# Patient Record
Sex: FEMALE | Race: WHITE | NOT HISPANIC OR LATINO | Employment: OTHER | URBAN - METROPOLITAN AREA
[De-identification: names, ages, dates, MRNs, and addresses within clinical notes are randomized per-mention and may not be internally consistent; named-entity substitution may affect disease eponyms.]

---

## 2017-01-02 DIAGNOSIS — I10 ESSENTIAL HYPERTENSION, BENIGN: Primary | ICD-10-CM

## 2017-01-02 NOTE — TELEPHONE ENCOUNTER
metoprolol (LOPRESSOR) 50 MG tablet      Last Written Prescription Date: 7/5/2016  Last Fill Quantity: 270, # refills: 1    Last Office Visit with [facility], [facility] or [facility] prescribing provider:  11/21/2016   Future Office Visit:    Next 5 appointments (look out 90 days)     Jan 23, 2017  2:30 PM   [facility]    [address] [place] [id number] [phone]            Jan 23, 2017  3:30 PM   Office Visit with Ventura Alvarez MD   [facility] ([facility])    [address] [place] [id number] [phone]                    BP Readings from Last 3 Encounters:   11/21/16 130/83   08/12/16 132/74   07/22/16 132/73

## 2017-01-03 RX ORDER — METOPROLOL TARTRATE 50 MG
TABLET ORAL
Qty: 270 TABLET | Refills: 1 | Status: SHIPPED | OUTPATIENT
Start: 2017-01-03 | End: 2017-06-27

## 2017-01-03 NOTE — TELEPHONE ENCOUNTER
Prescription approved per [facility] [place].    Missy Kent RN

## 2017-01-23 ENCOUNTER — OFFICE VISIT (OUTPATIENT)
Dept: PHARMACY | Facility: CLINIC | Age: 77
End: 2017-01-23
Payer: COMMERCIAL

## 2017-01-23 ENCOUNTER — OFFICE VISIT (OUTPATIENT)
Dept: FAMILY MEDICINE | Facility: CLINIC | Age: 77
End: 2017-01-23
Payer: COMMERCIAL

## 2017-01-23 VITALS
HEART RATE: 94 BPM | SYSTOLIC BLOOD PRESSURE: 124 MMHG | WEIGHT: 214 LBS | HEIGHT: 62 IN | BODY MASS INDEX: 39.38 KG/M2 | TEMPERATURE: 98.6 F | DIASTOLIC BLOOD PRESSURE: 62 MMHG

## 2017-01-23 VITALS
WEIGHT: 216 LBS | HEART RATE: 81 BPM | DIASTOLIC BLOOD PRESSURE: 66 MMHG | BODY MASS INDEX: 40.83 KG/M2 | SYSTOLIC BLOOD PRESSURE: 139 MMHG

## 2017-01-23 DIAGNOSIS — M79.10 MYALGIA: ICD-10-CM

## 2017-01-23 DIAGNOSIS — M79.602 PAIN IN BOTH UPPER EXTREMITIES: Primary | ICD-10-CM

## 2017-01-23 DIAGNOSIS — R79.89 ELEVATED SERUM CREATININE: Primary | ICD-10-CM

## 2017-01-23 DIAGNOSIS — I10 ESSENTIAL HYPERTENSION, BENIGN: ICD-10-CM

## 2017-01-23 DIAGNOSIS — I25.10 CORONARY ARTERY DISEASE INVOLVING NATIVE CORONARY ARTERY OF NATIVE HEART WITHOUT ANGINA PECTORIS: ICD-10-CM

## 2017-01-23 DIAGNOSIS — E78.5 HYPERLIPIDEMIA LDL GOAL <100: ICD-10-CM

## 2017-01-23 DIAGNOSIS — E11.59 TYPE 2 DIABETES MELLITUS WITH VASCULAR DISEASE (H): ICD-10-CM

## 2017-01-23 DIAGNOSIS — R82.81 PYURIA: ICD-10-CM

## 2017-01-23 DIAGNOSIS — E63.9 NUTRITIONAL DISORDER: ICD-10-CM

## 2017-01-23 DIAGNOSIS — M79.601 PAIN IN BOTH UPPER EXTREMITIES: Primary | ICD-10-CM

## 2017-01-23 DIAGNOSIS — K21.9 GASTROESOPHAGEAL REFLUX DISEASE, ESOPHAGITIS PRESENCE NOT SPECIFIED: ICD-10-CM

## 2017-01-23 LAB
ALBUMIN SERPL-MCNC: 3.6 G/DL (ref 3.4–5)
ALBUMIN UR-MCNC: 30 MG/DL
ALP SERPL-CCNC: 123 U/L (ref 40–150)
ALT SERPL W P-5'-P-CCNC: 20 U/L (ref 0–50)
ANION GAP SERPL CALCULATED.3IONS-SCNC: 10 MMOL/L (ref 3–14)
APPEARANCE UR: CLEAR
AST SERPL W P-5'-P-CCNC: 11 U/L (ref 0–45)
BILIRUB SERPL-MCNC: 0.4 MG/DL (ref 0.2–1.3)
BILIRUB UR QL STRIP: ABNORMAL
BUN SERPL-MCNC: 20 MG/DL (ref 7–30)
CALCIUM SERPL-MCNC: 9.1 MG/DL (ref 8.5–10.1)
CHLORIDE SERPL-SCNC: 109 MMOL/L (ref 94–109)
CK SERPL-CCNC: 68 U/L (ref 30–225)
CO2 SERPL-SCNC: 22 MMOL/L (ref 20–32)
COLOR UR AUTO: YELLOW
CREAT SERPL-MCNC: 1.13 MG/DL (ref 0.52–1.04)
ERYTHROCYTE [SEDIMENTATION RATE] IN BLOOD BY WESTERGREN METHOD: 39 MM/H (ref 0–30)
GFR SERPL CREATININE-BSD FRML MDRD: 46 ML/MIN/1.7M2
GLUCOSE SERPL-MCNC: 243 MG/DL (ref 70–99)
GLUCOSE UR STRIP-MCNC: 500 MG/DL
HBA1C MFR BLD: 8.7 % (ref 4.3–6)
HGB UR QL STRIP: NEGATIVE
HYALINE CASTS #/AREA URNS LPF: ABNORMAL /LPF (ref 0–2)
KETONES UR STRIP-MCNC: ABNORMAL MG/DL
LEUKOCYTE ESTERASE UR QL STRIP: ABNORMAL
NITRATE UR QL: NEGATIVE
PH UR STRIP: 5 PH (ref 5–7)
POTASSIUM SERPL-SCNC: 4.4 MMOL/L (ref 3.4–5.3)
PROT SERPL-MCNC: 7.2 G/DL (ref 6.8–8.8)
RBC #/AREA URNS AUTO: ABNORMAL /HPF (ref 0–2)
SODIUM SERPL-SCNC: 141 MMOL/L (ref 133–144)
SP GR UR STRIP: >1.03 (ref 1–1.03)
URN SPEC COLLECT METH UR: ABNORMAL
UROBILINOGEN UR STRIP-ACNC: 0.2 EU/DL (ref 0.2–1)
WBC #/AREA URNS AUTO: ABNORMAL /HPF (ref 0–2)

## 2017-01-23 PROCEDURE — 36415 COLL VENOUS BLD VENIPUNCTURE: CPT | Performed by: PREVENTIVE MEDICINE

## 2017-01-23 PROCEDURE — 82043 UR ALBUMIN QUANTITATIVE: CPT | Performed by: PREVENTIVE MEDICINE

## 2017-01-23 PROCEDURE — 80053 COMPREHEN METABOLIC PANEL: CPT | Performed by: PREVENTIVE MEDICINE

## 2017-01-23 PROCEDURE — 99214 OFFICE O/P EST MOD 30 MIN: CPT | Performed by: PREVENTIVE MEDICINE

## 2017-01-23 PROCEDURE — 86140 C-REACTIVE PROTEIN: CPT | Performed by: PREVENTIVE MEDICINE

## 2017-01-23 PROCEDURE — 85652 RBC SED RATE AUTOMATED: CPT | Performed by: PREVENTIVE MEDICINE

## 2017-01-23 PROCEDURE — 81001 URINALYSIS AUTO W/SCOPE: CPT | Performed by: PREVENTIVE MEDICINE

## 2017-01-23 PROCEDURE — 83036 HEMOGLOBIN GLYCOSYLATED A1C: CPT | Performed by: PREVENTIVE MEDICINE

## 2017-01-23 PROCEDURE — 99607 MTMS BY PHARM ADDL 15 MIN: CPT | Performed by: PHARMACIST

## 2017-01-23 PROCEDURE — 82550 ASSAY OF CK (CPK): CPT | Performed by: PREVENTIVE MEDICINE

## 2017-01-23 PROCEDURE — 99605 MTMS BY PHARM NP 15 MIN: CPT | Performed by: PHARMACIST

## 2017-01-23 NOTE — NURSING NOTE
"Chief Complaint   Patient presents with     Consult     lab results       Initial There were no vitals taken for this visit. Estimated body mass index is 40.83 kg/(m^2) as calculated from the following:    Height as of 11/21/16: 5' 1\" (1.549 m).    Weight as of an earlier encounter on 1/23/17: 216 lb (97.977 kg).  BP completed using cuff size: sherman KELLY CMA      "

## 2017-01-23 NOTE — MR AVS SNAPSHOT
After Visit Summary   1/23/2017    Cara Camarillo    MRN: [id number]           Patient Information     Date Of Birth          1940        Visit Information        Provider Department      1/23/2017 2:30 PM Lissy Diana, [facility] MTM        Care Instructions    Recommendations from today's MTM visit:                                                    MTM (medication therapy management) is a service provided by a clinical pharmacist designed to help you get the most of out of your medicines.   Today we reviewed what your medicines are for, how to know if they are working, that your medicines are safe and how to make your medicine regimen as easy as possible.     1.  Please follow-up with Dr. Alvarez regarding the pain today.  We can try cutting your atorvastatin in half for a while to see if your pain gets any better.    2.  You don't need to drink orange juice unless your blood sugar is less than 70mg/dL.    3.  I would recommend that you start taking a long-acting insulin again, but you didn't feel you could do this due to cost.    Next MTM visit:  Please let me know how your pain is doing in the next 2 weeks.    To schedule another MTM appointment, please call the clinic directly or you may call the MTM scheduling line at [phone] or toll-free at [phone].     My Clinical Pharmacist's contact information:                                                      It was a pleasure seeing you today!  Please feel free to contact me with any questions or concerns you have.      Lissy Diana, PharmD, [facility]  Medication Therapy Management Provider  Pager: [phone]     You may receive a survey about the MTM services you received.  I would appreciate your feedback to help me serve you better in the future. Please fill it out and return it when you can. Your comments will be anonymous.                 Follow-ups after your visit        Your next 10 appointments already  scheduled     Jan 23, 2017  3:30 PM   [facility]    [address] [facility] [address] [phone]           Bring a current list of meds and any records pertaining to this visit.  For Physicals, please bring immunization records and any forms needing to be filled out.  Please arrive 10 minutes early to complete paperwork.              Who to contact     If you have questions or need follow up information about today's clinic visit or your schedule please contact [facility] MTM directly at [phone].  Normal or non-critical lab and imaging results will be communicated to you by MyChart, letter or phone within 4 business days after the clinic has received the results. If you do not hear from us within 7 days, please contact the clinic through MyChart or phone. If you have a critical or abnormal lab result, we will notify you by phone as soon as possible.  Submit refill requests through [employer] or call your pharmacy and they will forward the refill request to us. Please allow 3 business days for your refill to be completed.          Additional Information About Your Visit        Care EveryWhere ID     This is your Care EveryWhere ID. This could be used by other organizations to access your [place] medical records  [id number]        Your Vitals Were     Pulse                   81            Blood Pressure from Last 3 Encounters:   01/23/17 139/66   11/21/16 130/83   08/12/16 132/74    Weight from Last 3 Encounters:   01/23/17 216 lb (97.977 kg)   11/21/16 221 lb 14.4 oz (100.653 kg)   08/12/16 217 lb (98.431 kg)              Today, you had the following     No orders found for display       Primary Care Provider Office Phone # Fax #    Ventura Alvarez [phone]       [facility] [address] [address]        Thank you!     Thank  you for choosing [facility]  for your care. Our goal is always to provide you with excellent care. Hearing back from our patients is one way we can continue to improve our services. Please take a few minutes to complete the written survey that you may receive in the mail after your visit with us. Thank you!             Your Updated Medication List - Protect others around you: Learn how to safely use, store and throw away your medicines at www.disposemymeds.org.          This list is accurate as of: 1/23/17  3:00 PM.  Always use your most recent med list.                   Brand Name Dispense Instructions for use    acetaminophen 500 MG tablet    TYLENOL     Take 500-1,000 mg by mouth 3 times daily as needed for mild pain       amLODIPine 10 MG tablet    NORVASC    90 tablet    Take 1 tablet (10 mg) by mouth daily       aspirin 81 MG tablet     100 tablet    Take 1 tablet (81 mg) by mouth daily       atorvastatin 40 MG tablet    LIPITOR    90 tablet    TAKE 1 TABLET BY MOUTH DAILY       blood glucose monitoring test strip    ONE TOUCH ULTRA    300 each    1 strip by In Vitro route 3 times daily       calcium carbonate 500 MG tablet    OS-MANJEET 500 mg [place]. Ca     Take 500 mg by mouth 2 times daily       clopidogrel 75 MG tablet    PLAVIX    34 tablet    Take 4 tablets the first day, then take 1 tab a day after that.       glipiZIDE 10 MG tablet    GLUCOTROL    360 tablet    Take 2 tablets by mouth in the morning before breakfast and 2 tablets in the evening before dinner       losartan 50 MG tablet    COZAAR    90 tablet    Take 1 tablet (50 mg) by mouth daily       metFORMIN 500 MG 24 hr tablet    GLUCOPHAGE-XR    180 tablet    TAKE 2 TABLETS (1,000 MG) BY MOUTH DAILY       metoprolol 50 MG tablet    LOPRESSOR    270 tablet    TAKE ONE AND ONE-HALF TABLETS BY MOUTH TWICE DAILY       nitroglycerin 0.4 MG sublingual tablet    NITROSTAT    25 tablet    Place 1 tablet (0.4 mg) under the tongue every 5  minutes as needed for chest pain if you are still having symptoms after 3 doses (15 minutes) call 911.       order for DME     1 Device    Equipment being ordered: L wrist brace       pantoprazole 40 MG EC tablet    PROTONIX    90 tablet    TAKE 1 TABLET BY MOUTH ONCE DAILY 30 TO 60 MINUTES BEFORE A MEAL       vitamin D 1000 UNITS capsule     30    1 CAPSULE DAILY

## 2017-01-23 NOTE — PROGRESS NOTES
SUBJECTIVE/OBJECTIVE:                                                    Cara Camarillo is a 76 year old female coming in for a follow-up visit for Medication Therapy Management.  She was referred to me from Dr. Alvarez.     Chief Complaint: Follow up from our visit on 10/19/16.  This visit serves as an initial visit for 2017.  She has an appt with Dr. Alvarez after our visit today.      Tobacco: No tobacco use   Alcohol: not currently using    Medication Adherence: no issues reported    Pain: She's having quite a bit of pain - she saw Dr. Alvarez for this last month and was asked to come back in 5 days - unfortunately that appt was re-scheduled and today was the soonest she could get in.  Pain is primarily in her wrists, forearms, upper arms, shoulders and hands (is primarily in the joints).  She was treated for gout in her wrist.  She is using a heating pad, which seems to be helpful.  She hasn't tried ice.  She's not taking anything else for the pain but does have APAP available for use.  She mentions that it took her 1/2 hour to get her boots on today and another 1/2 hour to get her seatbelt on before our visit due to the pain.  She has declined care coordination services in the past, and continues to do so again today.    Diabetes:  Pt currently taking metformin ER 1000mg daily and glipizide IR 20mg BID. Pt is not experiencing side effects.  SMBG: one time daily (alternating between AM fasting and before dinner).   Ranges (from patient's glucose log):   AM: 172, 183, 188, 171, 171, 198, 198, 162, 173, 140, 190, 145, 192  Before dinner: 131, 136, 150, 171, 125, 141, 137, 155, 122, 145, 144, 114, 141  Symptoms of low blood sugar? none. Frequency of hypoglycemia? Never - but she does drink some OJ with BG readings around 110mg/dL  Recent symptoms of high blood sugar? none  Eye exam: due - she declines at this time due to cost  Foot exam: up to date  Microalbumin is < 30 mg/g. Pt is taking an ACEi/ARB.  Aspirin:  Taking 81mg daily and denies side effects  Diet/Exercise: No change     Hypertension/CAD: Current medications include ASA 81mg daily, Plavix 75mg daily, amlodipine 10mg daily, losartan 50mg daily, metoprolol tartrate 75mg BID, and SL NTG PRN (not recent use).  Patient does not self-monitor BP.  Patient reports no current medication side effects.     Hyperlipidemia: Current therapy includes atorvastatin 40mg once daily.  Pt reports no significant myalgias or other side effects.   The 10-year ASCVD risk score (Gabymango ADRIAN Jr., et al., 2013) is: 48.1%    Values used to calculate the score:      Age: 76 years      Sex: Female      Is an : No      Diabetic: Yes      Tobacco smoker: No      Systolic Blood Pressure: 130 mmHg      Prescribed Antihypertensives: Yes      HDL Cholesterol: 49 mg/dL      Total Cholesterol: 177 mg/dL       GERD: Current medications include: pantoprazole 40mg daily. Pt c/o no current symptoms.  Patient feels that current regimen is effective.     Supplements: She continues taking calcium BID and Vitamin D 1000 IU daily.  She denies side effects of therapy.    Current labs include:  BP Readings from Last 3 Encounters:   11/21/16 130/83   08/12/16 132/74   07/22/16 132/73       Today's Vitals: There were no vitals taken for this visit.     A1C      8.9   11/21/2016    CHOL      177   7/22/2016  TRIG      148   7/22/2016  HDL       49   7/22/2016  LDL       98   7/22/2016    Liver Function Studies -   Recent Labs   Lab Test  11/21/16   1641   PROTTOTAL  7.6   ALBUMIN  3.7   BILITOTAL  0.3   ALKPHOS  108   AST  13   ALT  23       Lab Results   Component Value Date    UCRR 218 07/22/2016    MICROL 16 07/22/2016    UMALCR 7.57 07/22/2016       Last Basic Metabolic Panel:  NA      140   11/21/2016   POTASSIUM      4.5   11/21/2016  CHLORIDE      108   11/21/2016  BUN       31   11/21/2016  CR     1.39   11/21/2016    GFR ESTIMATE   Date Value Ref Range Status   11/21/2016 37* >60 mL/min/1.7m2  Final     Comment:     Non  GFR Calc   09/12/2016 45* >60 mL/min/1.7m2 Final     Comment:     Non  GFR Calc   07/22/2016 42* >60 mL/min/1.7m2 Final     Comment:     Non  GFR Calc       TSH   Date Value Ref Range Status   11/21/2016 3.32 0.40 - 4.00 mU/L Final   ]    Most Recent Immunizations   Administered Date(s) Administered     Influenza (High Dose) 3 valent vaccine 11/21/2016     Influenza (IIV3) 10/12/2012     Pneumococcal (PCV 13) 05/21/2015     Pneumococcal 23 valent 05/16/2014     TD (ADULT, 7+) 04/15/2011     TDAP (ADACEL AGES 11-64) 01/29/2015     ASSESSMENT:                                                    Current medications were reviewed today.      Medication Adherence: no issues identified    Pain: Needs further evaluation, appt scheduled with PCP immediately after ours.  Could be related to statin therapy, but would be abnormal since pain is primarily in joints.  She could try reducing atorvastatin dose for 2 weeks to see if sx improve.  I think she'd benefit from care coordination services, but she declines.    Diabetes: Needs Improvement. Patient is not meeting A1c goal of < 8%.  At this point our options are limited since she didn't tolerate Actos, and she's unwilling/unable to start any brand name medications due to cost.  I think she may qualify for assistance for costs of medications, but she says she's not and she declines checking.  Ok to continue current metformin dose, as long as eGFR doesn't fall <30ml/min.  Doesn't need to treat low blood sugars with readings in the 100s.    Hypertension/CAD: Stable. Patient is meeting BP goal of < 140/90mmHg.      Hyperlipidemia: Stable. Pt is on high intensity statin which is indicated based on 2013 ACC/AHA guidelines for lipid management.       GERD: Stable.  Current treatment is effective.     Supplements:  Calcium citrate would be preferred calcium supplement given concurrent PPI, but she declines  changing (again due to cost).     PLAN:                                                      1.  Please follow-up with Dr. Alvarez regarding the pain today.  We can try cutting your atorvastatin in half for a while to see if your pain gets any better.  2.  You don't need to drink orange juice unless your blood sugar is less than 70mg/dL.  3.  Recommended adding another medication for diabetes, she declined.  4.  Recommended care coordination referral, she declined.    I spent 40 minutes with this patient today.  All changes were made via collaborative practice agreement with Ventura Alvarez. A copy of the visit note was provided to the patient's primary care provider.     Will follow up in 2 weeks, I've asked her to call me with an update on pain control.    The patient was given a summary of these recommendations as an after visit summary.    Lissy Diana, PharmD, [facility]  Medication Therapy Management Provider  Pager: [phone]

## 2017-01-23 NOTE — PATIENT INSTRUCTIONS
Recommendations from today's MTM visit:                                                    MTM (medication therapy management) is a service provided by a clinical pharmacist designed to help you get the most of out of your medicines.   Today we reviewed what your medicines are for, how to know if they are working, that your medicines are safe and how to make your medicine regimen as easy as possible.     1.  Please follow-up with Dr. Alvarez regarding the pain today.  We can try cutting your atorvastatin in half for a while to see if your pain gets any better.    2.  You don't need to drink orange juice unless your blood sugar is less than 70mg/dL.    3.  I would recommend that you start taking a long-acting insulin again, but you didn't feel you could do this due to cost.    Next MTM visit:  Please let me know how your pain is doing in the next 2 weeks.    To schedule another MTM appointment, please call the clinic directly or you may call the MTM scheduling line at [phone] or toll-free at [phone].     My Clinical Pharmacist's contact information:                                                      It was a pleasure seeing you today!  Please feel free to contact me with any questions or concerns you have.      Lissy Diana, [place], [facility]  Medication Therapy Management Provider  Pager: [phone]     You may receive a survey about the MTM services you received.  I would appreciate your feedback to help me serve you better in the future. Please fill it out and return it when you can. Your comments will be anonymous.

## 2017-01-23 NOTE — MR AVS SNAPSHOT
"              After Visit Summary   1/23/2017    Cara Camarillo    MRN: [id number]           Patient Information     Date Of Birth          1940        Visit Information        Provider Department      1/23/2017 3:30 PM Ventura Alvarez MD [facility]         Follow-ups after your visit        Who to contact     If you have questions or need follow up information about today's clinic visit or your schedule please contact [facility] directly at [phone].  Normal or non-critical lab and imaging results will be communicated to you by MyChart, letter or phone within 4 business days after the clinic has received the results. If you do not hear from us within 7 days, please contact the clinic through MyChart or phone. If you have a critical or abnormal lab result, we will notify you by phone as soon as possible.  Submit refill requests through [employer] or call your pharmacy and they will forward the refill request to us. Please allow 3 business days for your refill to be completed.          Additional Information About Your Visit        Care EveryWhere ID     This is your Care EveryWhere ID. This could be used by other organizations to access your [place] medical records  [id number]        Your Vitals Were     Pulse Temperature Height BMI (Body Mass Index) Breastfeeding?       94 98.6  F (37  C) 5' 2\" (1.575 m) 39.13 kg/m2 No        Blood Pressure from Last 3 Encounters:   01/23/17 124/62   01/23/17 139/66   11/21/16 130/83    Weight from Last 3 Encounters:   01/23/17 214 lb (97.07 kg)   01/23/17 216 lb (97.977 kg)   11/21/16 221 lb 14.4 oz (100.653 kg)              Today, you had the following     No orders found for display       Primary Care Provider Office Phone # Fax #    Ventura Alvarez [phone]       [facility] [address]  [place] [address]        Thank you!     Thank you for choosing [place] [facility]  for your care. Our goal is always to provide you with excellent care. Hearing back from our patients is one way we can continue to improve our services. Please take a few minutes to complete the written survey that you may receive in the mail after your visit with us. Thank you!             Your Updated Medication List - Protect others around you: Learn how to safely use, store and throw away your medicines at www.disposemymeds.org.          This list is accurate as of: 1/23/17  3:39 PM.  Always use your most recent med list.                   Brand Name Dispense Instructions for use    acetaminophen 500 MG tablet    TYLENOL     Take 500-1,000 mg by mouth 3 times daily as needed for mild pain       amLODIPine 10 MG tablet    NORVASC    90 tablet    Take 1 tablet (10 mg) by mouth daily       aspirin 81 MG tablet     100 tablet    Take 1 tablet (81 mg) by mouth daily       atorvastatin 40 MG tablet    LIPITOR    90 tablet    TAKE 1 TABLET BY MOUTH DAILY       blood glucose monitoring test strip    ONE TOUCH ULTRA    300 each    1 strip by In Vitro route 3 times daily       calcium carbonate 500 MG tablet    OS-MANJEET 500 mg [place]. Ca     Take 500 mg by mouth 2 times daily       clopidogrel 75 MG tablet    PLAVIX    34 tablet    Take 4 tablets the first day, then take 1 tab a day after that.       glipiZIDE 10 MG tablet    GLUCOTROL    360 tablet    Take 2 tablets by mouth in the morning before breakfast and 2 tablets in the evening before dinner       losartan 50 MG tablet    COZAAR    90 tablet    Take 1 tablet (50 mg) by mouth daily       metFORMIN 500 MG 24 hr tablet    GLUCOPHAGE-XR    180 tablet    TAKE 2 TABLETS (1,000 MG) BY MOUTH DAILY       metoprolol 50 MG tablet    LOPRESSOR    270 tablet    TAKE ONE AND ONE-HALF TABLETS BY MOUTH TWICE DAILY       nitroglycerin 0.4 MG sublingual tablet    NITROSTAT    25 tablet    Place 1 tablet (0.4 mg) under the tongue every 5 minutes as needed for chest pain  if you are still having symptoms after 3 doses (15 minutes) call 911.       order for DME     1 Device    Equipment being ordered: L wrist brace       pantoprazole 40 MG EC tablet    PROTONIX    90 tablet    TAKE 1 TABLET BY MOUTH ONCE DAILY 30 TO 60 MINUTES BEFORE A MEAL       vitamin D 1000 UNITS capsule     30    1 CAPSULE DAILY

## 2017-01-23 NOTE — LETTER
[facility]  [address] [place] [address] [facility]  [address]  [place]  [address]  Tel: [phone]    February 13, 2017    [place]  [address] [facility] [address]        Dear Ms. Camarillo,    Many of your labs look fine.    Your urinalysis has a few white blood cells in it.  I advise repeating this test in 2-3 weeks.    Your hemoglobin a1c (diabetes test) is above goal.  I advise increasing your metformin to 1000 mg in the morning and 500 mg in the evening.    Please call me with your blood glucoses in 2 weeks.    We should recheck your creatinine in 3-4 weeks and your a1c lab in 3 months.    It was a pleasure seeing you in clinic recently.  Please call with any questions you may have.      Sincerely,    Syvlester Alvarez MD/jin    Results for orders placed or performed in visit on 01/23/17   Comprehensive metabolic panel   Result Value Ref Range    Sodium 141 133 - 144 mmol/L    Potassium 4.4 3.4 - 5.3 mmol/L    Chloride 109 94 - 109 mmol/L    Carbon Dioxide 22 20 - 32 mmol/L    Anion Gap 10 3 - 14 mmol/L    Glucose 243 (H) 70 - 99 mg/dL    Urea Nitrogen 20 7 - 30 mg/dL    Creatinine 1.13 (H) 0.52 - 1.04 mg/dL    GFR Estimate 46 (L) >60 mL/min/1.7m2    GFR Estimate If Black 56 (L) >60 mL/min/1.7m2    Calcium 9.1 8.5 - 10.1 mg/dL    Bilirubin Total 0.4 0.2 - 1.3 mg/dL    Albumin 3.6 3.4 - 5.0 g/dL    Protein Total 7.2 6.8 - 8.8 g/dL    Alkaline Phosphatase 123 40 - 150 U/L    ALT 20 0 - 50 U/L    AST 11 0 - 45 U/L   UA reflex to Microscopic and Culture   Result Value Ref Range    Color Urine Yellow     Appearance Urine Clear     Glucose Urine 500 (A) NEG mg/dL    Bilirubin Urine (A) NEG     Small  This is an unconfirmed screening test result. A positive result may be false.      Ketones Urine Trace (A) NEG mg/dL    Specific Gravity Urine >1.030 1.003 - 1.035    Blood Urine Negative NEG    pH Urine 5.0 5.0 - 7.0 pH    Protein Albumin Urine 30 (A) NEG mg/dL    Urobilinogen Urine 0.2 0.2 - 1.0 EU/dL     Nitrite Urine Negative NEG    Leukocyte Esterase Urine Small (A) NEG    Source Midstream Urine    Albumin Random Urine Quantitative   Result Value Ref Range    Creatinine Urine 292 mg/dL    Albumin Urine mg/L 60 mg/L    Albumin Urine mg/g Cr 20.58 0 - 25 mg/g Cr   Hemoglobin A1c   Result Value Ref Range    Hemoglobin A1C 8.7 (H) 4.3 - 6.0 %   ESR: Erythrocyte sedimentation rate   Result Value Ref Range    Sed Rate 39 (H) 0 - 30 mm/h   CK total   Result Value Ref Range    CK Total 68 30 - 225 U/L   CRP, inflammation   Result Value Ref Range    CRP Inflammation 8.1 (H) 0.0 - 8.0 mg/L   Urine Microscopic   Result Value Ref Range    WBC Urine 2-5 (A) 0 - 2 /HPF    RBC Urine O - 2 0 - 2 /HPF    Hyaline Casts O - 2 0 - 2 /LPF           Enclosure: Lab Results

## 2017-01-24 LAB
CREAT UR-MCNC: 292 MG/DL
CRP SERPL-MCNC: 8.1 MG/L (ref 0–8)
MICROALBUMIN UR-MCNC: 60 MG/L
MICROALBUMIN/CREAT UR: 20.58 MG/G CR (ref 0–25)

## 2017-01-27 NOTE — PROGRESS NOTES
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    ROS:  C: NEGATIVE for fever, chills  E: NEGATIVE for vision changes   R: NEGATIVE for significant cough or SOB  CV: NEGATIVE for chest pain, palpitations   GI: NEGATIVE for nausea, abdominal pain, heartburn, or change in bowel habits  : NEGATIVE for frequency, dysuria, or hematuria  M: NEGATIVE for significant arthralgias or myalgia  N: NEGATIVE for weakness, dizziness or paresthesias or headache    OBJECTIVE:  /62 mmHg  Pulse 94  Temp(Src) 98.6  F (37  C)  Ht 5' 2\" (1.575 m)  Wt 214 lb (97.07 kg)  BMI 39.13 kg/m2  Breastfeeding? No  EXAM:  GENERAL APPEARANCE: healthy, alert and no distress  EYES: EOMI,  PERRL  HENT: ear canals and TM's normal and nose and mouth without ulcers or lesions  RESP: lungs clear to auscultation - no rales, rhonchi or wheezes  CV: regular rates and rhythm, normal S1 S2, no S3 or S4 and no murmur, click or rub -  ABDOMEN:  soft, nontender, no HSM or masses and bowel sounds normal    ASSESSMENT/PLAN:  (R79.89) Elevated serum creatinine  (primary encounter diagnosis)  Plan: Comprehensive metabolic panel, UA reflex to         Microscopic and Culture, Albumin Random Urine         Quantitative, Urine Microscopic    (M79.1) Myalgia  Plan: ESR: Erythrocyte sedimentation rate, CK total,         CRP, inflammation  Will check labs and stop atorvastatin for 3 weeks  Pt to call me with update on symptoms at that point.    (E78.5) Hyperlipidemia LDL goal <100    (E11.59) Type 2 diabetes mellitus with vascular disease " (H)  Plan: Hemoglobin A1c    (I25.10) Coronary artery disease involving native coronary artery of native heart without angina pectoris    Above issues are stable, med changes pending labs    25 minutes spent with patient, over 50% time counseling, coordinating care and explaining about nature of the patient's conditions.  All risks, benefits of treatment and further evaluation was reviewed with patient.  Pt expressed understanding.  Pt was in agreement with this plan.  Ventura Alvarez MD

## 2017-02-08 ENCOUNTER — TELEPHONE (OUTPATIENT)
Dept: PHARMACY | Facility: CLINIC | Age: 77
End: 2017-02-08

## 2017-02-08 NOTE — Clinical Note
[facility]  [address] [address] [phone]          February 10, 2017    Cara Camarillo                                                                                                                     [address] [facility] [address]            Dear Cara,    Our records show that you are due for a Medication Therapy Management (MTM) appointment. This is an appointment to meet with a pharmacist in the clinic to make sure you get the most out of your medications.  Your medications play an important role in your health.  We would like to meet with you to review how your medications are working for you and to answer any questions you may have.       I know you left me a message earlier this week and I've tried reaching you several times without success.  Can you call the phone # below to set up a phone visit with me so we can have a set time to touch base?     We are available in the clinic on Mondays, Wednesdays and Fridays from 8am to 4pm.  To make an appointment, please call our scheduling line at [phone].    We hope to see you soon!       Sincerely,        Lissy Diana, PharmD, [facility]  Medication Therapy Management Pharmacist  Pager: [phone]     Jacey Hampton PharmD  Medication Therapy Management Pharmacy Resident  Pager: [phone]

## 2017-02-08 NOTE — TELEPHONE ENCOUNTER
Received VM from [place] indicating that Dr. Alvarez stopped her atorvastatin and her pain is doing so much better.  I tried calling her back to f/up on this - there was no answer and no VM available.  Will try again at a later time.    Lissy Diana, PharmD, [facility]  Medication Therapy Management Provider  Pager: [phone]

## 2017-02-10 NOTE — TELEPHONE ENCOUNTER
Tried calling patient again - there again was no answer and no VM available.  Sent letter to warner Diana PharmD, [facility]  Medication Therapy Management Provider  Pager: [phone]

## 2017-02-10 NOTE — TELEPHONE ENCOUNTER
Tried calling patient again - let phone ring many times, there was no answer, no VM available.  Will try again at a later time.    Lissy Diana, PharmD, [facility]  Medication Therapy Management Provider  Pager: [phone]

## 2017-02-10 NOTE — TELEPHONE ENCOUNTER
Tried calling patient again - phone is busy.  Will try again later.    Lissy Diana, PharmD, [facility]  Medication Therapy Management Provider  Pager: [phone]

## 2017-02-13 ENCOUNTER — TELEPHONE (OUTPATIENT)
Dept: FAMILY MEDICINE | Facility: CLINIC | Age: 77
End: 2017-02-13

## 2017-02-13 DIAGNOSIS — E78.5 HYPERLIPIDEMIA LDL GOAL <100: Primary | ICD-10-CM

## 2017-02-13 NOTE — TELEPHONE ENCOUNTER
Please call patient to let them know  Many of your labs look fine.    Your urinalysis has a few white blood cells in it.  I advise repeating this test in 2-3 weeks.    Your hemoglobin a1c (diabetes test) is above goal.  I advise increasing your metformin to 1000 mg in the morning and 500 mg in the evening.    Please call me with your blood glucoses in 2 weeks.    We should recheck your creatinine in 3-4 weeks and your a1c lab in 3 months.    It was a pleasure seeing you in clinic recently.  Please call with any questions you may have.  Ventura Alvarez MD

## 2017-02-14 ENCOUNTER — TELEPHONE (OUTPATIENT)
Dept: FAMILY MEDICINE | Facility: CLINIC | Age: 77
End: 2017-02-14

## 2017-02-14 RX ORDER — ROSUVASTATIN CALCIUM 5 MG/1
5 TABLET, COATED ORAL WEEKLY
Qty: 12 TABLET | Refills: 1 | Status: SHIPPED | OUTPATIENT
Start: 2017-02-14 | End: 2017-06-05

## 2017-02-14 NOTE — TELEPHONE ENCOUNTER
Reason for Call:  Other prescription    Detailed comments: pharmacy is asking for rx clarification  Apparently pt is on 2 different statins   Please call the pharmacist and verify  [phone]    Best Time:     Can we leave a detailed message on this number? YES    Call taken on 2/14/2017 at 3:48 PM by Anamaria Boogie

## 2017-02-14 NOTE — TELEPHONE ENCOUNTER
Advised per below, and pt will  new medication, and come for urinalysis as recommended in 2 weeks.    Jossy Wharton RN

## 2017-02-28 ENCOUNTER — TELEPHONE (OUTPATIENT)
Dept: FAMILY MEDICINE | Facility: CLINIC | Age: 77
End: 2017-02-28

## 2017-02-28 DIAGNOSIS — E11.59 TYPE 2 DIABETES MELLITUS WITH VASCULAR DISEASE (H): ICD-10-CM

## 2017-02-28 NOTE — TELEPHONE ENCOUNTER
Reason for Call:  Other FYI    Detailed comments: BS readings for the past 2 weeks.  2/14   2/15   2/16   2/17   2/18   2/19   2/20   2/21   2/22   2/23   2/24   225   2/26 PM 96  2/27     Phone Number Patient can be reached at: Home number on file [phone] (home)    Best Time: anytime    Can we leave a detailed message on this number? YES    Call taken on 2/28/2017 at 9:27 AM by Jacqueline Hargrove

## 2017-03-01 RX ORDER — METFORMIN HCL 500 MG
TABLET, EXTENDED RELEASE 24 HR ORAL
Qty: 180 TABLET | Refills: 0
Start: 2017-03-01 | End: 2017-05-24

## 2017-03-01 NOTE — TELEPHONE ENCOUNTER
Patient notified of below provider information.  She is confirming she take 1000 mg in Morning and 500 mg in PM of metformin.  Deandra Denise RN

## 2017-03-01 NOTE — TELEPHONE ENCOUNTER
Attempted to reach Patient, no answer on her home phone number, no way to leave a message.   Called her second number listed which is her daughter Kalina (consent to communicate is on file), left message for her mother to call the clinic back, advised that we would try her home phone again later today also.    Need to confirm dosing that the Patient is taking for her Metformin as requested by PCP in message below.   Also need to ask Patient which labs she was told to come and get redrawn as she has an appointment scheduled for labs on 3/6 but no future labs are noted in the chart.    Teodora Pulido RN

## 2017-03-01 NOTE — TELEPHONE ENCOUNTER
Please advise pt continue current meds for diabetes - confirm she is taking metformin 1000 mg in am and 500 mg in pm.  Advise we should recheck her creatinine lab in the next few days in the clinic. Please have her continue to check her glucoses and contact us with an update. Thanks.

## 2017-03-06 ENCOUNTER — DOCUMENTATION ONLY (OUTPATIENT)
Dept: LAB | Facility: CLINIC | Age: 77
End: 2017-03-06

## 2017-03-06 DIAGNOSIS — R82.81 PYURIA: ICD-10-CM

## 2017-03-06 DIAGNOSIS — E11.59 TYPE 2 DIABETES MELLITUS WITH VASCULAR DISEASE (H): ICD-10-CM

## 2017-03-06 DIAGNOSIS — R82.81 PYURIA: Primary | ICD-10-CM

## 2017-03-06 LAB
ALBUMIN UR-MCNC: 30 MG/DL
APPEARANCE UR: CLEAR
BACTERIA #/AREA URNS HPF: ABNORMAL /HPF
BILIRUB UR QL STRIP: ABNORMAL
COLOR UR AUTO: YELLOW
CREAT SERPL-MCNC: 1.21 MG/DL (ref 0.52–1.04)
GFR SERPL CREATININE-BSD FRML MDRD: 43 ML/MIN/1.7M2
GLUCOSE UR STRIP-MCNC: 100 MG/DL
HBA1C MFR BLD: 8.3 % (ref 4.3–6)
HGB UR QL STRIP: NEGATIVE
HYALINE CASTS #/AREA URNS LPF: ABNORMAL /LPF (ref 0–2)
KETONES UR STRIP-MCNC: ABNORMAL MG/DL
LEUKOCYTE ESTERASE UR QL STRIP: ABNORMAL
MUCOUS THREADS #/AREA URNS LPF: PRESENT /LPF
NITRATE UR QL: NEGATIVE
NON-SQ EPI CELLS #/AREA URNS LPF: ABNORMAL /LPF
PH UR STRIP: 5.5 PH (ref 5–7)
RBC #/AREA URNS AUTO: ABNORMAL /HPF (ref 0–2)
SP GR UR STRIP: 1.02 (ref 1–1.03)
URN SPEC COLLECT METH UR: ABNORMAL
UROBILINOGEN UR STRIP-ACNC: 0.2 EU/DL (ref 0.2–1)
WBC #/AREA URNS AUTO: ABNORMAL /HPF (ref 0–2)

## 2017-03-06 PROCEDURE — 81001 URINALYSIS AUTO W/SCOPE: CPT | Performed by: PREVENTIVE MEDICINE

## 2017-03-06 PROCEDURE — 83036 HEMOGLOBIN GLYCOSYLATED A1C: CPT | Performed by: PREVENTIVE MEDICINE

## 2017-03-06 PROCEDURE — 82565 ASSAY OF CREATININE: CPT | Performed by: PREVENTIVE MEDICINE

## 2017-03-06 PROCEDURE — 36415 COLL VENOUS BLD VENIPUNCTURE: CPT | Performed by: PREVENTIVE MEDICINE

## 2017-03-06 NOTE — PROGRESS NOTES
Patient came in TODAY to get her UA recheck. She mentioned she needed to recheck her leukocytes. Please place future order.    Thanks!

## 2017-03-06 NOTE — LETTER
[facility]  [address] [facility]  [address]  [place]  [address]  Tel: [phone]    March 21, 2017    Cara M Marquise  [address] [facility] [address]        Dear MsCelena Marquise,    Your labs generally look fine.  Your hemoglobin a1c (diabetes test) remains slightly elevated.  Please make an appointment with me in clinic to further discuss this.    It was a pleasure seeing you in clinic recently.  Please call with any questions you may have.    Sincerely,    Sylvester Alvarez MD/jin    Results for orders placed or performed in visit on 03/06/17   Creatinine   Result Value Ref Range    Creatinine 1.21 (H) 0.52 - 1.04 mg/dL    GFR Estimate 43 (L) >60 mL/min/1.7m2    GFR Estimate If Black 52 (L) >60 mL/min/1.7m2   Hemoglobin A1c   Result Value Ref Range    Hemoglobin A1C 8.3 (H) 4.3 - 6.0 %   UA reflex to Microscopic and Culture   Result Value Ref Range    Color Urine Yellow     Appearance Urine Clear     Glucose Urine 100 (A) NEG mg/dL    Bilirubin Urine (A) NEG     Small  This is an unconfirmed screening test result. A positive result may be false.      Ketones Urine Trace (A) NEG mg/dL    Specific Gravity Urine 1.025 1.003 - 1.035    Blood Urine Negative NEG    pH Urine 5.5 5.0 - 7.0 pH    Protein Albumin Urine 30 (A) NEG mg/dL    Urobilinogen Urine 0.2 0.2 - 1.0 EU/dL    Nitrite Urine Negative NEG    Leukocyte Esterase Urine Small (A) NEG    Source Midstream Urine    Urine Microscopic   Result Value Ref Range    WBC Urine 2-5 (A) 0 - 2 /HPF    RBC Urine O - 2 0 - 2 /HPF    Hyaline Casts 2-5 (A) 0 - 2 /LPF    Squamous Epithelial /LPF Urine Few FEW /LPF    Bacteria Urine Many (A) NEG /HPF    Mucous Urine Present (A) NEG /LPF           Enclosure: Lab Results

## 2017-03-08 ENCOUNTER — ALLIED HEALTH/NURSE VISIT (OUTPATIENT)
Dept: PHARMACY | Facility: CLINIC | Age: 77
End: 2017-03-08
Payer: COMMERCIAL

## 2017-03-08 DIAGNOSIS — I25.10 CORONARY ARTERY DISEASE INVOLVING NATIVE CORONARY ARTERY OF NATIVE HEART WITHOUT ANGINA PECTORIS: ICD-10-CM

## 2017-03-08 DIAGNOSIS — E78.5 HYPERLIPIDEMIA LDL GOAL <100: Primary | ICD-10-CM

## 2017-03-08 DIAGNOSIS — E11.59 TYPE 2 DIABETES MELLITUS WITH VASCULAR DISEASE (H): ICD-10-CM

## 2017-03-08 DIAGNOSIS — I10 ESSENTIAL HYPERTENSION, BENIGN: ICD-10-CM

## 2017-03-08 PROCEDURE — 99605 MTMS BY PHARM NP 15 MIN: CPT | Performed by: PHARMACIST

## 2017-03-08 PROCEDURE — 99607 MTMS BY PHARM ADDL 15 MIN: CPT | Performed by: PHARMACIST

## 2017-03-08 NOTE — MR AVS SNAPSHOT
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  Normal or non-critical lab and imaging results will be communicated to you by MyChart, letter or phone within 4 business days after the clinic has received the results. If you do not hear from us within 7 days, please contact the clinic through [facility] or phone. If you have a critical or abnormal lab result, we will notify you by phone as soon as possible.  Submit refill requests through [facility] or call your pharmacy and they will forward the refill request to us. Please allow 3 business days for your refill to be completed.          Additional Information About Your Visit        MyChart Information     [facility] lets you send messages to your doctor, view your test results, renew your prescriptions, schedule appointments and more. To sign up, go to [email or web address] . Click on \"Log in\" on the left side of the screen, which will take you to the Welcome page. Then click on \"Sign up Now\" on the right side of the page.     You will be asked to enter the access code listed below, as well as some personal information. Please follow the directions to create your username and password.     Your access code is: [email or web address]  Expires: 6/6/2017 10:37 AM     Your " access code will  in 90 days. If you need help or a new code, please call your [place] clinic or [phone].        Care EveryWhere ID     This is your Care EveryWhere ID. This could be used by other organizations to access your [place] medical records  [id number]         Blood Pressure from Last 3 Encounters:   17 124/62   17 139/66   16 130/83    Weight from Last 3 Encounters:   17 214 lb (97.1 kg)   17 216 lb (98 kg)   16 221 lb 14.4 oz (100.7 kg)              Today, you had the following     No orders found for display       Primary Care Provider Office Phone # Fax #    Ventura Jeter Anders Alvarez [phone]       [facility] [address]  [place] [address]        Thank you!     Thank you for choosing [facility]  for your care. Our goal is always to provide you with excellent care. Hearing back from our patients is one way we can continue to improve our services. Please take a few minutes to complete the written survey that you may receive in the mail after your visit with us. Thank you!             Your Updated Medication List - Protect others around you: Learn how to safely use, store and throw away your medicines at www.disposemymeds.org.          This list is accurate as of: 3/8/17 10:37 AM.  Always use your most recent med list.                   Brand Name Dispense Instructions for use    acetaminophen 500 MG tablet    TYLENOL     Take 500-1,000 mg by mouth 3 times daily as needed for mild pain       amLODIPine 10 MG tablet    NORVASC    90 tablet    Take 1 tablet (10 mg) by mouth daily       aspirin 81 MG tablet     100 tablet    Take 1 tablet (81 mg) by mouth daily       blood glucose monitoring test strip    ONE TOUCH ULTRA    300 each    1 strip by In Vitro route 3 times daily       calcium carbonate 500 MG tablet    OS-MANJEET 500 mg Nikolai. Ca     Take 500 mg by mouth 2 times daily        clopidogrel 75 MG tablet    PLAVIX    34 tablet    Take 4 tablets the first day, then take 1 tab a day after that.       glipiZIDE 10 MG tablet    GLUCOTROL    360 tablet    TAKE 2 TABLETS BY MOUTH IN THE MORNING BEFORE BREAKFAST AND 2 TABLETS IN THE EVENING BEFORE DINNER       losartan 50 MG tablet    COZAAR    90 tablet    Take 1 tablet (50 mg) by mouth daily       metFORMIN 500 MG 24 hr tablet    GLUCOPHAGE-XR    180 tablet    1000 mg in am and 500 mg in pm       metoprolol 50 MG tablet    LOPRESSOR    270 tablet    TAKE ONE AND ONE-HALF TABLETS BY MOUTH TWICE DAILY       nitroglycerin 0.4 MG sublingual tablet    NITROSTAT    25 tablet    Place 1 tablet (0.4 mg) under the tongue every 5 minutes as needed for chest pain if you are still having symptoms after 3 doses (15 minutes) call 911.       order for DME     1 Device    Equipment being ordered: L wrist brace       pantoprazole 40 MG EC tablet    PROTONIX    90 tablet    TAKE 1 TABLET BY MOUTH ONCE DAILY 30 TO 60 MINUTES BEFORE A MEAL       rosuvastatin 5 MG tablet    CRESTOR    12 tablet    Take 1 tablet (5 mg) by mouth once a week       vitamin D 1000 UNITS capsule     30    1 CAPSULE DAILY

## 2017-03-08 NOTE — PROGRESS NOTES
SUBJECTIVE/OBJECTIVE:                                                    Cara Camarillo is a 76 year old female called for a follow-up visit for Medication Therapy Management.  She was referred to me from Dr. Alvarez.     Chief Complaint: Follow up from our visit on 1/23/17.  This visit serves as an initial visit for 2017.  Called to f/up on diabetes management.    Tobacco: No tobacco use   Alcohol: not currently using    Medication Adherence: no issues reported    Hyperlipidemia: Current therapy includes rosuvastatin 5mg weekly.  Pt reports pain/weakness in her arms, but improved compared to atorvastatin.  She says there are times/days when she doesn't have pains.  The 10-year ASCVD risk score (Gaby ADRIAN Jr., et al., 2013) is: 44.9%    Values used to calculate the score:      Age: 76 years      Sex: Female      Is Non- : No      Diabetic: Yes      Tobacco smoker: No      Systolic Blood Pressure: 124 mmHg      Is Prescribed Antihypertensives: Yes      HDL Cholesterol: 49 mg/dL      Total Cholesterol: 177 mg/dL     Diabetes:  Pt currently taking metformin XR 1500mg/day and glipizide IR 20mg BID. Pt is not experiencing side effects.  SMBG: one time daily (alternating AM and PM).   Ranges (patient reported): See telephone encounter from 2/28  Symptoms of low blood sugar? none. Frequency of hypoglycemia? never.  Recent symptoms of high blood sugar? none  Eye exam: due  Foot exam: up to date   Microalbumin is < 30 mg/g. Pt is taking an ACEi/ARB.  Aspirin: Taking 81mg daily and denies side effects    Hypertension/CAD: Current medications include ASA 81mg daily, Plavix 75mg daily, amlodipine 10mg daily, losartan 50mg daily, and metoprolol tartrate 75mg BID.  Patient does not self-monitor BP.  Patient reports no current medication side effects.  She reports being told at one point that she could d/c Plavix after one year - she says it's been about a year now and wonders if she can d/c.    She denied  other questions or concerns today so didn't want to discuss her other medications/conditions.    Current labs include:  BP Readings from Last 3 Encounters:   01/23/17 124/62   01/23/17 139/66   11/21/16 130/83     Today's Vitals: There were no vitals taken for this visit. - telephone visit    Lab Results   Component Value Date    A1C 8.3 03/06/2017   .  Lab Results   Component Value Date    CHOL 177 07/22/2016     Lab Results   Component Value Date    TRIG 148 07/22/2016     Lab Results   Component Value Date    HDL 49 07/22/2016     Lab Results   Component Value Date    LDL 98 07/22/2016    LDL 89 12/18/2015       Liver Function Studies -   Recent Labs   Lab Test  01/23/17   1557   PROTTOTAL  7.2   ALBUMIN  3.6   BILITOTAL  0.4   ALKPHOS  123   AST  11   ALT  20       Lab Results   Component Value Date    UCRR 292 01/23/2017    MICROL 60 01/23/2017    UMALCR 20.58 01/23/2017       Last Basic Metabolic Panel:  Lab Results   Component Value Date     01/23/2017      Lab Results   Component Value Date    POTASSIUM 4.4 01/23/2017     Lab Results   Component Value Date    CHLORIDE 109 01/23/2017     Lab Results   Component Value Date    BUN 20 01/23/2017     Lab Results   Component Value Date    CR 1.21 03/06/2017     GFR Estimate   Date Value Ref Range Status   03/06/2017 43 (L) >60 mL/min/1.7m2 Final     Comment:     Non  GFR Calc   01/23/2017 46 (L) >60 mL/min/1.7m2 Final     Comment:     Non  GFR Calc   11/21/2016 37 (L) >60 mL/min/1.7m2 Final     Comment:     Non  GFR Calc       TSH   Date Value Ref Range Status   11/21/2016 3.32 0.40 - 4.00 mU/L Final   ]    Most Recent Immunizations   Administered Date(s) Administered     Influenza (High Dose) 3 valent vaccine 11/21/2016     Influenza (IIV3) 10/12/2012     Pneumococcal (PCV 13) 05/21/2015     Pneumococcal 23 valent 05/16/2014     TD (ADULT, 7+) 04/15/2011     TDAP (ADACEL AGES 11-64) 01/29/2015     ASSESSMENT:                                                     Current medications were reviewed today as discussed above.      Medication Adherence: no issues identified    Hyperlipidemia: Stable. Pt is not on high intensity statin which is indicated based on 2013 ACC/AHA guidelines for lipid management, but is on maximally tolerated statin at this time.    Diabetes: Improved. Patient is not meeting A1c goal of < 8%, but A1c was checked after 6 weeks rather than 3 months.  A1c did improve, so next A1c may be at goal without further medication adjustments.    Hypertension/CAD: Stable. Patient is meeting BP goal of < 140/90mmHg.  Would benefit from following up with cardiology to discuss Plavix duration.      PLAN:                                                      1.  Continue current medication regimen.  Will be due for another A1c in 3 months.  2.  Encouraged Cara to schedule 1 year f/up appt with cardiology.    I spent 20 minutes with this patient today.  All changes were made via collaborative practice agreement with Ventura Alvarez. A copy of the visit note was provided to the patient's primary care provider.     Will follow up in 3 months, sooner if needed.    The patient declined a summary of these recommendations as an after visit summary.    Lissy Diana, PharmD, [facility]  Medication Therapy Management Provider  Pager: [phone]

## 2017-03-17 ENCOUNTER — TELEPHONE (OUTPATIENT)
Dept: CARDIOLOGY | Facility: CLINIC | Age: 77
End: 2017-03-17

## 2017-03-17 DIAGNOSIS — I25.10 CAD IN NATIVE ARTERY: ICD-10-CM

## 2017-03-17 RX ORDER — CLOPIDOGREL BISULFATE 75 MG/1
75 TABLET ORAL DAILY
Qty: 90 TABLET | Refills: 1 | Status: SHIPPED | OUTPATIENT
Start: 2017-03-17 | End: 2017-05-02

## 2017-03-17 NOTE — TELEPHONE ENCOUNTER
Patient called wondering if she should see a doctor for her annual OV to review medication. Parth currently has an OV in April. Advised patient to keep scheduled OV.

## 2017-03-23 DIAGNOSIS — E11.59 TYPE 2 DIABETES MELLITUS WITH VASCULAR DISEASE (H): ICD-10-CM

## 2017-03-23 RX ORDER — METFORMIN HCL 500 MG
TABLET, EXTENDED RELEASE 24 HR ORAL
Qty: 180 TABLET | Refills: 0 | Status: SHIPPED | OUTPATIENT
Start: 2017-03-23 | End: 2017-04-12

## 2017-03-23 NOTE — TELEPHONE ENCOUNTER
metFORMIN (GLUCOPHAGE-XR) 500 MG 24 hr tablet 180 tablet 0 3/1/2017              Last Written Prescription Date: 3/1/17  Last Fill Quantity: 180, # refills: 0  Last Office Visit with G, P or [facility] prescribing provider:  1/23/17   Next 5 appointments (look out 90 days)     Apr 12, 2017  4:30 PM CDT   [facility] Visit with Ventura Alvarez MD   [facility]    [address] [facility] [id number] [phone]                   BP Readings from Last 3 Encounters:   01/23/17 124/62   01/23/17 139/66   11/21/16 130/83     Lab Results   Component Value Date    MICROL 60 01/23/2017     No results found for: MICROALBUMIN  Creatinine   Date Value Ref Range Status   03/06/2017 1.21 (H) 0.52 - 1.04 mg/dL Final   ]  GFR Estimate   Date Value Ref Range Status   03/06/2017 43 (L) >60 mL/min/1.7m2 Final     Comment:     Non  GFR Calc   01/23/2017 46 (L) >60 mL/min/1.7m2 Final     Comment:     Non  GFR Calc   11/21/2016 37 (L) >60 mL/min/1.7m2 Final     Comment:     Non  GFR Calc     GFR Estimate If Black   Date Value Ref Range Status   03/06/2017 52 (L) >60 mL/min/1.7m2 Final     Comment:      GFR Calc   01/23/2017 56 (L) >60 mL/min/1.7m2 Final     Comment:      GFR Calc   11/21/2016 44 (L) >60 mL/min/1.7m2 Final     Comment:      GFR Calc     Lab Results   Component Value Date    CHOL 177 07/22/2016     Lab Results   Component Value Date    HDL 49 07/22/2016     Lab Results   Component Value Date    LDL 98 07/22/2016     Lab Results   Component Value Date    TRIG 148 07/22/2016     Lab Results   Component Value Date    CHOLHDLRATIO 4.4 10/24/2014     Lab Results   Component Value Date    AST 11 01/23/2017     Lab Results   Component Value Date    ALT 20 01/23/2017     Lab Results   Component Value Date    A1C 8.3 03/06/2017    A1C 8.7 01/23/2017    A1C 8.9 11/21/2016    A1C  8.3 07/22/2016    A1C 7.7 04/18/2016     Potassium   Date Value Ref Range Status   01/23/2017 4.4 3.4 - 5.3 mmol/L Final

## 2017-04-12 ENCOUNTER — OFFICE VISIT (OUTPATIENT)
Dept: FAMILY MEDICINE | Facility: CLINIC | Age: 77
End: 2017-04-12
Payer: COMMERCIAL

## 2017-04-12 ENCOUNTER — RADIANT APPOINTMENT (OUTPATIENT)
Dept: GENERAL RADIOLOGY | Facility: CLINIC | Age: 77
End: 2017-04-12
Attending: PREVENTIVE MEDICINE
Payer: COMMERCIAL

## 2017-04-12 VITALS
WEIGHT: 214 LBS | OXYGEN SATURATION: 98 % | HEIGHT: 62 IN | BODY MASS INDEX: 39.38 KG/M2 | TEMPERATURE: 97.4 F | HEART RATE: 78 BPM | DIASTOLIC BLOOD PRESSURE: 76 MMHG | SYSTOLIC BLOOD PRESSURE: 147 MMHG

## 2017-04-12 DIAGNOSIS — M25.552 HIP PAIN, LEFT: Primary | ICD-10-CM

## 2017-04-12 DIAGNOSIS — E11.59 TYPE 2 DIABETES MELLITUS WITH VASCULAR DISEASE (H): ICD-10-CM

## 2017-04-12 DIAGNOSIS — M25.552 HIP PAIN, LEFT: ICD-10-CM

## 2017-04-12 DIAGNOSIS — R30.0 DYSURIA: ICD-10-CM

## 2017-04-12 LAB
ALBUMIN UR-MCNC: ABNORMAL MG/DL
APPEARANCE UR: CLEAR
BACTERIA #/AREA URNS HPF: ABNORMAL /HPF
BASOPHILS # BLD AUTO: 0 10E9/L (ref 0–0.2)
BASOPHILS NFR BLD AUTO: 0.3 %
BILIRUB UR QL STRIP: NEGATIVE
COLOR UR AUTO: YELLOW
DIFFERENTIAL METHOD BLD: NORMAL
EOSINOPHIL # BLD AUTO: 0.2 10E9/L (ref 0–0.7)
EOSINOPHIL NFR BLD AUTO: 3.5 %
ERYTHROCYTE [DISTWIDTH] IN BLOOD BY AUTOMATED COUNT: 14.5 % (ref 10–15)
GLUCOSE UR STRIP-MCNC: NEGATIVE MG/DL
HCT VFR BLD AUTO: 42.2 % (ref 35–47)
HGB BLD-MCNC: 13.9 G/DL (ref 11.7–15.7)
HGB UR QL STRIP: ABNORMAL
KETONES UR STRIP-MCNC: NEGATIVE MG/DL
LEUKOCYTE ESTERASE UR QL STRIP: ABNORMAL
LYMPHOCYTES # BLD AUTO: 1.3 10E9/L (ref 0.8–5.3)
LYMPHOCYTES NFR BLD AUTO: 22 %
MCH RBC QN AUTO: 29 PG (ref 26.5–33)
MCHC RBC AUTO-ENTMCNC: 32.9 G/DL (ref 31.5–36.5)
MCV RBC AUTO: 88 FL (ref 78–100)
MONOCYTES # BLD AUTO: 0.5 10E9/L (ref 0–1.3)
MONOCYTES NFR BLD AUTO: 9.2 %
MUCOUS THREADS #/AREA URNS LPF: PRESENT /LPF
NEUTROPHILS # BLD AUTO: 3.7 10E9/L (ref 1.6–8.3)
NEUTROPHILS NFR BLD AUTO: 65 %
NITRATE UR QL: NEGATIVE
NON-SQ EPI CELLS #/AREA URNS LPF: ABNORMAL /LPF
PH UR STRIP: 5.5 PH (ref 5–7)
PLATELET # BLD AUTO: 248 10E9/L (ref 150–450)
RBC # BLD AUTO: 4.79 10E12/L (ref 3.8–5.2)
RBC #/AREA URNS AUTO: ABNORMAL /HPF (ref 0–2)
SP GR UR STRIP: >1.03 (ref 1–1.03)
URN SPEC COLLECT METH UR: ABNORMAL
UROBILINOGEN UR STRIP-ACNC: 0.2 EU/DL (ref 0.2–1)
WBC # BLD AUTO: 5.7 10E9/L (ref 4–11)
WBC #/AREA URNS AUTO: ABNORMAL /HPF (ref 0–2)

## 2017-04-12 PROCEDURE — 36415 COLL VENOUS BLD VENIPUNCTURE: CPT | Performed by: PREVENTIVE MEDICINE

## 2017-04-12 PROCEDURE — 83690 ASSAY OF LIPASE: CPT | Performed by: PREVENTIVE MEDICINE

## 2017-04-12 PROCEDURE — 85025 COMPLETE CBC W/AUTO DIFF WBC: CPT | Performed by: PREVENTIVE MEDICINE

## 2017-04-12 PROCEDURE — 86140 C-REACTIVE PROTEIN: CPT | Performed by: PREVENTIVE MEDICINE

## 2017-04-12 PROCEDURE — 81001 URINALYSIS AUTO W/SCOPE: CPT | Performed by: PREVENTIVE MEDICINE

## 2017-04-12 PROCEDURE — 99214 OFFICE O/P EST MOD 30 MIN: CPT | Performed by: PREVENTIVE MEDICINE

## 2017-04-12 PROCEDURE — 87086 URINE CULTURE/COLONY COUNT: CPT | Performed by: PREVENTIVE MEDICINE

## 2017-04-12 PROCEDURE — 80053 COMPREHEN METABOLIC PANEL: CPT | Performed by: PREVENTIVE MEDICINE

## 2017-04-12 PROCEDURE — 73523 X-RAY EXAM HIPS BI 5/> VIEWS: CPT

## 2017-04-12 NOTE — MR AVS SNAPSHOT
After Visit Summary   4/12/2017    Cara Camarillo    MRN: [id number]           Patient Information     Date Of Birth          1940        Visit Information        Provider Department      4/12/2017 4:30 PM Ventura Alvarez MD [facility]         Follow-ups after your visit        Your next 10 appointments already scheduled     Apr 12, 2017  4:30 PM CDT   Office Visit with Ventura Alvarez MD   [facility] ([facility])    [address] [facility] [id number] [phone]           Bring a current list of meds and any records pertaining to this visit.  For Physicals, please bring immunization records and any forms needing to be filled out.  Please arrive 10 minutes early to complete paperwork.            Apr 13, 2017  1:30 PM CDT   RETURN 45 with Kyle Montana MD   [facility] ([facility])    [id number]  [facility] [id number] [phone]              Who to contact     If you have questions or need follow up information about today's clinic visit or your schedule please contact [facility] directly at [phone].  Normal or non-critical lab and imaging results will be communicated to you by MyChart, letter or phone within 4 business days after the clinic has received the results. If you do not hear from us within 7 days, please contact the clinic through GnamGnamhart or phone. If you have a critical or abnormal lab result, we will notify you by phone as soon as possible.  Submit refill requests through IForem or call your pharmacy and they will forward the refill request to us. Please allow 3 business days for your refill to be completed.          Additional Information About Your Visit        [id number] Room Information     IForem lets you send messages to your doctor, view your test results, renew your prescriptions, schedule appointments and  "more. To sign up, go to [email or web address] . Click on \"Log in\" on the left side of the screen, which will take you to the Welcome page. Then click on \"Sign up Now\" on the right side of the page.     You will be asked to enter the access code listed below, as well as some personal information. Please follow the directions to create your username and password.     Your access code is: [email or web address]  Expires: 2017 11:37 AM     Your access code will  in 90 days. If you need help or a new code, please call your [place] clinic or [phone].        Care EveryWhere ID     This is your Care EveryWhere ID. This could be used by other organizations to access your [place] medical records  [id number]        Your Vitals Were     Pulse Temperature Height Pulse Oximetry BMI (Body Mass Index)       78 97.4  F (36.3  C) (Tympanic) 5' 2\" (1.575 m) 98% 39.14 kg/m2        Blood Pressure from Last 3 Encounters:   17 147/76   17 124/62   17 139/66    Weight from Last 3 Encounters:   17 214 lb (97.1 kg)   17 214 lb (97.1 kg)   17 216 lb (98 kg)              Today, you had the following     No orders found for display         Today's Medication Changes          These changes are accurate as of: 17  4:27 PM.  If you have any questions, ask your nurse or doctor.               These medicines have changed or have updated prescriptions.        Dose/Directions    metFORMIN 500 MG 24 hr tablet   Commonly known as:  GLUCOPHAGE-XR   This may have changed:  Another medication with the same name was removed. Continue taking this medication, and follow the directions you see here.   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     Thank you for choosing [facility]  for your care. Our goal is always to provide you with excellent care. Hearing back from our patients is one way we can continue to improve our services. Please take a few minutes to complete the written survey that you may receive in the mail after your visit with us. Thank you!             Your Updated Medication List - Protect others around you: Learn how to safely use, store and throw away your medicines at www.disposemymeds.org.          This list is accurate as of: 4/12/17  4:27 PM.  Always use your most recent med list.                   Brand Name Dispense Instructions for use    acetaminophen 500 MG tablet    TYLENOL     Take 500-1,000 mg by mouth 3 times daily as needed for mild pain       amLODIPine 10 MG tablet    NORVASC    90 tablet    Take 1 tablet (10 mg) by mouth daily       aspirin 81 MG tablet     100 tablet    Take 1 tablet (81 mg) by mouth daily       blood glucose monitoring test strip    ONE TOUCH ULTRA    300 each    1 strip by In Vitro route 3 times daily       calcium carbonate 500 MG tablet    OS-MANJEET 500 mg [place]. Ca     Take 500 mg by mouth 2 times daily       clopidogrel 75 MG tablet    PLAVIX    90 tablet    Take 1 tablet (75 mg) by mouth daily take 1 tab a day       glipiZIDE 10 MG tablet    GLUCOTROL    360 tablet    TAKE 2 TABLETS BY MOUTH IN THE MORNING BEFORE BREAKFAST AND 2 TABLETS IN THE EVENING BEFORE DINNER       losartan 50 MG tablet    COZAAR    90 tablet    Take 1 tablet (50 mg) by mouth daily       metFORMIN 500 MG 24 hr tablet    GLUCOPHAGE-XR    180 tablet    1000 mg in am and 500 mg in pm       metoprolol 50 MG tablet    LOPRESSOR    270 tablet    TAKE ONE AND ONE-HALF TABLETS BY MOUTH TWICE DAILY       nitroglycerin 0.4 MG sublingual tablet    NITROSTAT    25 tablet    Place 1 tablet (0.4 mg) under the tongue every 5 minutes as  needed for chest pain if you are still having symptoms after 3 doses (15 minutes) call 911.       order for DME     1 Device    Equipment being ordered: L wrist brace       pantoprazole 40 MG EC tablet    PROTONIX    90 tablet    TAKE 1 TABLET BY MOUTH ONCE DAILY 30 TO 60 MINUTES BEFORE A MEAL       rosuvastatin 5 MG tablet    CRESTOR    12 tablet    Take 1 tablet (5 mg) by mouth once a week       vitamin D 1000 UNITS capsule     30    1 CAPSULE DAILY

## 2017-04-12 NOTE — NURSING NOTE
"Chief Complaint   Patient presents with     RECHECK     discuss labs     Diarrhea     pt states she has diarrhea a lot and not sure if its related to meds     Musculoskeletal Problem     right arm pain       Initial /76 (BP Location: Left arm, Cuff Size: Adult Large)  Pulse 78  Temp 97.4  F (36.3  C) (Tympanic)  Ht 5' 2\" (1.575 m)  Wt 214 lb (97.1 kg)  SpO2 98%  BMI 39.14 kg/m2 Estimated body mass index is 39.14 kg/(m^2) as calculated from the following:    Height as of this encounter: 5' 2\" (1.575 m).    Weight as of this encounter: 214 lb (97.1 kg).  Medication Reconciliation: complete     Mahsa Meza MA    "

## 2017-04-12 NOTE — LETTER
[facility]  [address] [place], [place] [address]  [address]  [place]  [address]  Tel: [phone]    April 25, 2017    Cara Camarillo  [address] [facility] [id number]        Dear Ms. Camarillo,    Your urinalysis shows some white blood cells in your urine but no significant bacteria.  I advise rechecking this in 2-3 days.  It was a pleasure seeing you in clinic recently.  Please call with any questions you may have.    Sincerely,    Sylvester Alvarez MD/jin    Results for orders placed or performed in visit on 04/12/17   UA reflex to Microscopic and Culture   Result Value Ref Range    Color Urine Yellow     Appearance Urine Clear     Glucose Urine Negative NEG mg/dL    Bilirubin Urine Negative NEG    Ketones Urine Negative NEG mg/dL    Specific Gravity Urine >1.030 1.003 - 1.035    Blood Urine Trace (A) NEG    pH Urine 5.5 5.0 - 7.0 pH    Protein Albumin Urine Trace (A) NEG mg/dL    Urobilinogen Urine 0.2 0.2 - 1.0 EU/dL    Nitrite Urine Negative NEG    Leukocyte Esterase Urine Large (A) NEG    Source Midstream Urine    CBC with platelets and differential   Result Value Ref Range    WBC 5.7 4.0 - 11.0 10e9/L    RBC Count 4.79 3.8 - 5.2 10e12/L    Hemoglobin 13.9 11.7 - 15.7 g/dL    Hematocrit 42.2 35.0 - 47.0 %    MCV 88 78 - 100 fl    MCH 29.0 26.5 - 33.0 pg    MCHC 32.9 31.5 - 36.5 g/dL    RDW 14.5 10.0 - 15.0 %    Platelet Count 248 150 - 450 10e9/L    Diff Method Automated Method     % Neutrophils 65.0 %    % Lymphocytes 22.0 %    % Monocytes 9.2 %    % Eosinophils 3.5 %    % Basophils 0.3 %    Absolute Neutrophil 3.7 1.6 - 8.3 10e9/L    Absolute Lymphocytes 1.3 0.8 - 5.3 10e9/L    Absolute Monocytes 0.5 0.0 - 1.3 10e9/L    Absolute Eosinophils 0.2 0.0 - 0.7 10e9/L    Absolute Basophils 0.0 0.0 - 0.2 10e9/L   Comprehensive metabolic panel   Result Value Ref Range    Sodium 142 133 - 144 mmol/L    Potassium 4.6 3.4 - 5.3 mmol/L    Chloride 109 94 - 109 mmol/L    Carbon Dioxide 24 20 - 32 mmol/L    Anion  Gap 9 3 - 14 mmol/L    Glucose 144 (H) 70 - 99 mg/dL    Urea Nitrogen 26 7 - 30 mg/dL    Creatinine 1.27 (H) 0.52 - 1.04 mg/dL    GFR Estimate 41 (L) >60 mL/min/1.7m2    GFR Estimate If Black 49 (L) >60 mL/min/1.7m2    Calcium 9.2 8.5 - 10.1 mg/dL    Bilirubin Total 0.3 0.2 - 1.3 mg/dL    Albumin 3.7 3.4 - 5.0 g/dL    Protein Total 7.1 6.8 - 8.8 g/dL    Alkaline Phosphatase 105 40 - 150 U/L    ALT 33 0 - 50 U/L    AST 22 0 - 45 U/L   Lipase   Result Value Ref Range    Lipase 128 73 - 393 U/L   CRP, inflammation   Result Value Ref Range    CRP Inflammation 6.6 0.0 - 8.0 mg/L   Urine Microscopic   Result Value Ref Range    WBC Urine  (A) 0 - 2 /HPF    RBC Urine O - 2 0 - 2 /HPF    Squamous Epithelial /LPF Urine Few FEW /LPF    Bacteria Urine Moderate (A) NEG /HPF    Mucous Urine Present (A) NEG /LPF   Urine Culture Aerobic Bacterial   Result Value Ref Range    Specimen Description Midstream Urine     Culture Micro       <10,000 colonies/mL mixed urogenital zac Susceptibility testing not routinely   done      Micro Report Status FINAL 04/14/2017            Enclosure: Lab Results

## 2017-04-13 ENCOUNTER — TELEPHONE (OUTPATIENT)
Dept: FAMILY MEDICINE | Facility: CLINIC | Age: 77
End: 2017-04-13

## 2017-04-13 ENCOUNTER — OFFICE VISIT (OUTPATIENT)
Dept: CARDIOLOGY | Facility: CLINIC | Age: 77
End: 2017-04-13
Attending: INTERNAL MEDICINE
Payer: COMMERCIAL

## 2017-04-13 VITALS
HEART RATE: 84 BPM | BODY MASS INDEX: 40.67 KG/M2 | SYSTOLIC BLOOD PRESSURE: 160 MMHG | DIASTOLIC BLOOD PRESSURE: 80 MMHG | WEIGHT: 221 LBS | HEIGHT: 62 IN

## 2017-04-13 DIAGNOSIS — E78.5 HYPERLIPIDEMIA LDL GOAL <100: ICD-10-CM

## 2017-04-13 DIAGNOSIS — R30.0 DYSURIA: ICD-10-CM

## 2017-04-13 DIAGNOSIS — R60.0 BILATERAL EDEMA OF LOWER EXTREMITY: ICD-10-CM

## 2017-04-13 DIAGNOSIS — I25.10 CORONARY ARTERY DISEASE INVOLVING NATIVE CORONARY ARTERY OF NATIVE HEART WITHOUT ANGINA PECTORIS: Primary | ICD-10-CM

## 2017-04-13 DIAGNOSIS — I51.9 LEFT VENTRICULAR DIASTOLIC DYSFUNCTION: ICD-10-CM

## 2017-04-13 DIAGNOSIS — I10 ESSENTIAL HYPERTENSION, BENIGN: ICD-10-CM

## 2017-04-13 DIAGNOSIS — E11.9 TYPE 2 DIABETES MELLITUS WITHOUT COMPLICATION, WITHOUT LONG-TERM CURRENT USE OF INSULIN (H): Primary | ICD-10-CM

## 2017-04-13 DIAGNOSIS — I35.0 AORTIC STENOSIS, MILD: ICD-10-CM

## 2017-04-13 LAB
ALBUMIN SERPL-MCNC: 3.7 G/DL (ref 3.4–5)
ALP SERPL-CCNC: 105 U/L (ref 40–150)
ALT SERPL W P-5'-P-CCNC: 33 U/L (ref 0–50)
ANION GAP SERPL CALCULATED.3IONS-SCNC: 9 MMOL/L (ref 3–14)
AST SERPL W P-5'-P-CCNC: 22 U/L (ref 0–45)
BILIRUB SERPL-MCNC: 0.3 MG/DL (ref 0.2–1.3)
BUN SERPL-MCNC: 26 MG/DL (ref 7–30)
CALCIUM SERPL-MCNC: 9.2 MG/DL (ref 8.5–10.1)
CHLORIDE SERPL-SCNC: 109 MMOL/L (ref 94–109)
CO2 SERPL-SCNC: 24 MMOL/L (ref 20–32)
CREAT SERPL-MCNC: 1.27 MG/DL (ref 0.52–1.04)
CRP SERPL-MCNC: 6.6 MG/L (ref 0–8)
GFR SERPL CREATININE-BSD FRML MDRD: 41 ML/MIN/1.7M2
GLUCOSE SERPL-MCNC: 144 MG/DL (ref 70–99)
LIPASE SERPL-CCNC: 128 U/L (ref 73–393)
POTASSIUM SERPL-SCNC: 4.6 MMOL/L (ref 3.4–5.3)
PROT SERPL-MCNC: 7.1 G/DL (ref 6.8–8.8)
SODIUM SERPL-SCNC: 142 MMOL/L (ref 133–144)

## 2017-04-13 PROCEDURE — 99214 OFFICE O/P EST MOD 30 MIN: CPT | Performed by: INTERNAL MEDICINE

## 2017-04-13 ASSESSMENT — PATIENT HEALTH QUESTIONNAIRE - PHQ9: SUM OF ALL RESPONSES TO PHQ QUESTIONS 1-9: 0

## 2017-04-13 NOTE — PROGRESS NOTES
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    ROS:  C: NEGATIVE for fever, chills  E: NEGATIVE for vision changes   R: NEGATIVE for significant cough or SOB  CV: NEGATIVE for chest pain, palpitations   GI: NEGATIVE for nausea, abdominal pain, heartburn, or change in bowel habits  : NEGATIVE for frequency, dysuria, or hematuria  M: NEGATIVE for significant arthralgias or myalgia  N: NEGATIVE for weakness, dizziness or paresthesias or headache    OBJECTIVE:  /76 (BP Location: Left arm, Cuff Size: Adult Large)  Pulse 78  Temp 97.4  F (36.3  C) (Tympanic)  Ht 5' 2\" (1.575 m)  Wt 214 lb (97.1 kg)  SpO2 98%  BMI 39.14 kg/m2  EXAM:  GENERAL APPEARANCE: healthy, alert and no distress  EYES: EOMI,  PERRL  HENT: ear canals and TM's normal and nose and mouth without ulcers or lesions  RESP: lungs clear to auscultation - no rales, rhonchi or wheezes  CV: regular rates and rhythm, normal S1 S2, no S3 or S4 and no murmur, click or rub -  ABDOMEN:  soft, nontender, no HSM or masses and bowel sounds normal  LOW BACK - mild ttp paraspinal region bilaterally, neg slr grace, pain w int/ext rot L hip, nl strength, senation, reflexes grace LE, nl rom back, nl gain, no si jt ttp, no pelvic tilt    ASSESSMENT/PLAN:  (M25.552) Hip pain, left  (primary encounter diagnosis)  Plan: XR Pelvis and Hip Bilateral 2 Views, Urine         Microscopic  Suspect OA  Will image    (R30.0) Dysuria  Plan: UA reflex to Microscopic and Culture, CBC with         platelets and differential, Comprehensive         metabolic panel, Lipase, CRP, inflammation,         Urine Culture Aerobic Bacterial    Will check ua    (E11.59) Type 2 diabetes mellitus with vascular disease " (H)  Plan: insulin NPH (HUMULIN N VIAL) 100 UNIT/ML         injection  Not optimized, will stop glipizide and start nph, advise pt to check glucoses over next 2 weeks and contact me with update    25 minutes spent with patient, over 50% time counseling, coordinating care and explaining about nature of the patient's conditions.    All risks, benefits of treatment and further evaluation was reviewed with patient.  Pt expressed understanding.  Pt was in agreement with this plan.  Ventura Alvarez MD

## 2017-04-13 NOTE — TELEPHONE ENCOUNTER
Yes I would take 1/2 the dose in the morning - please advise pt contact us in 2 weeks with an update on her blood glucoses.

## 2017-04-13 NOTE — PROGRESS NOTES
HPI and Plan:   See dictation:[id number]    Orders Placed This Encounter   Procedures     NM Lexiscan stress test (nuc card)     Follow-Up with Cardiac Advanced Practice Provider     Follow-Up with Cardiologist       No orders of the defined types were placed in this encounter.      There are no discontinued medications.      Encounter Diagnoses   Name Primary?     Coronary artery disease involving native coronary artery of native heart without angina pectoris Yes     Left ventricular diastolic dysfunction      Aortic stenosis, mild      Hyperlipidemia LDL goal <100      Essential hypertension, benign      Bilateral edema of lower extremity        CURRENT MEDICATIONS:  Current Outpatient Prescriptions   Medication Sig Dispense Refill     insulin NPH (HUMULIN N VIAL) 100 UNIT/ML injection Inject 5 Units Subcutaneous 2 times daily (before meals) 3 vial 3     clopidogrel (PLAVIX) 75 MG tablet Take 1 tablet (75 mg) by mouth daily take 1 tab a day 90 tablet 1     metFORMIN (GLUCOPHAGE-XR) 500 MG 24 hr tablet 1000 mg in am and 500 mg in pm 180 tablet 0     rosuvastatin (CRESTOR) 5 MG tablet Take 1 tablet (5 mg) by mouth once a week 12 tablet 1     metoprolol (LOPRESSOR) 50 MG tablet TAKE ONE AND ONE-HALF TABLETS BY MOUTH TWICE DAILY 270 tablet 1     order for DME Equipment being ordered: L wrist brace 1 Device 0     pantoprazole (PROTONIX) 40 MG enteric coated tablet TAKE 1 TABLET BY MOUTH ONCE DAILY 30 TO 60 MINUTES BEFORE A MEAL 90 tablet 2     amLODIPine (NORVASC) 10 MG tablet Take 1 tablet (10 mg) by mouth daily 90 tablet 3     losartan (COZAAR) 50 MG tablet Take 1 tablet (50 mg) by mouth daily 90 tablet 1     nitroglycerin (NITROSTAT) 0.4 MG SL tablet Place 1 tablet (0.4 mg) under the tongue every 5 minutes as needed for chest pain if you are still having symptoms after 3 doses (15 minutes) call 912. 25 tablet 1     calcium carbonate (OS-MANJEET 500 MG [place]. CA) 500 MG tablet Take 500 mg by mouth 2 times daily        acetaminophen (TYLENOL) 500 MG tablet Take 500-1,000 mg by mouth 3 times daily as needed for mild pain       aspirin 81 MG tablet Take 1 tablet (81 mg) by mouth daily 100 tablet 3     VITAMIN D 1000 UNIT OR CAPS 1 CAPSULE DAILY 30 0     blood glucose monitoring (ONE TOUCH ULTRA) test strip 1 strip by In Vitro route 3 times daily 300 each prn       ALLERGIES     Allergies   Allergen Reactions     Actos [Pioglitazone]      Lower extremity edema       Enalapril      Renal failure     Hydrochlorothiazide      Dry mouth     Lisinopril      Hyperkalemia         PAST MEDICAL HISTORY:  Past Medical History:   Diagnosis Date     Adenoma     tubal     Anxiety      Anxiety      Aortic stenosis      Arthritis     ankles     Chronic kidney disease (CKD), stage III (moderate)      Coronary artery disease     cardiac cath 2016: ISIDRO to LAD, cath 2016: ISIDRO to PDA     Decubitus ulcer of coccyx      Dysthymic disorder      Essential hypertension, benign 2205     Herpes zoster without mention of complication Aug 2006    left leg (thigh)     Hydronephrosis      Hyperlipidaemia LDL goal < 100      Left ventricular diastolic dysfunction      Malignant neoplasm of corpus uteri, except isthmus (H) 2005    endometrial CA, s/p external beam radiation & radiation implant & FIDEL/BSO       Obesity      Pulmonary hyperinflation      Sciatica      Type II or unspecified type diabetes mellitus without mention of complication, not stated as uncontrolled 2005     Unspecified congenital anomaly of urinary system 2005       PAST SURGICAL HISTORY:  Past Surgical History:   Procedure Laterality Date     ARTHROPLASTY KNEE  2013    Procedure: ARTHROPLASTY KNEE;  RIGHT TOTAL KNEE ARTHROPLASTY;  Surgeon: Romaine Constantino MD;  Location:  OR     ARTHROPLASTY KNEE  2/3/2014    Procedure: ARTHROPLASTY KNEE;  LEFT TOTAL KNEE ARTHROPLASTY (BIOMET)^;  Surgeon: Romaine Constantino MD;  Location:  OR     C ANESTH, SECTION        C NONSPECIFIC PROCEDURE  3/20/06    CT scan of chest/abd/pelvis- negative for recurrence of CA     EXCISE MASS LOWER EXTREMITY Left 4/8/2015    Procedure: EXCISE MASS LOWER EXTREMITY;  Surgeon: Sloan Richardson MD;  Location:  SD     HC UGI ENDOSCOPY W EUS  1/8/2013    Procedure: COMBINED ENDOSCOPIC ULTRASOUND, ESOPHAGOSCOPY, GASTROSCOPY, DUODENOSCOPY (EGD);  Surgeon: Lyric Simons MD;  Location:  GI     HYSTERECTOMY      Vaginal     HYSTERECTOMY, VAGINAL  7/25/05    Uterine CA     LAPAROSCOPIC CHOLECYSTECTOMY  1/10/2013    Procedure: LAPAROSCOPIC CHOLECYSTECTOMY;  LAPAROSCOPIC CHOLECYSTECTOMY ;  Surgeon: Eduardo Taylor MD;  Location:  OR     NEPHRECTOMY BILATERAL       NEPHRECTOMY RT/LT  OCT 2005     OTHER SURGICAL HISTORY      angiogram Jan 2016: ISIDRO to PDA     OTHER SURGICAL HISTORY      angiogram Feb 2016: ISIDRO to LAD       FAMILY HISTORY:  Family History   Problem Relation Age of Onset     DIABETES Father      Adult onset     Other - See Comments Mother 95     Old age       SOCIAL HISTORY:  Social History     Social History     Marital status:      Spouse name: N/A     Number of children: N/A     Years of education: N/A     Social History Main Topics     Smoking status: Never Smoker     Smokeless tobacco: Never Used     Alcohol use No     Drug use: No     Sexual activity: No     Other Topics Concern     Parent/Sibling W/ Cabg, Mi Or Angioplasty Before 65f 55m? No     Caffeine Concern No     1 cup daily     Sleep Concern Yes     worrying a lot right now about health     Stress Concern Yes     was told procedure not covered under medicare, losing sleep, wouldn't have gone to hospital if she knew that. WORRIES about it continually     Weight Concern No     Special Diet No     Exercise No     Social History Narrative       Review of Systems:  Skin:  Negative       Eyes:  Positive for glasses;cataracts    ENT:  Negative      Respiratory:  Negative       Cardiovascular:     "Positive for;dizziness    Gastroenterology: Negative      Genitourinary:  not assessed      Musculoskeletal:  Positive for joint pain;arthritis;nocturnal cramping    Neurologic:  Positive for headaches    Psychiatric:  Negative      Heme/Lymph/Imm:  Positive for allergies    Endocrine:  Positive for diabetes type 2    Physical Exam:  Vitals: /80 (BP Location: Right arm, Cuff Size: Adult Large)  Pulse 84  Ht 1.575 m (5' 2\")  Wt 100.2 kg (221 lb)  BMI 40.42 kg/m2    Constitutional:  alert and oriented;well developed;in no acute distress obese      Skin:  warm and dry to the touch, no apparent skin lesions or masses noted        Head:  normocephalic, no masses or lesions        Eyes:  pupils equal and round, conjunctivae and lids unremarkable, sclera white, no xanthalasma, EOMS intact, no nystagmus        ENT:  no pallor or cyanosis, dentition good        Neck:  carotid pulses are full and equal bilaterally, JVP normal, no carotid bruit, no thyromegaly        Chest:  normal breath sounds, clear to auscultation, normal A-P diameter, normal symmetry, normal respiratory excursion, no use of accessory muscles          Cardiac: regular rhythm;normal S1 and S2   S4   systolic ejection murmur;grade 2;LLSB;radiation to the RUSB          Abdomen:  abdomen soft, non-tender, BS normoactive, no mass, no HSM, no bruits        Vascular: pulses full and equal, no bruits auscultated                                   left radial site clean mild ecchymosis; no hum or bruit    Extremities and Back:  no edema;no deformities, clubbing, cyanosis, erythema observed              Neurological:  no gross motor deficits;affect appropriate, oriented to time, person and place              CC  Miquel Rodriguez MD   [facility]  [address]  [place] [address]              "

## 2017-04-13 NOTE — MR AVS SNAPSHOT
After Visit Summary   4/13/2017    Cara Camarillo    MRN: [id number]           Patient Information     Date Of Birth          1940        Visit Information        Provider Department      4/13/2017 1:30 PM Kyle Montana MD [facility] HEART [facility]        Today's Diagnoses     Coronary artery disease involving native coronary artery of native heart without angina pectoris    -  1    Left ventricular diastolic dysfunction        Aortic stenosis, mild        Hyperlipidemia LDL goal <100        Essential hypertension, benign        Bilateral edema of lower extremity           Follow-ups after your visit        Additional Services     Follow-Up with Cardiac Advanced Practice Provider           Follow-Up with Cardiologist                 Future tests that were ordered for you today     Open Future Orders        Priority Expected Expires Ordered    Follow-Up with Cardiac Advanced Practice Provider Routine 10/10/2017 4/13/2018 4/13/2017    Follow-Up with Cardiologist Routine 4/10/2018 4/13/2018 4/13/2017    NM Lexiscan stress test (nuc card) Routine 4/20/2017 4/13/2018 4/13/2017            Who to contact     If you have questions or need follow up information about today's clinic visit or your schedule please contact [facility] directly at [phone].  Normal or non-critical lab and imaging results will be communicated to you by MyChart, letter or phone within 4 business days after the clinic has received the results. If you do not hear from us within 7 days, please contact the clinic through MyChart or phone. If you have a critical or abnormal lab result, we will notify you by phone as soon as possible.  Submit refill requests through [employer] or call your pharmacy and they will forward the refill request to us. Please allow 3 business days for your refill to be completed.          Additional Information About Your Visit       "  MyChart Information     [employer] lets you send messages to your doctor, view your test results, renew your prescriptions, schedule appointments and more. To sign up, go to [email or web address] . Click on \"Log in\" on the left side of the screen, which will take you to the Welcome page. Then click on \"Sign up Now\" on the right side of the page.     You will be asked to enter the access code listed below, as well as some personal information. Please follow the directions to create your username and password.     Your access code is: [email or web address]  Expires: 2017 11:37 AM     Your access code will  in 90 days. If you need help or a new code, please call your [place] clinic or [phone].        Care EveryWhere ID     This is your Care EveryWhere ID. This could be used by other organizations to access your [place] medical records  [id number]        Your Vitals Were     Pulse Height BMI (Body Mass Index)             84 1.575 m (5' 2\") 40.42 kg/m2          Blood Pressure from Last 3 Encounters:   17 160/80   17 147/76   17 124/62    Weight from Last 3 Encounters:   17 100.2 kg (221 lb)   17 97.1 kg (214 lb)   17 97.1 kg (214 lb)              We Performed the Following     Follow-Up with Cardiologist        Primary Care Provider Office Phone # Fax #    [place] Corona Alvarez [phone]       [facility] [address] [place] [address] [place] [address] [address]        Thank you!     Thank you for choosing [facility] [place]  for your care. Our goal is always to provide you with excellent care. Hearing back from our patients is one way we can continue to improve our services. Please take a few minutes to complete the written survey that you may receive in the mail after your visit with us. Thank you!             Your Updated Medication List - Protect others around you: Learn how to safely use, store and throw away " your medicines at www.disposemymeds.org.          This list is accurate as of: 4/13/17  2:22 PM.  Always use your most recent med list.                   Brand Name Dispense Instructions for use    acetaminophen 500 MG tablet    TYLENOL     Take 500-1,000 mg by mouth 3 times daily as needed for mild pain       amLODIPine 10 MG tablet    NORVASC    90 tablet    Take 1 tablet (10 mg) by mouth daily       aspirin 81 MG tablet     100 tablet    Take 1 tablet (81 mg) by mouth daily       blood glucose monitoring test strip    ONE TOUCH ULTRA    300 each    1 strip by In Vitro route 3 times daily       calcium carbonate 500 MG tablet    OS-MANJEET 500 mg [place]. [place]     Take 500 mg by mouth 2 times daily       clopidogrel 75 MG tablet    PLAVIX    90 tablet    Take 1 tablet (75 mg) by mouth daily take 1 tab a day       insulin  UNIT/ML injection    HumuLIN N VIAL    3 vial    Inject 5 Units Subcutaneous 2 times daily (before meals)       losartan 50 MG tablet    COZAAR    90 tablet    Take 1 tablet (50 mg) by mouth daily       metFORMIN 500 MG 24 hr tablet    GLUCOPHAGE-XR    180 tablet    1000 mg in am and 500 mg in pm       metoprolol 50 MG tablet    LOPRESSOR    270 tablet    TAKE ONE AND ONE-HALF TABLETS BY MOUTH TWICE DAILY       nitroglycerin 0.4 MG sublingual tablet    NITROSTAT    25 tablet    Place 1 tablet (0.4 mg) under the tongue every 5 minutes as needed for chest pain if you are still having symptoms after 3 doses (15 minutes) call 911.       order for DME     1 Device    Equipment being ordered: L wrist brace       pantoprazole 40 MG EC tablet    PROTONIX    90 tablet    TAKE 1 TABLET BY MOUTH ONCE DAILY 30 TO 60 MINUTES BEFORE A MEAL       rosuvastatin 5 MG tablet    CRESTOR    12 tablet    Take 1 tablet (5 mg) by mouth once a week       vitamin D 1000 UNITS capsule     30    1 CAPSULE DAILY

## 2017-04-13 NOTE — TELEPHONE ENCOUNTER
If still wanting NPH, it looks like the insurance will likely cover Novolin N, I have this pending. Not sure if Novolin and Humulin are 1:1 so I left sig blank.    Hilario Peralta, CMA

## 2017-04-13 NOTE — TELEPHONE ENCOUNTER
Reason for Call:  Insulin    Detailed comments: Cara NOVOA Cheriseselvin called and said that the insulin she was prescribed is not covered by insurance and is 200 dollars. She is wondering if she should stay on her old insulin since this one is so expensive or what she should do. Please give Cara Luongselvin a call back as soon as possible.           Phone Number Patient can be reached at: Home number on file [phone] (home)    Best Time: ASAP     Can we leave a detailed message on this number? YES    Call taken on 4/13/2017 at 3:39 PM by Demetra Hemphill

## 2017-04-13 NOTE — LETTER
4/13/2017    Ventura Alvarez MD  [facility]   [address]  [facility] [address]    RE: Cara Camarillo       Dear Colleague,    I had the pleasure of seeing your patient, Cara Camarillo, at [facility] for evaluation of coronary artery disease.  This patient was previously followed by my associate ROWAN Rodriguez, but because this doctor is in the [place] office less frequently, the patient is now changing to my clinic.  She is a delightful 76, almost 77-year-old female with a history of coronary artery disease.  She is status post intracoronary stenting of the posterior descending artery in 01/2016.  In a staged procedure the following month, her mid LAD was stented.  The patient has a history of anxiety and depression.  She denies recurrent chest discomfort.  Her biggest complaint at this time is mostly bilateral right greater than left arm discomfort.  This radiates into her hand and is quite weak and painful to lift almost anything.  The patient was a previous  and did many of those duties with her arms.  She denies syncope, presyncope, palpitations or significant shortness of breath.  She does go to [employer] and walks in the store with a grocery cart.      PHYSICAL EXAMINATION:   VITAL SIGNS:  Current blood pressure is 160/80, pulse is 84 and regular.   CHEST:  Clear to auscultation.   CARDIAC:  Regular rate and rhythm, normal S1 and S2 with an S4 gallop but no S3.  She has a 2/6 systolic ejection murmur left lower sternal border to the right upper sternal border.  No other murmurs heard.  No JVD or HJR.  Pulses were all intact without bruits.   ABDOMEN:  Benign without organomegaly.   EXTREMITIES:  Without cyanosis, clubbing or edema.     Outpatient Encounter Prescriptions as of 4/13/2017   Medication Sig Dispense Refill     [DISCONTINUED] insulin NPH (HUMULIN N VIAL) 100 UNIT/ML injection Inject 5 Units Subcutaneous 2 times daily (before meals)  3 vial 3     [DISCONTINUED] clopidogrel (PLAVIX) 75 MG tablet Take 1 tablet (75 mg) by mouth daily take 1 tab a day 90 tablet 1     [DISCONTINUED] metFORMIN (GLUCOPHAGE-XR) 500 MG 24 hr tablet 1000 mg in am and 500 mg in pm 180 tablet 0     [DISCONTINUED] rosuvastatin (CRESTOR) 5 MG tablet Take 1 tablet (5 mg) by mouth once a week (Patient not taking: Reported on 5/1/2017) 12 tablet 1     [DISCONTINUED] metoprolol (LOPRESSOR) 50 MG tablet TAKE ONE AND ONE-HALF TABLETS BY MOUTH TWICE DAILY 270 tablet 1     order for DME Equipment being ordered: L wrist brace 1 Device 0     pantoprazole (PROTONIX) 40 MG enteric coated tablet TAKE 1 TABLET BY MOUTH ONCE DAILY 30 TO 60 MINUTES BEFORE A MEAL 90 tablet 2     amLODIPine (NORVASC) 10 MG tablet Take 1 tablet (10 mg) by mouth daily 90 tablet 3     [DISCONTINUED] losartan (COZAAR) 50 MG tablet Take 1 tablet (50 mg) by mouth daily 90 tablet 1     nitroglycerin (NITROSTAT) 0.4 MG SL tablet Place 1 tablet (0.4 mg) under the tongue every 5 minutes as needed for chest pain if you are still having symptoms after 3 doses (15 minutes) call 911. 25 tablet 1     calcium carbonate (OS-MANJEET 500 MG [place]. [place]) 500 MG tablet Take 500 mg by mouth 2 times daily       acetaminophen (TYLENOL) 500 MG tablet Take 500-1,000 mg by mouth 3 times daily as needed for mild pain       aspirin 81 MG tablet Take 1 tablet (81 mg) by mouth daily 100 tablet 3     VITAMIN D 1000 UNIT OR CAPS 1 CAPSULE DAILY 30 0     [DISCONTINUED] blood glucose monitoring (ONE TOUCH ULTRA) test strip 1 strip by In Vitro route 3 times daily 300 each prn     No facility-administered encounter medications on file as of 4/13/2017.       ASSESSMENT:   1.  Cara Camarillo is a pleasant 76-year-old female with known coronary artery disease.  She is status post intracoronary stenting of the PDA and LAD.  We are going to perform a Lexiscan nuclear stress test to evaluate whether her clopidogrel can be discontinued.  If there is no  ischemia, we will continue to manage risk factors.   2.  Hypertension.  The patient notes her blood pressure is generally better than today.  We will check her blood pressure at the time of her stress test.   3.  Dyslipidemia.  The patient is taking rosuvastatin 5 mg 1 day per week on Wednesday.  This is not likely causing myalgias or myopathy.  I have asked her to discontinue this medicine for 3 weeks and to call my office with an update.  If her symptoms have not improved, I do not believe this is secondary to the statin and we will resume the statin on an everyday basis.  At that point, we will consider rechecking her lipids.   4.  The patient does have a history of mild aortic stenosis.  We will continue to monitor this.                It is my pleasure to assist in the care of Cara Gordon.  She will be seen at our clinic in 6 months or earlier on a p.r.n. basis.  All her questions were answered to her satisfaction.     Sincerely,    Kyle Montana MD     [facility]

## 2017-04-13 NOTE — TELEPHONE ENCOUNTER
PCP: Please see message below  Patient has future stress test 4/18/17  Pt wondering if she should take 1/2 dose insulin in the morning?    Kari Hassan RN

## 2017-04-13 NOTE — TELEPHONE ENCOUNTER
Reason for Call:  Other prescription    Detailed comments: Patient having a stress test 04/18/17 should she take half a dose of her insulin     Phone Number Patient can be reached at: Home number on file [phone] (home)    Best Time: anytime     Can we leave a detailed message on this number? YES    Call taken on 4/13/2017 at 2:58 PM by Endy Parnell

## 2017-04-13 NOTE — TELEPHONE ENCOUNTER
I called patient and relayed provider message below.  Take 1/2 dose in the morning before stress test.  She will call in 2 weeks with update on blood glucoses.     Kari Hassan RN

## 2017-04-14 LAB
BACTERIA SPEC CULT: NORMAL
MICRO REPORT STATUS: NORMAL
SPECIMEN SOURCE: NORMAL

## 2017-04-14 RX ORDER — SULFAMETHOXAZOLE/TRIMETHOPRIM 800-160 MG
1 TABLET ORAL 2 TIMES DAILY
Qty: 6 TABLET | Refills: 0 | Status: SHIPPED | OUTPATIENT
Start: 2017-04-14 | End: 2017-04-17

## 2017-04-14 NOTE — TELEPHONE ENCOUNTER
Pt called [facility] and they told her she is responsible to pay  $140 for Novolin plus she would need to pay for syringes. Pt states  She cannot afford this. Pt would like someone to call her to discuss   Ph. [phone]

## 2017-04-14 NOTE — TELEPHONE ENCOUNTER
we can refer her to care coordinator to discuss options  If she agrees then let me know so we can put referral.  Dr.Nasima Alejandro MD

## 2017-04-14 NOTE — PROGRESS NOTES
HISTORY OF PRESENT ILLNESS:  I had the pleasure of seeing your patient, Cara Camarillo, at [facility] for evaluation of coronary artery disease.  This patient was previously followed by my associate ROWAN Rodriguez, but because this doctor is in the [place] office less frequently, the patient is now changing to my clinic.  She is a delightful 76, almost 77-year-old female with a history of coronary artery disease.  She is status post intracoronary stenting of the posterior descending artery in 01/2016.  In a staged procedure the following month, her mid LAD was stented.  The patient has a history of anxiety and depression.  She denies recurrent chest discomfort.  Her biggest complaint at this time is mostly bilateral right greater than left arm discomfort.  This radiates into her hand and is quite weak and painful to lift almost anything.  The patient was a previous  and did many of those duties with her arms.  She denies syncope, presyncope, palpitations or significant shortness of breath.  She does go to [employer] and walks in the store with a grocery cart.      PHYSICAL EXAMINATION:   VITAL SIGNS:  Current blood pressure is 160/80, pulse is 84 and regular.   CHEST:  Clear to auscultation.   CARDIAC:  Regular rate and rhythm, normal S1 and S2 with an S4 gallop but no S3.  She has a 2/6 systolic ejection murmur left lower sternal border to the right upper sternal border.  No other murmurs heard.  No JVD or HJR.  Pulses were all intact without bruits.   ABDOMEN:  Benign without organomegaly.   EXTREMITIES:  Without cyanosis, clubbing or edema.      ASSESSMENT:   1.  Cara Camarillo is a pleasant 76-year-old female with known coronary artery disease.  She is status post intracoronary stenting of the PDA and LAD.  We are going to perform a Lexiscan nuclear stress test to evaluate whether her clopidogrel can be discontinued.  If there is no ischemia, we will continue to manage risk factors.    2.  Hypertension.  The patient notes her blood pressure is generally better than today.  We will check her blood pressure at the time of her stress test.   3.  Dyslipidemia.  The patient is taking rosuvastatin 5 mg 1 day per week on Wednesday.  This is not likely causing myalgias or myopathy.  I have asked her to discontinue this medicine for 3 weeks and to call my office with an update.  If her symptoms have not improved, I do not believe this is secondary to the statin and we will resume the statin on an everyday basis.  At that point, we will consider rechecking her lipids.   4.  The patient does have a history of mild aortic stenosis.  We will continue to monitor this.      It is my pleasure to assist in the care of Cara Gordon.  She will be seen at our clinic in 6 months or earlier on a p.r.n. basis.  All her questions were answered to her satisfaction.      cc:   [place] [address]         JOSEY MAO MD, [facility]             D: 2017 14:30   T: 2017 12:12   MT: iain      Name:     CARA LAGOS   MRN:      [id number]        Account:      [id number]   :      1940           Service Date: 2017      Document: [id number]

## 2017-04-14 NOTE — TELEPHONE ENCOUNTER
Patient is unable to afford the novolin, so is thinking she should just stick with the glipizide.  Please call her and advise.   Mahsa Meza MA

## 2017-04-17 ENCOUNTER — TELEPHONE (OUTPATIENT)
Dept: PHARMACY | Facility: CLINIC | Age: 77
End: 2017-04-17

## 2017-04-17 NOTE — TELEPHONE ENCOUNTER
Thanks for the help Lissy! I'll have to remember this Relion brand if another cost issue comes up with NPH.

## 2017-04-17 NOTE — TELEPHONE ENCOUNTER
Received phone call from Cara.  She did  Novolin N (her daughter assisted her with the cost) and she wonders when she should be testing her BG.  I suggested she test BID - immediatly before meals when taking her insulin.    I advised the cost of NPH insulin would be lower for her next refill if she received at this at [facility] or [facility] ($25/vial) and requests the Relion brand.  She will do this for her next refill.    I'll call her in 2 weeks to f/up on her BG.    Lissy Diana, PharmD, [facility]  Medication Therapy Management Provider  Pager: [phone]

## 2017-04-17 NOTE — TELEPHONE ENCOUNTER
Lissy or Hilario - I am surprised the nph is too expensive.  Can you help with this?    I really think she needs insulin.    Thanks Sylvester

## 2017-04-17 NOTE — TELEPHONE ENCOUNTER
See separate telephone from today.    Lissy Burres, PharmD, [facility]  Medication Therapy Management Provider  Pager: [phone]

## 2017-04-18 ENCOUNTER — HOSPITAL ENCOUNTER (OUTPATIENT)
Dept: CARDIOLOGY | Facility: CLINIC | Age: 77
Discharge: HOME | End: 2017-04-18
Attending: INTERNAL MEDICINE | Admitting: INTERNAL MEDICINE
Payer: MEDICARE

## 2017-04-18 VITALS — HEART RATE: 75 BPM | SYSTOLIC BLOOD PRESSURE: 140 MMHG | DIASTOLIC BLOOD PRESSURE: 86 MMHG

## 2017-04-18 DIAGNOSIS — I25.10 CORONARY ARTERY DISEASE INVOLVING NATIVE CORONARY ARTERY OF NATIVE HEART WITHOUT ANGINA PECTORIS: ICD-10-CM

## 2017-04-18 PROCEDURE — 78452 HT MUSCLE IMAGE SPECT MULT: CPT | Mod: 26 | Performed by: INTERNAL MEDICINE

## 2017-04-18 PROCEDURE — 93018 CV STRESS TEST I&R ONLY: CPT | Performed by: INTERNAL MEDICINE

## 2017-04-18 PROCEDURE — 34300033 ZZH RX 343: Performed by: INTERNAL MEDICINE

## 2017-04-18 PROCEDURE — 25000128 H RX IP 250 OP 636: Performed by: INTERNAL MEDICINE

## 2017-04-18 PROCEDURE — A9502 TC99M TETROFOSMIN: HCPCS | Performed by: INTERNAL MEDICINE

## 2017-04-18 PROCEDURE — 93016 CV STRESS TEST SUPVJ ONLY: CPT | Performed by: INTERNAL MEDICINE

## 2017-04-18 PROCEDURE — 93017 CV STRESS TEST TRACING ONLY: CPT

## 2017-04-18 RX ORDER — ALBUTEROL SULFATE 90 UG/1
2 AEROSOL, METERED RESPIRATORY (INHALATION) EVERY 5 MIN PRN
Status: DISCONTINUED | OUTPATIENT
Start: 2017-04-18 | End: 2017-04-19 | Stop reason: HOSPADM

## 2017-04-18 RX ORDER — ACYCLOVIR 200 MG/1
0-1 CAPSULE ORAL
Status: DISCONTINUED | OUTPATIENT
Start: 2017-04-18 | End: 2017-04-19 | Stop reason: HOSPADM

## 2017-04-18 RX ORDER — AMINOPHYLLINE 25 MG/ML
50-100 INJECTION, SOLUTION INTRAVENOUS
Status: COMPLETED | OUTPATIENT
Start: 2017-04-18 | End: 2017-04-18

## 2017-04-18 RX ORDER — REGADENOSON 0.08 MG/ML
0.4 INJECTION, SOLUTION INTRAVENOUS ONCE
Status: COMPLETED | OUTPATIENT
Start: 2017-04-18 | End: 2017-04-18

## 2017-04-18 RX ADMIN — TETROFOSMIN 9.85 MCI.: 0.23 INJECTION, POWDER, LYOPHILIZED, FOR SOLUTION INTRAVENOUS at 12:01

## 2017-04-18 RX ADMIN — AMINOPHYLLINE 100 MG: 25 INJECTION, SOLUTION INTRAVENOUS at 13:47

## 2017-04-18 RX ADMIN — TETROFOSMIN 29.7 MCI.: 0.23 INJECTION, POWDER, LYOPHILIZED, FOR SOLUTION INTRAVENOUS at 13:44

## 2017-04-18 RX ADMIN — REGADENOSON 0.4 MG: 0.08 INJECTION, SOLUTION INTRAVENOUS at 13:39

## 2017-04-20 DIAGNOSIS — I10 ESSENTIAL HYPERTENSION WITH GOAL BLOOD PRESSURE LESS THAN 140/90: ICD-10-CM

## 2017-04-20 RX ORDER — LOSARTAN POTASSIUM 50 MG/1
100 TABLET ORAL DAILY
Qty: 180 TABLET | Refills: 1 | Status: SHIPPED | OUTPATIENT
Start: 2017-04-20 | End: 2017-10-19

## 2017-04-20 NOTE — TELEPHONE ENCOUNTER
Pending Prescriptions:                       Disp   Refills    losartan (COZAAR) 50 MG tablet            90 tab*1            Sig: Take 1 tablet (50 mg) by mouth daily        SIG NEEDS TO BE ADJUSTED TO TAKE two 50mg Tablets Daily    See results note in epic under nuclear med study.  Last Written Prescription Date: 07/22/2016  Last Fill Quantity: 90, # refills: 1  Last Office Visit with [facility], [facility] or [facility] prescribing provider: 04/12/2017  Next 5 appointments (look out 90 days)     May 03, 2017  1:15 PM CDT   Nurse Only with CS NURSE   [facility]    [address]  [place] [address]   [phone]                   Potassium   Date Value Ref Range Status   04/12/2017 4.6 3.4 - 5.3 mmol/L Final     Creatinine   Date Value Ref Range Status   04/12/2017 1.27 (H) 0.52 - 1.04 mg/dL Final     BP Readings from Last 3 Encounters:   04/18/17 140/86   04/13/17 160/80   04/12/17 147/76

## 2017-04-20 NOTE — TELEPHONE ENCOUNTER
Routing refill request to provider for review/approval because:  Labs out of range:  Creatinine.  Please review.  Thank you.  Deandra Denise RN

## 2017-04-26 ENCOUNTER — DOCUMENTATION ONLY (OUTPATIENT)
Dept: LAB | Facility: CLINIC | Age: 77
End: 2017-04-26

## 2017-04-26 DIAGNOSIS — R82.71 ASYMPTOMATIC BACTERIURIA: Primary | ICD-10-CM

## 2017-04-26 NOTE — PROGRESS NOTES
Patient is coming to lab on 5/2/17 and there is no note indicating why she will be coming to lab. Please review and future orders if needed.    Thanks, Lab

## 2017-05-01 ENCOUNTER — ALLIED HEALTH/NURSE VISIT (OUTPATIENT)
Dept: PHARMACY | Facility: CLINIC | Age: 77
End: 2017-05-01
Payer: COMMERCIAL

## 2017-05-01 ENCOUNTER — TELEPHONE (OUTPATIENT)
Dept: CARDIOLOGY | Facility: CLINIC | Age: 77
End: 2017-05-01

## 2017-05-01 DIAGNOSIS — E78.5 HYPERLIPIDEMIA LDL GOAL <100: Primary | ICD-10-CM

## 2017-05-01 DIAGNOSIS — I25.10 CORONARY ARTERY DISEASE INVOLVING NATIVE CORONARY ARTERY OF NATIVE HEART WITHOUT ANGINA PECTORIS: ICD-10-CM

## 2017-05-01 DIAGNOSIS — I10 ESSENTIAL HYPERTENSION, BENIGN: ICD-10-CM

## 2017-05-01 DIAGNOSIS — E11.59 TYPE 2 DIABETES MELLITUS WITH VASCULAR DISEASE (H): ICD-10-CM

## 2017-05-01 DIAGNOSIS — E78.5 HYPERLIPIDEMIA LDL GOAL <100: ICD-10-CM

## 2017-05-01 PROCEDURE — 99606 MTMS BY PHARM EST 15 MIN: CPT | Performed by: PHARMACIST

## 2017-05-01 PROCEDURE — 99607 MTMS BY PHARM ADDL 15 MIN: CPT | Performed by: PHARMACIST

## 2017-05-01 NOTE — TELEPHONE ENCOUNTER
OK to discontinue her clopidogrel.  I would suggest she stay off the rosuvastatin for 1 month.  If she continue to not have any LE myalgias, I would suggest re-challenging with low dose rosuvastatin 5 mg once per week to see if her symptoms return.  If she cannot tolerate a statin, we could consider use of PCSK9 Inhibitors.  Kyle Montana MD, FACC  May 1, 2017 5:50 PM

## 2017-05-01 NOTE — PROGRESS NOTES
SUBJECTIVE/OBJECTIVE:                                                    Cara Camarillo is a 77 year old female called for a follow-up visit for Medication Therapy Management.  She was referred to me from Dr. Alvarez.     Chief Complaint: Follow up from our visit on 3/8/17 and phone calls between now and then.  Called to f/up on diabetes management.    Tobacco: No tobacco use   Alcohol: not currently using    Medication Adherence: no issues reported    Hyperlipidemia: Current therapy includes no current medications - she went off rosuvastatin after seeing cardiology on 4/13 and feels pain has been much better without this.  She was instructed to call Dr. Montana after 3 weeks off of medication which she has not yet done.  The 10-year ASCVD risk score (Gabymango ADRIAN Jr, et al., 2013) is: 57.4%    Values used to calculate the score:      Age: 77 years      Sex: Female      Is Non- : No      Diabetic: Yes      Tobacco smoker: No      Systolic Blood Pressure: 140 mmHg      Is BP treated: Yes      HDL Cholesterol: 49 mg/dL      Total Cholesterol: 177 mg/dL     Diabetes:  Pt currently taking metformin ER 1500mg daily and NPH insulin 5 units BID. Pt is not experiencing side effects.  SMBG: two times daily.   Ranges (from patient's glucose log):   AM (fasting): 175, 166, 193, 164, 169, 206, 152, 205, 167, 175, 215, 190, 181, 173, 204  Before dinner: 144, 153, 157, 142, 117, 130, 131, 114, 145, 164, 95, 164, 159, 154  Symptoms of low blood sugar? none. Frequency of hypoglycemia? never.  Recent symptoms of high blood sugar? none  Eye exam: due  Foot exam: due  Microalbumin is < 30 mg/g. Pt is taking an ACEi/ARB.  Aspirin: Taking 81mg daily and denies side effects    Hypertension/CAD: Current medications include ASA 81mg daily, Plavix 75mg daily, amlodipine 10mg daily, losartan 100mg daily, metoprolol tartrate 75mg BID, and SL NTG PRN (no recent use).  Patient does not self-monitor BP.  Patient reports no  current medication side effects.  She questions if she needs to stay on Plavix or not - she was instructed to have a nuclear stress test to evaluate the need (which she did), but hasn't heard anything back from cardiology.    She declines discussing other medications/conditions today - feels everything else is going well.    Current labs include:  BP Readings from Last 3 Encounters:   04/18/17 140/86   04/13/17 160/80   04/12/17 147/76     Today's Vitals: There were no vitals taken for this visit. - telephone visit    Lab Results   Component Value Date    A1C 8.3 03/06/2017   .  Lab Results   Component Value Date    CHOL 177 07/22/2016     Lab Results   Component Value Date    TRIG 148 07/22/2016     Lab Results   Component Value Date    HDL 49 07/22/2016     Lab Results   Component Value Date    LDL 98 07/22/2016       Liver Function Studies -   Recent Labs   Lab Test  04/12/17   1716   PROTTOTAL  7.1   ALBUMIN  3.7   BILITOTAL  0.3   ALKPHOS  105   AST  22   ALT  33       Lab Results   Component Value Date    UCRR 292 01/23/2017    MICROL 60 01/23/2017    UMALCR 20.58 01/23/2017       Last Basic Metabolic Panel:  Lab Results   Component Value Date     04/12/2017      Lab Results   Component Value Date    POTASSIUM 4.6 04/12/2017     Lab Results   Component Value Date    CHLORIDE 109 04/12/2017     Lab Results   Component Value Date    BUN 26 04/12/2017     Lab Results   Component Value Date    CR 1.27 04/12/2017     GFR Estimate   Date Value Ref Range Status   04/12/2017 41 (L) >60 mL/min/1.7m2 Final     Comment:     Non  GFR Calc   03/06/2017 43 (L) >60 mL/min/1.7m2 Final     Comment:     Non  GFR Calc   01/23/2017 46 (L) >60 mL/min/1.7m2 Final     Comment:     Non  GFR Calc     TSH   Date Value Ref Range Status   11/21/2016 3.32 0.40 - 4.00 mU/L Final   ]    Most Recent Immunizations   Administered Date(s) Administered     Influenza (High Dose) 3 valent  vaccine 11/21/2016     Influenza (IIV3) 10/12/2012     Pneumococcal (PCV 13) 05/21/2015     Pneumococcal 23 valent 05/16/2014     TD (ADULT, 7+) 04/15/2011     TDAP Vaccine (Adacel) 01/29/2015     ASSESSMENT:                                                    Current medications were reviewed today as discussed above.      Medication Adherence: no issues identified    Hyperlipidemia: Needs Improvement. Pt is not on high intensity statin which is indicated based on 2013 ACC/AHA guidelines for lipid management.  It appears cardiology had a plan in place (although unclear), she'd benefit from calling them.    Diabetes: Needs Improvement. Patient is not meeting A1c goal of < 8%. Self monitoring of blood glucose is not at goal of fasting  mg/dL and post prandial < 180 mg/dL.  Will benefit from continuing to increase insulin doses     Hypertension/CAD: Needs Improvement.  Would benefit from calling cardiology to f/up on Plavix duration.      PLAN:                                                      1.  Encouraged Cara to call cardiology to discuss plans for statin use and Plavix use.  2.  Increased NPH insulin to 8 units BID.  She'll get next refill from [facility] for cost effectiveness and will call when she needs an Rx sent.    I spent 20 minutes with this patient today.  All changes were made via collaborative practice agreement with Ventura Alvarez. A copy of the visit note was provided to the patient's primary care provider.     Will follow up in 2 weeks, phone visit scheduled.    The patient declined a summary of these recommendations as an after visit summary.    Lissy Diana, PharmD, BCACP  Medication Therapy Management Provider  Pager: [phone]

## 2017-05-01 NOTE — TELEPHONE ENCOUNTER
Pt called stating that Dr. Montana wanted her to call in about 3 weeks with an update. Pt stated that that the pain has gone away since stopping the Rosuvastatin so she believes it was that medication causing her symptoms. Pt also was wondering if she could stop the clopidogrel as Dr. Montana wanted to see the stress test prior to stopping it.     4/13/17 OV with Dr. Montana   ASSESSMENT:   1. Cara Camarillo is a pleasant 76-year-old female with known coronary artery disease. She is status post intracoronary stenting of the PDA and LAD. We are going to perform a Lexiscan nuclear stress test to evaluate whether her clopidogrel can be discontinued. If there is no ischemia, we will continue to manage risk factors.   2. Hypertension. The patient notes her blood pressure is generally better than today. We will check her blood pressure at the time of her stress test.   3. Dyslipidemia. The patient is taking rosuvastatin 5 mg 1 day per week on Wednesday. This is not likely causing myalgias or myopathy. I have asked her to discontinue this medicine for 3 weeks and to call my office with an update. If her symptoms have not improved, I do not believe this is secondary to the statin and we will resume the statin on an everyday basis. At that point, we will consider rechecking her lipids.   4. The patient does have a history of mild aortic stenosis. We will continue to monitor this.       4/18/17 [facility]  Notes Recorded by Kyle Montana MD on 4/18/2017 at 5:29 PM  Normal nuclear stress test without ischemia or infarction.  Normal LV function.  Hypertension at rest.   Suggest F/U with Dr. Alvarez for her hypertension.  Would probably increase the losartan to 100 mg daily.  Kyle Montana MD, [facility]  April 18, 2017 5:28 PM    Pt is currently on losartan 100 mg daily per Dr. Alvarez.     Will route to Dr. Montana to see what he recommends for the rosuvastatin and if patient can stop the clopidogrel.

## 2017-05-01 NOTE — MR AVS SNAPSHOT
After Visit Summary   5/1/2017    Cara Camarillo    MRN: [id number]           Patient Information     Date Of Birth          1940        Visit Information        Provider Department      5/1/2017 9:30 AM Lissy Diana, [facility]        Today's Diagnoses     Hyperlipidemia LDL goal <100    -  1    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)        Coronary artery disease involving native coronary artery of native heart without angina pectoris        Essential hypertension, benign           Follow-ups after your visit        Your next 10 appointments already scheduled     May 03, 2017  1:00 PM CDT   LAB with  LAB   [facility]    [address] [facility] [id number] [phone]           Patient must bring picture ID.  Patient should be prepared to give a urine specimen  Please do not eat 10-12 hours before your appointment if you are coming in fasting for labs on lipids, cholesterol, or glucose (sugar).  Pregnant women should follow their Care Team instructions. Water with medications is okay. Do not drink coffee or other fluids.   If you have concerns about taking  your medications, please ask at office or if scheduling via [employer], send a message by clicking on Secure Messaging, Message Your Care Team.            May 03, 2017  1:15 PM CDT   Nurse Only with  NURSE   [facility]    [address] [place] [id number] [phone]            May 15, 2017  8:30 AM CDT   TELEMEDICINE with Demetra Craig [facility] ([facility])    [address] [place] [id number] [phone]           Note: this is not an onsite visit; there is no need to come to the facility.              Who to contact     If you have questions or need follow up information about today's clinic visit or your schedule please contact  "[facility] directly at [phone].  Normal or non-critical lab and imaging results will be communicated to you by MyChart, letter or phone within 4 business days after the clinic has received the results. If you do not hear from us within 7 days, please contact the clinic through [employer] or phone. If you have a critical or abnormal lab result, we will notify you by phone as soon as possible.  Submit refill requests through [employer] or call your pharmacy and they will forward the refill request to us. Please allow 3 business days for your refill to be completed.          Additional Information About Your Visit        Dish[employer]pinion[employer] Information     [facility] lets you send messages to your doctor, view your test results, renew your prescriptions, schedule appointments and more. To sign up, go to [email or web address] . Click on \"Log in\" on the left side of the screen, which will take you to the Welcome page. Then click on \"Sign up Now\" on the right side of the page.     You will be asked to enter the access code listed below, as well as some personal information. Please follow the directions to create your username and password.     Your access code is: [email or web address]  Expires: 2017 11:37 AM     Your access code will  in 90 days. If you need help or a new code, please call your [place] clinic or [phone].        Care EveryWhere ID     This is your Care EveryWhere ID. This could be used by other organizations to access your [place] medical records  [id number]         Blood Pressure from Last 3 Encounters:   17 140/86   17 160/80   17 147/76    Weight from Last 3 Encounters:   17 221 lb (100.2 kg)   17 214 lb (97.1 kg)   17 214 lb (97.1 kg)              Today, you had the following     No orders found for display         Today's Medication Changes          These changes are accurate as of: 17 10:00 AM.  If you have any questions, ask your nurse or " doctor.               These medicines have changed or have updated prescriptions.        Dose/Directions    insulin  UNIT/ML injection   Commonly known as:  NovoLIN N VIAL   This may have changed:  additional instructions   Changed by:  Lissy Diana RPH        8 units before breakfast, 8 units before dinner   Quantity:  10 mL   Refills:  1            Where to get your medicines      Some of these will need a paper prescription and others can be bought over the counter.  Ask your nurse if you have questions.     You don't need a prescription for these medications     insulin  UNIT/ML injection                Primary Care Provider Office Phone # Fax #    Ventura Jeter Anders Alvarez [phone]       [facility] [address]  [facility] [address]        Thank you!     Thank you for choosing [facility]  for your care. Our goal is always to provide you with excellent care. Hearing back from our patients is one way we can continue to improve our services. Please take a few minutes to complete the written survey that you may receive in the mail after your visit with us. Thank you!             Your Updated Medication List - Protect others around you: Learn how to safely use, store and throw away your medicines at www.disposemymeds.org.          This list is accurate as of: 5/1/17 10:00 AM.  Always use your most recent med list.                   Brand Name Dispense Instructions for use    acetaminophen 500 MG tablet    TYLENOL     Take 500-1,000 mg by mouth 3 times daily as needed for mild pain       amLODIPine 10 MG tablet    NORVASC    90 tablet    Take 1 tablet (10 mg) by mouth daily       aspirin 81 MG tablet     100 tablet    Take 1 tablet (81 mg) by mouth daily       blood glucose monitoring test strip    ONE TOUCH ULTRA    300 each    1 strip by In Vitro route 3 times daily       calcium carbonate 500 MG tablet    OS-MANJEET 500 mg [place]. Ca      Take 500 mg by mouth 2 times daily       clopidogrel 75 MG tablet    PLAVIX    90 tablet    Take 1 tablet (75 mg) by mouth daily take 1 tab a day       insulin  UNIT/ML injection    NovoLIN N VIAL    10 mL    8 units before breakfast, 8 units before dinner       losartan 50 MG tablet    COZAAR    180 tablet    Take 2 tablets (100 mg) by mouth daily       metFORMIN 500 MG 24 hr tablet    GLUCOPHAGE-XR    180 tablet    1000 mg in am and 500 mg in pm       metoprolol 50 MG tablet    LOPRESSOR    270 tablet    TAKE ONE AND ONE-HALF TABLETS BY MOUTH TWICE DAILY       nitroglycerin 0.4 MG sublingual tablet    NITROSTAT    25 tablet    Place 1 tablet (0.4 mg) under the tongue every 5 minutes as needed for chest pain if you are still having symptoms after 3 doses (15 minutes) call 911.       order for DME     1 Device    Equipment being ordered: L wrist brace       pantoprazole 40 MG EC tablet    PROTONIX    90 tablet    TAKE 1 TABLET BY MOUTH ONCE DAILY 30 TO 60 MINUTES BEFORE A MEAL       rosuvastatin 5 MG tablet    CRESTOR    12 tablet    Take 1 tablet (5 mg) by mouth once a week       vitamin D 1000 UNITS capsule     30    1 CAPSULE DAILY

## 2017-05-02 NOTE — TELEPHONE ENCOUNTER
Writer called pt back with Dr. Montana above response. Writer informed pt that she can stop her clopidogrel. Writer updated med list. Pt verbalized understanding. Writer told pt she can stay off of her rosuvastatin for 1 month. Writer told pt that if she continues to not have any lower extremity myalgias then Dr. Montana would like to restart it at a low dose. Writer told pt that a reminder will be set to call pt in one month to see how her symptoms are doing. Pt verbalized understanding and has no further questions or concerns at this time.

## 2017-05-03 ENCOUNTER — ALLIED HEALTH/NURSE VISIT (OUTPATIENT)
Dept: NURSING | Facility: CLINIC | Age: 77
End: 2017-05-03
Payer: COMMERCIAL

## 2017-05-03 VITALS
SYSTOLIC BLOOD PRESSURE: 135 MMHG | HEART RATE: 70 BPM | BODY MASS INDEX: 40.48 KG/M2 | TEMPERATURE: 96.9 F | DIASTOLIC BLOOD PRESSURE: 78 MMHG | OXYGEN SATURATION: 96 % | HEIGHT: 62 IN | WEIGHT: 220 LBS

## 2017-05-03 DIAGNOSIS — R82.71 ASYMPTOMATIC BACTERIURIA: ICD-10-CM

## 2017-05-03 DIAGNOSIS — Z01.30 BP CHECK: Primary | ICD-10-CM

## 2017-05-03 LAB
ALBUMIN UR-MCNC: 30 MG/DL
APPEARANCE UR: CLEAR
BACTERIA #/AREA URNS HPF: ABNORMAL /HPF
BILIRUB UR QL STRIP: NEGATIVE
COLOR UR AUTO: YELLOW
GLUCOSE UR STRIP-MCNC: NEGATIVE MG/DL
HGB UR QL STRIP: NEGATIVE
KETONES UR STRIP-MCNC: ABNORMAL MG/DL
LEUKOCYTE ESTERASE UR QL STRIP: ABNORMAL
MUCOUS THREADS #/AREA URNS LPF: PRESENT /LPF
NITRATE UR QL: NEGATIVE
NON-SQ EPI CELLS #/AREA URNS LPF: ABNORMAL /LPF
PH UR STRIP: 5.5 PH (ref 5–7)
RBC #/AREA URNS AUTO: ABNORMAL /HPF (ref 0–2)
SP GR UR STRIP: >1.03 (ref 1–1.03)
URN SPEC COLLECT METH UR: ABNORMAL
UROBILINOGEN UR STRIP-ACNC: 0.2 EU/DL (ref 0.2–1)
WBC #/AREA URNS AUTO: ABNORMAL /HPF (ref 0–2)

## 2017-05-03 PROCEDURE — 99207 ZZC NO CHARGE NURSE ONLY: CPT

## 2017-05-03 PROCEDURE — 81001 URINALYSIS AUTO W/SCOPE: CPT | Performed by: PHYSICIAN ASSISTANT

## 2017-05-03 PROCEDURE — 87086 URINE CULTURE/COLONY COUNT: CPT | Performed by: PHYSICIAN ASSISTANT

## 2017-05-03 NOTE — PROGRESS NOTES
Cara Camarillo is a 77 year old female who comes in today for a Blood Pressure check because of ongoing blood pressure monitoring.    *Document pulse and BP  *Use new set of vitals button for multiple readings.  *Use extended vitals for orthostatic    Vitals as recorded, a large cuff was used.    Patient is taking medication as prescribed  Patient is tolerating medications well.  Patient is not monitoring Blood Pressure at home.  Average readings if yes are NA    Current complaints: none    Disposition: follow-up as indicated by MD/AP

## 2017-05-03 NOTE — MR AVS SNAPSHOT
"              After Visit Summary   5/3/2017    Cara Camarillo    MRN: [id number]           Patient Information     Date Of Birth          1940        Visit Information        Provider Department      5/3/2017 1:15 PM CS NURSE [facility]        Today's Diagnoses     BP check    -  1       Follow-ups after your visit        Your next 10 appointments already scheduled     May 15, 2017  8:30 AM CDT   TELEMEDICINE with Demetra Craig RPH   [facility] ([facility])    [address] [place] [id number] [phone]           Note: this is not an onsite visit; there is no need to come to the facility.              Who to contact     If you have questions or need follow up information about today's clinic visit or your schedule please contact [facility] directly at [phone].  Normal or non-critical lab and imaging results will be communicated to you by MyChart, letter or phone within 4 business days after the clinic has received the results. If you do not hear from us within 7 days, please contact the clinic through MyChart or phone. If you have a critical or abnormal lab result, we will notify you by phone as soon as possible.  Submit refill requests through [email or web address] or call your pharmacy and they will forward the refill request to us. Please allow 3 business days for your refill to be completed.          Additional Information About Your Visit        MyChart Information     [email or web address] lets you send messages to your doctor, view your test results, renew your prescriptions, schedule appointments and more. To sign up, go to [email or web address] . Click on \"Log in\" on the left side of the screen, which will take you to the Welcome page. Then click on \"Sign up Now\" on the right side of the page.     You will be asked to enter the access code listed below, as well as some personal information. Please follow the directions to create " "your username and password.     Your access code is: [email or web address]  Expires: 2017 11:37 AM     Your access code will  in 90 days. If you need help or a new code, please call your [facility] or [phone].        Care EveryWhere ID     This is your Care EveryWhere ID. This could be used by other organizations to access your [place] medical records  [id number]        Your Vitals Were     Pulse Temperature Height Pulse Oximetry BMI (Body Mass Index)       70 96.9  F (36.1  C) (Oral) 5' 2\" (1.575 m) 96% 40.24 kg/m2        Blood Pressure from Last 3 Encounters:   17 135/78   17 140/86   17 160/80    Weight from Last 3 Encounters:   17 220 lb (99.8 kg)   17 221 lb (100.2 kg)   17 214 lb (97.1 kg)              Today, you had the following     No orders found for display       Primary Care Provider Office Phone # Fax #    Ventura Jeter Anders Alvarez [phone]       [facility] [address] [facility] [address]  [facility] [address]        Thank you!     Thank you for choosing [facility]  for your care. Our goal is always to provide you with excellent care. Hearing back from our patients is one way we can continue to improve our services. Please take a few minutes to complete the written survey that you may receive in the mail after your visit with us. Thank you!             Your Updated Medication List - Protect others around you: Learn how to safely use, store and throw away your medicines at www.disposemymeds.org.          This list is accurate as of: 5/3/17  1:17 PM.  Always use your most recent med list.                   Brand Name Dispense Instructions for use    acetaminophen 500 MG tablet    TYLENOL     Take 500-1,000 mg by mouth 3 times daily as needed for mild pain       amLODIPine 10 MG tablet    NORVASC    90 tablet    Take 1 tablet (10 mg) by mouth daily       aspirin 81 MG tablet     100 tablet    Take 1 tablet (81 mg) by " mouth daily       blood glucose monitoring test strip    ONE TOUCH ULTRA    300 each    1 strip by In Vitro route 3 times daily       calcium carbonate 500 MG tablet    OS-MANJEET 500 mg [place]. Ca     Take 500 mg by mouth 2 times daily       insulin  UNIT/ML injection    NovoLIN N VIAL    10 mL    8 units before breakfast, 8 units before dinner       losartan 50 MG tablet    COZAAR    180 tablet    Take 2 tablets (100 mg) by mouth daily       metFORMIN 500 MG 24 hr tablet    GLUCOPHAGE-XR    180 tablet    1000 mg in am and 500 mg in pm       metoprolol 50 MG tablet    LOPRESSOR    270 tablet    TAKE ONE AND ONE-HALF TABLETS BY MOUTH TWICE DAILY       nitroglycerin 0.4 MG sublingual tablet    NITROSTAT    25 tablet    Place 1 tablet (0.4 mg) under the tongue every 5 minutes as needed for chest pain if you are still having symptoms after 3 doses (15 minutes) call 911.       order for DME     1 Device    Equipment being ordered: L wrist brace       pantoprazole 40 MG EC tablet    PROTONIX    90 tablet    TAKE 1 TABLET BY MOUTH ONCE DAILY 30 TO 60 MINUTES BEFORE A MEAL       rosuvastatin 5 MG tablet    CRESTOR    12 tablet    Take 1 tablet (5 mg) by mouth once a week       vitamin D 1000 UNITS capsule     30    1 CAPSULE DAILY

## 2017-05-04 ENCOUNTER — TELEPHONE (OUTPATIENT)
Dept: FAMILY MEDICINE | Facility: CLINIC | Age: 77
End: 2017-05-04

## 2017-05-04 LAB
BACTERIA SPEC CULT: NORMAL
MICRO REPORT STATUS: NORMAL
SPECIMEN SOURCE: NORMAL

## 2017-05-04 NOTE — TELEPHONE ENCOUNTER
I called patient and explained that we are waiting on Urine Culture results to come back  We will contact her with results when ready.    Kari Hassan RN

## 2017-05-04 NOTE — TELEPHONE ENCOUNTER
Patient calling for UA results.  Also thinks she was to have blood work as well.  Please advise,  Sho Mondragon RN  [employer]

## 2017-05-04 NOTE — TELEPHONE ENCOUNTER
Reason for Call:  Patient calling to check on labs     Detailed comments: Cara Camarillo called and had UA done yesterday. She thought that she was supposed to have blood work done as well but there are no orders in her chart for any blood work.       Phone Number Patient can be reached at: Home number on file [phone] (home)    Best Time: ASAP     Can we leave a detailed message on this number? YES    Call taken on 5/4/2017 at 12:13 PM by Demetra Hemphill

## 2017-05-05 ENCOUNTER — TELEPHONE (OUTPATIENT)
Dept: FAMILY MEDICINE | Facility: CLINIC | Age: 77
End: 2017-05-05

## 2017-05-05 DIAGNOSIS — R82.81 STERILE PYURIA: Primary | ICD-10-CM

## 2017-05-05 NOTE — TELEPHONE ENCOUNTER
Attempted to reach patient again.   Home Phone continues to ring and ring with no VM set up  Please recall.      Kari Hassan RN

## 2017-05-05 NOTE — TELEPHONE ENCOUNTER
Call to patient.  Reviewed provider plan with her.  She is very concerned about what might be wrong with her in regards to going to Urology, she worries about needing any type of surgery again.  Reassured patient that is best to have specialist evaluate her for the best treatment options, surgery may not be needed.  She did state she wanted to see Dr. Riley again, reviewed referral was placed for his office.  She was happy with this.  She verbalizes understanding of pl,an, has no further questions.  Marie Vyas RN

## 2017-05-05 NOTE — TELEPHONE ENCOUNTER
Advise any of the four mds taking pt's is fine    Increase nph insulin to 9 units at night.  Continue 8 units in morning.  Please update med list.  Follow up with Lissy Diana for glucoses and insulin dosing.  I strongly recommend continuing insulin therapy    I would advise pt see urology for the white cells in her urine.  I have referred her.

## 2017-05-05 NOTE — TELEPHONE ENCOUNTER
PCP:     Called Patient regarding the UC results.  1.She continues to feel some burning on urination, but not all the time, and today has not felt it at all.  Continues to feel frequency with urination  Denies any hematuria, fever.  Urine is clear, no odor.    Continues to have intermittent pain in the left groin, but that is also not present today.   Is trying to drink more water, but doesn't feel thirsty.   Occasional dizziness in the morning, better the rest of the day.     2.Recent blood sugars since Novolin was increased to 8 units twice a day.  5/2 am 201, 160 before dinner,  5/3 189, 171  5/4 219, 138  Would prefer to not be on insulin, would rather take pills if she can.       3.Is upset about PCP leaving, is there someone you would recommend that she see?    Please advise  Thank you  Teodora Pulido RN    __________________________________________________________________________________________________________  Lab result note:  Notes Recorded by Ventura Alvarez MD on 5/5/2017 at 8:12 AM  Please call patient to let them know and task me back with answer to urinary symptom question.  Thanks.    Your urinalysis shows some white blood cells in it but urine culture is negative.  Please let me know if you are having any bothersome urinary symptoms.    It was a pleasure seeing you in clinic recently.  Please call with any questions you may have.  Ventura Alvarez MD

## 2017-05-15 ENCOUNTER — ALLIED HEALTH/NURSE VISIT (OUTPATIENT)
Dept: PHARMACY | Facility: CLINIC | Age: 77
End: 2017-05-15
Payer: COMMERCIAL

## 2017-05-15 DIAGNOSIS — E11.59 TYPE 2 DIABETES MELLITUS WITH VASCULAR DISEASE (H): Primary | ICD-10-CM

## 2017-05-15 PROCEDURE — 99607 MTMS BY PHARM ADDL 15 MIN: CPT | Performed by: PHARMACIST

## 2017-05-15 PROCEDURE — 99606 MTMS BY PHARM EST 15 MIN: CPT | Performed by: PHARMACIST

## 2017-05-15 NOTE — PATIENT INSTRUCTIONS
Recommendations from today's MTM visit:                                                        1. Increase Novolin N to 10 units before breakfast, 10 units before dinner    Next MTM visit: I will call in 2 weeks on Wednesday, May 31st at 10 AM to find out how your blood sugars are.      To schedule another MTM appointment, please call the clinic directly or you may call the MTM scheduling line at [phone] or toll-free at [phone].     My Clinical Pharmacist's contact information:                                                      It was a pleasure seeing you today!  Please feel free to contact me with any questions or concerns you have.      Demetra Craig, Pharm.D.  Medication Therapy Management Pharmacist  Page/VM:  [phone]    You may receive a survey about the MTM services you received.  I would appreciate your feedback to help me serve you better in the future. Please fill it out and return it when you can. Your comments will be anonymous.

## 2017-05-15 NOTE — MR AVS SNAPSHOT
After Visit Summary   5/15/2017    Cara Camarillo    MRN: [id number]           Patient Information     Date Of Birth          1940        Visit Information        Provider Department      5/15/2017 8:30 AM Demetra Craig RPH [facility]        Today's Diagnoses     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)    -  1      Care Instructions    Recommendations from today's MTM visit:                                                        1. Increase Novolin N to 10 units before breakfast, 10 units before dinner    Next MTM visit: I will call in 2 weeks on Wednesday, May 31st at 10 AM to find out how your blood sugars are.      To schedule another MTM appointment, please call the clinic directly or you may call the MTM scheduling line at [phone] or toll-free at [phone].     My Clinical Pharmacist's contact information:                                                      It was a pleasure seeing you today!  Please feel free to contact me with any questions or concerns you have.      Demetra Craig, Pharm.D.  Medication Therapy Management Pharmacist  Page/VM:  [phone]    You may receive a survey about the [facility] services you received.  I would appreciate your feedback to help me serve you better in the future. Please fill it out and return it when you can. Your comments will be anonymous.              Follow-ups after your visit        Your next 10 appointments already scheduled     May 31, 2017 10:00 AM CDT   TELEMEDICINE with Demetra Craig RPH   [facility] ([facility])    [address] [place] [address] [id number] [phone]           Note: this is not an onsite visit; there is no need to come to the facility.              Who to contact     If you have questions or need follow up information about today's clinic visit or your schedule please contact [facility] directly at  "[phone].  Normal or non-critical lab and imaging results will be communicated to you by MyChart, letter or phone within 4 business days after the clinic has received the results. If you do not hear from us within 7 days, please contact the clinic through [employer] or phone. If you have a critical or abnormal lab result, we will notify you by phone as soon as possible.  Submit refill requests through [email or web address] or call your pharmacy and they will forward the refill request to us. Please allow 3 business days for your refill to be completed.          Additional Information About Your Visit        [employer] Information     [email or web address] lets you send messages to your doctor, view your test results, renew your prescriptions, schedule appointments and more. To sign up, go to [email or web address] . Click on \"Log in\" on the left side of the screen, which will take you to the Welcome page. Then click on \"Sign up Now\" on the right side of the page.     You will be asked to enter the access code listed below, as well as some personal information. Please follow the directions to create your username and password.     Your access code is: [email or web address]  Expires: 2017 11:37 AM     Your access code will  in 90 days. If you need help or a new code, please call your [place] clinic or [phone].        Care EveryWhere ID     This is your Care EveryWhere ID. This could be used by other organizations to access your [place] medical records  [id number]         Blood Pressure from Last 3 Encounters:   17 135/78   17 140/86   17 160/80    Weight from Last 3 Encounters:   17 220 lb (99.8 kg)   17 221 lb (100.2 kg)   17 214 lb (97.1 kg)              Today, you had the following     No orders found for display         Today's Medication Changes          These changes are accurate as of: 5/15/17  1:05 PM.  If you have any questions, ask your nurse or doctor.               These medicines have " changed or have updated prescriptions.        Dose/Directions    NovoLIN N VIAL 100 UNIT/ML injection   This may have changed:  additional instructions   Used for:  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)   Generic drug:  insulin NPH        10 units before breakfast, 10 units before dinner   Quantity:  10 mL   Refills:  1                Primary Care Provider Office Phone # Fax #    Ventura Jeter Anders Alvarez [phone]       [facility] [address] [facility] [address]  [facility] [address]        Thank you!     Thank you for choosing [facility]  for your care. Our goal is always to provide you with excellent care. Hearing back from our patients is one way we can continue to improve our services. Please take a few minutes to complete the written survey that you may receive in the mail after your visit with us. Thank you!             Your Updated Medication List - Protect others around you: Learn how to safely use, store and throw away your medicines at www.disposemymeds.org.          This list is accurate as of: 5/15/17  1:05 PM.  Always use your most recent med list.                   Brand Name Dispense Instructions for use    acetaminophen 500 MG tablet    TYLENOL     Take 500-1,000 mg by mouth 3 times daily as needed for mild pain       amLODIPine 10 MG tablet    NORVASC    90 tablet    Take 1 tablet (10 mg) by mouth daily       aspirin 81 MG tablet     100 tablet    Take 1 tablet (81 mg) by mouth daily       blood glucose monitoring test strip    ONE TOUCH ULTRA    300 each    1 strip by In Vitro route 3 times daily       calcium carbonate 500 MG tablet    OS-MANJEET 500 mg [place]     Take 500 mg by mouth 2 times daily       losartan 50 MG tablet    COZAAR    180 tablet    Take 2 tablets (100 mg) by mouth daily       metFORMIN 500 MG 24 hr tablet    GLUCOPHAGE-XR    180 tablet    1000 mg in am and 500 mg in pm       metoprolol 50 MG tablet    LOPRESSOR    270  tablet    TAKE ONE AND ONE-HALF TABLETS BY MOUTH TWICE DAILY       nitroglycerin 0.4 MG sublingual tablet    NITROSTAT    25 tablet    Place 1 tablet (0.4 mg) under the tongue every 5 minutes as needed for chest pain if you are still having symptoms after 3 doses (15 minutes) call 911.       NovoBinary Thumb N VIAL 100 UNIT/ML injection   Generic drug:  insulin NPH     10 mL    10 units before breakfast, 10 units before dinner       order for DME     1 Device    Equipment being ordered: L wrist brace       pantoprazole 40 MG EC tablet    PROTONIX    90 tablet    TAKE 1 TABLET BY MOUTH ONCE DAILY 30 TO 60 MINUTES BEFORE A MEAL       rosuvastatin 5 MG tablet    CRESTOR    12 tablet    Take 1 tablet (5 mg) by mouth once a week       vitamin D 1000 UNITS capsule     30    1 CAPSULE DAILY

## 2017-05-15 NOTE — PROGRESS NOTES
SUBJECTIVE/OBJECTIVE:                                                    Cara Camarillo is a 77 year old female called for a follow-up visit for Medication Therapy Management.  She was referred to me from Dr. Alvarez.     Chief Complaint: Follow up from our visit on May 1st, 2017.  Following-up on blood sugars.   Tobacco: No tobacco use   Alcohol: not currently using    Medication Adherence: no issues reported    Diabetes:  Pt currently taking Novolin N 9 units twice daily. Pt is not experiencing side effects.  SMBG: two times daily.   Ranges (patient reported): See below         AM   PM  5/2  201, 160  5/3  189, 171  5/4  219, 138  5/5  190, 145  5/6  166, 127 (increased to 9 units twice daily)  5/7  210, 107  5/8  156, 128  5/9  183, 137  5/10 157, 127  5/11 160, 132  5/12 185, 128  5/13 152, 131  5/14 202, 168    Patient is not experiencing hypoglycemia  Recent symptoms of high blood sugar? none  Eye exam: due  Foot exam: due  Microalbumin is < 30 mg/g. Pt is taking an ACEi/ARB.  Aspirin: Taking 81mg daily and denies side effects  Diet/Exercise: She tries to eat things she knows are okay for diabetes but struggles when she is visiting with her daughter.  She states that she will try to do better.  It sounds like she relies on her daughter for company during meals frequently and is often served things inconsistent with a diabetes diet.     Patient feels that her numbers were a lot better when she was taking glipizide.  The Novolin N is expensive.  Patient really doesn't like using insulin because of the visible nature of using insulin.      Current labs include:  BP Readings from Last 3 Encounters:   05/03/17 135/78   04/18/17 140/86   04/13/17 160/80     Today's Vitals: There were no vitals taken for this visit. - telemed    Lab Results   Component Value Date    A1C 8.3 03/06/2017    A1C 8.7 01/23/2017    A1C 8.9 11/21/2016    A1C 8.3 07/22/2016    A1C 7.7 04/18/2016     Recent Labs   Lab Test  07/22/16   1500   12/18/15   1116   10/24/14   0847   04/24/13   0905   CHOL  177   --    --   215*   --   157   HDL  49*  53   < >  49*   < >  56   LDL  98  89   < >  137*   < >  83   TRIG  148   --    --   143   --   93   CHOLHDLRATIO   --    --    --   4.4   --   2.8    < > = values in this interval not displayed.     Liver Function Studies -   Recent Labs   Lab Test  04/12/17   1716   PROTTOTAL  7.1   ALBUMIN  3.7   BILITOTAL  0.3   ALKPHOS  105   AST  22   ALT  33     GFR Estimate   Date Value Ref Range Status   04/12/2017 41 (L) >60 mL/min/1.7m2 Final     Comment:     Non  GFR Calc   03/06/2017 43 (L) >60 mL/min/1.7m2 Final     Comment:     Non  GFR Calc   01/23/2017 46 (L) >60 mL/min/1.7m2 Final     Comment:     Non  GFR Calc     Lab Results   Component Value Date    UCRR 292 01/23/2017    MICROL 60 01/23/2017    UMALCR 20.58 01/23/2017       Last Basic Metabolic Panel:  Lab Results   Component Value Date     04/12/2017      Lab Results   Component Value Date    POTASSIUM 4.6 04/12/2017     Lab Results   Component Value Date    CHLORIDE 109 04/12/2017     Lab Results   Component Value Date    BUN 26 04/12/2017     Lab Results   Component Value Date    CR 1.27 04/12/2017     TSH   Date Value Ref Range Status   11/21/2016 3.32 0.40 - 4.00 mU/L Final     Most Recent Immunizations   Administered Date(s) Administered     Influenza (High Dose) 3 valent vaccine 11/21/2016     Influenza (IIV3) 10/12/2012     Pneumococcal (PCV 13) 05/21/2015     Pneumococcal 23 valent 05/16/2014     TD (ADULT, 7+) 04/15/2011     TDAP Vaccine (Adacel) 01/29/2015     ASSESSMENT:                                                    Current medications were reviewed today as discussed above.      Medication Adherence: no issues identified    Diabetes: Needs Improvement. Patient is not meeting A1c goal of < 8%. Self monitoring of blood glucose is not at goal of fasting  mg/dL and PPG <180 mg/dL.  We  discuss an achievable goal today of having all blood sugars measure beneath 200 mg/dL. To achieve this goal, patient would benefit from a dose increase in insulin.       PLAN:                                                      1) Increase Novolin N to 10 units twice daily.  Continue SMBG.      I spent 20 minutes with this patient today.  All changes were made via collaborative practice agreement with Ventura Alvarez. A copy of the visit note was provided to the patient's primary care provider.     Will follow up in 2 weeks to monitor blood sugars and determine if patient has gotten in touch with cardiology.    The patient was mailed a summary of these recommendations as an after visit summary.    Demetra Craig, Pharm.D.  Medication Therapy Management Pharmacist  Page/VM:  [phone]

## 2017-05-16 ENCOUNTER — TELEPHONE (OUTPATIENT)
Dept: FAMILY MEDICINE | Facility: CLINIC | Age: 77
End: 2017-05-16

## 2017-05-16 NOTE — TELEPHONE ENCOUNTER
Called and relayed referral information to the Pt.   Informed her about reason for referral, and advised Pt to call and schedule appointment.   Pt agrees with plan of care.     Your provider has referred you to: N: [facility]  [place] [phone]   [email or web address]    Missy Kent RN

## 2017-05-16 NOTE — TELEPHONE ENCOUNTER
Reason for Call:  Other call back    Detailed comments: Pt called and was wondering if she could speak with a nurse in regards to the [facility] referral from Dr. Alvarez. She is unclear whether she should be calling them to schedule, if she even NEEDS to schedule the appt., or if they are suppose to be calling her. Please give her a phone call back ASAP.    Phone Number Patient can be reached at: Home number on file [phone] (home)    Best Time:     Can we leave a detailed message on this number? YES    Call taken on 5/16/2017 at 8:41 AM by Ana Murillo

## 2017-05-22 DIAGNOSIS — E11.59 TYPE 2 DIABETES MELLITUS WITH VASCULAR DISEASE (H): ICD-10-CM

## 2017-05-22 RX ORDER — METFORMIN HCL 500 MG
TABLET, EXTENDED RELEASE 24 HR ORAL
Qty: 180 TABLET | Refills: 1 | Status: SHIPPED | OUTPATIENT
Start: 2017-05-22 | End: 2017-05-24 | Stop reason: DRUGHIGH

## 2017-05-22 NOTE — TELEPHONE ENCOUNTER
metFORMIN (GLUCOPHAGE-XR) 500 MG 24 hr tablet 180 tablet 0 3/1/2017              Last Written Prescription Date: 03/01/2017  Last Fill Quantity: 180, # refills: 0  Last Office Visit with G, [facility] or [facility] prescribing provider:  04/12/2017        BP Readings from Last 3 Encounters:   05/03/17 135/78   04/18/17 140/86   04/13/17 160/80     Lab Results   Component Value Date    MICROL 60 01/23/2017     Lab Results   Component Value Date    UMALCR 20.58 01/23/2017     Creatinine   Date Value Ref Range Status   04/12/2017 1.27 (H) 0.52 - 1.04 mg/dL Final   ]  GFR Estimate   Date Value Ref Range Status   04/12/2017 41 (L) >60 mL/min/1.7m2 Final     Comment:     Non  GFR Calc   03/06/2017 43 (L) >60 mL/min/1.7m2 Final     Comment:     Non  GFR Calc   01/23/2017 46 (L) >60 mL/min/1.7m2 Final     Comment:     Non  GFR Calc     GFR Estimate If Black   Date Value Ref Range Status   04/12/2017 49 (L) >60 mL/min/1.7m2 Final     Comment:      GFR Calc   03/06/2017 52 (L) >60 mL/min/1.7m2 Final     Comment:      GFR Calc   01/23/2017 56 (L) >60 mL/min/1.7m2 Final     Comment:      GFR Calc     Lab Results   Component Value Date    CHOL 177 07/22/2016     Lab Results   Component Value Date    HDL 49 07/22/2016     Lab Results   Component Value Date    LDL 98 07/22/2016     Lab Results   Component Value Date    TRIG 148 07/22/2016     Lab Results   Component Value Date    CHOLHDLRATIO 4.4 10/24/2014     Lab Results   Component Value Date    AST 22 04/12/2017     Lab Results   Component Value Date    ALT 33 04/12/2017     Lab Results   Component Value Date    A1C 8.3 03/06/2017    A1C 8.7 01/23/2017    A1C 8.9 11/21/2016    A1C 8.3 07/22/2016    A1C 7.7 04/18/2016     Potassium   Date Value Ref Range Status   04/12/2017 4.6 3.4 - 5.3 mmol/L Final

## 2017-05-22 NOTE — TELEPHONE ENCOUNTER
Prescription approved per [facility] according to the [facility] Refill Protocol.  Angie Ferguson RN- Triage [employer]

## 2017-05-24 DIAGNOSIS — E11.59 TYPE 2 DIABETES MELLITUS WITH VASCULAR DISEASE (H): ICD-10-CM

## 2017-05-24 RX ORDER — METFORMIN HCL 500 MG
TABLET, EXTENDED RELEASE 24 HR ORAL
Qty: 180 TABLET | Refills: 0 | Status: SHIPPED | OUTPATIENT
Start: 2017-05-24 | End: 2017-07-19

## 2017-05-24 NOTE — TELEPHONE ENCOUNTER
Reason for Call:  Medication or medication refill:    Do you use a [facility]?  Name of the pharmacy and phone number for the current request:  [id number] [place] [facility], [place] - [address]    Name of the medication requested: metFORMIN (GLUCOPHAGE-XR) 500 MG 24 hr table    Other request: pt is calling states that pharmacy will not re fill this prescription. Pharmacy is telling her that it's too early that it refill for 5/30... Pt is worried that if she doesn't take meds it might be a bad thing.. She would like call when rx is sent    Can we leave a detailed message on this number? YES    Phone number patient can be reached at: Home number on file [phone] (home)    Best Time: anytime    Call taken on 5/24/2017 at 1:50 PM by Shantel Marroquin

## 2017-05-24 NOTE — TELEPHONE ENCOUNTER
Pending Prescriptions:                       Disp   Refills    metFORMIN (GLUCOPHAGE-XR) 500 MG 24 hr ta*180 ta*0            Si mg in am and 500 mg in pm    Wrong script dosing sent into pharmacy. Med list updated. Please review. Thanks    Last Written Prescription Date: 2017  Last Fill Quantity: 180, # refills: 0  Last Office Visit with [facility] or [facility] prescribing provider:  2017        BP Readings from Last 3 Encounters:   17 135/78   17 140/86   17 160/80     Lab Results   Component Value Date    MICROL 60 2017     Lab Results   Component Value Date    UMALCR 20.58 2017     Creatinine   Date Value Ref Range Status   2017 1.27 (H) 0.52 - 1.04 mg/dL Final   ]  GFR Estimate   Date Value Ref Range Status   2017 41 (L) >60 mL/min/1.7m2 Final     Comment:     Non  GFR Calc   2017 43 (L) >60 mL/min/1.7m2 Final     Comment:     Non  GFR Calc   2017 46 (L) >60 mL/min/1.7m2 Final     Comment:     Non  GFR Calc     GFR Estimate If Black   Date Value Ref Range Status   2017 49 (L) >60 mL/min/1.7m2 Final     Comment:      GFR Calc   2017 52 (L) >60 mL/min/1.7m2 Final     Comment:      GFR Calc   2017 56 (L) >60 mL/min/1.7m2 Final     Comment:      GFR Calc     Lab Results   Component Value Date    CHOL 177 2016     Lab Results   Component Value Date    HDL 49 2016     Lab Results   Component Value Date    LDL 98 2016     Lab Results   Component Value Date    TRIG 148 2016     Lab Results   Component Value Date    CHOLHDLRATIO 4.4 10/24/2014     Lab Results   Component Value Date    AST 22 2017     Lab Results   Component Value Date    ALT 33 2017     Lab Results   Component Value Date    A1C 8.3 2017    A1C 8.7 2017    A1C 8.9 2016    A1C 8.3 2016    A1C 7.7 2016      Potassium   Date Value Ref Range Status   04/12/2017 4.6 3.4 - 5.3 mmol/L Final

## 2017-05-31 ENCOUNTER — ALLIED HEALTH/NURSE VISIT (OUTPATIENT)
Dept: PHARMACY | Facility: CLINIC | Age: 77
End: 2017-05-31
Payer: COMMERCIAL

## 2017-05-31 DIAGNOSIS — E11.59 TYPE 2 DIABETES MELLITUS WITH VASCULAR DISEASE (H): Primary | ICD-10-CM

## 2017-05-31 PROCEDURE — 99606 MTMS BY PHARM EST 15 MIN: CPT | Performed by: PHARMACIST

## 2017-05-31 PROCEDURE — 99607 MTMS BY PHARM ADDL 15 MIN: CPT | Performed by: PHARMACIST

## 2017-05-31 NOTE — MR AVS SNAPSHOT
"              After Visit Summary   5/31/2017    Cara Camarillo    MRN: [id number]           Patient Information     Date Of Birth          1940        Visit Information        Provider Department      5/31/2017 10:00 AM Demetra Craig RPH [facility]        Today's Diagnoses     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)    -  1       Follow-ups after your visit        Future tests that were ordered for you today     Open Future Orders        Priority Expected Expires Ordered    Hemoglobin A1c Routine  5/31/2018 5/31/2017    Lipid panel reflex to direct LDL Routine  5/31/2018 5/31/2017            Who to contact     If you have questions or need follow up information about today's clinic visit or your schedule please contact [facility] directly at [phone].  Normal or non-critical lab and imaging results will be communicated to you by MyChart, letter or phone within 4 business days after the clinic has received the results. If you do not hear from us within 7 days, please contact the clinic through MyChart or phone. If you have a critical or abnormal lab result, we will notify you by phone as soon as possible.  Submit refill requests through [employer] or call your pharmacy and they will forward the refill request to us. Please allow 3 business days for your refill to be completed.          Additional Information About Your Visit        MyChart Information     [employer] lets you send messages to your doctor, view your test results, renew your prescriptions, schedule appointments and more. To sign up, go to [email or web address] . Click on \"Log in\" on the left side of the screen, which will take you to the Welcome page. Then click on \"Sign up Now\" on the right side of the page.     You will be asked to enter the access code listed below, as well as some personal information. Please follow the directions to create your username and password.     Your access " "code is: [email or web address]  Expires: 2017 11:37 AM     Your access code will  in 90 days. If you need help or a new code, please call your [place] clinic or [phone].        Care EveryWhere ID     This is your Care EveryWhere ID. This could be used by other organizations to access your [place] medical records  [id number]         Blood Pressure from Last 3 Encounters:   17 135/78   17 140/86   17 160/80    Weight from Last 3 Encounters:   17 220 lb (99.8 kg)   17 221 lb (100.2 kg)   17 214 lb (97.1 kg)                 Today's Medication Changes          These changes are accurate as of: 17 10:52 AM.  If you have any questions, ask your nurse or doctor.               Start taking these medicines.        Dose/Directions    insulin syringe-needle U-100 30G X 1/2\" 0.5 ML   Commonly known as:  BD insulin syringe ULTRAFINE   Used for:  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)        Use one syringe twice daily or as directed.   Quantity:  100 each   Refills:  11            Where to get your medicines      These medications were sent to [facility] [place] - [address]  [address] [address]     Phone:  [phone]     insulin syringe-needle U-100 30G X 1/2\" 0.5 ML                Primary Care Provider Office Phone # Fax #    [place] Corona Alvarez [phone]       [facility] [address]  [facility] [address]        Thank you!     Thank you for choosing [facility]  for your care. Our goal is always to provide you with excellent care. Hearing back from our patients is one way we can continue to improve our services. Please take a few minutes to complete the written survey that you may receive in the mail after your visit with us. Thank you!             Your Updated Medication List - Protect others around you: Learn how to safely use, store and throw away your " "medicines at www.disposemymeds.org.          This list is accurate as of: 5/31/17 10:52 AM.  Always use your most recent med list.                   Brand Name Dispense Instructions for use    acetaminophen 500 MG tablet    TYLENOL     Take 500-1,000 mg by mouth 3 times daily as needed for mild pain       amLODIPine 10 MG tablet    NORVASC    90 tablet    Take 1 tablet (10 mg) by mouth daily       aspirin 81 MG tablet     100 tablet    Take 1 tablet (81 mg) by mouth daily       blood glucose monitoring test strip    ONE TOUCH ULTRA    300 each    1 strip by In Vitro route 3 times daily       calcium carbonate 500 MG tablet    OS-MANJEET 500 mg [place]. [place]     Take 500 mg by mouth 2 times daily       insulin syringe-needle U-100 30G X 1/2\" 0.5 ML    BD insulin syringe ULTRAFINE    100 each    Use one syringe twice daily or as directed.       losartan 50 MG tablet    COZAAR    180 tablet    Take 2 tablets (100 mg) by mouth daily       metFORMIN 500 MG 24 hr tablet    GLUCOPHAGE-XR    180 tablet    1000 mg in am and 500 mg in pm       metoprolol 50 MG tablet    LOPRESSOR    270 tablet    TAKE ONE AND ONE-HALF TABLETS BY MOUTH TWICE DAILY       nitroglycerin 0.4 MG sublingual tablet    NITROSTAT    25 tablet    Place 1 tablet (0.4 mg) under the tongue every 5 minutes as needed for chest pain if you are still having symptoms after 3 doses (15 minutes) call 911.       NovoLIN N VIAL 100 UNIT/ML injection   Generic drug:  insulin NPH     10 mL    10 units before breakfast, 10 units before dinner       order for DME     1 Device    Equipment being ordered: L wrist brace       pantoprazole 40 MG EC tablet    PROTONIX    90 tablet    TAKE 1 TABLET BY MOUTH ONCE DAILY 30 TO 60 MINUTES BEFORE A MEAL       rosuvastatin 5 MG tablet    CRESTOR    12 tablet    Take 1 tablet (5 mg) by mouth once a week       vitamin D 1000 UNITS capsule     30    1 CAPSULE DAILY         "

## 2017-05-31 NOTE — Clinical Note
FYI - ordered A1c and lipid panel per due labs. Sugars are looking much better! I will follow-up with her when labs return

## 2017-05-31 NOTE — PROGRESS NOTES
"SUBJECTIVE/OBJECTIVE:                                                    Cara Camarillo is a 77 year old female called for a follow-up visit for Medication Therapy Management.  She was referred to me from Dr. Alvarez. She describes concern today regarding a recent finding of white blood cells in her urine with accompanying back pain.  Her will go see a urologist or a nephrologist on June 16th to have the matter investigated.  She believes everything is \"going so wrong\" for her right now.  She mentioned this at our last visit as well.     Chief Complaint: Follow up from our visit on May 15th, 2017.  Following-up on blood sugars.   Tobacco: No tobacco use   Alcohol: not currently using    Medication Adherence: no issues reported - patient requests a syringe refill today (BD syringes UF 0.5x30, [facility])    Diabetes:  Pt currently taking metformin XR 1500 mg daily, Novolin N 10 units twice daily. She brings up the expense of her Novolin N again and mentions that 10 years ago or so when she started insulin it was much cheaper for her.  She used Lantus at the time. I was unable to explain the cost difference to her - I'm not sure what her prescription drug insurance plan is and how it covers insulin.  She wishes she could go back to oral medications again.  We discuss that this plan is best for her kidneys at this time.  She verbalizes understanding.  She describes some bruising where her injections are but quickly changes the subject.   She mentions that keeping track her of her blood sugars is burdensome and she does not enjoy doing it.  Cara would prefer to keep her insulin dose the same for now.   Pt is not experiencing side effects.  SMBG: two times daily.   Ranges (patient reported): See below              AM   PM  5/15  195, 128    5/16  151, 146  5/17  157, 111  5/18  181, 128  5/19  180, 127   5/20  152, 125  5/21  164, 123  5/22  181, 177  5/23  163, 117  5/24  171, 158  5/25  205, 135  5/26  182, 124  5/27 "  198, 103    160, 140    192, 110    147, 150    184   Av mg/dL  Patient is not experiencing hypoglycemia  Recent symptoms of high blood sugar? none  Eye exam: due  Foot exam: due  Microalbumin is < 30 mg/g. Pt is taking an ACEi/ARB.  Aspirin: Taking 81mg daily and denies side effects  Diet/Exercise: No changes from our last discussion.  Patient makes an effort to eat well but often relies on her daughter for food.  These food choices aren't always consistent with her diabetes diet.     Current labs include:  BP Readings from Last 3 Encounters:   17 135/78   17 140/86   17 160/80     Today's Vitals: There were no vitals taken for this visit. - telemed    Lab Results   Component Value Date    A1C 8.3 2017    A1C 8.7 2017    A1C 8.9 2016    A1C 8.3 2016    A1C 7.7 2016     Recent Labs   Lab Test  16   1500  12/18/15   1116   10/24/14   0847   13   0905   CHOL  177   --    --   215*   --   157   HDL  49*  53   < >  49*   < >  56   LDL  98  89   < >  137*   < >  83   TRIG  148   --    --   143   --   93   CHOLHDLRATIO   --    --    --   4.4   --   2.8    < > = values in this interval not displayed.     Liver Function Studies -   Recent Labs   Lab Test  17   1716   PROTTOTAL  7.1   ALBUMIN  3.7   BILITOTAL  0.3   ALKPHOS  105   AST  22   ALT  33     GFR Estimate   Date Value Ref Range Status   2017 41 (L) >60 mL/min/1.7m2 Final     Comment:     Non  GFR Calc   2017 43 (L) >60 mL/min/1.7m2 Final     Comment:     Non  GFR Calc   2017 46 (L) >60 mL/min/1.7m2 Final     Comment:     Non  GFR Calc     Lab Results   Component Value Date    UCRR 292 2017    MICROL 60 2017    UMALCR 20.58 2017       Last Basic Metabolic Panel:  Lab Results   Component Value Date     2017      Lab Results   Component Value Date    POTASSIUM 4.6 2017     Lab  Results   Component Value Date    CHLORIDE 109 04/12/2017     Lab Results   Component Value Date    BUN 26 04/12/2017     Lab Results   Component Value Date    CR 1.27 04/12/2017     TSH   Date Value Ref Range Status   11/21/2016 3.32 0.40 - 4.00 mU/L Final     Most Recent Immunizations   Administered Date(s) Administered     Influenza (High Dose) 3 valent vaccine 11/21/2016     Influenza (IIV3) 10/12/2012     Pneumococcal (PCV 13) 05/21/2015     Pneumococcal 23 valent 05/16/2014     TD (ADULT, 7+) 04/15/2011     TDAP Vaccine (Adacel) 01/29/2015     ASSESSMENT:                                                    Current medications were reviewed today as discussed above.      Medication Adherence: no issues identified    Diabetes: Improved. Patient is not meeting A1c goal of < 8%. Self monitoring of blood glucose is not at goal of fasting  mg/dL. However, patient is meeting PPG of <180 mg/dL and all but one measurement is less than 200 at this visit.  This achieves our goal we set at last visit.  Patient is due for a1c in June.  Defer dose adjustment until A1c lab returns.      PLAN:                                                      1) Order A1c today.   2) Continue current medications and SMBG.     I spent 20 minutes with this patient today.  All changes were made via collaborative practice agreement with [place] Alvarez. A copy of the visit note was provided to the patient's primary care provider.     Will follow up via phone call after A1c lab returns in June.     The patient declined a summary of these recommendations as an after visit summary.    Demetra Craig, Pharm.D.  Medication Therapy Management Pharmacist  Page/VM:  [phone]

## 2017-06-02 NOTE — TELEPHONE ENCOUNTER
Writer called pt . Pt states that she occasionally has an achy feeling in her right hands arm, and wrist, but it has decreased since stopping this rosuvastatin.  Pt states that she was having this pain when she saw Dr. Montana on 4/13/17. Pt also reports that her pain is occurring less often in the legs.     Writer will route to Dr. Montana as any FYI and see if he would like pt to resume low dose rosuvastatin.

## 2017-06-05 RX ORDER — ROSUVASTATIN CALCIUM 5 MG/1
5 TABLET, COATED ORAL WEEKLY
Qty: 12 TABLET | Refills: 3 | Status: SHIPPED | OUTPATIENT
Start: 2017-06-05 | End: 2017-07-27

## 2017-06-05 NOTE — TELEPHONE ENCOUNTER
I would resume the rosuvastatin at the previous dose.  Small muscle pain is unusual with statins.  Patient to report back after two weeks please.  Kyle Montana MD, FACC  June 4, 2017 10:08 PM

## 2017-06-05 NOTE — TELEPHONE ENCOUNTER
Called patient to verify she resumed her rosuvastatin.  She did not answer and there is no voicemail available.

## 2017-06-05 NOTE — TELEPHONE ENCOUNTER
Spoke to patient and she is going to resume rosuvastatin 5 mg every Wednesday.  Refill sent. Informed patient writer will call her back in two weeks to see if the muscle pain has come back. Patient agrees to plan. No further questions. Team 4 reminder sent.

## 2017-06-05 NOTE — TELEPHONE ENCOUNTER
Received a message from patient asking to be called back.  Called patient back three and line is busy.

## 2017-06-09 DIAGNOSIS — E11.9 TYPE 2 DIABETES, HBA1C GOAL < 7% (H): ICD-10-CM

## 2017-06-09 NOTE — TELEPHONE ENCOUNTER
Pending Prescriptions:                       Disp   Refills    ONE TOUCH ULTRA test strip [Pharmacy Med *[address]*1            Sig: USE TO TEST THREE TIMES DAILY             Last Written Prescription Date: 4/5/16  Last Fill Quantity: 300, # refills: prn  Last Office Visit with G, [facility] or [facility] prescribing provider:  4/12/17        BP Readings from Last 3 Encounters:   05/03/17 135/78   04/18/17 140/86   04/13/17 160/80     Lab Results   Component Value Date    MICROL 60 01/23/2017     Lab Results   Component Value Date    UMALCR 20.58 01/23/2017     Creatinine   Date Value Ref Range Status   04/12/2017 1.27 (H) 0.52 - 1.04 mg/dL Final   ]  GFR Estimate   Date Value Ref Range Status   04/12/2017 41 (L) >60 mL/min/1.7m2 Final     Comment:     Non  GFR Calc   03/06/2017 43 (L) >60 mL/min/1.7m2 Final     Comment:     Non  GFR Calc   01/23/2017 46 (L) >60 mL/min/1.7m2 Final     Comment:     Non  GFR Calc     GFR Estimate If Black   Date Value Ref Range Status   04/12/2017 49 (L) >60 mL/min/1.7m2 Final     Comment:      GFR Calc   03/06/2017 52 (L) >60 mL/min/1.7m2 Final     Comment:      GFR Calc   01/23/2017 56 (L) >60 mL/min/1.7m2 Final     Comment:      GFR Calc     Lab Results   Component Value Date    CHOL 177 07/22/2016     Lab Results   Component Value Date    HDL 49 07/22/2016     Lab Results   Component Value Date    LDL 98 07/22/2016     Lab Results   Component Value Date    TRIG 148 07/22/2016     Lab Results   Component Value Date    CHOLHDLRATIO 4.4 10/24/2014     Lab Results   Component Value Date    AST 22 04/12/2017     Lab Results   Component Value Date    ALT 33 04/12/2017     Lab Results   Component Value Date    A1C 8.3 03/06/2017    A1C 8.7 01/23/2017    A1C 8.9 11/21/2016    A1C 8.3 07/22/2016    A1C 7.7 04/18/2016     Potassium   Date Value Ref Range Status   04/12/2017 4.6 3.4 - 5.3 mmol/L Final

## 2017-06-16 ENCOUNTER — TRANSFERRED RECORDS (OUTPATIENT)
Dept: HEALTH INFORMATION MANAGEMENT | Facility: CLINIC | Age: 77
End: 2017-06-16

## 2017-06-16 ENCOUNTER — DOCUMENTATION ONLY (OUTPATIENT)
Dept: LAB | Facility: CLINIC | Age: 77
End: 2017-06-16

## 2017-06-16 DIAGNOSIS — E11.9 TYPE 2 DIABETES MELLITUS WITHOUT COMPLICATION, WITHOUT LONG-TERM CURRENT USE OF INSULIN (H): ICD-10-CM

## 2017-06-16 DIAGNOSIS — E11.9 TYPE 2 DIABETES MELLITUS WITHOUT COMPLICATION, WITHOUT LONG-TERM CURRENT USE OF INSULIN (H): Primary | ICD-10-CM

## 2017-06-16 LAB — HBA1C MFR BLD: 7.7 % (ref 4.3–6)

## 2017-06-16 PROCEDURE — 36415 COLL VENOUS BLD VENIPUNCTURE: CPT | Performed by: PREVENTIVE MEDICINE

## 2017-06-16 PROCEDURE — 83036 HEMOGLOBIN GLYCOSYLATED A1C: CPT | Performed by: PREVENTIVE MEDICINE

## 2017-06-16 NOTE — PROGRESS NOTES
Patient came to lab today (6/16/17) for an A1c check-up and she mentioned that you sent her a letter regarding about it.   Please review and future order if needed.    -Lab

## 2017-06-20 NOTE — TELEPHONE ENCOUNTER
Called patient to see how she was doing after resuming the rosuvastatin 5 mg every Wednesday.  Patient did not answer. VM not available.

## 2017-06-21 ENCOUNTER — ALLIED HEALTH/NURSE VISIT (OUTPATIENT)
Dept: PHARMACY | Facility: CLINIC | Age: 77
End: 2017-06-21
Payer: COMMERCIAL

## 2017-06-21 DIAGNOSIS — E78.5 HYPERLIPIDEMIA LDL GOAL <100: ICD-10-CM

## 2017-06-21 DIAGNOSIS — R30.0 DYSURIA: ICD-10-CM

## 2017-06-21 DIAGNOSIS — E11.59 TYPE 2 DIABETES MELLITUS WITH VASCULAR DISEASE (H): Primary | ICD-10-CM

## 2017-06-21 PROCEDURE — 99607 MTMS BY PHARM ADDL 15 MIN: CPT | Performed by: PHARMACIST

## 2017-06-21 PROCEDURE — 99606 MTMS BY PHARM EST 15 MIN: CPT | Performed by: PHARMACIST

## 2017-06-21 NOTE — Clinical Note
Patient is complaining of weakness since restarting her statin.  She states it is similar to what she experienced while taking atorvastatin. I told her I would pass this on to you as you requested that she check in 2 weeks after new rosuva start.

## 2017-06-21 NOTE — MR AVS SNAPSHOT
After Visit Summary   6/21/2017    Cara Camarillo    MRN: [id number]           Patient Information     Date Of Birth          1940        Visit Information        Provider Department      6/21/2017 10:00 AM Demetra Craig RPH [facility]        Today's Diagnoses     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)    -  1    Hyperlipidemia LDL goal <100        Dysuria           Follow-ups after your visit        Your next 10 appointments already scheduled     Jul 19, 2017 10:00 AM CDT   TELEMEDICINE with Demetra Craig RPH   [facility]    [address] [place] [address] [phone]           Note: this is not an onsite visit; there is no need to come to the facility.            Jul 27, 2017  3:30 PM CDT   [facility]    [address] [place] [id number] [phone]           Bring a current list of meds and any records pertaining to this visit.  For Physicals, please bring immunization records and any forms needing to be filled out.  Please arrive 10 minutes early to complete paperwork.              Who to contact     If you have questions or need follow up information about today's clinic visit or your schedule please contact [facility] directly at [phone].  Normal or non-critical lab and imaging results will be communicated to you by MyChart, letter or phone within 4 business days after the clinic has received the results. If you do not hear from us within 7 days, please contact the clinic through MyChart or phone. If you have a critical or abnormal lab result, we will notify you by phone as soon as possible.  Submit refill requests through [employer] or call your pharmacy and they will forward the refill request to us. Please allow 3 business days for your refill to be completed.     "      Additional Information About Your Visit        MyChart Information     [employer] lets you send messages to your doctor, view your test results, renew your prescriptions, schedule appointments and more. To sign up, go to [email or web address] . Click on \"Log in\" on the left side of the screen, which will take you to the Welcome page. Then click on \"Sign up Now\" on the right side of the page.     You will be asked to enter the access code listed below, as well as some personal information. Please follow the directions to create your username and password.     Your access code is: [email or web address]  Expires: 2017 11:19 AM     Your access code will  in 90 days. If you need help or a new code, please call your [place] clinic or [phone].        Care EveryWhere ID     This is your Care EveryWhere ID. This could be used by other organizations to access your [place] medical records  [id number]         Blood Pressure from Last 3 Encounters:   17 135/78   17 140/86   17 160/80    Weight from Last 3 Encounters:   17 220 lb (99.8 kg)   17 221 lb (100.2 kg)   17 214 lb (97.1 kg)              Today, you had the following     No orders found for display         Where to get your medicines      These medications were sent to [facility] [address] [place] [address]  [address] [facility] [address]     Phone:  [phone]     insulin  UNIT/ML injection    insulin syringe-needle U-100 30G X 1/2\" 0.5 ML          Primary Care Provider Office Phone # Fax #    [place] Corona Alvarez [phone]       [facility] [address] [facility] [address]  [facility] [address]        Equal Access to Services     TERRANCE TRUONG AH: Haddevante zelaya Sovicki, waaxda luqadaha, qaybta kaallevi harrison. So [facility] [phone].    ATENCIÓN: Si neelam koo a serrano disposición " "servicios gratuitos de asistencia lingüística. Meeta allen [phone].    We comply with applicable federal civil rights laws and [place] laws. We do not discriminate on the basis of race, color, national origin, age, disability sex, sexual orientation or gender identity.            Thank you!     Thank you for choosing [facility]  for your care. Our goal is always to provide you with excellent care. Hearing back from our patients is one way we can continue to improve our services. Please take a few minutes to complete the written survey that you may receive in the mail after your visit with us. Thank you!             Your Updated Medication List - Protect others around you: Learn how to safely use, store and throw away your medicines at www.disposemymeds.org.          This list is accurate as of: 6/21/17 11:19 AM.  Always use your most recent med list.                   Brand Name Dispense Instructions for use Diagnosis    acetaminophen 500 MG tablet    TYLENOL     Take 500-1,000 mg by mouth 3 times daily as needed for mild pain        amLODIPine 10 MG tablet    NORVASC    90 tablet    Take 1 tablet (10 mg) by mouth daily    Essential hypertension, benign       aspirin 81 MG tablet     100 tablet    Take 1 tablet (81 mg) by mouth daily    Type II or unspecified type diabetes mellitus without mention of complication, not stated as uncontrolled       calcium carbonate 1250 MG tablet    OS-MANJEET 500 mg [place]. 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)       losartan 50 MG tablet    COZAAR    180 tablet    Take 2 tablets (100 mg) by " mouth daily    Essential hypertension with goal blood pressure less than 140/90       metFORMIN 500 MG 24 hr tablet    GLUCOPHAGE-XR    180 tablet    1000 mg in am and 500 mg in pm    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)       metoprolol 50 MG tablet    LOPRESSOR    270 tablet    TAKE ONE AND ONE-HALF TABLETS BY MOUTH TWICE DAILY    Essential hypertension, benign       nitroglycerin 0.4 MG sublingual tablet    NITROSTAT    25 tablet    Place 1 tablet (0.4 mg) under the tongue every 5 minutes as needed for chest pain if you are still having symptoms after 3 doses (15 minutes) call 911.    Postsurgical percutaneous transluminal coronary angioplasty status, Status post coronary angioplasty       ONE TOUCH ULTRA test strip   Generic drug:  blood glucose monitoring     300 strip    USE TO TEST THREE TIMES DAILY    Type 2 diabetes, HbA1c goal < 7% (H)       order for DME     1 Device    Equipment being ordered: L wrist brace    Left wrist pain       pantoprazole 40 MG EC tablet    PROTONIX    90 tablet    TAKE 1 TABLET BY MOUTH ONCE DAILY 30 TO 60 MINUTES BEFORE A MEAL    Gastroesophageal reflux disease, esophagitis presence not specified       rosuvastatin 5 MG tablet    CRESTOR    12 tablet    Take 1 tablet (5 mg) by mouth once a week    Hyperlipidemia LDL goal <100       vitamin D 1000 UNITS capsule     30    1 CAPSULE DAILY    Vitamin D deficiency

## 2017-06-21 NOTE — PROGRESS NOTES
SUBJECTIVE/OBJECTIVE:                Cara Camarillo is a 77 year old female called for a follow-up visit for Medication Therapy Management.  She was referred to me from Dr. Alvarez.  She will be establishing care with Dr. Gonsalez in July after Dr. Alvarez leaves his practice.      Chief Complaint: Follow up from our visit on 17.  We follow-up on her recent A1c measurement today.  Tobacco: No tobacco use   Alcohol: not currently using    Medication Adherence: no issues reported    Diabetes:  Pt currently taking Novolin N 10 units twice a day, metformin 1000 mg in the morning and 500 mg in the evening. Pt is not experiencing side effects.  Cara does request that we send new prescriptions for Novolin N and syringes to [facility].  She has learned she can save money getting those items there.   SMBG: two times daily.   Ranges (from patient's glucose log): As below    Date FBG Bedtime    183 158    201 139    174 156    215 156    181 118    186      FB/6 at goal ( mg/dL, 0%)  Bedtime:  5/ at goal (<200 mg/dL, 100%)    Patient is not experiencing hypoglycemia  Recent symptoms of high blood sugar? none  Eye exam: due  Foot exam: due  Microalbumin is < 30 mg/g. Pt is taking an ACEi/ARB.  Aspirin: Taking 81mg daily and denies side effects  Diet/Exercise: Limited to activities of daily living, tries to eat within the guidelines of a diabetes diet    Urogenital Problem:  Patient is experiencing a lot of pain this week due to unspecified urinary/groin issue.  She does not report treating this pain with medications but does express frustration that urology was unable to help her.  She was asked to follow-up with her primary care provider.  Her PCP is Dr. Alvarez - however, he is leaving his practice shortly and she will need to establish care with Dr. Gonsalez.  She is unable to get in with Dr. Gonsalez until late July.  This worries her more and she reports being uncomfortable. She states  "that the pain does come and go and she hopes it will resolve soon.      Hyperlipidemia: Current therapy includes rosuvastatin 5 mg weekly.  Pt reports myalgias in her arms that makes it difficult to hold things since on medication. She wears braces on her wrists (longstanding) due to underlying issues but cannot currently \"hold a cup of coffee\" due to weakness.She reports similar symptoms of weakness and pain that she experienced while on atorvastatin.  They resolved when she quit atorvastatin.  I was unaware of this issue in the past and am not aware of the history of this pain and weakness in her arms in its entirety.     Current labs include:  BP Readings from Last 3 Encounters:   05/03/17 135/78   04/18/17 140/86   04/13/17 160/80     Today's Vitals: There were no vitals taken for this visit. - telemed  Lab Results   Component Value Date    A1C 7.7 06/16/2017   .  Lab Results   Component Value Date    CHOL 177 07/22/2016     Lab Results   Component Value Date    TRIG 148 07/22/2016     Lab Results   Component Value Date    HDL 49 07/22/2016     Lab Results   Component Value Date    LDL 98 07/22/2016       Liver Function Studies -   Recent Labs   Lab Test  04/12/17   1716   PROTTOTAL  7.1   ALBUMIN  3.7   BILITOTAL  0.3   ALKPHOS  105   AST  22   ALT  33       Lab Results   Component Value Date    UCRR 292 01/23/2017    MICROL 60 01/23/2017    UMALCR 20.58 01/23/2017       Last Basic Metabolic Panel:  Lab Results   Component Value Date     04/12/2017      Lab Results   Component Value Date    POTASSIUM 4.6 04/12/2017     Lab Results   Component Value Date    CHLORIDE 109 04/12/2017     Lab Results   Component Value Date    BUN 26 04/12/2017     Lab Results   Component Value Date    CR 1.27 04/12/2017     GFR Estimate   Date Value Ref Range Status   04/12/2017 41 (L) >60 mL/min/1.7m2 Final     Comment:     Non  GFR Calc   03/06/2017 43 (L) >60 mL/min/1.7m2 Final     Comment:     Non  " American GFR Calc   01/23/2017 46 (L) >60 mL/min/1.7m2 Final     Comment:     Non  GFR Calc     TSH   Date Value Ref Range Status   11/21/2016 3.32 0.40 - 4.00 mU/L Final     Most Recent Immunizations   Administered Date(s) Administered     Influenza (High Dose) 3 valent vaccine 11/21/2016     Influenza (IIV3) 10/12/2012     Pneumococcal (PCV 13) 05/21/2015     Pneumococcal 23 valent 05/16/2014     TD (ADULT, 7+) 04/15/2011     TDAP Vaccine (Adacel) 01/29/2015       ASSESSMENT:              Current medications were reviewed today as discussed above.      Medication Adherence: no issues identified    Diabetes: Stable. Patient is meeting A1c goal of < 8%.      Hyperlipidemia: Stable. Pt is on moderate intensity statin which is indicated based on 2013 ACC/AHA guidelines for lipid management.  However, patient appears to be experiencing myalgias and weakness related to the medication.  Will inform cardiology for guidance.     Urogenital Issue: Plan in place to see PCP for further workup.     PLAN:                  1) Continue SMBG.    2) New Rx's for Novolin N and syringes sent to [facility] pharmacy in [place].  3) Reports of myalgias and weakness passed on to cardiology for further evaluation.     I spent 20 minutes with this patient today.  All changes were made via collaborative practice agreement with Ventura Alvarez. A copy of the visit note was provided to the patient's primary care provider.     Will follow up in 4 weeks.    The patient declined a summary of these recommendations as an after visit summary.    Demetra Craig, Pharm.D.  Medication Therapy Management Pharmacist  Page/VM:  [phone]

## 2017-06-22 NOTE — TELEPHONE ENCOUNTER
Received CC'd chart from patients PMD          Kyle Montana MD at 6/21/2017 10:00 AM        Status: Signed            I will have my RN call to get a better understanding of any myalgias.  Rosuvastatin 5 mg once a day is a pretty minimal dose.  Options are pravastatin or PCSK9 Inhibitor.  Kyle Montana MD, FACC  June 21, 2017 10:23 PM                  Spoke to patient and she stated that since resumed the rosuvastatin her arms have been very achy and painful. Yesterday was the worst it has ever been. Pt states that today is better so far. Pt only takes the Rosuvastatin 5 mg once a week on Wednesday. Writer reviewed the other option and she was not interested in injections, does not think she could do it herself. Pt would like to keep to taking a pill if possible.    Will route to Dr. Montana to update

## 2017-06-26 NOTE — TELEPHONE ENCOUNTER
Spoke to patient about Dr. Montana's recommendation below. Pt stated that lately she has not had any aches or pains. Pt would like to take this weeks dose of Rosuvastatin 5 mg on Wednesday 6/28/17 and see if her aches and pains return. If patient still have Myalgias, she will call back and try switching to the pravastatin. Writer agreed with plan.

## 2017-06-26 NOTE — TELEPHONE ENCOUNTER
Tried to call patient to review Dr. Montana's recommendations below. No answer and no voicemail on home phone picks up. Tried to call mobile phone listed. No answer. Left non urgent voicemail for patient or daughter to call back with team 4 direct number.

## 2017-06-26 NOTE — TELEPHONE ENCOUNTER
I would start pravastatin 40 mg at bedtime daily.  I would then recheck FLP/ALT after 2 months.  Thanks.  Kyle Montana MD, FACC  June 25, 2017 10:57 PM

## 2017-06-27 DIAGNOSIS — I10 ESSENTIAL HYPERTENSION, BENIGN: ICD-10-CM

## 2017-06-27 NOTE — TELEPHONE ENCOUNTER
Pending Prescriptions:                       Disp   Refills    metoprolol (LOPRESSOR) 50 MG tablet [Phar*270 ta*1            Sig: TAKE ONE AND ONE-HALF TABLETS BY MOUTH TWICE           DAILY          Last Written Prescription Date: 1/7/17  Last Fill Quantity: 270, # refills: 1    Last Office Visit with [facility], P or [facility] prescribing provider:  4/12/17   Future Office Visit:    Next 5 appointments (look out 90 days)     Jul 27, 2017  3:30 PM CDT   [facility]    [address] [facility] [address]   [phone]                    BP Readings from Last 3 Encounters:   05/03/17 135/78   04/18/17 140/86   04/13/17 160/80

## 2017-06-28 RX ORDER — METOPROLOL TARTRATE 50 MG
TABLET ORAL
Qty: 270 TABLET | Refills: 0 | Status: SHIPPED | OUTPATIENT
Start: 2017-06-28 | End: 2017-09-27

## 2017-06-28 NOTE — TELEPHONE ENCOUNTER
Prescription approved per [facility] Refill Protocol.    Teodora Pulido RN

## 2017-07-19 ENCOUNTER — ALLIED HEALTH/NURSE VISIT (OUTPATIENT)
Dept: PHARMACY | Facility: CLINIC | Age: 77
End: 2017-07-19
Payer: COMMERCIAL

## 2017-07-19 DIAGNOSIS — E11.59 TYPE 2 DIABETES MELLITUS WITH VASCULAR DISEASE (H): Primary | ICD-10-CM

## 2017-07-19 DIAGNOSIS — E78.5 HYPERLIPIDEMIA LDL GOAL <100: ICD-10-CM

## 2017-07-19 DIAGNOSIS — E11.59 TYPE 2 DIABETES MELLITUS WITH VASCULAR DISEASE (H): ICD-10-CM

## 2017-07-19 DIAGNOSIS — R30.0 DYSURIA: ICD-10-CM

## 2017-07-19 PROCEDURE — 99606 MTMS BY PHARM EST 15 MIN: CPT | Performed by: PHARMACIST

## 2017-07-19 PROCEDURE — 99607 MTMS BY PHARM ADDL 15 MIN: CPT | Performed by: PHARMACIST

## 2017-07-19 NOTE — PROGRESS NOTES
SUBJECTIVE/OBJECTIVE:                Cara Camarillo is a 77 year old female called for a follow-up visit for Medication Therapy Management.  She was referred to me from Dr. Alvarez.  She will be establishing care with Dr. Gonsalez next week.     Chief Complaint: Follow up from our visit on 17.  We follow-up on her blood sugar measurements and statin therapy today.    Tobacco: No tobacco use   Alcohol: not currently using    Medication Adherence: no issues reported    Diabetes:  Pt currently taking Novolin N 10 units twice a day, metformin 1000 mg in the morning and 500 mg in the evening. Pt is not experiencing side effects.  She mentions that she is giving her insulin in her leg right now because she doesn't like the bruises on her stomach.  We review appropriate technique today.   SMBG: two times daily.   Ranges (from patient's glucose log): As below    Date FBG Bedtime   7/10 192 137   7/11 181 129   7/12 158 142   7/13 156 133   7/14 181 164   /15 171 105   16 248 149    198 131   18 183 144    173      FB/10 at goal ( mg/dL, 0%)  Bedtime:   at goal (<200 mg/dL, 100%)    Patient is not experiencing hypoglycemia  Recent symptoms of high blood sugar? none  Eye exam: due  Foot exam: due  Microalbumin is < 30 mg/g. Pt is taking an ACEi/ARB.  Aspirin: Taking 81mg daily and complains of bruising easily today. This is not new or worsened, but she mentions it today.    Diet/Exercise: Limited to activities of daily living, tries to eat within the guidelines of a diabetes diet    Hyperlipidemia: Current therapy includes rosuvastatin 5 mg weekly on .  Pt reports myalgias in her arms and legs again.  She stopped rosuvastatin for awhile per cardiology and noticed the pains resolved. She restarted and noticed the pains returned.  She was instructed to call cardiology back if the muscle pains returned.  She tried to call the cardiology office to report symptoms as directed but could  not reach the nurse she spoke to.  She left a message but states she did not receive a return phone call.  She will continue taking medication as directed for now. She does mention that Dr. Alvarez stopped atorvastatin awhile back for this reason today.     Current labs include:  BP Readings from Last 3 Encounters:   05/03/17 135/78   04/18/17 140/86   04/13/17 160/80     Today's Vitals: There were no vitals taken for this visit. - telemed    Lab Results   Component Value Date    A1C 7.7 06/16/2017   .  Lab Results   Component Value Date    CHOL 177 07/22/2016     Lab Results   Component Value Date    TRIG 148 07/22/2016     Lab Results   Component Value Date    HDL 49 07/22/2016     Lab Results   Component Value Date    LDL 98 07/22/2016     Liver Function Studies -   Recent Labs   Lab Test  04/12/17   1716   PROTTOTAL  7.1   ALBUMIN  3.7   BILITOTAL  0.3   ALKPHOS  105   AST  22   ALT  33     Lab Results   Component Value Date    UCRR 292 01/23/2017    MICROL 60 01/23/2017    UMALCR 20.58 01/23/2017     Last Basic Metabolic Panel:  Lab Results   Component Value Date     04/12/2017      Lab Results   Component Value Date    POTASSIUM 4.6 04/12/2017     Lab Results   Component Value Date    CHLORIDE 109 04/12/2017     Lab Results   Component Value Date    BUN 26 04/12/2017     Lab Results   Component Value Date    CR 1.27 04/12/2017     GFR Estimate   Date Value Ref Range Status   04/12/2017 41 (L) >60 mL/min/1.7m2 Final     Comment:     Non  GFR Calc   03/06/2017 43 (L) >60 mL/min/1.7m2 Final     Comment:     Non  GFR Calc   01/23/2017 46 (L) >60 mL/min/1.7m2 Final     Comment:     Non  GFR Calc     TSH   Date Value Ref Range Status   11/21/2016 3.32 0.40 - 4.00 mU/L Final     Most Recent Immunizations   Administered Date(s) Administered     Influenza (High Dose) 3 valent vaccine 11/21/2016     Influenza (IIV3) 10/12/2012     Pneumococcal (PCV 13) 05/21/2015      Pneumococcal 23 valent 05/16/2014     TD (ADULT, 7+) 04/15/2011     TDAP Vaccine (Adacel) 01/29/2015       ASSESSMENT:              Current medications were reviewed today as discussed above.      Medication Adherence: no issues identified    Diabetes: Stable. Patient is meeting A1c goal of < 8%.      Hyperlipidemia: Stable. Pt is on moderate intensity statin which is indicated based on 2013 ACC/AHA guidelines for lipid management.  However, patient appears to be experiencing myalgias and weakness related to the medication once again.  Will inform cardiology for a patient call back.      Urogenital Issue: Plan in place to see PCP for further workup. The urologist did not have findings to pass on.     PLAN:                  1) Continue SMBG.    2) Will send info about muscle pains to cardiology again.     I spent 30 minutes with this patient today.  All changes were made via collaborative practice agreement with Dr. Gonsalez. A copy of the visit note was provided to the patient's primary care provider.     Will follow up in 8 weeks.    The patient declined a summary of these recommendations as an after visit summary.    Demetra Craig, Pharm.D.  Medication Therapy Management Pharmacist  Page/VM:  [phone]

## 2017-07-20 RX ORDER — METFORMIN HCL 500 MG
TABLET, EXTENDED RELEASE 24 HR ORAL
Qty: 180 TABLET | Refills: 0 | Status: SHIPPED | OUTPATIENT
Start: 2017-07-20 | End: 2017-09-16

## 2017-07-20 NOTE — TELEPHONE ENCOUNTER
Pending Prescriptions:                       Disp   Refills    metFORMIN (GLUCOPHAGE-XR) 500 MG 24 hr ta*180 ta*0            Si mg in am and 500 mg in pm             Last Written Prescription Date: 17  Last Fill Quantity: 180, # refills: 0  Last Office Visit with G, P or [facility] prescribing provider:  17 Antonio   Next 5 appointments (look out 90 days)     2017  3:30 PM CDT   [facility] Visit with Kole [facility] MD   [facility]    [address] [facility] [address]   [phone]                   BP Readings from Last 3 Encounters:   17 135/78   17 140/86   17 160/80     Lab Results   Component Value Date    MICROL 60 2017     Lab Results   Component Value Date    UMALCR 20.58 2017     Creatinine   Date Value Ref Range Status   2017 1.27 (H) 0.52 - 1.04 mg/dL Final   ]  GFR Estimate   Date Value Ref Range Status   2017 41 (L) >60 mL/min/1.7m2 Final     Comment:     Non  GFR Calc   2017 43 (L) >60 mL/min/1.7m2 Final     Comment:     Non  GFR Calc   2017 46 (L) >60 mL/min/1.7m2 Final     Comment:     Non  GFR Calc     GFR Estimate If Black   Date Value Ref Range Status   2017 49 (L) >60 mL/min/1.7m2 Final     Comment:      GFR Calc   2017 52 (L) >60 mL/min/1.7m2 Final     Comment:      GFR Calc   2017 56 (L) >60 mL/min/1.7m2 Final     Comment:      GFR Calc     Lab Results   Component Value Date    CHOL 177 2016     Lab Results   Component Value Date    HDL 49 2016     Lab Results   Component Value Date    LDL 98 2016     Lab Results   Component Value Date    TRIG 148 2016     Lab Results   Component Value Date    CHOLHDLRATIO 4.4 10/24/2014     Lab Results   Component Value Date    AST 22 2017     Lab Results   Component Value Date    ALT 33  04/12/2017     Lab Results   Component Value Date    A1C 7.7 06/16/2017    A1C 8.3 03/06/2017    A1C 8.7 01/23/2017    A1C 8.9 11/21/2016    A1C 8.3 07/22/2016     Potassium   Date Value Ref Range Status   04/12/2017 4.6 3.4 - 5.3 mmol/L Final     Ruba Mg, RT(R)

## 2017-07-20 NOTE — TELEPHONE ENCOUNTER
Dr. Gonsalez  Scheduled to see you 7/27  Routing refill request to provider for review/approval because:  Labs out of range:  Cr  Labs not current:  lipids  Due to establish care with new provider    Please approve if appropriate  Sameera Aj RN

## 2017-07-24 DIAGNOSIS — K21.9 GASTROESOPHAGEAL REFLUX DISEASE, ESOPHAGITIS PRESENCE NOT SPECIFIED: ICD-10-CM

## 2017-07-24 NOTE — TELEPHONE ENCOUNTER
Pending Prescriptions:                       Disp   Refills    pantoprazole (PROTONIX) 40 MG EC tablet   90 tab*2                Last Written Prescription Date: 10/27/16  Last Fill Quantity: 90,  # refills: 2   Last Office Visit with [facility], P or [facility] prescribing provider: 4/12/17                                         Next 5 appointments (look out 90 days)     Jul 27, 2017  3:30 PM CDT   Office Visit with Kole Gonsalez MD   [facility]    [address] [facility] [id number] [phone]

## 2017-07-26 RX ORDER — PANTOPRAZOLE SODIUM 40 MG/1
TABLET, DELAYED RELEASE ORAL
Qty: 90 TABLET | Refills: 2 | Status: SHIPPED | OUTPATIENT
Start: 2017-07-26 | End: 2018-07-09

## 2017-07-26 NOTE — TELEPHONE ENCOUNTER
Routing to Dr. Gonsalez to review. Pt is scheduled to establish with you on 7/27/17.     Med pended.     Missy Kent RN

## 2017-07-27 ENCOUNTER — OFFICE VISIT (OUTPATIENT)
Dept: FAMILY MEDICINE | Facility: CLINIC | Age: 77
End: 2017-07-27
Payer: COMMERCIAL

## 2017-07-27 VITALS
OXYGEN SATURATION: 97 % | TEMPERATURE: 97 F | HEART RATE: 75 BPM | HEIGHT: 62 IN | DIASTOLIC BLOOD PRESSURE: 76 MMHG | BODY MASS INDEX: 40.3 KG/M2 | WEIGHT: 219 LBS | RESPIRATION RATE: 18 BRPM | SYSTOLIC BLOOD PRESSURE: 138 MMHG

## 2017-07-27 DIAGNOSIS — E11.59 TYPE 2 DIABETES MELLITUS WITH VASCULAR DISEASE (H): Primary | ICD-10-CM

## 2017-07-27 DIAGNOSIS — I10 ESSENTIAL HYPERTENSION, BENIGN: ICD-10-CM

## 2017-07-27 DIAGNOSIS — I35.0 AORTIC STENOSIS, MILD: ICD-10-CM

## 2017-07-27 DIAGNOSIS — N18.30 CKD (CHRONIC KIDNEY DISEASE) STAGE 3, GFR 30-59 ML/MIN (H): ICD-10-CM

## 2017-07-27 DIAGNOSIS — M25.552 HIP PAIN, LEFT: ICD-10-CM

## 2017-07-27 DIAGNOSIS — Z13.89 SCREENING FOR DIABETIC PERIPHERAL NEUROPATHY: ICD-10-CM

## 2017-07-27 DIAGNOSIS — E78.5 HYPERLIPIDEMIA LDL GOAL <70: ICD-10-CM

## 2017-07-27 DIAGNOSIS — I25.10 CORONARY ARTERY DISEASE INVOLVING NATIVE CORONARY ARTERY OF NATIVE HEART WITHOUT ANGINA PECTORIS: ICD-10-CM

## 2017-07-27 PROCEDURE — 99207 C FOOT EXAM  NO CHARGE: CPT | Mod: 25 | Performed by: INTERNAL MEDICINE

## 2017-07-27 PROCEDURE — 99214 OFFICE O/P EST MOD 30 MIN: CPT | Mod: 25 | Performed by: INTERNAL MEDICINE

## 2017-07-27 RX ORDER — ATORVASTATIN CALCIUM 40 MG/1
40 TABLET, FILM COATED ORAL DAILY
Qty: 90 TABLET | Refills: 3 | COMMUNITY
Start: 2017-07-27 | End: 2018-01-15

## 2017-07-27 NOTE — PROGRESS NOTES
SUBJECTIVE:                                                    Cara Camarillo is a 77 year old female who presents to clinic today for the following health issues:      New Patient/Transfer of Care    Former patient of Dr. Alvarez  Complains of pain in left groin present for months  No injury or trauma  X-ray in 2017 showed mild/moderate DJD in left hip  Symptoms improved with APAP, but still severe    Sugars usually less than 200 in AM  No problems with low sugars    Problem list and histories reviewed & adjusted, as indicated.  Additional history: as documented    Patient Active Problem List   Diagnosis     Malignant neoplasm of corpus uteri, except isthmus (H)     Dysthymic disorder     Herpes zoster     Essential hypertension, benign     SOLITARY KIDNEY ANOMALY NEC     Hyperlipidemia LDL goal <70     CKD (chronic kidney disease) stage 3, GFR 30-59 ml/min     Advanced directives, counseling/discussion     Anxiety     Tubular adenoma     OA (osteoarthritis)     Aortic stenosis, mild     IVCD (intraventricular conduction defect)     B12 deficiency     Iron deficiency     Esophageal reflux     Overweight BMI 35-40     Type 2 diabetes mellitus without complication (H)     Osteopenia     RBBB     Left ventricular diastolic dysfunction     Coronary artery disease     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)     Chest wall pain     Bilateral edema of lower extremity     Coronary artery disease involving native coronary artery of native heart without angina pectoris     Past Surgical History:   Procedure Laterality Date     ARTHROPLASTY KNEE  2013    Procedure: ARTHROPLASTY KNEE;  RIGHT TOTAL KNEE ARTHROPLASTY;  Surgeon: Romaine Constantino MD;  Location:  OR     ARTHROPLASTY KNEE  2/3/2014    Procedure: ARTHROPLASTY KNEE;  LEFT TOTAL KNEE ARTHROPLASTY (BIOMET)^;  Surgeon: Romaine Constantino MD;  Location:  OR     C ANESTH, SECTION       C NONSPECIFIC PROCEDURE  3/20/06    CT scan of  "chest/abd/pelvis- negative for recurrence of CA     EXCISE MASS LOWER EXTREMITY Left 4/8/2015    Procedure: EXCISE MASS LOWER EXTREMITY;  Surgeon: Sloan Richardson MD;  Location:  SD     HC UGI ENDOSCOPY W EUS  1/8/2013    Procedure: COMBINED ENDOSCOPIC ULTRASOUND, ESOPHAGOSCOPY, GASTROSCOPY, DUODENOSCOPY (EGD);  Surgeon: Lyric Simons MD;  Location:  GI     HYSTERECTOMY      Vaginal     HYSTERECTOMY, VAGINAL  7/25/05    Uterine CA     LAPAROSCOPIC CHOLECYSTECTOMY  1/10/2013    Procedure: LAPAROSCOPIC CHOLECYSTECTOMY;  LAPAROSCOPIC CHOLECYSTECTOMY ;  Surgeon: Eduardo Taylor MD;  Location:  OR     NEPHRECTOMY BILATERAL       NEPHRECTOMY RT/LT  OCT 2005     OTHER SURGICAL HISTORY      angiogram Jan 2016: ISIDRO to PDA     OTHER SURGICAL HISTORY      angiogram Feb 2016: ISIDRO to LAD       Social History   Substance Use Topics     Smoking status: Never Smoker     Smokeless tobacco: Never Used     Alcohol use No     Family History   Problem Relation Age of Onset     DIABETES Father      Adult onset     Other - See Comments Mother 95     Old age         Current Outpatient Prescriptions   Medication Sig Dispense Refill     pantoprazole (PROTONIX) 40 MG EC tablet TAKE 1 TABLET BY MOUTH ONCE DAILY 30 TO 60 MINUTES BEFORE A MEAL 90 tablet 2     metFORMIN (GLUCOPHAGE-XR) 500 MG 24 hr tablet 1000 mg in am and 500 mg in pm 180 tablet 0     metoprolol (LOPRESSOR) 50 MG tablet TAKE ONE AND ONE-HALF TABLETS BY MOUTH TWICE DAILY 270 tablet 0     insulin syringe-needle U-100 (BD INSULIN SYRINGE ULTRAFINE) 30G X 1/2\" 0.5 ML Use one syringe twice daily or as directed. 100 each 11     insulin NPH (NOVOLIN N VIAL) 100 UNIT/ML injection 10 units before breakfast, 10 units before dinner 10 mL 1     ONE TOUCH ULTRA test strip USE TO TEST THREE TIMES DAILY 300 strip 1     rosuvastatin (CRESTOR) 5 MG tablet Take 1 tablet (5 mg) by mouth once a week 12 tablet 3     losartan (COZAAR) 50 MG tablet Take 2 tablets (100 " "mg) by mouth daily 180 tablet 1     order for DME Equipment being ordered: L wrist brace 1 Device 0     amLODIPine (NORVASC) 10 MG tablet Take 1 tablet (10 mg) by mouth daily 90 tablet 3     calcium carbonate (OS-MANJEET 500 MG [place]) 500 MG tablet Take 500 mg by mouth 2 times daily       acetaminophen (TYLENOL) 500 MG tablet Take 500-1,000 mg by mouth 3 times daily as needed for mild pain       aspirin 81 MG tablet Take 1 tablet (81 mg) by mouth daily 100 tablet 3     VITAMIN D 1000 UNIT OR CAPS 1 CAPSULE DAILY 30 0     nitroglycerin (NITROSTAT) 0.4 MG SL tablet Place 1 tablet (0.4 mg) under the tongue every 5 minutes as needed for chest pain if you are still having symptoms after 3 doses (15 minutes) call 911. (Patient not taking: Reported on 7/27/2017) 25 tablet 1     Allergies   Allergen Reactions     Actos [Pioglitazone]      Lower extremity edema       Enalapril      Renal failure     Hydrochlorothiazide      Dry mouth     Lisinopril      Hyperkalemia           ROS:  Constitutional, HEENT, cardiovascular, pulmonary, gi (reports chronic diarrhea present for years, no blood or weight loss) and gu (recent evaluation for pyuria by urology) systems are negative, except as otherwise noted.      OBJECTIVE:   /82 (BP Location: Right arm, Patient Position: Chair, Cuff Size: Adult Large)  Pulse 75  Temp 97  F (36.1  C) (Oral)  Resp 18  Ht 5' 2\" (1.575 m)  Wt 219 lb (99.3 kg)  SpO2 97%  BMI 40.06 kg/m2  Body mass index is 40.06 kg/(m^2).  GENERAL: healthy, alert and no distress  NECK: no adenopathy, no asymmetry, masses, or scars and thyroid normal to palpation  RESP: lungs clear to auscultation - no rales, rhonchi or wheezes  CV: Heart with regular rate and rhythm.   ABDOMEN: soft, nontender, no hepatosplenomegaly, no masses and bowel sounds normal  MS: Left hip joint with full ROM without pain, straight left leg raise does not elicit symptoms  PSYCH: mentation appears normal, affect normal/bright  Diabetic " foot exam: normal DP and PT pulses, no trophic changes or ulcerative lesions and normal sensory exam    Diagnostic Test Results:  Results for orders placed or performed in visit on 06/16/17   Hemoglobin A1c   Result Value Ref Range    Hemoglobin A1C 7.7 (H) 4.3 - 6.0 %       ASSESSMENT/PLAN:       1. 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 Coronary artery disease involving native coronary artery of native heart without angina pectoris  Symptoms controlled  On appropriate medical therapy     3. Essential hypertension, benign  Well controlled     4. Aortic stenosis, mild  No symptoms     5. CKD (chronic kidney disease) stage 3, GFR 30-59 ml/min  Stable     6. Hyperlipidemia LDL goal <70  On statin therapy, but concerned about cost of Crestor  Does not feel much better on Crestor then atorvastatin  Wants to switch back to atorvastatin but only wants to take it once weekly (which is a start)  Will recheck lipids in October when she returns        7. Screening for diabetic peripheral neuropathy    - FOOT EXAM  NO CHARGE [95805.114]    8.  Left hip pain  - Probably from OA, I recommended injection, she will consider (and call me if she decides to do this); if the injection does not help consider PT    FUTURE APPOINTMENTS:       - October or sooner as needed     Kole Gonsalez MD  [facility]

## 2017-07-27 NOTE — MR AVS SNAPSHOT
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      Your next 10 appointments already scheduled     Sep 20, 2017 10:00 AM CDT   TELEMEDICINE with Lissy Diana [facility] ([facility])    [address] [place] [address] [phone]           Note: this is not an onsite visit; there is no need to come to the facility.              Who to contact     If you have questions or need follow up information about today's clinic visit or your schedule please contact [facility] directly at [phone].  Normal or non-critical lab and imaging results will be communicated to you by MyChart, letter or phone within 4 business days after the clinic has received the results. If you do not hear from us within 7 days, please contact the clinic through MyChart or phone. If you have a critical or abnormal lab result, we will notify you by phone as soon as possible.  Submit refill requests through [employer] or call your pharmacy and they will forward the refill request to us. Please  "allow 3 business days for your refill to be completed.          Additional Information About Your Visit        MyChart Information     [email or web address] lets you send messages to your doctor, view your test results, renew your prescriptions, schedule appointments and more. To sign up, go to [email or web address] . Click on \"Log in\" on the left side of the screen, which will take you to the Welcome page. Then click on \"Sign up Now\" on the right side of the page.     You will be asked to enter the access code listed below, as well as some personal information. Please follow the directions to create your username and password.     Your access code is: [email or web address]  Expires: 2017 11:19 AM     Your access code will  in 90 days. If you need help or a new code, please call your [place] clinic or [phone].        Care EveryWhere ID     This is your [facility] EveryWhere ID. This could be used by other organizations to access your [place] medical records  [id number]        Your Vitals Were     Pulse Temperature Respirations Height Pulse Oximetry BMI (Body Mass Index)    75 97  F (36.1  C) (Oral) 18 5' 2\" (1.575 m) 97% 40.06 kg/m2       Blood Pressure from Last 3 Encounters:   17 138/76   17 135/78   17 140/86    Weight from Last 3 Encounters:   17 219 lb (99.3 kg)   17 220 lb (99.8 kg)   17 221 lb (100.2 kg)              We Performed the Following     FOOT EXAM  NO CHARGE [56563.114]          Today's Medication Changes          These changes are accurate as of: 17  4:11 PM.  If you have any questions, ask your nurse or doctor.               Stop taking these medicines if you haven't already. Please contact your care team if you have questions.     rosuvastatin 5 MG tablet   Commonly known as:  CRESTOR   Stopped by:  Kole Gonsalez MD                    Primary Care Provider Office Phone # Fax #    Kole Gonsalez [phone]       [place] [address] " [place] [address]        Equal Access to Services     TERRANCE TRUONG : Hadii aad ku hadivonlaura Jacobs, waisatusabine davalos, blancaanahi guyryliesabine holly, levi willissaramakayla santizo . So [facility] [phone].    ATENCIÓN: Si habla español, tiene a serrano disposición servicios gratuitos de asistencia lingüística. Llame al [phone].    We comply with applicable federal civil rights laws and [place] laws. We do not discriminate on the basis of race, color, national origin, age, disability sex, sexual orientation or gender identity.            Thank you!     Thank you for choosing [facility]  for your care. Our goal is always to provide you with excellent care. Hearing back from our patients is one way we can continue to improve our services. Please take a few minutes to complete the written survey that you may receive in the mail after your visit with us. Thank you!             Your Updated Medication List - Protect others around you: Learn how to safely use, store and throw away your medicines at www.disposemymeds.org.          This list is accurate as of: 7/27/17  4:11 PM.  Always use your most recent med list.                   Brand Name Dispense Instructions for use Diagnosis    acetaminophen 500 MG tablet    TYLENOL     Take 500-1,000 mg by mouth 3 times daily as needed for mild pain        amLODIPine 10 MG tablet    NORVASC    90 tablet    Take 1 tablet (10 mg) by mouth daily    Essential hypertension, benign       aspirin 81 MG tablet     100 tablet    Take 1 tablet (81 mg) by mouth daily    Type II or unspecified type diabetes mellitus without mention of complication, not stated as uncontrolled       calcium carbonate 1250 MG tablet    OS-MANJEET 500 mg [place]. [place]     Take 500 mg by mouth 2 times daily        insulin  UNIT/ML injection    NovoLIN N VIAL    10 mL    10 units before breakfast, 10 units before dinner    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)       insulin  "syringe-needle U-100 30G X 1/2\" 0.5 ML    BD insulin syringe ULTRAFINE    100 each    Use one syringe twice daily or as directed.    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)       LIPITOR 40 MG tablet   Generic drug:  atorvastatin     90 tablet    Take 1 tablet (40 mg) by mouth daily        losartan 50 MG tablet    COZAAR    180 tablet    Take 2 tablets (100 mg) by mouth daily    Essential hypertension with goal blood pressure less than 140/90       metFORMIN 500 MG 24 hr tablet    GLUCOPHAGE-XR    180 tablet    1000 mg in am and 500 mg in pm    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)       metoprolol 50 MG tablet    LOPRESSOR    270 tablet    TAKE ONE AND ONE-HALF TABLETS BY MOUTH TWICE DAILY    Essential hypertension, benign       nitroGLYcerin 0.4 MG sublingual tablet    NITROSTAT    25 tablet    Place 1 tablet (0.4 mg) under the tongue every 5 minutes as needed for chest pain if you are still having symptoms after 3 doses (15 minutes) call 911.    Postsurgical percutaneous transluminal coronary angioplasty status, Status post coronary angioplasty       ONE TOUCH ULTRA test strip   Generic drug:  blood glucose monitoring     300 strip    USE TO TEST THREE TIMES DAILY    Type 2 diabetes, HbA1c goal < 7% (H)       order for DME     1 Device    Equipment being ordered: L wrist brace    Left wrist pain       pantoprazole 40 MG EC tablet    PROTONIX    90 tablet    TAKE 1 TABLET BY MOUTH ONCE DAILY 30 TO 60 MINUTES BEFORE A MEAL    Gastroesophageal reflux disease, esophagitis presence not specified       vitamin D 1000 UNITS capsule     30    1 CAPSULE DAILY    Vitamin D deficiency         "

## 2017-09-08 DIAGNOSIS — I10 ESSENTIAL HYPERTENSION, BENIGN: ICD-10-CM

## 2017-09-08 RX ORDER — AMLODIPINE BESYLATE 10 MG/1
10 TABLET ORAL DAILY
Qty: 90 TABLET | Refills: 3 | Status: SHIPPED | OUTPATIENT
Start: 2017-09-08 | End: 2018-09-16

## 2017-09-08 NOTE — TELEPHONE ENCOUNTER
Routing refill request to provider for review/approval because:  Labs out of range:  Cr     Missy Kent RN

## 2017-09-08 NOTE — TELEPHONE ENCOUNTER
Pending Prescriptions:                       Disp   Refills    amLODIPine (NORVASC) 10 MG tablet         90 tab*3            Sig: Take 1 tablet (10 mg) by mouth daily          Last Written Prescription Date: 7-  Last Fill Quantity: 90, # refills: 3  Last Office Visit with BYRON, [facility] or [facility] prescribing provider: 7-27-17 Wallace  Next 5 appointments (look out 90 days)     Oct 23, 2017  1:00 PM CDT   Return Visit with AKANKSHA Cadena CNP   [facility] ([facility])    [address] [place] [address] [phone]                   Potassium   Date Value Ref Range Status   04/12/2017 4.6 3.4 - 5.3 mmol/L Final     Creatinine   Date Value Ref Range Status   04/12/2017 1.27 (H) 0.52 - 1.04 mg/dL Final     BP Readings from Last 3 Encounters:   07/27/17 138/76   05/03/17 135/78   04/18/17 140/86     RT Criselda (R)

## 2017-09-20 ENCOUNTER — ALLIED HEALTH/NURSE VISIT (OUTPATIENT)
Dept: PHARMACY | Facility: CLINIC | Age: 77
End: 2017-09-20
Payer: COMMERCIAL

## 2017-09-20 DIAGNOSIS — E78.5 HYPERLIPIDEMIA LDL GOAL <100: ICD-10-CM

## 2017-09-20 DIAGNOSIS — R19.7 DIARRHEA, UNSPECIFIED TYPE: ICD-10-CM

## 2017-09-20 DIAGNOSIS — E11.59 TYPE 2 DIABETES MELLITUS WITH VASCULAR DISEASE (H): Primary | ICD-10-CM

## 2017-09-20 PROCEDURE — 99607 MTMS BY PHARM ADDL 15 MIN: CPT | Performed by: PHARMACIST

## 2017-09-20 PROCEDURE — 99606 MTMS BY PHARM EST 15 MIN: CPT | Performed by: PHARMACIST

## 2017-09-20 NOTE — PROGRESS NOTES
SUBJECTIVE/OBJECTIVE:                Cara Camarillo is a 77 year old female called for a follow-up visit for Medication Therapy Management.  She was referred to me from Dr. Alvarez.  She will be establishing care with Dr. Gonsalez next month.    Chief Complaint: Follow up from her visit on 7/19/17 with Ryan Craig, PharmD, BCACP.  Called to f/up on diabetes management.    Tobacco: No tobacco use   Alcohol: not currently using    Medication Adherence: no issues reported    Diabetes:  Pt currently taking Novolin N 10 units twice a day, metformin ER 1500mg daily. Pt is not experiencing side effects.   SMBG: been testing every day, but would like to go back to every other day, alternating morning and bedtime.   Ranges (from patient's glucose log): As below    Date FBG Bedtime   9/10 143 127   9/11 168 116   9/12 169 118   9/13 187 135   9/14 168 166   9/15 198 119   9/16 148 151   9/17 146 108   9/18 159 139   9/19 181 131   9/20 164      Patient is experiencing hypoglycemia. Frequency of hypoglycemia? Once or twice, took orange juice, happened in the morning (she didn't test BG when feeling this way).   Recent symptoms of high blood sugar? none  Eye exam: due - she declines due to cost  Foot exam: due  Microalbumin is < 30 mg/g. Pt is taking an ACEi/ARB.  Aspirin: Taking 81mg daily and complains of bruising easily today. This is not new or worsened, but she mentions it today.      Hyperlipidemia: Current therapy includes rosuvastatin 5 mg weekly on Wednesdays.  She ran out of rosuvastatin today and has leftover atorvastatin at home so she plans to change back to atorvastatin, taking 40mg weekly.  She notes she sometimes has to take APAP for myalgias secondary to statin use, but feels the sx only last for a few hours after she takes the dose.    Diarrhea: Pt complains of diarrhea and is wondering if it is due to the medications. She doesn't have diarrhea every day. She notices diarrhea especially when something is  troubling her    Current labs include:  BP Readings from Last 3 Encounters:   07/27/17 138/76   05/03/17 135/78   04/18/17 140/86     Today's Vitals: There were no vitals taken for this visit. - telephone visit    Lab Results   Component Value Date    A1C 7.7 06/16/2017   .  Lab Results   Component Value Date    CHOL 177 07/22/2016     Lab Results   Component Value Date    TRIG 148 07/22/2016     Lab Results   Component Value Date    HDL 49 07/22/2016     Lab Results   Component Value Date    LDL 98 07/22/2016       Liver Function Studies -   Recent Labs   Lab Test  04/12/17   1716   PROTTOTAL  7.1   ALBUMIN  3.7   BILITOTAL  0.3   ALKPHOS  105   AST  22   ALT  33       Lab Results   Component Value Date    UCRR 292 01/23/2017    MICROL 60 01/23/2017    UMALCR 20.58 01/23/2017       Last Basic Metabolic Panel:  Lab Results   Component Value Date     04/12/2017      Lab Results   Component Value Date    POTASSIUM 4.6 04/12/2017     Lab Results   Component Value Date    CHLORIDE 109 04/12/2017     Lab Results   Component Value Date    BUN 26 04/12/2017     Lab Results   Component Value Date    CR 1.27 04/12/2017     GFR Estimate   Date Value Ref Range Status   04/12/2017 41 (L) >60 mL/min/1.7m2 Final     Comment:     Non  GFR Calc   03/06/2017 43 (L) >60 mL/min/1.7m2 Final     Comment:     Non  GFR Calc   01/23/2017 46 (L) >60 mL/min/1.7m2 Final     Comment:     Non  GFR Calc       TSH   Date Value Ref Range Status   11/21/2016 3.32 0.40 - 4.00 mU/L Final   ]    Most Recent Immunizations   Administered Date(s) Administered     Influenza (High Dose) 3 valent vaccine 11/21/2016     Influenza (IIV3) 10/12/2012     Pneumococcal (PCV 13) 05/21/2015     Pneumococcal 23 valent 05/16/2014     TD (ADULT, 7+) 04/15/2011     TDAP Vaccine (Adacel) 01/29/2015       ASSESSMENT:              Current medications were reviewed today as discussed above.      Medication Adherence: no  issues identified    Diabetes: Needs Improvement. Patient is meeting A1c goal of < 8%. Self monitoring of blood glucose is not at goal of fasting  mg/dL.  Post-prandial readings are at goal <180mg/dL.  May benefit from increasing PM insulin dose to aid with AM BG, but she declines doing so at this time.    Hyperlipidemia: Unimproved. Pt is not on high ntensity statin which is indicated based on 2013 ACC/AHA guidelines for lipid management, but is on maximally tolerated statin therapy.  No changes needed at this time.    Diarrhea: Does not seem to be medication related given intermittent nature of diarrhea sx.  I agree it's likely secondary to some anxiety symptoms, which she's declined treatment for.    PLAN:                1.  Recommended increasing PM dose of Novolin N to 12 units, but she declined doing so.    I spent 30 minutes with this patient today. A copy of the visit note was provided to the patient's primary care provider.     Will follow up pending lab results with PCP next month.    The patient declined a summary of these recommendations as an after visit summary.    Carito Perez, PharmD  Pharmaceutical Care Resident  Pager: [phone]     Lissy Diana PharmD, [facility]  Medication Therapy Management Provider  Pager: [phone]

## 2017-09-20 NOTE — MR AVS SNAPSHOT
After Visit Summary   9/20/2017    Cara Camarillo    MRN: [id number]           Patient Information     Date Of Birth          1940        Visit Information        Provider Department      9/20/2017 10:00 AM Lissy Diana [facility]        Today's Diagnoses     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)    -  1    Hyperlipidemia LDL goal <100        Diarrhea, unspecified type           Follow-ups after your visit        Your next 10 appointments already scheduled     Oct 23, 2017  1:00 PM CDT   Return Visit with AKANKSHA Cadena CNP   [facility] [place] ([facility])    [id number]  [facility] [address] [phone]            Oct 27, 2017 12:30 PM CDT   Office Visit with Kole Gonsalez MD   [facility]    [address] [facility] [address] [phone]           Bring a current list of meds and any records pertaining to this visit. For Physicals, please bring immunization records and any forms needing to be filled out. Please arrive 10 minutes early to complete paperwork.              Who to contact     If you have questions or need follow up information about today's clinic visit or your schedule please contact [facility] directly at [phone].  Normal or non-critical lab and imaging results will be communicated to you by MyChart, letter or phone within 4 business days after the clinic has received the results. If you do not hear from us within 7 days, please contact the clinic through MyChart or phone. If you have a critical or abnormal lab result, we will notify you by phone as soon as possible.  Submit refill requests through [employer] or call your pharmacy and they will forward the refill request to us. Please allow 3 business days for your refill to be completed.          Additional Information About Your Visit       "  MyChart Information     Finsphere lets you send messages to your doctor, view your test results, renew your prescriptions, schedule appointments and more. To sign up, go to [email or web address] . Click on \"Log in\" on the left side of the screen, which will take you to the Welcome page. Then click on \"Sign up Now\" on the right side of the page.     You will be asked to enter the access code listed below, as well as some personal information. Please follow the directions to create your username and password.     Your access code is: [email or web address]  Expires: 2017  4:20 PM     Your access code will  in 90 days. If you need help or a new code, please call your [place] clinic or [phone].        Care EveryWhere ID     This is your Care EveryWhere ID. This could be used by other organizations to access your [place] medical records  [id number]         Blood Pressure from Last 3 Encounters:   17 138/76   17 135/78   17 140/86    Weight from Last 3 Encounters:   17 219 lb (99.3 kg)   17 220 lb (99.8 kg)   17 221 lb (100.2 kg)              Today, you had the following     No orders found for display       Primary Care Provider Office Phone # Fax #    Kole Gonsalez [phone]       [facility] [address] [facility] [address]  [facility] [address]        Equal Access to Services     [facility] HospitalCARISA AH: Hadii aad ku hadasho Soomaali, waaxda luqadaha, qaybta kaalmada adeegyada, waxay abner santizo . So [facility] [phone].    ATENCIÓN: Si habla español, tiene a serrano disposición servicios gratuitos de asistencia lingüística. Llame al [phone].    We comply with applicable federal civil rights laws and [place] laws. We do not discriminate on the basis of race, color, national origin, age, disability sex, sexual orientation or gender identity.            Thank you!     Thank you for choosing [facility]  for your care. Our " "goal is always to provide you with excellent care. Hearing back from our patients is one way we can continue to improve our services. Please take a few minutes to complete the written survey that you may receive in the mail after your visit with us. Thank you!             Your Updated Medication List - Protect others around you: Learn how to safely use, store and throw away your medicines at www.disposemymeds.org.          This list is accurate as of: 9/20/17 11:59 PM.  Always use your most recent med list.                   Brand Name Dispense Instructions for use Diagnosis    acetaminophen 500 MG tablet    TYLENOL     Take 500-1,000 mg by mouth 3 times daily as needed for mild pain        amLODIPine 10 MG tablet    NORVASC    90 tablet    Take 1 tablet (10 mg) by mouth daily    Essential hypertension, benign       aspirin 81 MG tablet     100 tablet    Take 1 tablet (81 mg) by mouth daily    Type II or unspecified type diabetes mellitus without mention of complication, not stated as uncontrolled       calcium carbonate 1250 MG tablet    OS-MANJEET 500 mg [place]. 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)       LIPITOR 40 MG tablet   Generic drug:  atorvastatin     90 tablet    Take 1 tablet (40 mg) by mouth daily        losartan 50 MG tablet    COZAAR    180 tablet    Take 2 tablets (100 mg) by mouth daily    Essential hypertension with goal blood pressure less than 140/90       metFORMIN 500 MG 24 hr tablet    GLUCOPHAGE-XR    90 tablet    Take 2 tablets by mouth in the morning, and 1 tablet in the evening. Overdue for fasting lab. Please schedule: " [phone]    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)       metoprolol 50 MG tablet    LOPRESSOR    270 tablet    TAKE ONE AND ONE-HALF TABLETS BY MOUTH TWICE DAILY    Essential hypertension, benign       nitroGLYcerin 0.4 MG sublingual tablet    NITROSTAT    25 tablet    Place 1 tablet (0.4 mg) under the tongue every 5 minutes as needed for chest pain if you are still having symptoms after 3 doses (15 minutes) call 911.    Postsurgical percutaneous transluminal coronary angioplasty status, Status post coronary angioplasty       ONE TOUCH ULTRA test strip   Generic drug:  blood glucose monitoring     300 strip    USE TO TEST THREE TIMES DAILY    Type 2 diabetes, HbA1c goal < 7% (H)       order for DME     1 Device    Equipment being ordered: L wrist brace    Left wrist pain       pantoprazole 40 MG EC tablet    PROTONIX    90 tablet    TAKE 1 TABLET BY MOUTH ONCE DAILY 30 TO 60 MINUTES BEFORE A MEAL    Gastroesophageal reflux disease, esophagitis presence not specified       vitamin D 1000 UNITS capsule     30    1 CAPSULE DAILY    Vitamin D deficiency

## 2017-09-27 DIAGNOSIS — I10 ESSENTIAL HYPERTENSION, BENIGN: ICD-10-CM

## 2017-09-27 DIAGNOSIS — E11.59 TYPE 2 DIABETES MELLITUS WITH VASCULAR DISEASE (H): ICD-10-CM

## 2017-09-27 LAB
CHOLEST SERPL-MCNC: 139 MG/DL
HBA1C MFR BLD: 7.9 % (ref 4.3–6)
HDLC SERPL-MCNC: 68 MG/DL
LDLC SERPL CALC-MCNC: 48 MG/DL
NONHDLC SERPL-MCNC: 71 MG/DL
TRIGL SERPL-MCNC: 114 MG/DL

## 2017-09-27 PROCEDURE — 80061 LIPID PANEL: CPT | Performed by: PHARMACIST

## 2017-09-27 PROCEDURE — 83036 HEMOGLOBIN GLYCOSYLATED A1C: CPT | Performed by: PHARMACIST

## 2017-09-27 PROCEDURE — 36415 COLL VENOUS BLD VENIPUNCTURE: CPT | Performed by: PHARMACIST

## 2017-09-27 NOTE — TELEPHONE ENCOUNTER
Pending Prescriptions:                       Disp   Refills    metoprolol (LOPRESSOR) 50 MG tablet       270 ta*0                Last Written Prescription Date: 06/28/17  Last Fill Quantity: 270, # refills: 0  Last Office Visit with [facility], [facility] or [facility] prescribing provider: 08/29/17  Next 5 appointments (look out 90 days)     Oct 23, 2017  1:00 PM CDT   Return Visit with AKANKSHA Cadena CNP   [facility] [place] ([facility])    [facility] [id number] [phone]            Oct 27, 2017 12:30 PM CDT   Office Visit with Kole Gonsalez MD   [facility] ([facility])    [address] [facility] [id number] [phone]                   Potassium   Date Value Ref Range Status   04/12/2017 4.6 3.4 - 5.3 mmol/L Final     Creatinine   Date Value Ref Range Status   04/12/2017 1.27 (H) 0.52 - 1.04 mg/dL Final     BP Readings from Last 3 Encounters:   07/27/17 138/76   05/03/17 135/78   04/18/17 140/86

## 2017-09-28 RX ORDER — METOPROLOL TARTRATE 50 MG
TABLET ORAL
Qty: 270 TABLET | Refills: 0 | Status: SHIPPED | OUTPATIENT
Start: 2017-09-28 | End: 2017-12-27

## 2017-10-17 DIAGNOSIS — E11.59 TYPE 2 DIABETES MELLITUS WITH VASCULAR DISEASE (H): ICD-10-CM

## 2017-10-17 NOTE — TELEPHONE ENCOUNTER
Metformin      Last Written Prescription Date: 09/18/2017  Last Fill Quantity: 90, # refills: 0  Last Office Visit with [facility], [facility] or [facility] prescribing provider: 07/27/2017  Next 5 appointments (look out 90 days)     Oct 23, 2017  1:00 PM CDT   Return Visit with AKANKSHA Cadena CNP   [facility] [place] ([facility])    [address] [place] [address] [phone]            Oct 27, 2017 12:30 PM CDT   Office Visit with Kole Gonsalez MD   [facility] ([facility])    [address] [facility] [address] [phone]                   Lab Results   Component Value Date    CHOL 139 09/27/2017     Lab Results   Component Value Date    HDL 68 09/27/2017     Lab Results   Component Value Date    LDL 48 09/27/2017     Lab Results   Component Value Date    TRIG 114 09/27/2017     Lab Results   Component Value Date    CHOLHDLRATIO 4.4 10/24/2014

## 2017-10-18 RX ORDER — METFORMIN HCL 500 MG
TABLET, EXTENDED RELEASE 24 HR ORAL
Qty: 90 TABLET | Refills: 0 | Status: SHIPPED | OUTPATIENT
Start: 2017-10-18 | End: 2017-11-16

## 2017-10-19 DIAGNOSIS — I10 ESSENTIAL HYPERTENSION WITH GOAL BLOOD PRESSURE LESS THAN 140/90: ICD-10-CM

## 2017-10-19 RX ORDER — LOSARTAN POTASSIUM 50 MG/1
TABLET ORAL
Qty: 180 TABLET | Refills: 1 | Status: SHIPPED | OUTPATIENT
Start: 2017-10-19 | End: 2018-04-22

## 2017-10-19 NOTE — TELEPHONE ENCOUNTER
Routing refill request to provider for review/approval because:  Labs out of range:  Cr 1.27  Please approve if appropriate  Sameera Aj RN

## 2017-10-23 ENCOUNTER — OFFICE VISIT (OUTPATIENT)
Dept: CARDIOLOGY | Facility: CLINIC | Age: 77
End: 2017-10-23
Attending: INTERNAL MEDICINE
Payer: COMMERCIAL

## 2017-10-23 VITALS
BODY MASS INDEX: 40.85 KG/M2 | DIASTOLIC BLOOD PRESSURE: 80 MMHG | HEART RATE: 82 BPM | WEIGHT: 222 LBS | SYSTOLIC BLOOD PRESSURE: 140 MMHG | HEIGHT: 62 IN

## 2017-10-23 DIAGNOSIS — I10 ESSENTIAL HYPERTENSION, BENIGN: ICD-10-CM

## 2017-10-23 DIAGNOSIS — I25.10 CORONARY ARTERY DISEASE INVOLVING NATIVE CORONARY ARTERY OF NATIVE HEART WITHOUT ANGINA PECTORIS: ICD-10-CM

## 2017-10-23 PROCEDURE — 99214 OFFICE O/P EST MOD 30 MIN: CPT | Performed by: NURSE PRACTITIONER

## 2017-10-23 NOTE — LETTER
10/23/2017    Kole Gonsalez MD  [facility]   [address]  [facility] [address]      RE: Roebling M Cheriseselvin       Dear Colleague,    I had the pleasure of seeing Cara Camarillo in the [facility].    HISTORY OF PRESENT ILLNESS:  Cara is a sweet 77-year-old woman who is here today in followup for her coronary artery disease.  Her past medical history includes stenting of her posterior descending artery in 01/2016.  She had a staged procedure 1 month later where her mid-LAD was stented.  When she was seen in followup with Dr. Montana in April of this year she complained of bilateral arm pain with right being worse than her left.  She describes the pain as radiating into her hand and felt weakness in her upper extremities.  At that clinic visit, her blood pressure was elevated.  Dr. Montana ordered a Lexiscan to evaluate her symptoms and also to make a decision whether her Plavix could be discontinued. Cara had her stress test 04/18.  It showed no evidence of ischemia or infarction.  It showed a normal LV systolic function of 60%.  Her resting blood pressure was 140/86 and 130/82 during the test.  Today her blood pressure is 140/80.  I reviewed her antihypertensive medications closely with Cara today.  It appears she is taking them as she should.      In April when she was seen by Dr. Montana she was on rosuvastatin 5 mg weekly on Wednesdays and she reported some vague muscle discomfort.  Dr. Montana asked her to take a 3-week holiday and then atorvastatin was started at 40 mg a day.  She has had a followup cholesterol profile since then. Total cholesterol now was 139, HDL 68, LDL 48, triglycerides 114.      Today, Cara still talks about pains in her thumbs and her hands and in her wrist.  She denies any muscle myalgias or swelling in her larger muscles.  She denies shortness of breath at rest.  She does report shortness of breath with walking  activities.  She does admit she is very inactive.  She feels unsteady at times on her feet.  She does have a cane, a walker to help with mobility.      Cara lives alone.  She does her own grocery shopping and cooking.      Her weight today is 222 pounds.  This is 8 pounds up from a visit back in April.      PHYSICAL EXAMINATION:   GENERAL:  The patient is alert and oriented.   SKIN:  Warm and dry.  She is in no acute distress.  She needed assistance of one getting on and off the exam table.   CARDIAC:  Heart tones are distant, but regular.   LUNGS:  Clear without crackles or wheezes.   ABDOMEN:  Soft.   EXTREMITIES:  Lower extremities are free of edema.  Blood pressure as mentioned above.     Outpatient Encounter Prescriptions as of 10/23/2017   Medication Sig Dispense Refill     losartan (COZAAR) 50 MG tablet TAKE 2 TABLETS BY MOUTH DAILY 180 tablet 1     [DISCONTINUED] metFORMIN (GLUCOPHAGE-XR) 500 MG 24 hr tablet TAKE 2 TABLETS BY MOUTH IN MORNING, AND 1 TABLET IN EVENING. OVERDUE FOR FASTING LAB, MUST SCHEDULE. 90 tablet 0     metoprolol (LOPRESSOR) 50 MG tablet TAKE ONE AND ONE-HALF TABLETS BY MOUTH TWICE DAILY 270 tablet 0     amLODIPine (NORVASC) 10 MG tablet Take 1 tablet (10 mg) by mouth daily 90 tablet 3     atorvastatin (LIPITOR) 40 MG tablet Take 1 tablet (40 mg) by mouth daily 90 tablet 3     pantoprazole (PROTONIX) 40 MG EC tablet TAKE 1 TABLET BY MOUTH ONCE DAILY 30 TO 60 MINUTES BEFORE A MEAL 90 tablet 2     insulin NPH (NOVOLIN N VIAL) 100 UNIT/ML injection 10 units before breakfast, 10 units before dinner 10 mL 1     nitroglycerin (NITROSTAT) 0.4 MG SL tablet Place 1 tablet (0.4 mg) under the tongue every 5 minutes as needed for chest pain if you are still having symptoms after 3 doses (15 minutes) call 911. 25 tablet 1     calcium carbonate (OS-MANJEET 500 MG [place]. [place]) 500 MG tablet Take 500 mg by mouth 2 times daily       acetaminophen (TYLENOL) 500 MG tablet Take 500-1,000 mg by mouth 3 times  "daily as needed for mild pain       aspirin 81 MG tablet Take 1 tablet (81 mg) by mouth daily 100 tablet 3     VITAMIN D 1000 UNIT OR CAPS 1 CAPSULE DAILY 30 0     insulin syringe-needle U-100 (BD INSULIN SYRINGE ULTRAFINE) 30G X 1/2\" 0.5 ML Use one syringe twice daily or as directed. 100 each 11     ONE TOUCH ULTRA test strip USE TO TEST THREE TIMES DAILY 300 strip 1     order for DME Equipment being ordered: L wrist brace 1 Device 0     No facility-administered encounter medications on file as of 10/23/2017.       IMPRESSION AND PLAN:  This is a 77-year-old woman with a history of coronary artery disease with a recent Lexiscan nuclear stress test that was negative for any signs of ischemia.  Her Plavix was stopped shortly after the test.  Cara has important risk factors for coronary artery disease that includes hypertension, obesity and diabetes mellitus.    2. Hypertension -stable  3. Hyperlipidemia- LDL at goal.    More than 50% of this 30-minute clinic visit was spent on strategies of weight loss to include increased mobility, using her walker more regularly.  I also talked about the Mediterranean diet, reducing her starches and sweets, and adding more fruit and vegetables.  Finally, we talked about lower or smaller portions.  It has been my pleasure to be involved in Cara's care.  She will follow up with Dr. Montana in 04/2018.     Again, thank you for allowing me to participate in the care of your patient.      Sincerely,    AKANKSHA Shi CNP     [facility]      "

## 2017-10-23 NOTE — PROGRESS NOTES
"HPI and Plan:   See dictation    No orders of the defined types were placed in this encounter.    No orders of the defined types were placed in this encounter.    There are no discontinued medications.      Encounter Diagnoses   Name Primary?     Coronary artery disease involving native coronary artery of native heart without angina pectoris      Essential hypertension, benign        CURRENT MEDICATIONS:  Current Outpatient Prescriptions   Medication Sig Dispense Refill     losartan (COZAAR) 50 MG tablet TAKE 2 TABLETS BY MOUTH DAILY 180 tablet 1     metFORMIN (GLUCOPHAGE-XR) 500 MG 24 hr tablet TAKE 2 TABLETS BY MOUTH IN MORNING, AND 1 TABLET IN EVENING. OVERDUE FOR FASTING LAB, MUST SCHEDULE. 90 tablet 0     metoprolol (LOPRESSOR) 50 MG tablet TAKE ONE AND ONE-HALF TABLETS BY MOUTH TWICE DAILY 270 tablet 0     amLODIPine (NORVASC) 10 MG tablet Take 1 tablet (10 mg) by mouth daily 90 tablet 3     atorvastatin (LIPITOR) 40 MG tablet Take 1 tablet (40 mg) by mouth daily 90 tablet 3     pantoprazole (PROTONIX) 40 MG EC tablet TAKE 1 TABLET BY MOUTH ONCE DAILY 30 TO 60 MINUTES BEFORE A MEAL 90 tablet 2     insulin NPH (NOVOLIN N VIAL) 100 UNIT/ML injection 10 units before breakfast, 10 units before dinner 10 mL 1     nitroglycerin (NITROSTAT) 0.4 MG SL tablet Place 1 tablet (0.4 mg) under the tongue every 5 minutes as needed for chest pain if you are still having symptoms after 3 doses (15 minutes) call 911. 25 tablet 1     calcium carbonate (OS-MANJEET 500 MG [place]. [place]) 500 MG tablet Take 500 mg by mouth 2 times daily       acetaminophen (TYLENOL) 500 MG tablet Take 500-1,000 mg by mouth 3 times daily as needed for mild pain       aspirin 81 MG tablet Take 1 tablet (81 mg) by mouth daily 100 tablet 3     VITAMIN D 1000 UNIT OR CAPS 1 CAPSULE DAILY 30 0     insulin syringe-needle U-100 (BD INSULIN SYRINGE ULTRAFINE) 30G X 1/2\" 0.5 ML Use one syringe twice daily or as directed. 100 each 11     ONE TOUCH ULTRA test strip USE " TO TEST THREE TIMES DAILY 300 strip 1     order for DME Equipment being ordered: L wrist brace 1 Device 0       ALLERGIES     Allergies   Allergen Reactions     Actos [Pioglitazone]      Lower extremity edema       Enalapril      Renal failure     Hydrochlorothiazide      Dry mouth     Lisinopril      Hyperkalemia         PAST MEDICAL HISTORY:  Past Medical History:   Diagnosis Date     Adenoma     tubal     Anxiety      Anxiety      Aortic stenosis      Arthritis     ankles     Chronic kidney disease (CKD), stage III (moderate)      Coronary artery disease     cardiac cath 2016: ISIDRO to LAD, cath 2016: ISIDRO to PDA     Decubitus ulcer of coccyx      Dysthymic disorder      Essential hypertension, benign 2205     Herpes zoster without mention of complication Aug 2006    left leg (thigh)     Hydronephrosis      Hyperlipidaemia LDL goal < 100      Left ventricular diastolic dysfunction      Malignant neoplasm of corpus uteri, except isthmus (H) 2005    endometrial CA, s/p external beam radiation & radiation implant & FIDEL/BSO       Obesity      Pulmonary hyperinflation      Sciatica      Type II or unspecified type diabetes mellitus without mention of complication, not stated as uncontrolled 2005     Unspecified congenital anomaly of urinary system 2005       PAST SURGICAL HISTORY:  Past Surgical History:   Procedure Laterality Date     ARTHROPLASTY KNEE  2013    Procedure: ARTHROPLASTY KNEE;  RIGHT TOTAL KNEE ARTHROPLASTY;  Surgeon: Romaine Constantion MD;  Location:  OR     ARTHROPLASTY KNEE  2/3/2014    Procedure: ARTHROPLASTY KNEE;  LEFT TOTAL KNEE ARTHROPLASTY (BIOMET)^;  Surgeon: Romaine Constantino MD;  Location:  OR     C ANESTH, SECTION       C NONSPECIFIC PROCEDURE  3/20/06    CT scan of chest/abd/pelvis- negative for recurrence of CA     EXCISE MASS LOWER EXTREMITY Left 2015    Procedure: EXCISE MASS LOWER EXTREMITY;  Surgeon: Sloan Richardson MD;  Location:  [facility]     HC UGI ENDOSCOPY W EUS  1/8/2013    Procedure: COMBINED ENDOSCOPIC ULTRASOUND, ESOPHAGOSCOPY, GASTROSCOPY, DUODENOSCOPY (EGD);  Surgeon: Lyric Simons MD;  Location:  GI     HYSTERECTOMY      Vaginal     HYSTERECTOMY, VAGINAL  7/25/05    Uterine CA     LAPAROSCOPIC CHOLECYSTECTOMY  1/10/2013    Procedure: LAPAROSCOPIC CHOLECYSTECTOMY;  LAPAROSCOPIC CHOLECYSTECTOMY ;  Surgeon: Eduardo Taylor MD;  Location:  OR     NEPHRECTOMY BILATERAL       NEPHRECTOMY RT/LT  OCT 2005     OTHER SURGICAL HISTORY      angiogram Jan 2016: ISIDRO to PDA     OTHER SURGICAL HISTORY      angiogram Feb 2016: ISIDRO to LAD       FAMILY HISTORY:  Family History   Problem Relation Age of Onset     DIABETES Father      Adult onset     Other - See Comments Mother 95     Old age       SOCIAL HISTORY:  Social History     Social History     Marital status:      Spouse name: N/A     Number of children: N/A     Years of education: N/A     Social History Main Topics     Smoking status: Never Smoker     Smokeless tobacco: Never Used     Alcohol use No     Drug use: No     Sexual activity: No     Other Topics Concern     Parent/Sibling W/ Cabg, Mi Or Angioplasty Before 65f 55m? No     Caffeine Concern No     1 cup daily     Sleep Concern Yes     worrying a lot right now about health     Stress Concern Yes     was told procedure not covered under medicare, losing sleep, wouldn't have gone to hospital if she knew that. WORRIES about it continually     Weight Concern No     Special Diet No     Exercise No     Social History Narrative       Review of Systems:  Skin:  Negative     Eyes:  Positive for glasses;cataracts  ENT:  Negative    Respiratory:  Negative    Cardiovascular:    Positive for;dizziness;edema  Gastroenterology: Positive for nausea  Genitourinary:  not assessed    Musculoskeletal:  Positive for joint pain;arthritis;nocturnal cramping  Neurologic:  Positive for headaches (more often in the  "mornings)  Psychiatric:  Negative    Heme/Lymph/Imm:  Positive for allergies  Endocrine:  Positive for diabetes    Physical Exam:  Vitals: /80  Pulse 82  Ht 1.575 m (5' 2\")  Wt 100.7 kg (222 lb)  BMI 40.6 kg/m2    Constitutional:  alert and oriented;well developed;in no acute distress obese      Skin:  warm and dry to the touch, no apparent skin lesions or masses noted        Head:  normocephalic, no masses or lesions        Eyes:  pupils equal and round, conjunctivae and lids unremarkable, sclera white, no xanthalasma, EOMS intact, no nystagmus        ENT:  no pallor or cyanosis, dentition good        Neck:  carotid pulses are full and equal bilaterally, JVP normal, no carotid bruit, no thyromegaly        Chest:  normal breath sounds, clear to auscultation, normal A-P diameter, normal symmetry, normal respiratory excursion, no use of accessory muscles        Cardiac: regular rhythm;normal S1 and S2   S4   systolic ejection murmur;grade 2;LLSB;radiation to the RUSB          Abdomen:  abdomen soft, non-tender, BS normoactive, no mass, no HSM, no bruits        Vascular: pulses full and equal, no bruits auscultated                                 left radial site clean mild ecchymosis; no hum or bruit    Extremities and Back:  no edema;no deformities, clubbing, cyanosis, erythema observed        Neurological:  no gross motor deficits;affect appropriate, oriented to time, person and place          Recent Lab Results:  LIPID RESULTS:  Lab Results   Component Value Date    CHOL 139 09/27/2017    HDL 68 09/27/2017    LDL 48 09/27/2017    TRIG 114 09/27/2017    CHOLHDLRATIO 4.4 10/24/2014       LIVER ENZYME RESULTS:  Lab Results   Component Value Date    AST 22 04/12/2017    ALT 33 04/12/2017       CBC RESULTS:  Lab Results   Component Value Date    WBC 5.7 04/12/2017    RBC 4.79 04/12/2017    HGB 13.9 04/12/2017    HCT 42.2 04/12/2017    MCV 88 04/12/2017    MCH 29.0 04/12/2017    MCHC 32.9 04/12/2017    RDW 14.5 " 04/12/2017     04/12/2017       BMP RESULTS:  Lab Results   Component Value Date     04/12/2017    POTASSIUM 4.6 04/12/2017    CHLORIDE 109 04/12/2017    CO2 24 04/12/2017    ANIONGAP 9 04/12/2017     (H) 04/12/2017    BUN 26 04/12/2017    CR 1.27 (H) 04/12/2017    GFRESTIMATED 41 (L) 04/12/2017    GFRESTBLACK 49 (L) 04/12/2017    MANJEET 9.2 04/12/2017        A1C RESULTS:  Lab Results   Component Value Date    A1C 7.9 (H) 09/27/2017       INR RESULTS:  Lab Results   Component Value Date    INR 0.89 01/19/2016    INR 0.89 09/25/2014           CC  Kyle Montana MD  [address]  [place] [address]

## 2017-10-23 NOTE — PROGRESS NOTES
HISTORY OF PRESENT ILLNESS:  Cara is a sweet 77-year-old woman who is here today in followup for her coronary artery disease.  Her past medical history includes stenting of her posterior descending artery in 01/2016.  She had a staged procedure 1 month later where her mid-LAD was stented.  When she was seen in followup with Dr. Montana in April of this year she complained of bilateral arm pain with right being worse than her left.  She describes the pain as radiating into her hand and felt weakness in her upper extremities.  At that clinic visit, her blood pressure was elevated.  Dr. Montana ordered a Lexiscan to evaluate her symptoms and also to make a decision whether her Plavix could be discontinued. Cara had her stress test 04/18.  It showed no evidence of ischemia or infarction.  It showed a normal LV systolic function of 60%.  Her resting blood pressure was 140/86 and 130/82 during the test.  Today her blood pressure is 140/80.  I reviewed her antihypertensive medications closely with Cara today.  It appears she is taking them as she should.      In April when she was seen by Dr. Montana she was on rosuvastatin 5 mg weekly on Wednesdays and she reported some vague muscle discomfort.  Dr. Montana asked her to take a 3-week holiday and then atorvastatin was started at 40 mg a day.  She has had a followup cholesterol profile since then. Total cholesterol now was 139, HDL 68, LDL 48, triglycerides 114.      Today, Cara still talks about pains in her thumbs and her hands and in her wrist.  She denies any muscle myalgias or swelling in her larger muscles.  She denies shortness of breath at rest.  She does report shortness of breath with walking activities.  She does admit she is very inactive.  She feels unsteady at times on her feet.  She does have a cane, a walker to help with mobility.      Cara lives alone.  She does her own grocery shopping and cooking.      Her weight today is 222 pounds.   This is 8 pounds up from a visit back in April.      PHYSICAL EXAMINATION:   GENERAL:  The patient is alert and oriented.   SKIN:  Warm and dry.  She is in no acute distress.  She needed assistance of one getting on and off the exam table.   CARDIAC:  Heart tones are distant, but regular.   LUNGS:  Clear without crackles or wheezes.   ABDOMEN:  Soft.   EXTREMITIES:  Lower extremities are free of edema.  Blood pressure as mentioned above.      IMPRESSION AND PLAN:  This is a 77-year-old woman with a history of coronary artery disease with a recent Lexiscan nuclear stress test that was negative for any signs of ischemia.  Her Plavix was stopped shortly after the test.  Cara has important risk factors for coronary artery disease that includes hypertension, obesity and diabetes mellitus.    2. Hypertension -stable  3. Hyperlipidemia- LDL at goal.    More than 50% of this 30-minute clinic visit was spent on strategies of weight loss to include increased mobility, using her walker more regularly.  I also talked about the Mediterranean diet, reducing her starches and sweets, and adding more fruit and vegetables.  Finally, we talked about lower or smaller portions.  It has been my pleasure to be involved in Cara's care.  She will follow up with Dr. Montana in 2018.         AKANKSHA FLETCHER, CADEN             D: 10/23/2017 14:15   T: 10/23/2017 16:12   MT: [id number]      Name:     CARA LAGOS   MRN:      [id number]        Account:      [id number]   :      1940           Service Date: 10/23/2017      Document: [id number]

## 2017-10-23 NOTE — MR AVS SNAPSHOT
After Visit Summary   10/23/2017    Cara Camarillo    MRN: [id number]           Patient Information     Date Of Birth          1940        Visit Information        Provider Department      10/23/2017 1:00 PM Mary Lou Galvez, AKANKSHA NEGRETE [facility]        Today's Diagnoses     Coronary artery disease involving native coronary artery of native heart without angina pectoris        Essential hypertension, benign          Care Instructions    No changes today    Mediterranean diet hand-out          Follow-ups after your visit        Your next 10 appointments already scheduled     Oct 27, 2017 12:30 PM CDT   [facility]    [address] [facility] [address] [phone]           Bring a current list of meds and any records pertaining to this visit. For Physicals, please bring immunization records and any forms needing to be filled out. Please arrive 10 minutes early to complete paperwork.              Who to contact     If you have questions or need follow up information about today's clinic visit or your schedule please contact [facility] directly at [phone].  Normal or non-critical lab and imaging results will be communicated to you by [facility] Labshart, letter or phone within 4 business days after the clinic has received the results. If you do not hear from us within 7 days, please contact the clinic through Cyantot or phone. If you have a critical or abnormal lab result, we will notify you by phone as soon as possible.  Submit refill requests through GardenStory or call your pharmacy and they will forward the refill request to us. Please allow 3 business days for your refill to be completed.          Additional Information About Your Visit        Aeryon Labshart Information     GardenStory lets you send messages to your doctor, view your test results,  "renew your prescriptions, schedule appointments and more. To sign up, go to [email or web address] . Click on \"Log in\" on the left side of the screen, which will take you to the Welcome page. Then click on \"Sign up Now\" on the right side of the page.     You will be asked to enter the access code listed below, as well as some personal information. Please follow the directions to create your username and password.     Your access code is: [email or web address]  Expires: 2017  4:20 PM     Your access code will  in 90 days. If you need help or a new code, please call your [place] clinic or [phone].        Care EveryWhere ID     This is your Care EveryWhere ID. This could be used by other organizations to access your [place] medical records  [id number]        Your Vitals Were     Pulse Height BMI (Body Mass Index)             82 1.575 m (5' 2\") 40.6 kg/m2          Blood Pressure from Last 3 Encounters:   10/23/17 140/80   17 138/76   17 135/78    Weight from Last 3 Encounters:   10/23/17 100.7 kg (222 lb)   17 99.3 kg (219 lb)   17 99.8 kg (220 lb)              We Performed the Following     Follow-Up with Cardiac Advanced Practice Provider        Primary Care Provider Office Phone # Fax #    Kole Gonsalez [phone]       [facility] [address] [address]        Equal Access to Services     [facility]: Hadii aad ku hadasho Soomaali, waaxda luqadaha, qaybta kaalmada adeegyasabine, levi santizo . So [facility] [phone].    ATENCIÓN: Si habla español, tiene a serrano disposición servicios gratuitos de asistencia lingüística. Llame al [phone].    We comply with applicable federal civil rights laws and [place] laws. We do not discriminate on the basis of race, color, national origin, age, disability, sex, sexual orientation, or gender identity.            Thank you!     Thank you for choosing [facility]" "[facility] [place]  for your care. Our goal is always to provide you with excellent care. Hearing back from our patients is one way we can continue to improve our services. Please take a few minutes to complete the written survey that you may receive in the mail after your visit with us. Thank you!             Your Updated Medication List - Protect others around you: Learn how to safely use, store and throw away your medicines at www.disposemymeds.org.          This list is accurate as of: 10/23/17  1:37 PM.  Always use your most recent med list.                   Brand Name Dispense Instructions for use Diagnosis    acetaminophen 500 MG tablet    TYLENOL     Take 500-1,000 mg by mouth 3 times daily as needed for mild pain        amLODIPine 10 MG tablet    NORVASC    90 tablet    Take 1 tablet (10 mg) by mouth daily    Essential hypertension, benign       aspirin 81 MG tablet     100 tablet    Take 1 tablet (81 mg) by mouth daily    Type II or unspecified type diabetes mellitus without mention of complication, not stated as uncontrolled       calcium carbonate 1250 MG tablet    OS-MANJEET 500 mg [place]. 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 " OVERDUE FOR FASTING LAB, MUST SCHEDULE.    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    Postsurgical percutaneous transluminal coronary angioplasty status, Status post coronary angioplasty       ONE TOUCH ULTRA test strip   Generic drug:  blood glucose monitoring     300 strip    USE TO TEST THREE TIMES DAILY    Type 2 diabetes, HbA1c goal < 7% (H)       order for DME     1 Device    Equipment being ordered: L wrist brace    Left wrist pain       pantoprazole 40 MG EC tablet    PROTONIX    90 tablet    TAKE 1 TABLET BY MOUTH ONCE DAILY 30 TO 60 MINUTES BEFORE A MEAL    Gastroesophageal reflux disease, esophagitis presence not specified       vitamin D 1000 UNITS capsule     30    1 CAPSULE DAILY    Vitamin D deficiency

## 2017-10-27 ENCOUNTER — OFFICE VISIT (OUTPATIENT)
Dept: FAMILY MEDICINE | Facility: CLINIC | Age: 77
End: 2017-10-27
Payer: COMMERCIAL

## 2017-10-27 VITALS
BODY MASS INDEX: 41.04 KG/M2 | OXYGEN SATURATION: 97 % | HEART RATE: 67 BPM | DIASTOLIC BLOOD PRESSURE: 68 MMHG | TEMPERATURE: 97 F | WEIGHT: 223 LBS | SYSTOLIC BLOOD PRESSURE: 135 MMHG | HEIGHT: 62 IN

## 2017-10-27 DIAGNOSIS — E11.59 TYPE 2 DIABETES MELLITUS WITH VASCULAR DISEASE (H): Primary | ICD-10-CM

## 2017-10-27 DIAGNOSIS — Z23 NEED FOR PROPHYLACTIC VACCINATION AND INOCULATION AGAINST INFLUENZA: ICD-10-CM

## 2017-10-27 DIAGNOSIS — G89.29 CHRONIC RIGHT-SIDED LOW BACK PAIN WITH RIGHT-SIDED SCIATICA: ICD-10-CM

## 2017-10-27 DIAGNOSIS — K21.9 GASTROESOPHAGEAL REFLUX DISEASE, ESOPHAGITIS PRESENCE NOT SPECIFIED: ICD-10-CM

## 2017-10-27 DIAGNOSIS — I25.10 CORONARY ARTERY DISEASE INVOLVING NATIVE CORONARY ARTERY OF NATIVE HEART WITHOUT ANGINA PECTORIS: ICD-10-CM

## 2017-10-27 DIAGNOSIS — E78.5 HYPERLIPIDEMIA LDL GOAL <70: ICD-10-CM

## 2017-10-27 DIAGNOSIS — I10 ESSENTIAL HYPERTENSION, BENIGN: ICD-10-CM

## 2017-10-27 DIAGNOSIS — M54.41 CHRONIC RIGHT-SIDED LOW BACK PAIN WITH RIGHT-SIDED SCIATICA: ICD-10-CM

## 2017-10-27 PROCEDURE — 90662 IIV NO PRSV INCREASED AG IM: CPT | Performed by: INTERNAL MEDICINE

## 2017-10-27 PROCEDURE — G0008 ADMIN INFLUENZA VIRUS VAC: HCPCS | Performed by: INTERNAL MEDICINE

## 2017-10-27 PROCEDURE — 99214 OFFICE O/P EST MOD 30 MIN: CPT | Performed by: INTERNAL MEDICINE

## 2017-10-27 NOTE — NURSING NOTE
"Chief Complaint   Patient presents with     Follow Up For     Diabetes       Initial /70 (BP Location: Right arm, Patient Position: Chair, Cuff Size: Adult Large)  Pulse 67  Temp 97  F (36.1  C) (Oral)  Ht 5' 2\" (1.575 m)  Wt 223 lb (101.2 kg)  SpO2 97%  BMI 40.79 kg/m2 Estimated body mass index is 40.79 kg/(m^2) as calculated from the following:    Height as of this encounter: 5' 2\" (1.575 m).    Weight as of this encounter: 223 lb (101.2 kg).  Medication Reconciliation: complete   aMr Eric MA  "

## 2017-10-27 NOTE — PROGRESS NOTES
Injectable Influenza Immunization Documentation    1.  Is the person to be vaccinated sick today?   No    2. Does the person to be vaccinated have an allergy to a component   of the vaccine?   No  Egg Allergy Algorithm Link    3. Has the person to be vaccinated ever had a serious reaction   to influenza vaccine in the past?   No    4. Has the person to be vaccinated ever had Guillain-Barré syndrome?   No    Form completed by patient  Mar Eric MA  Prior to injection verified patient identity using patient's name and date of birth.

## 2017-10-27 NOTE — MR AVS SNAPSHOT
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      Future tests that were ordered for you today     Open Future Orders        Priority Expected Expires Ordered    **A1C FUTURE anytime Routine 12/28/2017 10/27/2018 10/27/2017            Who to contact     If you have questions or need follow up information about today's clinic visit or your schedule please contact [facility] directly at [phone].  Normal or non-critical lab and imaging results will be communicated to you by MyChart, letter or phone within 4 business days after the clinic has received the results. If you do not hear from us within 7 days, please contact the clinic through MyChart or phone. If you have a critical or abnormal lab result, we will notify you by phone as soon as possible.  Submit refill requests through zPerfectGift or call your pharmacy and they will forward the refill request to us. Please allow 3 business days for your refill to be completed.          Additional Information About Your Visit        Care EveryWhere ID     This is your Care EveryWhere ID. This could be used by other organizations to access your  "[place] medical records  [id number]        Your Vitals Were     Pulse Temperature Height Pulse Oximetry BMI (Body Mass Index)       67 97  F (36.1  C) (Oral) 5' 2\" (1.575 m) 97% 40.79 kg/m2        Blood Pressure from Last 3 Encounters:   10/27/17 135/68   10/23/17 140/80   07/27/17 138/76    Weight from Last 3 Encounters:   10/27/17 223 lb (101.2 kg)   10/23/17 222 lb (100.7 kg)   07/27/17 219 lb (99.3 kg)               Primary Care Provider Office Phone # Fax #    Kole Gonsalez [phone]       [facility] [address] [address]        Equal Access to Services     [facility] : Hadii saúl gongora hadasho Soomaali, waaxda luqadaha, qaybta kaalmada adeegyada, levi santizo . So [facility] [phone].    ATENCIÓN: Si habla español, tiene a serrano disposición servicios gratuitos de asistencia lingüística. Meeta al [phone].    We comply with applicable federal civil rights laws and [place] laws. We do not discriminate on the basis of race, color, national origin, age, disability, sex, sexual orientation, or gender identity.            Thank you!     Thank you for choosing [facility]  for your care. Our goal is always to provide you with excellent care. Hearing back from our patients is one way we can continue to improve our services. Please take a few minutes to complete the written survey that you may receive in the mail after your visit with us. Thank you!             Your Updated Medication List - Protect others around you: Learn how to safely use, store and throw away your medicines at www.disposemymeds.org.          This list is accurate as of: 10/27/17  1:12 PM.  Always use your most recent med list.                   Brand Name Dispense Instructions for use Diagnosis    acetaminophen 500 MG tablet    TYLENOL     Take 500-1,000 mg by mouth 3 times daily as needed for mild pain        amLODIPine 10 MG tablet    NORVASC    90 " "tablet    Take 1 tablet (10 mg) by mouth daily    Essential hypertension, benign       aspirin 81 MG tablet     100 tablet    Take 1 tablet (81 mg) by mouth daily    Type II or unspecified type diabetes mellitus without mention of complication, not stated as uncontrolled       calcium carbonate 1250 MG tablet    OS-MANJEET 500 mg [place]. 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 OVERDUE FOR FASTING LAB, MUST SCHEDULE.    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    Postsurgical percutaneous transluminal coronary angioplasty status, Status post coronary angioplasty       ONE TOUCH ULTRA test strip   Generic drug:  blood glucose monitoring     300 strip    USE TO TEST THREE TIMES DAILY    Type 2 diabetes, HbA1c goal < 7% (H)       order for DME     1 Device    Equipment being ordered: L wrist brace "    Left wrist pain       pantoprazole 40 MG EC tablet    PROTONIX    90 tablet    TAKE 1 TABLET BY MOUTH ONCE DAILY 30 TO 60 MINUTES BEFORE A MEAL    Gastroesophageal reflux disease, esophagitis presence not specified       vitamin D 1000 UNITS capsule     30    1 CAPSULE DAILY    Vitamin D deficiency

## 2017-10-27 NOTE — PROGRESS NOTES
SUBJECTIVE:   Cara Camarillo is a 77 year old female who presents to clinic today for the following health issues:      Diabetes Follow-up    Patient is checking blood sugars: once daily.  Results are as follows:       am -         Diabetic concerns: None     Symptoms of hypoglycemia (low blood sugar): none     Paresthesias (numbness or burning in feet) or sores: No   Date of last diabetic eye exam: 2016      Amount of exercise or physical activity: walks around yard. Grocery store    Problems taking medications regularly: No    Medication side effects: none    Diet: regular (no restrictions)        Hyperlipidemia Follow-Up      Rate your low fat/cholesterol diet?: good    Taking statin?  Yes, no muscle aches from statin    Other lipid medications/supplements?:  none    Hypertension Follow-up      Outpatient blood pressures are not being checked.    Low Salt Diet: no added salt    Vascular Disease Follow-up:  Coronary Artery Disease (CAD)      Chest pain or pressure, left side neck or arm pain: No    Shortness of breath/increased sweats/nausea with exertion: No    Pain in calves walking 1-2 blocks: No    Worsened or new symptoms since last visit: No    Nitroglycerin use: no    Daily aspirin use: Yes    Heart Failure Follow-up    Symptoms:    Shortness of breath: none    Lower extremity edema: stable     Chest pain: No    Using more pillows than normal: No    Cough at night: No    Weight:    Checking weight daily: No    Weight change: none she is not weighing herself    Cardiology visits, ER/UC, or hospital admissions since last visit: Cardiology Visit - recent    Medication side effects: none      She wants to stop any medication that she can because she struggles to afford her medications  She is tolerating daily atorvastatin   She does not have heartburn so she wonders why she is taking pantoprazole     Problem list and histories reviewed & adjusted, as indicated.  Additional history: as documented    Patient  Active Problem List   Diagnosis     Malignant neoplasm of corpus uteri, except isthmus (H)     Dysthymic disorder     Herpes zoster     Essential hypertension, benign     SOLITARY KIDNEY ANOMALY NEC     Hyperlipidemia LDL goal <70     CKD (chronic kidney disease) stage 3, GFR 30-59 ml/min     Advanced directives, counseling/discussion     Anxiety     Tubular adenoma     OA (osteoarthritis)     Aortic stenosis, mild     IVCD (intraventricular conduction defect)     B12 deficiency     Iron deficiency     Esophageal reflux     Overweight BMI 35-40     Type 2 diabetes mellitus without complication (H)     Osteopenia     RBBB     Left ventricular diastolic dysfunction     Coronary artery disease     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)     Chest wall pain     Bilateral edema of lower extremity     Coronary artery disease involving native coronary artery of native heart without angina pectoris     Past Surgical History:   Procedure Laterality Date     ARTHROPLASTY KNEE  2013    Procedure: ARTHROPLASTY KNEE;  RIGHT TOTAL KNEE ARTHROPLASTY;  Surgeon: Romaine Constantino MD;  Location:  OR     ARTHROPLASTY KNEE  2/3/2014    Procedure: ARTHROPLASTY KNEE;  LEFT TOTAL KNEE ARTHROPLASTY (BIOMET)^;  Surgeon: Romaine Constantino MD;  Location:  OR     C ANESTH, SECTION       C NONSPECIFIC PROCEDURE  3/20/06    CT scan of chest/abd/pelvis- negative for recurrence of CA     EXCISE MASS LOWER EXTREMITY Left 2015    Procedure: EXCISE MASS LOWER EXTREMITY;  Surgeon: Sloan Richardson MD;  Location: [facility] UGI ENDOSCOPY W EUS  2013    Procedure: COMBINED ENDOSCOPIC ULTRASOUND, ESOPHAGOSCOPY, GASTROSCOPY, DUODENOSCOPY (EGD);  Surgeon: Lyric iSmons MD;  Location:  GI     HYSTERECTOMY      Vaginal     HYSTERECTOMY, VAGINAL  05    Uterine CA     LAPAROSCOPIC CHOLECYSTECTOMY  1/10/2013    Procedure: LAPAROSCOPIC CHOLECYSTECTOMY;  LAPAROSCOPIC CHOLECYSTECTOMY ;  Surgeon: Brandon  "Eduardo Wright MD;  Location: [facility] OR     NEPHRECTOMY BILATERAL       NEPHRECTOMY RT/LT  OCT 2005     OTHER SURGICAL HISTORY      angiogram Jan 2016: ISIDRO to PDA     OTHER SURGICAL HISTORY      angiogram Feb 2016: ISIDRO to LAD       Social History   Substance Use Topics     Smoking status: Never Smoker     Smokeless tobacco: Never Used     Alcohol use No     Family History   Problem Relation Age of Onset     DIABETES Father      Adult onset     Other - See Comments Mother 95     Old age         Current Outpatient Prescriptions   Medication Sig Dispense Refill     losartan (COZAAR) 50 MG tablet TAKE 2 TABLETS BY MOUTH DAILY 180 tablet 1     metFORMIN (GLUCOPHAGE-XR) 500 MG 24 hr tablet TAKE 2 TABLETS BY MOUTH IN MORNING, AND 1 TABLET IN EVENING. OVERDUE FOR FASTING LAB, MUST SCHEDULE. 90 tablet 0     metoprolol (LOPRESSOR) 50 MG tablet TAKE ONE AND ONE-HALF TABLETS BY MOUTH TWICE DAILY 270 tablet 0     amLODIPine (NORVASC) 10 MG tablet Take 1 tablet (10 mg) by mouth daily 90 tablet 3     atorvastatin (LIPITOR) 40 MG tablet Take 1 tablet (40 mg) by mouth daily 90 tablet 3     pantoprazole (PROTONIX) 40 MG EC tablet TAKE 1 TABLET BY MOUTH ONCE DAILY 30 TO 60 MINUTES BEFORE A MEAL 90 tablet 2     insulin syringe-needle U-100 (BD INSULIN SYRINGE ULTRAFINE) 30G X 1/2\" 0.5 ML Use one syringe twice daily or as directed. 100 each 11     insulin NPH (NOVOLIN N VIAL) 100 UNIT/ML injection 10 units before breakfast, 10 units before dinner 10 mL 1     ONE TOUCH ULTRA test strip USE TO TEST THREE TIMES DAILY 300 strip 1     order for DME Equipment being ordered: L wrist brace 1 Device 0     nitroglycerin (NITROSTAT) 0.4 MG SL tablet Place 1 tablet (0.4 mg) under the tongue every 5 minutes as needed for chest pain if you are still having symptoms after 3 doses (15 minutes) call 911. 25 tablet 1     calcium carbonate (OS-MANJEET 500 MG [place]. CA) 500 MG tablet Take 500 mg by mouth 2 times daily       acetaminophen (TYLENOL) 500 MG tablet " "Take 500-1,000 mg by mouth 3 times daily as needed for mild pain       VITAMIN D 1000 UNIT OR CAPS 1 CAPSULE DAILY 30 0     aspirin 81 MG tablet Take 1 tablet (81 mg) by mouth daily 100 tablet 3     Allergies   Allergen Reactions     Actos [Pioglitazone]      Lower extremity edema       Enalapril      Renal failure     Hydrochlorothiazide      Dry mouth     Lisinopril      Hyperkalemia           Reviewed and updated as needed this visit by clinical staffTobacco  Allergies  Meds  Soc Hx      Reviewed and updated as needed this visit by Provider         ROS:  Her hip pain has improved  She now describes pain in her sciatic notch that sometimes radiates down the posterior right leg, she denies leg numbness or weakness    Constitutional, HEENT, cardiovascular, pulmonary, gi and gu systems are negative, except as otherwise noted.      OBJECTIVE:   /68  Pulse 67  Temp 97  F (36.1  C) (Oral)  Ht 5' 2\" (1.575 m)  Wt 223 lb (101.2 kg)  SpO2 97%  BMI 40.79 kg/m2  Body mass index is 40.79 kg/(m^2).  GENERAL: healthy, alert and no distress  RESP: lungs clear to auscultation - no rales, rhonchi or wheezes  CV: Heart with regular rate and rhythm.   ABDOMEN: soft, nontender, no hepatosplenomegaly, no masses and bowel sounds normal  MS: no gross musculoskeletal defects noted, no edema  NEURO: Normal strength and tone, mentation intact and speech normal  BACK:   Deep tendon reflexes are 2+ at the bilateral patella and Achilles tendons, there is 5 out of 5 muscle strength in all lower extremity muscle groups, there is normal sensation to light touch in all lower extremity dermatomes, straight leg raise does not elicit radicular symptoms bilaterally, there was tenderness in right sciatic notch. Gait is normal.   PSYCH: mentation appears normal, affect normal/bright    Diagnostic Test Results:  Results for orders placed or performed in visit on 09/27/17   Hemoglobin A1c   Result Value Ref Range    Hemoglobin A1C 7.9 (H) " 4.3 - 6.0 %   Lipid panel reflex to direct LDL   Result Value Ref Range    Cholesterol 139 <200 mg/dL    Triglycerides 114 <150 mg/dL    HDL Cholesterol 68 >49 mg/dL    LDL Cholesterol Calculated 48 <100 mg/dL    Non HDL Cholesterol 71 <130 mg/dL       ASSESSMENT/PLAN:       1. 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 Essential hypertension, benign  Adequately controlled     3. Hyperlipidemia LDL goal <70  On statin therapy with lipids at goal    4. Coronary artery disease involving native coronary artery of native heart without angina pectoris  Stable symptoms     5. Gastroesophageal reflux disease, esophagitis presence not specified  Discussed tapering scheduled to stop protonix  Every other day for one month then every 3rd day until she runs out of pills     6. Chronic right-sided low back pain with right-sided sciatica  She could benefit from PT for this, and I recommended PT, she declined     7. Need for prophylactic vaccination and inoculation against influenza  Flu shot today       FUTURE APPOINTMENTS:       - Follow-up visit pending A1c level after 12/27/17    28 minutes mostly counseling on above topics    Kole Gonsalez MD  [facility]

## 2017-12-27 DIAGNOSIS — I10 ESSENTIAL HYPERTENSION, BENIGN: ICD-10-CM

## 2017-12-29 DIAGNOSIS — E11.59 TYPE 2 DIABETES MELLITUS WITH VASCULAR DISEASE (H): ICD-10-CM

## 2017-12-29 LAB — HBA1C MFR BLD: 7.8 % (ref 4.3–6)

## 2017-12-29 PROCEDURE — 36415 COLL VENOUS BLD VENIPUNCTURE: CPT | Performed by: INTERNAL MEDICINE

## 2017-12-29 PROCEDURE — 83036 HEMOGLOBIN GLYCOSYLATED A1C: CPT | Performed by: INTERNAL MEDICINE

## 2017-12-29 RX ORDER — METOPROLOL TARTRATE 50 MG
TABLET ORAL
Qty: 270 TABLET | Refills: 2 | Status: SHIPPED | OUTPATIENT
Start: 2017-12-29 | End: 2018-09-25

## 2017-12-29 NOTE — TELEPHONE ENCOUNTER
Prescription approved per [facility]ill Protocol.  Jaymie WOOTEN RN    Requested Prescriptions   Pending Prescriptions Disp Refills     metoprolol (LOPRESSOR) 50 MG tablet [Pharmacy Med Name: METOPROLOL TARTRATE 50 MG TAB] 270 tablet 0     Sig: TAKE ONE AND ONE-HALF TABLETS BY MOUTH TWICE DAILY    Beta-Blockers Protocol Passed    12/27/2017  9:18 AM       Passed - Blood pressure under 140/90    BP Readings from Last 3 Encounters:   10/27/17 135/68   10/23/17 140/80   07/27/17 138/76                Passed - Patient is age 6 or older       Passed - Recent or future visit with authorizing provider's specialty    Patient had office visit in the last year or has a visit in the next 30 days with authorizing provider.  See chart review.

## 2017-12-30 NOTE — PROGRESS NOTES
The following letter pertains to your most recent diagnostic tests:    -Your hemoglobin A1c test which is a diabetes blood test that represents and average of your blood sugars over the last 3 months returned at 7.8 which is at your goal of hemoglobin A1c less than 8.         Sincerely,    Dr. Gonsalez

## 2018-01-15 ENCOUNTER — OFFICE VISIT (OUTPATIENT)
Dept: PHARMACY | Facility: CLINIC | Age: 78
End: 2018-01-15
Payer: COMMERCIAL

## 2018-01-15 VITALS — SYSTOLIC BLOOD PRESSURE: 130 MMHG | DIASTOLIC BLOOD PRESSURE: 70 MMHG | WEIGHT: 221 LBS | BODY MASS INDEX: 40.42 KG/M2

## 2018-01-15 DIAGNOSIS — R10.2 PELVIC PAIN IN FEMALE: ICD-10-CM

## 2018-01-15 DIAGNOSIS — E11.59 TYPE 2 DIABETES MELLITUS WITH VASCULAR DISEASE (H): Primary | ICD-10-CM

## 2018-01-15 DIAGNOSIS — I10 ESSENTIAL HYPERTENSION, BENIGN: ICD-10-CM

## 2018-01-15 DIAGNOSIS — K21.9 GASTROESOPHAGEAL REFLUX DISEASE, ESOPHAGITIS PRESENCE NOT SPECIFIED: ICD-10-CM

## 2018-01-15 DIAGNOSIS — I25.10 CORONARY ARTERY DISEASE INVOLVING NATIVE CORONARY ARTERY OF NATIVE HEART WITHOUT ANGINA PECTORIS: ICD-10-CM

## 2018-01-15 DIAGNOSIS — E63.9 NUTRITIONAL DISORDER: ICD-10-CM

## 2018-01-15 DIAGNOSIS — E78.5 HYPERLIPIDEMIA LDL GOAL <100: ICD-10-CM

## 2018-01-15 PROCEDURE — 99607 MTMS BY PHARM ADDL 15 MIN: CPT | Performed by: PHARMACIST

## 2018-01-15 PROCEDURE — 99605 MTMS BY PHARM NP 15 MIN: CPT | Performed by: PHARMACIST

## 2018-01-15 RX ORDER — ROSUVASTATIN CALCIUM 5 MG/1
5 TABLET, COATED ORAL WEEKLY
COMMUNITY
End: 2018-05-03

## 2018-01-15 NOTE — MR AVS SNAPSHOT
After Visit Summary   1/15/2018    Cara Camarillo    MRN: [id number]           Patient Information     Date Of Birth          1940        Visit Information        Provider Department      1/15/2018 11:30 AM Lsisy Diana, [facility] MTM        Care Instructions    Recommendations from today's MTM visit:                                                    MTM (medication therapy management) is a service provided by a clinical pharmacist designed to help you get the most of out of your medicines.   Today we reviewed what your medicines are for, how to know if they are working, that your medicines are safe and how to make your medicine regimen as easy as possible.     1.  Use Tylenol (acetaminophen) as needed for pain.    2.  You can stop your calcium supplement if you can get 3-4 servings of dairy per day.  If you can get 2 servings per day, then you can reduce the calcium to 1 tablet per day.    Next MTM visit: 3 months, sooner if needed    To schedule another MTM appointment, please call the clinic directly or you may call the MTM scheduling line at [phone] or toll-free at [phone].     My Clinical Pharmacist's contact information:                                                      It was a pleasure seeing you today!  Please feel free to contact me with any questions or concerns you have.      Carito Perez PharmD  Pharmaceutical Care Resident  Pager: [phone]    Lissy Diana PharmD, [facility]  Medication Therapy Management Provider  Pager: [phone]     You may receive a survey about the MTM services you received.  I would appreciate your feedback to help me serve you better in the future. Please fill it out and return it when you can. Your comments will be anonymous.                     Follow-ups after your visit        Who to contact     If you have questions or need follow up information about today's clinic visit or your schedule please contact  [facility] directly at [phone].  Normal or non-critical lab and imaging results will be communicated to you by MyChart, letter or phone within 4 business days after the clinic has received the results. If you do not hear from us within 7 days, please contact the clinic through MyChart or phone. If you have a critical or abnormal lab result, we will notify you by phone as soon as possible.  Submit refill requests through [employer] or call your pharmacy and they will forward the refill request to us. Please allow 3 business days for your refill to be completed.          Additional Information About Your Visit        Care EveryWhere ID     This is your Care EveryWhere ID. This could be used by other organizations to access your [place] medical records  [id number]        Your Vitals Were     BMI (Body Mass Index)                   40.42 kg/m2            Blood Pressure from Last 3 Encounters:   01/15/18 130/70   10/27/17 135/68   10/23/17 140/80    Weight from Last 3 Encounters:   01/15/18 221 lb (100.2 kg)   10/27/17 223 lb (101.2 kg)   10/23/17 222 lb (100.7 kg)              Today, you had the following     No orders found for display       Primary Care Provider Office Phone # Fax #    Kole Gonsalez [phone]       [facility] [address] [place] [address] T[address] [facility] [address]  [facility] [address]        Equal Access to Services     [facility]: Hadii aad ku hadasho Soomaali, waaxda luqadaha, qaybta kaalmada adeegyada, levi santizo . So [facility] [phone].    ATENCIÓN: Si habla español, tiene a serrano disposición servicios gratuitos de asistencia lingüística. Llame al [phone].    We comply with applicable federal civil rights laws and [place] laws. We do not discriminate on the basis of race, color, national origin, age, disability, sex, sexual orientation, or gender identity.            Thank you!     Thank you for choosing [facility]  for  "your care. Our goal is always to provide you with excellent care. Hearing back from our patients is one way we can continue to improve our services. Please take a few minutes to complete the written survey that you may receive in the mail after your visit with us. Thank you!             Your Updated Medication List - Protect others around you: Learn how to safely use, store and throw away your medicines at www.disposemymeds.org.          This list is accurate as of: 1/15/18 11:54 AM.  Always use your most recent med list.                   Brand Name Dispense Instructions for use Diagnosis    acetaminophen 500 MG tablet    TYLENOL     Take 500-1,000 mg by mouth 3 times daily as needed for mild pain        amLODIPine 10 MG tablet    NORVASC    90 tablet    Take 1 tablet (10 mg) by mouth daily    Essential hypertension, benign       aspirin 81 MG tablet     100 tablet    Take 1 tablet (81 mg) by mouth daily    Type II or unspecified type diabetes mellitus without mention of complication, not stated as uncontrolled       calcium carbonate 1250 MG tablet    OS-MANJEET 500 mg Ewiiaapaayp. 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)       losartan 50 MG tablet    COZAAR    180 tablet    TAKE 2 TABLETS BY MOUTH DAILY    Essential hypertension with goal blood pressure less than 140/90       metFORMIN 500 MG 24 hr tablet    GLUCOPHAGE-XR    90 tablet    TAKE 2 TABLETS BY MOUTH IN MORNING, AND 1 TABLET IN EVENING. OVERDUE FOR FASTING LAB, MUST SCHEDULE.    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)       metoprolol tartrate 50 MG tablet    LOPRESSOR    270 tablet "    TAKE ONE AND ONE-HALF TABLETS BY MOUTH TWICE DAILY    Essential hypertension, benign       nitroGLYcerin 0.4 MG sublingual tablet    NITROSTAT    25 tablet    Place 1 tablet (0.4 mg) under the tongue every 5 minutes as needed for chest pain if you are still having symptoms after 3 doses (15 minutes) call 911.    Postsurgical percutaneous transluminal coronary angioplasty status, Status post coronary angioplasty       ONETOUCH ULTRA test strip   Generic drug:  blood glucose monitoring     300 strip    USE TO TEST THREE TIMES DAILY    Type 2 diabetes, HbA1c goal < 7% (H)       pantoprazole 40 MG EC tablet    PROTONIX    90 tablet    TAKE 1 TABLET BY MOUTH ONCE DAILY 30 TO 60 MINUTES BEFORE A MEAL    Gastroesophageal reflux disease, esophagitis presence not specified       rosuvastatin 5 MG tablet    CRESTOR     Take 5 mg by mouth once a week        vitamin D 1000 UNITS capsule     30    1 CAPSULE DAILY    Vitamin D deficiency

## 2018-01-15 NOTE — PATIENT INSTRUCTIONS
Recommendations from today's MTM visit:                                                    MTM (medication therapy management) is a service provided by a clinical pharmacist designed to help you get the most of out of your medicines.   Today we reviewed what your medicines are for, how to know if they are working, that your medicines are safe and how to make your medicine regimen as easy as possible.     1.  Use Tylenol (acetaminophen) as needed for pain.    2.  You can stop your calcium supplement if you can get 3-4 servings of dairy per day.  If you can get 2 servings per day, then you can reduce the calcium to 1 tablet per day.    Next MTM visit: 3 months, sooner if needed    To schedule another MTM appointment, please call the clinic directly or you may call the MTM scheduling line at [phone] or toll-free at [phone].     My Clinical Pharmacist's contact information:                                                      It was a pleasure seeing you today!  Please feel free to contact me with any questions or concerns you have.      Indigo CamD  Pharmaceutical Care Resident  Pager: [phone]    Lissy Diana PharmD, [facility]  Medication Therapy Management Provider  Pager: [phone]     You may receive a survey about the MTM services you received.  I would appreciate your feedback to help me serve you better in the future. Please fill it out and return it when you can. Your comments will be anonymous.

## 2018-01-15 NOTE — PROGRESS NOTES
SUBJECTIVE/OBJECTIVE:                Cara Camarillo is a 77 year old female coming in for a follow-up visit for Medication Therapy Management.  She was referred to me from Dr. Alvarez, she's now established care with Dr. Gonsalez.     Chief Complaint: Follow up from our visit on 9/20/17.  This visit serves as an initial visit for 2018.  She would like to d/c any medications possible.    Tobacco: No tobacco use  Alcohol: not currently using    Medication Adherence: no issues reported    Diabetes:  Pt currently taking Novolin N 10 units twice a day, metformin ER 1500mg daily (reduced dose due to renal dysfunction). Pt is not experiencing side effects.   SMBG: Daily, alternating between AM (fasting) and HS   Ranges (from patient's glucose log):   AM: 166, 157, 155, 192, 164, 164, 168, 151  HS: 94, 123, 130, 140, 150, 144, 99, 114  Patient is not experiencing hypoglycemia.   Recent symptoms of high blood sugar? none  Eye exam: due - she declines due to cost  Foot exam: due  Microalbumin is < 30 mg/g. Pt is taking an ACEi/ARB.  Aspirin: Taking 81mg daily and she denies side effects today    Hypertension/CAD: Current medications include amlodipine 10mg daily, losartan 100mg daily, metoprolol tartrate 75mg BID ans SL NTG PRN (no recent use).  Patient does not self-monitor BP.  Patient reports no current medication side effects.     Hyperlipidemia: Current therapy includes rosuvastatin 5 mg weekly on Wednesdays.  She was previously taking atorvastatin, but had rosuvastatin left at home so went back to taking that.  She has been unable to tolerate more frequent dosing.  Sometimes takes APAP for leg/arm pains on the day she takes rosuvastatin, but otherwise doesn't usually need to take.    Pelvic Pain:  She wonders what records from Dr. Riley showed - she saw him a few months ago for pelvic pain.  Was told Dr. Gonsalez would f/up on the results, but it wasn't discussed at her last visit.  Per [facility] chart review, we don't have  these records.    GERD: Current medications include: pantoprazole 40mg - she's currently tapering off.  She's taking every 3rd day and will d/c when current supply is gone. Pt c/o no current symptoms.  Patient feels that current regimen is effective.     Supplements:  She's currently taking calcium 500mg BID and Vitamin D 1000 IU daily. She eats yogurt daily, some milk in her cereal.  She occasionally has cottage cheese.    Current labs include:BP Readings from Last 3 Encounters:   10/27/17 135/68   10/23/17 140/80   07/27/17 138/76     Today's Vitals: /70  Wt 221 lb (100.2 kg)  BMI 40.42 kg/m2     Lab Results   Component Value Date    A1C 7.8 12/29/2017   .  Lab Results   Component Value Date    CHOL 139 09/27/2017     Lab Results   Component Value Date    TRIG 114 09/27/2017     Lab Results   Component Value Date    HDL 68 09/27/2017     Lab Results   Component Value Date    LDL 48 09/27/2017       Liver Function Studies -   Recent Labs   Lab Test  04/12/17   1716   PROTTOTAL  7.1   ALBUMIN  3.7   BILITOTAL  0.3   ALKPHOS  105   AST  22   ALT  33       Lab Results   Component Value Date    UCRR 292 01/23/2017    MICROL 60 01/23/2017    UMALCR 20.58 01/23/2017       Last Basic Metabolic Panel:  Lab Results   Component Value Date     04/12/2017      Lab Results   Component Value Date    POTASSIUM 4.6 04/12/2017     Lab Results   Component Value Date    CHLORIDE 109 04/12/2017     Lab Results   Component Value Date    BUN 26 04/12/2017     Lab Results   Component Value Date    CR 1.27 04/12/2017     GFR Estimate   Date Value Ref Range Status   04/12/2017 41 (L) >60 mL/min/1.7m2 Final     Comment:     Non  GFR Calc   03/06/2017 43 (L) >60 mL/min/1.7m2 Final     Comment:     Non  GFR Calc   01/23/2017 46 (L) >60 mL/min/1.7m2 Final     Comment:     Non  GFR Calc       TSH   Date Value Ref Range Status   11/21/2016 3.32 0.40 - 4.00 mU/L Final   ]    Most  Recent Immunizations   Administered Date(s) Administered     Influenza (High Dose) 3 valent vaccine 10/27/2017     Influenza (IIV3) PF 10/12/2012     Pneumo Conj 13-V (2010&after) 05/21/2015     Pneumococcal 23 valent 05/16/2014     TD (ADULT, 7+) 04/15/2011     TDAP Vaccine (Adacel) 01/29/2015       ASSESSMENT:              Current medications were reviewed today as discussed above.      Medication Adherence: no issues identified    Diabetes: Stable. Patient is meeting A1c goal of < 8%.     Hypertension/CAD: Stable. Patient is meeting BP goal of < 140/90mmHg.      Hyperlipidemia: Stable. Pt is on maximally tolerated statin therapy.     Pelvic Pain: Need to get records.    GERD: Stable.  Plan in place to taper off PPI therapy completely.     Supplements:  Would be preferred to get calcium through dietary intake.  If she's able to do this she could reduce/d/c calcium supplementation (and pill burden).     PLAN:                1.  KELSEY signed and faxed to Dr. Riley's clinic.  2.  Advised Cara if she can get 3-4 servings of dairy per day she can d/c calcium supplement completely; if she can get 2 servings per day she can reduce calcium to once daily administration.    I spent 30 minutes with this patient today. All changes were made via collaborative practice agreement with Kole Gonsalez. A copy of the visit note was provided to the patient's primary care provider.     Will follow up in 3 months, sooner if needed.    The patient was given a summary of these recommendations as an after visit summary.    Lissy Diana, PharmD, [facility]  Medication Therapy Management Provider  Pager: [phone]

## 2018-04-22 ENCOUNTER — TELEPHONE (OUTPATIENT)
Dept: FAMILY MEDICINE | Facility: CLINIC | Age: 78
End: 2018-04-22

## 2018-04-22 DIAGNOSIS — E11.9 TYPE 2 DIABETES MELLITUS WITHOUT COMPLICATION, WITH LONG-TERM CURRENT USE OF INSULIN (H): Primary | ICD-10-CM

## 2018-04-22 DIAGNOSIS — I10 ESSENTIAL HYPERTENSION WITH GOAL BLOOD PRESSURE LESS THAN 140/90: ICD-10-CM

## 2018-04-22 DIAGNOSIS — Z79.4 TYPE 2 DIABETES MELLITUS WITHOUT COMPLICATION, WITH LONG-TERM CURRENT USE OF INSULIN (H): Primary | ICD-10-CM

## 2018-04-23 RX ORDER — LOSARTAN POTASSIUM 50 MG/1
TABLET ORAL
Qty: 60 TABLET | Refills: 0 | Status: SHIPPED | OUTPATIENT
Start: 2018-04-23 | End: 2018-04-30

## 2018-04-23 NOTE — TELEPHONE ENCOUNTER
"Medication is being filled for 1 time refill only due to:  upcoming appt, due for labs   Jaymie WOOTEN, RN    Requested Prescriptions   Pending Prescriptions Disp Refills     losartan (COZAAR) 50 MG tablet [Pharmacy Med Name: LOSARTAN POTASSIUM 50 MG TAB] 180 tablet 1     Sig: TAKE 2 TABLETS BY MOUTH DAILY    Angiotensin-II Receptors Failed    4/22/2018 10:39 AM       Failed - Normal serum creatinine on file in past 12 months    Recent Labs   Lab Test  04/12/17   1716   CR  1.27*            Failed - Normal serum potassium on file in past 12 months    Recent Labs   Lab Test  04/12/17   1716   POTASSIUM  4.6                   Passed - Blood pressure under 140/90 in past 12 months    BP Readings from Last 3 Encounters:   01/15/18 130/70   10/27/17 135/68   10/23/17 140/80                Passed - Recent (12 mo) or future (30 days) visit within the authorizing provider's specialty    Patient had office visit in the last 12 months or has a visit in the next 30 days with authorizing provider or within the authorizing provider's specialty.  See \"Patient Info\" tab in inbasket, or \"Choose Columns\" in Meds & Orders section of the refill encounter.           Passed - Patient is age 18 or older       Passed - No active pregnancy on record       Passed - No positive pregnancy test in past 12 months        Next 5 appointments (look out 90 days)     Apr 27, 2018  8:00 AM CDT   SHORT with Lissy Diana RPH   [facility] ([facility])    [address]  [address]  [facility] [id number] [phone]            May 03, 2018 10:45 AM CDT   Return Visit with Kyle Montana MD   [facility] ([facility])    [address]  [facility] [place] [facility] [id number] [phone] [address]                  "

## 2018-04-24 NOTE — TELEPHONE ENCOUNTER
To PCP:  Patient has follow up lab appt scheduled.  Due for A1C and BMP?  Please enter appropriate labs.  Thank you.  Deandra Denise RN

## 2018-04-27 ENCOUNTER — OFFICE VISIT (OUTPATIENT)
Dept: PHARMACY | Facility: CLINIC | Age: 78
End: 2018-04-27
Payer: COMMERCIAL

## 2018-04-27 ENCOUNTER — TELEPHONE (OUTPATIENT)
Dept: FAMILY MEDICINE | Facility: CLINIC | Age: 78
End: 2018-04-27

## 2018-04-27 VITALS
DIASTOLIC BLOOD PRESSURE: 76 MMHG | WEIGHT: 221 LBS | HEART RATE: 81 BPM | SYSTOLIC BLOOD PRESSURE: 136 MMHG | BODY MASS INDEX: 40.42 KG/M2

## 2018-04-27 DIAGNOSIS — K21.9 GASTROESOPHAGEAL REFLUX DISEASE, ESOPHAGITIS PRESENCE NOT SPECIFIED: ICD-10-CM

## 2018-04-27 DIAGNOSIS — I25.10 CORONARY ARTERY DISEASE INVOLVING NATIVE CORONARY ARTERY OF NATIVE HEART WITHOUT ANGINA PECTORIS: ICD-10-CM

## 2018-04-27 DIAGNOSIS — Z79.4 TYPE 2 DIABETES MELLITUS WITHOUT COMPLICATION, WITH LONG-TERM CURRENT USE OF INSULIN (H): ICD-10-CM

## 2018-04-27 DIAGNOSIS — E11.9 TYPE 2 DIABETES MELLITUS WITHOUT COMPLICATION, WITH LONG-TERM CURRENT USE OF INSULIN (H): ICD-10-CM

## 2018-04-27 DIAGNOSIS — E11.59 TYPE 2 DIABETES MELLITUS WITH VASCULAR DISEASE (H): Primary | ICD-10-CM

## 2018-04-27 DIAGNOSIS — I10 ESSENTIAL HYPERTENSION WITH GOAL BLOOD PRESSURE LESS THAN 140/90: ICD-10-CM

## 2018-04-27 DIAGNOSIS — R10.2 PELVIC PAIN IN FEMALE: ICD-10-CM

## 2018-04-27 DIAGNOSIS — I10 ESSENTIAL HYPERTENSION, BENIGN: ICD-10-CM

## 2018-04-27 DIAGNOSIS — E78.5 HYPERLIPIDEMIA LDL GOAL <100: ICD-10-CM

## 2018-04-27 DIAGNOSIS — E63.9 NUTRITIONAL DISORDER: ICD-10-CM

## 2018-04-27 LAB
ERYTHROCYTE [DISTWIDTH] IN BLOOD BY AUTOMATED COUNT: 14.6 % (ref 10–15)
HBA1C MFR BLD: 7.8 % (ref 0–5.6)
HCT VFR BLD AUTO: 43.2 % (ref 35–47)
HGB BLD-MCNC: 14.3 G/DL (ref 11.7–15.7)
MCH RBC QN AUTO: 29.6 PG (ref 26.5–33)
MCHC RBC AUTO-ENTMCNC: 33.1 G/DL (ref 31.5–36.5)
MCV RBC AUTO: 89 FL (ref 78–100)
PLATELET # BLD AUTO: 241 10E9/L (ref 150–450)
RBC # BLD AUTO: 4.83 10E12/L (ref 3.8–5.2)
WBC # BLD AUTO: 6.8 10E9/L (ref 4–11)

## 2018-04-27 PROCEDURE — 36415 COLL VENOUS BLD VENIPUNCTURE: CPT | Performed by: INTERNAL MEDICINE

## 2018-04-27 PROCEDURE — 99606 MTMS BY PHARM EST 15 MIN: CPT | Performed by: PHARMACIST

## 2018-04-27 PROCEDURE — 85027 COMPLETE CBC AUTOMATED: CPT | Performed by: INTERNAL MEDICINE

## 2018-04-27 PROCEDURE — 99607 MTMS BY PHARM ADDL 15 MIN: CPT | Performed by: PHARMACIST

## 2018-04-27 PROCEDURE — 83036 HEMOGLOBIN GLYCOSYLATED A1C: CPT | Performed by: INTERNAL MEDICINE

## 2018-04-27 NOTE — PATIENT INSTRUCTIONS
Recommendations from today's MTM visit:                                                    MTM (medication therapy management) is a service provided by a clinical pharmacist designed to help you get the most of out of your medicines.   Today we reviewed what your medicines are for, how to know if they are working, that your medicines are safe and how to make your medicine regimen as easy as possible.     1.  We will check your labs today - as long as everything is normal you can follow-up with Dr. Gonsalez at the end of July.    2.  I will refill your medications.    Next MTM visit:  Pending lab results    To schedule another MTM appointment, please call the clinic directly or you may call the MTM scheduling line at [phone] or toll-free at [phone].     My Clinical Pharmacist's contact information:                                                      It was a pleasure seeing you today!  Please feel free to contact me with any questions or concerns you have.     Lissy Diana PharmD, [facility]  Medication Therapy Management Provider  Pager: [phone]     You may receive a survey about the MTM services you received.  I would appreciate your feedback to help me serve you better in the future. Please fill it out and return it when you can. Your comments will be anonymous.

## 2018-04-27 NOTE — PROGRESS NOTES
SUBJECTIVE/OBJECTIVE:                Cara Camarillo is a 78 year old female coming in for a follow-up visit for Medication Therapy Management.  She was referred to me from Dr. Alvarez, she has since established care with Dr. Gonsalez.  She's planning to have labs done immediately after our visit today (ordered by Dr. Gonsalez)    Chief Complaint: Follow up from our visit on 1/15/18.  She's here to f/up, she has no specific questions or concerns today.    Tobacco: No tobacco use  Alcohol: not currently using    Medication Adherence/Access: no issues reported    Diabetes:  Pt currently taking Novolin N 10 units twice a day, metformin ER 1500mg daily (reduced dose due to renal dysfunction). Pt is not experiencing side effects.   SMBG: Daily, alternating between AM (fasting) and HS   Ranges (from patient's glucose log):   AM: 179, 175, 164, 175, 177  HS: 141, 125, 164, 183, 162, 121  Patient is not experiencing hypoglycemia.   Recent symptoms of high blood sugar? none  Eye exam: due - she declines due to cost  Foot exam: up to date  Microalbumin is < 30 mg/g. Pt is taking an ACEi/ARB.  Aspirin: Taking 81mg daily and she denies side effects today    Hypertension/CAD: Current medications include ASA 81mg daily, amlodipine 10mg daily, losartan 100mg daily, metoprolol tartrate 75mg BID ans SL NTG PRN (no recent use).  Patient does not self-monitor BP.  Patient reports no current medication side effects.     Hyperlipidemia: Current therapy includes rosuvastatin 5 mg weekly on Wednesdays.  She was previously taking atorvastatin, but had rosuvastatin left at home so went back to taking that.  She has been unable to tolerate more frequent dosing.     Pain:  She reports the pelvic pain has resolved.  She continues using APAP as needed for general aches and pains, generally 500-1000mg QHS, sometimes another dose during the day if needed.  She denies side effects.    GERD: Current medications include: pantoprazole 40mg - she's back  to taking this daily (she tried tapering off but was unsuccessful with this).   Pt c/o no current symptoms.  Patient feels that current regimen is effective.     Supplements:  She's currently taking calcium 500mg BID and Vitamin D 1000 IU daily. She eats yogurt daily, some milk in her cereal.  She occasionally has cottage cheese.  She tried increasing dietary calcium but was unable to keep up with this so she went back to taking calcium supplementation.    Current labs include:  Today's Vitals: /76  Pulse 81  Wt 221 lb (100.2 kg)  BMI 40.42 kg/m2     BP Readings from Last 3 Encounters:   01/15/18 130/70   10/27/17 135/68   10/23/17 140/80     Lab Results   Component Value Date    A1C 7.8 12/29/2017   .  Lab Results   Component Value Date    CHOL 139 09/27/2017     Lab Results   Component Value Date    TRIG 114 09/27/2017     Lab Results   Component Value Date    HDL 68 09/27/2017     Lab Results   Component Value Date    LDL 48 09/27/2017       Liver Function Studies -   Recent Labs   Lab Test  04/12/17   1716   PROTTOTAL  7.1   ALBUMIN  3.7   BILITOTAL  0.3   ALKPHOS  105   AST  22   ALT  33       Lab Results   Component Value Date    UCRR 292 01/23/2017    MICROL 60 01/23/2017    UMALCR 20.58 01/23/2017       Last Basic Metabolic Panel:  Lab Results   Component Value Date     04/12/2017      Lab Results   Component Value Date    POTASSIUM 4.6 04/12/2017     Lab Results   Component Value Date    CHLORIDE 109 04/12/2017     Lab Results   Component Value Date    BUN 26 04/12/2017     Lab Results   Component Value Date    CR 1.27 04/12/2017     GFR Estimate   Date Value Ref Range Status   04/12/2017 41 (L) >60 mL/min/1.7m2 Final     Comment:     Non  GFR Calc   03/06/2017 43 (L) >60 mL/min/1.7m2 Final     Comment:     Non  GFR Calc   01/23/2017 46 (L) >60 mL/min/1.7m2 Final     Comment:     Non  GFR Calc         Most Recent Immunizations   Administered  Date(s) Administered     Influenza (High Dose) 3 valent vaccine 10/27/2017     Influenza (IIV3) PF 10/12/2012     Pneumo Conj 13-V (2010&after) 05/21/2015     Pneumococcal 23 valent 05/16/2014     TD (ADULT, 7+) 04/15/2011     TDAP Vaccine (Adacel) 01/29/2015       ASSESSMENT:              Current medications were reviewed today as discussed above.      Medication Adherence: good, no issues identified    Diabetes: Needs Improvement. Patient is meeting A1c goal of < 8%. Self monitoring of blood glucose is not at goal of fasting  mg/dL and post prandial < 180 mg/dL.  She will be having A1c re-check immediately after our visit.  She may benefit from increasing insulin doses based on SMBG readings, she declines at this time.    Hypertension/CAD: Stable.  BP at goal <140/90mmHg.  Plan in place for re-checking CMP today.    Hyperlipidemia: Stable. Pt is not on moderate-high intensity statin which is indicated based on 2013 ACC/AHA guidelines for lipid management, but is on maximally tolerated statin therapy.  Plan in place to re-check lipids today.    Pain: Stable.    GERD: Stable.  Current treatment is effective.     Supplements:  Stable.     PLAN:                  1.  Cara will have labs done immediately after our visit today.  2.  I will refill meds on Monday assuming labs are stable.    I spent 40 minutes with this patient today. All changes were made via collaborative practice agreement with Kole Gonsalez. A copy of the visit note was provided to the patient's primary care provider.     Will follow up pending lab results.    The patient was given a summary of these recommendations as an after visit summary.    Lissy Diana, PharmD, [facility]  Medication Therapy Management Provider  Pager: [phone]

## 2018-04-27 NOTE — MR AVS SNAPSHOT
After Visit Summary   4/27/2018    Cara Camarillo    MRN: [id number]           Patient Information     Date Of Birth          1940        Visit Information        Provider Department      4/27/2018 8:00 AM Lissy Diana, [facility] MTM        Care Instructions    Recommendations from today's MTM visit:                                                    MTM (medication therapy management) is a service provided by a clinical pharmacist designed to help you get the most of out of your medicines.   Today we reviewed what your medicines are for, how to know if they are working, that your medicines are safe and how to make your medicine regimen as easy as possible.     1.  We will check your labs today - as long as everything is normal you can follow-up with Dr. Gonsalez at the end of July.    2.  I will refill your medications.    Next MTM visit:  Pending lab results    To schedule another MTM appointment, please call the clinic directly or you may call the MTM scheduling line at [phone] or toll-free at [phone].     My Clinical Pharmacist's contact information:                                                      It was a pleasure seeing you today!  Please feel free to contact me with any questions or concerns you have.     Lissy Diana, PharmD, [facility]  Medication Therapy Management Provider  Pager: [phone]     You may receive a survey about the MTM services you received.  I would appreciate your feedback to help me serve you better in the future. Please fill it out and return it when you can. Your comments will be anonymous.                     Follow-ups after your visit        Your next 10 appointments already scheduled     Apr 27, 2018  8:45 AM CDT   LAB with  LAB   [facility]    [address] [place] [id number] [phone]           Please do not eat 10-12 hours before your appointment if you are  "coming in fasting for labs on lipids, cholesterol, or glucose (sugar). This does not apply to pregnant women. Water, hot tea and black coffee (with nothing added) are okay. Do not drink other fluids, diet soda or chew gum.            May 03, 2018 10:45 AM CDT   Return Visit with Kyle Montana MD   [facility]    [address] [place] [id number] [phone] OPT 2              Who to contact     If you have questions or need follow up information about today's clinic visit or your schedule please contact [facility] directly at [phone].  Normal or non-critical lab and imaging results will be communicated to you by Sc[employer], letter or phone within 4 business days after the clinic has received the results. If you do not hear from us within 7 days, please contact the clinic through Scripped[employer] or phone. If you have a critical or abnormal lab result, we will notify you by phone as soon as possible.  Submit refill requests through [employer] or call your pharmacy and they will forward the refill request to us. Please allow 3 business days for your refill to be completed.          Additional Information About Your Visit        Scrippedhart Information     [employer] lets you send messages to your doctor, view your test results, renew your prescriptions, schedule appointments and more. To sign up, go to [email or web address] . Click on \"Log in\" on the left side of the screen, which will take you to the Welcome page. Then click on \"Sign up Now\" on the right side of the page.     You will be asked to enter the access code listed below, as well as some personal information. Please follow the directions to create your username and password.     Your access code is: [email or web address]  Expires: 2018  8:27 AM     Your access code will  in 90 days. If you need help or a new code, please call your [place] clinic or [phone].   "      Care EveryWhere ID     This is your Care EveryWhere ID. This could be used by other organizations to access your [place] medical records  [id number]        Your Vitals Were     Pulse BMI (Body Mass Index)                81 40.42 kg/m2           Blood Pressure from Last 3 Encounters:   04/27/18 136/76   01/15/18 130/70   10/27/17 135/68    Weight from Last 3 Encounters:   04/27/18 221 lb (100.2 kg)   01/15/18 221 lb (100.2 kg)   10/27/17 223 lb (101.2 kg)              Today, you had the following     No orders found for display       Primary Care Provider Office Phone # Fax #    Kole Gonsalez [phone]       [facility] [address] [facility] [address]  [place] [address]        Equal Access to Services     [facility] ALEXI : Hadii aad ku hadasho Soomaali, waaxda luqadaha, qaybta kaalmada adeegyada, levi santizo . So [facility] [phone].    ATENCIÓN: Si habla español, tiene a serrano disposición servicios gratuitos de asistencia lingüística. Meeta al [phone].    We comply with applicable federal civil rights laws and [place] laws. We do not discriminate on the basis of race, color, national origin, age, disability, sex, sexual orientation, or gender identity.            Thank you!     Thank you for choosing [facility]  for your care. Our goal is always to provide you with excellent care. Hearing back from our patients is one way we can continue to improve our services. Please take a few minutes to complete the written survey that you may receive in the mail after your visit with us. Thank you!             Your Updated Medication List - Protect others around you: Learn how to safely use, store and throw away your medicines at www.disposemymeds.org.          This list is accurate as of 4/27/18  8:27 AM.  Always use your most recent med list.                   Brand Name Dispense Instructions for use Diagnosis    acetaminophen 500 MG tablet     "TYLENOL     Take 500-1,000 mg by mouth 3 times daily as needed for mild pain        amLODIPine 10 MG tablet    NORVASC    90 tablet    Take 1 tablet (10 mg) by mouth daily    Essential hypertension, benign       aspirin 81 MG tablet     100 tablet    Take 1 tablet (81 mg) by mouth daily    Type II or unspecified type diabetes mellitus without mention of complication, not stated as uncontrolled       calcium carbonate 500 tablet    OS-MANJEET 500 mg [place]. 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)       losartan 50 MG tablet    COZAAR    60 tablet    TAKE 2 TABLETS BY MOUTH DAILY    Essential hypertension with goal blood pressure less than 140/90, Type 2 diabetes mellitus without complication, with long-term current use of insulin (H)       metFORMIN 500 MG 24 hr tablet    GLUCOPHAGE-XR    90 tablet    TAKE 2 TABLETS BY MOUTH IN MORNING, AND 1 TABLET IN EVENING. OVERDUE FOR FASTING LAB, MUST SCHEDULE.    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)       metoprolol tartrate 50 MG tablet    LOPRESSOR    270 tablet    TAKE ONE AND ONE-HALF TABLETS BY MOUTH TWICE DAILY    Essential hypertension, benign       nitroGLYcerin 0.4 MG sublingual tablet    NITROSTAT    25 tablet    Place 1 tablet (0.4 mg) under the tongue every 5 minutes as needed for chest pain if you are still having symptoms after 3 doses (15 minutes) call 911.    Postsurgical percutaneous transluminal coronary angioplasty status, Status post coronary angioplasty       ONETOUCH ULTRA test strip   Generic drug:  blood glucose monitoring     300 strip    USE TO TEST THREE TIMES DAILY    Type " 2 diabetes, HbA1c goal < 7% (H)       pantoprazole 40 MG EC tablet    PROTONIX    90 tablet    TAKE 1 TABLET BY MOUTH ONCE DAILY 30 TO 60 MINUTES BEFORE A MEAL    Gastroesophageal reflux disease, esophagitis presence not specified       rosuvastatin 5 MG tablet    CRESTOR     Take 5 mg by mouth once a week        vitamin D 1000 units capsule     30    1 CAPSULE DAILY    Vitamin D deficiency

## 2018-04-27 NOTE — TELEPHONE ENCOUNTER
Reason for Call:  Other call back    Detailed comments: pt says someone called her daughter and left a message informing her that her mother needed to come back in for lab work.  I do not see any information regarding this.  Pt is very confused, please contact her to clarify.    Phone Number Patient can be reached at: Home number on file [phone] (home)    Best Time: any    Can we leave a detailed message on this number? NO-no answering maching    Call taken on 4/27/2018 at 4:32 PM by Ciara Nunez

## 2018-04-30 ENCOUNTER — DOCUMENTATION ONLY (OUTPATIENT)
Dept: LAB | Facility: CLINIC | Age: 78
End: 2018-04-30

## 2018-04-30 RX ORDER — METFORMIN HCL 500 MG
TABLET, EXTENDED RELEASE 24 HR ORAL
Qty: 270 TABLET | Refills: 3 | Status: SHIPPED | OUTPATIENT
Start: 2018-04-30 | End: 2019-05-07

## 2018-04-30 RX ORDER — LOSARTAN POTASSIUM 50 MG/1
TABLET ORAL
Qty: 180 TABLET | Refills: 3 | Status: SHIPPED | OUTPATIENT
Start: 2018-04-30 | End: 2019-05-22

## 2018-04-30 NOTE — TELEPHONE ENCOUNTER
It looks like she didn't leave a urine sample, but I'm ok with her getting that done at her next OV.    Lissy Diana, PharmD, [facility]  Medication Therapy Management Provider  Pager: [phone]

## 2018-04-30 NOTE — PROGRESS NOTES
Patient did not leave a urine sample at their latest visit. Unsure if you wan this reordered or not.

## 2018-05-03 ENCOUNTER — OFFICE VISIT (OUTPATIENT)
Dept: CARDIOLOGY | Facility: CLINIC | Age: 78
End: 2018-05-03
Payer: COMMERCIAL

## 2018-05-03 VITALS
WEIGHT: 223 LBS | HEART RATE: 80 BPM | DIASTOLIC BLOOD PRESSURE: 72 MMHG | SYSTOLIC BLOOD PRESSURE: 132 MMHG | BODY MASS INDEX: 41.04 KG/M2 | HEIGHT: 62 IN

## 2018-05-03 DIAGNOSIS — I45.10 RBBB: ICD-10-CM

## 2018-05-03 DIAGNOSIS — R60.0 BILATERAL EDEMA OF LOWER EXTREMITY: ICD-10-CM

## 2018-05-03 DIAGNOSIS — I35.0 AORTIC STENOSIS, MILD: ICD-10-CM

## 2018-05-03 DIAGNOSIS — E78.5 HYPERLIPIDEMIA LDL GOAL <70: ICD-10-CM

## 2018-05-03 DIAGNOSIS — I25.10 CORONARY ARTERY DISEASE INVOLVING NATIVE CORONARY ARTERY OF NATIVE HEART WITHOUT ANGINA PECTORIS: Primary | ICD-10-CM

## 2018-05-03 DIAGNOSIS — I51.9 LEFT VENTRICULAR DIASTOLIC DYSFUNCTION: ICD-10-CM

## 2018-05-03 PROCEDURE — 99214 OFFICE O/P EST MOD 30 MIN: CPT | Performed by: INTERNAL MEDICINE

## 2018-05-03 RX ORDER — ROSUVASTATIN CALCIUM 5 MG/1
5 TABLET, COATED ORAL DAILY
Qty: 90 TABLET | Refills: 3 | Status: SHIPPED | OUTPATIENT
Start: 2018-05-03 | End: 2018-07-30 | Stop reason: ALTCHOICE

## 2018-05-03 NOTE — MR AVS SNAPSHOT
After Visit Summary   5/3/2018    Cara Camarillo    MRN: [id number]           Patient Information     Date Of Birth          1940        Visit Information        Provider Department      5/3/2018 10:45 AM Kyle Montana MD [facility]        Today's Diagnoses     Coronary artery disease involving native coronary artery of native heart without angina pectoris    -  1    Left ventricular diastolic dysfunction        RBBB        Hyperlipidemia LDL goal <70        Aortic stenosis, mild        Bilateral edema of lower extremity           Follow-ups after your visit        Additional Services     Follow-Up with Cardiologist                 Future tests that were ordered for you today     Open Future Orders        Priority Expected Expires Ordered    Follow-Up with Cardiologist Routine 5/3/2019 5/4/2019 5/3/2018    Lipid Profile Routine 8/1/2018 5/3/2019 5/3/2018    ALT Routine 8/1/2018 5/3/2019 5/3/2018            Who to contact     If you have questions or need follow up information about today's clinic visit or your schedule please contact [facility] directly at [phone].  Normal or non-critical lab and imaging results will be communicated to you by Route[employer], letter or phone within 4 business days after the clinic has received the results. If you do not hear from us within 7 days, please contact the clinic through [employer] or phone. If you have a critical or abnormal lab result, we will notify you by phone as soon as possible.  Submit refill requests through [employer] or call your pharmacy and they will forward the refill request to us. Please allow 3 business days for your refill to be completed.          Additional Information About Your Visit        MyChart Information     [employer] lets you send messages to your doctor, view your test results, renew your prescriptions, schedule appointments and more. To sign up, go  "to [email or web address] . Click on \"Log in\" on the left side of the screen, which will take you to the Welcome page. Then click on \"Sign up Now\" on the right side of the page.     You will be asked to enter the access code listed below, as well as some personal information. Please follow the directions to create your username and password.     Your access code is: [email or web address]  Expires: 2018  8:27 AM     Your access code will  in 90 days. If you need help or a new code, please call your [place] clinic or [phone].        Care EveryWhere ID     This is your Care EveryWhere ID. This could be used by other organizations to access your [place] medical records  [id number]        Your Vitals Were     Pulse Height BMI (Body Mass Index)             80 1.575 m (5' 2\") 40.79 kg/m2          Blood Pressure from Last 3 Encounters:   18 132/72   18 136/76   01/15/18 130/70    Weight from Last 3 Encounters:   18 101.2 kg (223 lb)   18 100.2 kg (221 lb)   01/15/18 100.2 kg (221 lb)                 Today's Medication Changes          These changes are accurate as of 5/3/18 11:28 AM.  If you have any questions, ask your nurse or doctor.               These medicines have changed or have updated prescriptions.        Dose/Directions    rosuvastatin 5 MG tablet   Commonly known as:  CRESTOR   This may have changed:  when to take this   Used for:  Hyperlipidemia LDL goal <70   Changed by:  Kyle Montana MD        Dose:  5 mg   Take 1 tablet (5 mg) by mouth daily   Quantity:  90 tablet   Refills:  3            Where to get your medicines      These medications were sent to [facility] [address] [facility] [place] - [address]  [address] [place] [address]     Phone:  [phone]     rosuvastatin 5 MG tablet                Primary Care Provider Office Phone # Fax #    Kole Gonsalez [phone] [address]  [place] [address]        Equal Access to " "Services     [facility]: Hadii aad ku hadivonlaura Laurievicki, waisatuda luqadaha, qaybta kaalmasabine holly, levi santizo . So [facility] [phone].    ATENCIÓN: Si luzma pichardo, tiene a serrano disposición servicios gratuitos de asistencia lingüística. Llame al [phone].    We comply with applicable federal civil rights laws and [place] laws. We do not discriminate on the basis of race, color, national origin, age, disability, sex, sexual orientation, or gender identity.            Thank you!     Thank you for choosing [facility]  for your care. Our goal is always to provide you with excellent care. Hearing back from our patients is one way we can continue to improve our services. Please take a few minutes to complete the written survey that you may receive in the mail after your visit with us. Thank you!             Your Updated Medication List - Protect others around you: Learn how to safely use, store and throw away your medicines at www.disposemymeds.org.          This list is accurate as of 5/3/18 11:28 AM.  Always use your most recent med list.                   Brand Name Dispense Instructions for use Diagnosis    acetaminophen 500 MG tablet    TYLENOL     Take 500-1,000 mg by mouth 3 times daily as needed for mild pain        amLODIPine 10 MG tablet    NORVASC    90 tablet    Take 1 tablet (10 mg) by mouth daily    Essential hypertension, benign       aspirin 81 MG tablet     100 tablet    Take 1 tablet (81 mg) by mouth daily    Type II or unspecified type diabetes mellitus without mention of complication, not stated as uncontrolled       calcium carbonate 500 tablet    OS-MANJEET 500 mg [place]. Ca     Take 500 mg by mouth 2 times daily        insulin  UNIT/ML injection    NovoLIN N VIAL    10 mL    10 units before breakfast, 10 units before dinner    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)       insulin syringe-needle U-100 30G X 1/2\" " 0.5 ML    BD insulin syringe ULTRAFINE    100 each    Use one syringe twice daily or as directed.    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)       losartan 50 MG tablet    COZAAR    180 tablet    TAKE 2 TABLETS BY MOUTH DAILY    Essential hypertension with goal blood pressure less than 140/90, Type 2 diabetes mellitus without complication, with long-term current use of insulin (H)       metFORMIN 500 MG 24 hr tablet    GLUCOPHAGE-XR    270 tablet    TAKE 2 TABLETS BY MOUTH IN MORNING, AND 1 TABLET IN EVENING.    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)       metoprolol tartrate 50 MG tablet    LOPRESSOR    270 tablet    TAKE ONE AND ONE-HALF TABLETS BY MOUTH TWICE DAILY    Essential hypertension, benign       nitroGLYcerin 0.4 MG sublingual tablet    NITROSTAT    25 tablet    Place 1 tablet (0.4 mg) under the tongue every 5 minutes as needed for chest pain if you are still having symptoms after 3 doses (15 minutes) call 911.    Postsurgical percutaneous transluminal coronary angioplasty status, Status post coronary angioplasty       ONETOUCH ULTRA test strip   Generic drug:  blood glucose monitoring     300 strip    USE TO TEST THREE TIMES DAILY    Type 2 diabetes, HbA1c goal < 7% (H)       pantoprazole 40 MG EC tablet    PROTONIX    90 tablet    TAKE 1 TABLET BY MOUTH ONCE DAILY 30 TO 60 MINUTES BEFORE A MEAL    Gastroesophageal reflux disease, esophagitis presence not specified       rosuvastatin 5 MG tablet    CRESTOR    90 tablet    Take 1 tablet (5 mg) by mouth daily    Hyperlipidemia LDL goal <70       vitamin D 1000 units capsule     30    1 CAPSULE DAILY    Vitamin D deficiency

## 2018-05-03 NOTE — PROGRESS NOTES
Service Date: 2018      [place] [address]       RE: Cara Camarillo   MRN: [id number]   : 1940      Dear Dr. Gonsalez:       I again had the pleasure of seeing your patient Cara Camarillo at the [facility] for evaluation of coronary artery disease.  The patient is a delightful, 78-year-old female with a history of coronary artery disease.  She is status post intracoronary stenting of the posterior descending artery in 2016.  In a staged procedure the following month, her mid LAD was stented.  A followup nuclear stress test in 2017 was normal.  The patient denies any recurrent angina or significant shortness of breath.  She has a history of anxiety and depression.  She does very little exercise.  She complains of right arm pain despite being only on rosuvastatin 5 mg once a week.  She was going to call my office after a 3 week period last year regarding her aches and pains off rosuvastatin, and then we were going to increase it to every day if she was not having any significant reactions to the medication.  We never increased the dose back to every day, and she remains on it once a week.  She has some peripheral edema from a sedentary lifestyle.  She eats generally a small breakfast, no lunch and a larger supper.  Her weight is fairly stable.  She goes to [employer] and does some walking with a grocery cart.      PHYSICAL EXAMINATION:  As listed.  Her blood pressure is under better control than last year.      ASSESSMENT:   1.  Cara Camarillo is a pleasant, 78-year-old female with known coronary artery disease.  She is status post intracoronary stenting of the PDA and LAD.  We will continue to monitor and repeat a nuclear stress test in 4 more years.   2.  Hypertension, currently under better control.  We discussed a low-salt diet.   3.  Hyperlipidemia.  We will increase her rosuvastatin to 5 mg daily and  recheck a fasting lipid profile and ALT in 3 months.   4.  This patient has known insulin-dependent diabetes mellitus.  We discussed a low-carbohydrate weight-reduction diet.  The patient is adamant that her behaviors are probably not going to change.   5.  The patient has a history of mild aortic stenosis.  We will continue to monitor over time.      It is my pleasure to assist in the care of Cara Lagos.  All her questions were answered to her satisfaction.      Sincerely,      Josey Montana MD, FACC         JOSEY MONTANA MD, FACC             D: 2018   T: 2018   MT: mj      Name:     CARA LAGOS   MRN:      [id number]        Account:      [id number]   :      1940           Service Date: 2018      Document: [id number]

## 2018-05-03 NOTE — PROGRESS NOTES
HPI and Plan:   See dictation:[id number]    Orders Placed This Encounter   Procedures     Lipid Profile     ALT     Follow-Up with Cardiologist       Orders Placed This Encounter   Medications     rosuvastatin (CRESTOR) 5 MG tablet     Sig: Take 1 tablet (5 mg) by mouth daily     Dispense:  90 tablet     Refill:  3       Medications Discontinued During This Encounter   Medication Reason     rosuvastatin (CRESTOR) 5 MG tablet Reorder         Encounter Diagnoses   Name Primary?     Coronary artery disease involving native coronary artery of native heart without angina pectoris Yes     Left ventricular diastolic dysfunction      RBBB      Hyperlipidemia LDL goal <70      Aortic stenosis, mild      Bilateral edema of lower extremity        CURRENT MEDICATIONS:  Current Outpatient Prescriptions   Medication Sig Dispense Refill     acetaminophen (TYLENOL) 500 MG tablet Take 500-1,000 mg by mouth 3 times daily as needed for mild pain       amLODIPine (NORVASC) 10 MG tablet Take 1 tablet (10 mg) by mouth daily 90 tablet 3     aspirin 81 MG tablet Take 1 tablet (81 mg) by mouth daily 100 tablet 3     calcium carbonate (OS-MANJEET 500 MG [place]. [place]) 500 MG tablet Take 500 mg by mouth 2 times daily       insulin NPH (NOVOLIN N VIAL) 100 UNIT/ML injection 10 units before breakfast, 10 units before dinner 10 mL 1     losartan (COZAAR) 50 MG tablet TAKE 2 TABLETS BY MOUTH DAILY 180 tablet 3     metFORMIN (GLUCOPHAGE-XR) 500 MG 24 hr tablet TAKE 2 TABLETS BY MOUTH IN MORNING, AND 1 TABLET IN EVENING. 270 tablet 3     metoprolol (LOPRESSOR) 50 MG tablet TAKE ONE AND ONE-HALF TABLETS BY MOUTH TWICE DAILY 270 tablet 2     nitroglycerin (NITROSTAT) 0.4 MG SL tablet Place 1 tablet (0.4 mg) under the tongue every 5 minutes as needed for chest pain if you are still having symptoms after 3 doses (15 minutes) call 911. 25 tablet 1     pantoprazole (PROTONIX) 40 MG EC tablet TAKE 1 TABLET BY MOUTH ONCE DAILY 30 TO 60 MINUTES BEFORE A MEAL 90 tablet 2  "    rosuvastatin (CRESTOR) 5 MG tablet Take 1 tablet (5 mg) by mouth daily 90 tablet 3     VITAMIN D 1000 UNIT OR CAPS 1 CAPSULE DAILY 30 0     insulin syringe-needle U-100 (BD INSULIN SYRINGE ULTRAFINE) 30G X 1/2\" 0.5 ML Use one syringe twice daily or as directed. 100 each 11     ONE TOUCH ULTRA test strip USE TO TEST THREE TIMES DAILY 300 strip 1     [DISCONTINUED] rosuvastatin (CRESTOR) 5 MG tablet Take 5 mg by mouth once a week         ALLERGIES     Allergies   Allergen Reactions     Actos [Pioglitazone]      Lower extremity edema       Enalapril      Renal failure     Hydrochlorothiazide      Dry mouth     Lisinopril      Hyperkalemia         PAST MEDICAL HISTORY:  Past Medical History:   Diagnosis Date     Adenoma     tubal     Anxiety      Anxiety      Aortic stenosis      Arthritis     ankles     Chronic kidney disease (CKD), stage III (moderate)      Coronary artery disease     cardiac cath Feb 2016: ISIDRO to LAD, cath Jan 2016: ISIDRO to PDA     Decubitus ulcer of coccyx      Dysthymic disorder      Essential hypertension, benign 03/01/2205     Herpes zoster without mention of complication Aug 2006    left leg (thigh)     Hydronephrosis      Hyperlipidaemia LDL goal < 100      Left ventricular diastolic dysfunction      Malignant neoplasm of corpus uteri, 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    endometrial CA, s/p external beam radiation & radiation implant & FIDEL/BSO       Obesity      Pulmonary hyperinflation      Sciatica      Type II or unspecified type diabetes mellitus without mention of complication, not stated as uncontrolled 03/01/2005     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       PAST SURGICAL HISTORY:  Past Surgical History:   Procedure Laterality Date     ARTHROPLASTY KNEE  8/7/2013    Procedure: ARTHROPLASTY KNEE;  RIGHT TOTAL KNEE ARTHROPLASTY;  Surgeon: Romaine Constantino MD;  Location:  OR     ARTHROPLASTY KNEE  2/3/2014    Procedure: ARTHROPLASTY KNEE;  LEFT TOTAL KNEE " ARTHROPLASTY (BIOMET)^;  Surgeon: Roamine Constantino MD;  Location:  OR     C ANESTH, SECTION       C NONSPECIFIC PROCEDURE  3/20/06    CT scan of chest/abd/pelvis- negative for recurrence of CA     EXCISE MASS LOWER EXTREMITY Left 2015    Procedure: EXCISE MASS LOWER EXTREMITY;  Surgeon: Sloan Richardson MD;  Location:  SD     HC UGI ENDOSCOPY W EUS  2013    Procedure: COMBINED ENDOSCOPIC ULTRASOUND, ESOPHAGOSCOPY, GASTROSCOPY, DUODENOSCOPY (EGD);  Surgeon: Lyric Simons MD;  Location:  GI     HYSTERECTOMY      Vaginal     HYSTERECTOMY, VAGINAL  05    Uterine CA     LAPAROSCOPIC CHOLECYSTECTOMY  1/10/2013    Procedure: LAPAROSCOPIC CHOLECYSTECTOMY;  LAPAROSCOPIC CHOLECYSTECTOMY ;  Surgeon: Eduardo Taylor MD;  Location:  OR     NEPHRECTOMY BILATERAL       NEPHRECTOMY RT/LT  OCT 2005     OTHER SURGICAL HISTORY      angiogram 2016: ISIDRO to PDA     OTHER SURGICAL HISTORY      angiogram 2016: ISIDRO to LAD       FAMILY HISTORY:  Family History   Problem Relation Age of Onset     DIABETES Father      Adult onset     Other - See Comments Mother 95     Old age       SOCIAL HISTORY:  Social History     Social History     Marital status:      Spouse name: N/A     Number of children: N/A     Years of education: N/A     Social History Main Topics     Smoking status: Never Smoker     Smokeless tobacco: Never Used     Alcohol use No     Drug use: No     Sexual activity: No     Other Topics Concern     Parent/Sibling W/ Cabg, Mi Or Angioplasty Before 65f 55m? No     Caffeine Concern No     1 cup daily     Sleep Concern Yes     worrying a lot right now about health     Stress Concern Yes     was told procedure not covered under medicare, losing sleep, wouldn't have gone to hospital if she knew that. WORRIES about it continually     Weight Concern No     Special Diet No     Exercise No     Social History Narrative       Review of Systems:  Skin:  Negative       Eyes:  " Positive for glasses;cataracts    ENT:  Negative      Respiratory:  Negative       Cardiovascular:    edema;Positive for (right arm discomfort x 2 months off and on)    Gastroenterology: Negative      Genitourinary:  not assessed      Musculoskeletal:  Positive for joint pain;arthritis;nocturnal cramping    Neurologic:  Positive for headaches    Psychiatric:  Negative      Heme/Lymph/Imm:  Positive for allergies    Endocrine:  Positive for diabetes      Physical Exam:  Vitals: /72  Pulse 80  Ht 1.575 m (5' 2\")  Wt 101.2 kg (223 lb)  BMI 40.79 kg/m2    Constitutional:  cooperative;in no acute distress obese      Skin:  warm and dry to the touch, no apparent skin lesions or masses noted          Head:  normocephalic, no masses or lesions        Eyes:  pupils equal and round, conjunctivae and lids unremarkable, sclera white, no xanthalasma, EOMS intact, no nystagmus        Lymph:      ENT:  no pallor or cyanosis, dentition good        Neck:  carotid pulses are full and equal bilaterally, JVP normal, no carotid bruit        Respiratory:  normal breath sounds, clear to auscultation, normal A-P diameter, normal symmetry, normal respiratory excursion, no use of accessory muscles         Cardiac: regular rhythm;normal S1 and S2   S4   systolic ejection murmur;grade 2;LLSB;radiation to the RUSB        pulses full and equal, no bruits auscultated                                   left radial site clean mild ecchymosis; no hum or bruit    GI:  abdomen soft, non-tender, BS normoactive, no mass, no HSM, no bruits        Extremities and Muscular Skeletal:  no edema;no deformities, clubbing, cyanosis, erythema observed              Neurological:  no gross motor deficits;affect appropriate        Psych:  Alert and Oriented x 3        CC  Kole Gonsalez MD  [address]  [place] [address]              "

## 2018-05-03 NOTE — LETTER
5/3/2018    Kole Gonsalez MD  [address]  [place] [address]    RE: Cara Camarillo       Dear Colleague,    I had the pleasure of seeing Cara Camarillo in the [facility].    HPI and Plan:   See dictation:[id number]    Orders Placed This Encounter   Procedures     Lipid Profile     ALT     Follow-Up with Cardiologist       Orders Placed This Encounter   Medications     rosuvastatin (CRESTOR) 5 MG tablet     Sig: Take 1 tablet (5 mg) by mouth daily     Dispense:  90 tablet     Refill:  3       Medications Discontinued During This Encounter   Medication Reason     rosuvastatin (CRESTOR) 5 MG tablet Reorder         Encounter Diagnoses   Name Primary?     Coronary artery disease involving native coronary artery of native heart without angina pectoris Yes     Left ventricular diastolic dysfunction      RBBB      Hyperlipidemia LDL goal <70      Aortic stenosis, mild      Bilateral edema of lower extremity        CURRENT MEDICATIONS:  Current Outpatient Prescriptions   Medication Sig Dispense Refill     acetaminophen (TYLENOL) 500 MG tablet Take 500-1,000 mg by mouth 3 times daily as needed for mild pain       amLODIPine (NORVASC) 10 MG tablet Take 1 tablet (10 mg) by mouth daily 90 tablet 3     aspirin 81 MG tablet Take 1 tablet (81 mg) by mouth daily 100 tablet 3     calcium carbonate (OS-MANJEET 500 MG [place]. [place]) 500 MG tablet Take 500 mg by mouth 2 times daily       insulin NPH (NOVOLIN N VIAL) 100 UNIT/ML injection 10 units before breakfast, 10 units before dinner 10 mL 1     losartan (COZAAR) 50 MG tablet TAKE 2 TABLETS BY MOUTH DAILY 180 tablet 3     metFORMIN (GLUCOPHAGE-XR) 500 MG 24 hr tablet TAKE 2 TABLETS BY MOUTH IN MORNING, AND 1 TABLET IN EVENING. 270 tablet 3     metoprolol (LOPRESSOR) 50 MG tablet TAKE ONE AND ONE-HALF TABLETS BY MOUTH TWICE DAILY 270 tablet 2     nitroglycerin (NITROSTAT) 0.4 MG SL tablet Place 1 tablet (0.4 mg) under the tongue every 5 minutes as  "needed for chest pain if you are still having symptoms after 3 doses (15 minutes) call 911. 25 tablet 1     pantoprazole (PROTONIX) 40 MG EC tablet TAKE 1 TABLET BY MOUTH ONCE DAILY 30 TO 60 MINUTES BEFORE A MEAL 90 tablet 2     rosuvastatin (CRESTOR) 5 MG tablet Take 1 tablet (5 mg) by mouth daily 90 tablet 3     VITAMIN D 1000 UNIT OR CAPS 1 CAPSULE DAILY 30 0     insulin syringe-needle U-100 (BD INSULIN SYRINGE ULTRAFINE) 30G X 1/2\" 0.5 ML Use one syringe twice daily or as directed. 100 each 11     ONE TOUCH ULTRA test strip USE TO TEST THREE TIMES DAILY 300 strip 1     [DISCONTINUED] rosuvastatin (CRESTOR) 5 MG tablet Take 5 mg by mouth once a week         ALLERGIES     Allergies   Allergen Reactions     Actos [Pioglitazone]      Lower extremity edema       Enalapril      Renal failure     Hydrochlorothiazide      Dry mouth     Lisinopril      Hyperkalemia         PAST MEDICAL HISTORY:  Past Medical History:   Diagnosis Date     Adenoma     tubal     Anxiety      Anxiety      Aortic stenosis      Arthritis     ankles     Chronic kidney disease (CKD), stage III (moderate)      Coronary artery disease     cardiac cath Feb 2016: ISIDRO to LAD, cath Jan 2016: ISIDRO to PDA     Decubitus ulcer of coccyx      Dysthymic disorder      Essential hypertension, benign 03/01/2205     Herpes zoster without mention of complication Aug 2006    left leg (thigh)     Hydronephrosis      Hyperlipidaemia LDL goal < 100      Left ventricular diastolic dysfunction      Malignant neoplasm of corpus uteri, 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    endometrial CA, s/p external beam radiation & radiation implant & FIDEL/BSO       Obesity      Pulmonary hyperinflation      Sciatica      Type II or unspecified type diabetes mellitus without mention of complication, not stated as uncontrolled 03/01/2005     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       PAST SURGICAL HISTORY:  Past Surgical History:   Procedure Laterality Date     " ARTHROPLASTY KNEE  2013    Procedure: ARTHROPLASTY KNEE;  RIGHT TOTAL KNEE ARTHROPLASTY;  Surgeon: Romaine Constantino MD;  Location:  OR     ARTHROPLASTY KNEE  2/3/2014    Procedure: ARTHROPLASTY KNEE;  LEFT TOTAL KNEE ARTHROPLASTY (BIOMET)^;  Surgeon: Romaine Constantino MD;  Location:  OR     C ANESTH, SECTION       C NONSPECIFIC PROCEDURE  3/20/06    CT scan of chest/abd/pelvis- negative for recurrence of CA     EXCISE MASS LOWER EXTREMITY Left 2015    Procedure: EXCISE MASS LOWER EXTREMITY;  Surgeon: Sloan Richardson MD;  Location:  SD     HC UGI ENDOSCOPY W EUS  2013    Procedure: COMBINED ENDOSCOPIC ULTRASOUND, ESOPHAGOSCOPY, GASTROSCOPY, DUODENOSCOPY (EGD);  Surgeon: Lyric Simons MD;  Location:  GI     HYSTERECTOMY      Vaginal     HYSTERECTOMY, VAGINAL  05    Uterine CA     LAPAROSCOPIC CHOLECYSTECTOMY  1/10/2013    Procedure: LAPAROSCOPIC CHOLECYSTECTOMY;  LAPAROSCOPIC CHOLECYSTECTOMY ;  Surgeon: Eduardo Taylor MD;  Location:  OR     NEPHRECTOMY BILATERAL       NEPHRECTOMY RT/LT  OCT 2005     OTHER SURGICAL HISTORY      angiogram 2016: ISIDRO to PDA     OTHER SURGICAL HISTORY      angiogram 2016: ISIDRO to LAD       FAMILY HISTORY:  Family History   Problem Relation Age of Onset     DIABETES Father      Adult onset     Other - See Comments Mother 95     Old age       SOCIAL HISTORY:  Social History     Social History     Marital status:      Spouse name: N/A     Number of children: N/A     Years of education: N/A     Social History Main Topics     Smoking status: Never Smoker     Smokeless tobacco: Never Used     Alcohol use No     Drug use: No     Sexual activity: No     Other Topics Concern     Parent/Sibling W/ Cabg, Mi Or Angioplasty Before 65f 55m? No     Caffeine Concern No     1 cup daily     Sleep Concern Yes     worrying a lot right now about health     Stress Concern Yes     was told procedure not covered under medicare, losing  "sleep, wouldn't have gone to hospital if she knew that. WORRIES about it continually     Weight Concern No     Special Diet No     Exercise No     Social History Narrative       Review of Systems:  Skin:  Negative       Eyes:  Positive for glasses;cataracts    ENT:  Negative      Respiratory:  Negative       Cardiovascular:    edema;Positive for (right arm discomfort x 2 months off and on)    Gastroenterology: Negative      Genitourinary:  not assessed      Musculoskeletal:  Positive for joint pain;arthritis;nocturnal cramping    Neurologic:  Positive for headaches    Psychiatric:  Negative      Heme/Lymph/Imm:  Positive for allergies    Endocrine:  Positive for diabetes      Physical Exam:  Vitals: /72  Pulse 80  Ht 1.575 m (5' 2\")  Wt 101.2 kg (223 lb)  BMI 40.79 kg/m2    Constitutional:  cooperative;in no acute distress obese      Skin:  warm and dry to the touch, no apparent skin lesions or masses noted          Head:  normocephalic, no masses or lesions        Eyes:  pupils equal and round, conjunctivae and lids unremarkable, sclera white, no xanthalasma, EOMS intact, no nystagmus        Lymph:      ENT:  no pallor or cyanosis, dentition good        Neck:  carotid pulses are full and equal bilaterally, JVP normal, no carotid bruit        Respiratory:  normal breath sounds, clear to auscultation, normal A-P diameter, normal symmetry, normal respiratory excursion, no use of accessory muscles         Cardiac: regular rhythm;normal S1 and S2   S4   systolic ejection murmur;grade 2;LLSB;radiation to the RUSB        pulses full and equal, no bruits auscultated                                   left radial site clean mild ecchymosis; no hum or bruit    GI:  abdomen soft, non-tender, BS normoactive, no mass, no HSM, no bruits        Extremities and Muscular Skeletal:  no edema;no deformities, clubbing, cyanosis, erythema observed              Neurological:  no gross motor deficits;affect appropriate    "     Psych:  Alert and Oriented x 3        CC  Kole Gonsalez MD  [address]  [place] [address]                Thank you for allowing me to participate in the care of your patient.      Sincerely,     Kyle Montana MD     [facility]    cc:   Kole Gonsalez MD  [address]  [place] [address]

## 2018-05-03 NOTE — LETTER
Service Date: 2018      [place] [address]       RE: Cara Camarillo   MRN: [id number]   : 1940      Dear Dr. Gonsalez:       I again had the pleasure of seeing your patient Cara Camarillo at the [facility] for evaluation of coronary artery disease.  The patient is a delightful, 78-year-old female with a history of coronary artery disease.  She is status post intracoronary stenting of the posterior descending artery in 2016.  In a staged procedure the following month, her mid LAD was stented.  A followup nuclear stress test in 2017 was normal.  The patient denies any recurrent angina or significant shortness of breath.  She has a history of anxiety and depression.  She does very little exercise.  She complains of right arm pain despite being only on rosuvastatin 5 mg once a week.  She was going to call my office after a 3 week period last year regarding her aches and pains off rosuvastatin, and then we were going to increase it to every day if she was not having any significant reactions to the medication.  We never increased the dose back to every day, and she remains on it once a week.  She has some peripheral edema from a sedentary lifestyle.  She eats generally a small breakfast, no lunch and a larger supper.  Her weight is fairly stable.  She goes to [employer] and does some walking with a grocery cart.      PHYSICAL EXAMINATION:  As listed.  Her blood pressure is under better control than last year.      ASSESSMENT:   1.  Cara Camarillo is a pleasant, 78-year-old female with known coronary artery disease.  She is status post intracoronary stenting of the PDA and LAD.  We will continue to monitor and repeat a nuclear stress test in 4 more years.   2.  Hypertension, currently under better control.  We discussed a low-salt diet.   3.  Hyperlipidemia.  We will increase her rosuvastatin to 5 mg daily and  recheck a fasting lipid profile and ALT in 3 months.   4.  This patient has known insulin-dependent diabetes mellitus.  We discussed a low-carbohydrate weight-reduction diet.  The patient is adamant that her behaviors are probably not going to change.   5.  The patient has a history of mild aortic stenosis.  We will continue to monitor over time.      It is my pleasure to assist in the care of Cara Lagos.  All her questions were answered to her satisfaction.      Sincerely,      Josey Montana MD, FACC         JOSEY MONTANA MD, FACC             D: 2018   T: 2018   MT: mj      Name:     CARA LAGOS   MRN:      [id number]        Account:      [id number]   :      1940           Service Date: 2018      Document: [id number]           Outpatient Encounter Prescriptions as of 5/3/2018   Medication Sig Dispense Refill     acetaminophen (TYLENOL) 500 MG tablet Take 500-1,000 mg by mouth 3 times daily as needed for mild pain       amLODIPine (NORVASC) 10 MG tablet Take 1 tablet (10 mg) by mouth daily 90 tablet 3     aspirin 81 MG tablet Take 1 tablet (81 mg) by mouth daily 100 tablet 3     calcium carbonate (OS-MANJEET 500 MG [place]. [place]) 500 MG tablet Take 500 mg by mouth 2 times daily       insulin NPH (NOVOLIN N VIAL) 100 UNIT/ML injection 10 units before breakfast, 10 units before dinner 10 mL 1     losartan (COZAAR) 50 MG tablet TAKE 2 TABLETS BY MOUTH DAILY 180 tablet 3     metFORMIN (GLUCOPHAGE-XR) 500 MG 24 hr tablet TAKE 2 TABLETS BY MOUTH IN MORNING, AND 1 TABLET IN EVENING. 270 tablet 3     metoprolol (LOPRESSOR) 50 MG tablet TAKE ONE AND ONE-HALF TABLETS BY MOUTH TWICE DAILY 270 tablet 2     nitroglycerin (NITROSTAT) 0.4 MG SL tablet Place 1 tablet (0.4 mg) under the tongue every 5 minutes as needed for chest pain if you are still having symptoms after 3 doses (15 minutes) call 911. 25 tablet 1     pantoprazole (PROTONIX) 40 MG EC tablet TAKE 1 TABLET BY MOUTH ONCE DAILY 30  "TO 60 MINUTES BEFORE A MEAL 90 tablet 2     rosuvastatin (CRESTOR) 5 MG tablet Take 1 tablet (5 mg) by mouth daily 90 tablet 3     VITAMIN D 1000 UNIT OR CAPS 1 CAPSULE DAILY 30 0     insulin syringe-needle U-100 (BD INSULIN SYRINGE ULTRAFINE) 30G X 1/2\" 0.5 ML Use one syringe twice daily or as directed. 100 each 11     ONE TOUCH ULTRA test strip USE TO TEST THREE TIMES DAILY 300 strip 1     [DISCONTINUED] rosuvastatin (CRESTOR) 5 MG tablet Take 5 mg by mouth once a week       No facility-administered encounter medications on file as of 5/3/2018.        Again, thank you for allowing me to participate in the care of your patient.      Sincerely,    Kyle Montana MD     [facility] [place]    "

## 2018-07-09 DIAGNOSIS — K21.9 GASTROESOPHAGEAL REFLUX DISEASE, ESOPHAGITIS PRESENCE NOT SPECIFIED: ICD-10-CM

## 2018-07-09 NOTE — TELEPHONE ENCOUNTER
"Last Written Prescription Date:  7/26/17  Last Fill Quantity: 90,  # refills: 2   Last office visit: 10/27/2017 with prescribing provider:     Future Office Visit:    Requested Prescriptions   Pending Prescriptions Disp Refills     pantoprazole (PROTONIX) 40 MG EC tablet [Pharmacy Med Name: PANTOPRAZOLE SOD DR 40 MG TAB] 90 tablet 2     Sig: TAKE 1 TABLET BY MOUTH ONCE DAILY 30 TO 60 MINUTES BEFORE A MEAL    PPI Protocol Passed    7/9/2018  1:23 AM       Passed - Not on Clopidogrel (unless Pantoprazole ordered)       Passed - No diagnosis of osteoporosis on record       Passed - Recent (12 mo) or future (30 days) visit within the authorizing provider's specialty    Patient had office visit in the last 12 months or has a visit in the next 30 days with authorizing provider or within the authorizing provider's specialty.  See \"Patient Info\" tab in inbasket, or \"Choose Columns\" in Meds & Orders section of the refill encounter.           Passed - Patient is age 18 or older       Passed - No active pregnacy on record       Passed - No positive pregnancy test in past 12 months          "

## 2018-07-10 RX ORDER — PANTOPRAZOLE SODIUM 40 MG/1
TABLET, DELAYED RELEASE ORAL
Qty: 90 TABLET | Refills: 0 | Status: SHIPPED | OUTPATIENT
Start: 2018-07-10 | End: 2019-01-21

## 2018-07-27 ENCOUNTER — TELEPHONE (OUTPATIENT)
Dept: CARDIOLOGY | Facility: CLINIC | Age: 78
End: 2018-07-27

## 2018-07-27 NOTE — TELEPHONE ENCOUNTER
HUGO from patient, who states that she has a lab appointment on 8/1 in the morning and has to be fasting. Patient is wondering what to do about her metformin and eating breakfast. Spoke with patient. Instructed patient to wait until after her lab appointment at 8am to eat breakfast and take her diabetic medications. Patient wondering about her other medications. Reviewed with patient that if there are certain medications that she takes that need to be taken with food in her stomach, to wait until after the lab draw when she can eat breakfast to take those medications. Patient verbalized understanding and agreed with plan of care.

## 2018-07-30 ENCOUNTER — TELEPHONE (OUTPATIENT)
Dept: PHARMACY | Facility: CLINIC | Age: 78
End: 2018-07-30

## 2018-07-30 ENCOUNTER — ALLIED HEALTH/NURSE VISIT (OUTPATIENT)
Dept: PHARMACY | Facility: CLINIC | Age: 78
End: 2018-07-30
Payer: COMMERCIAL

## 2018-07-30 DIAGNOSIS — E78.5 HYPERLIPIDEMIA LDL GOAL <70: Primary | ICD-10-CM

## 2018-07-30 PROCEDURE — 99606 MTMS BY PHARM EST 15 MIN: CPT | Performed by: PHARMACIST

## 2018-07-30 RX ORDER — ATORVASTATIN CALCIUM 20 MG/1
20 TABLET, FILM COATED ORAL DAILY
Qty: 90 TABLET | Refills: 1 | Status: SHIPPED | OUTPATIENT
Start: 2018-07-30 | End: 2019-01-21

## 2018-07-30 NOTE — MR AVS SNAPSHOT
"              After Visit Summary   7/30/2018    Cara Camarillo    MRN: [id number]           Patient Information     Date Of Birth          1940        Visit Information        Provider Department      7/30/2018 9:30 AM Lissy Diana RPH [facility]        Today's Diagnoses     Hyperlipidemia LDL goal <70    -  1       Follow-ups after your visit        Your next 10 appointments already scheduled     Aug 01, 2018  8:00 AM CDT   LAB with [facility]   [facility] [place] ([facility])    [address] [place] [id number] [phone]           Please do not eat 10-12 hours before your appointment if you are coming in fasting for labs on lipids, cholesterol, or glucose (sugar). This does not apply to pregnant women. Water, hot tea and black coffee (with nothing added) are okay. Do not drink other fluids, diet soda or chew gum.              Who to contact     If you have questions or need follow up information about today's clinic visit or your schedule please contact [facility] directly at [phone].  Normal or non-critical lab and imaging results will be communicated to you by FOUNDDhart, letter or phone within 4 business days after the clinic has received the results. If you do not hear from us within 7 days, please contact the clinic through GLOGt or phone. If you have a critical or abnormal lab result, we will notify you by phone as soon as possible.  Submit refill requests through BitWine or call your pharmacy and they will forward the refill request to us. Please allow 3 business days for your refill to be completed.          Additional Information About Your Visit        MyChart Information     BitWine lets you send messages to your doctor, view your test results, renew your prescriptions, schedule appointments and more. To sign up, go to [email or web address] . Click on \"Log in\" on the left side of " "the screen, which will take you to the Welcome page. Then click on \"Sign up Now\" on the right side of the page.     You will be asked to enter the access code listed below, as well as some personal information. Please follow the directions to create your username and password.     Your access code is: [email or web address]  Expires: 10/28/2018  9:45 AM     Your access code will  in 90 days. If you need help or a new code, please call your [facility] clinic or [phone].        Care EveryWhere ID     This is your Care EveryWhere ID. This could be used by other organizations to access your [facility] medical records  [id number]         Blood Pressure from Last 3 Encounters:   18 132/72   18 136/76   01/15/18 130/70    Weight from Last 3 Encounters:   18 223 lb (101.2 kg)   18 221 lb (100.2 kg)   01/15/18 221 lb (100.2 kg)              Today, you had the following     No orders found for display         Today's Medication Changes          These changes are accurate as of 18  9:45 AM.  If you have any questions, ask your nurse or doctor.               Start taking these medicines.        Dose/Directions    atorvastatin 20 MG tablet   Commonly known as:  LIPITOR   Used for:  Hyperlipidemia LDL goal <70   Started by:  Lissy Diana RPH        Dose:  20 mg   Take 1 tablet (20 mg) by mouth daily   Quantity:  90 tablet   Refills:  1         Stop taking these medicines if you haven't already. Please contact your care team if you have questions.     rosuvastatin 5 MG tablet   Commonly known as:  CRESTOR   Stopped by:  Lissy Diana RPH                Where to get your medicines      These medications were sent to [facility] [address] [facility] [place] - [address]  [address] [place] [address]     Phone:  [phone]     atorvastatin 20 MG tablet                Primary Care Provider Office Phone # Fax #    Kole Gonslaez [phone] [address]  [place] " [id number]        Equal Access to Services     [facility]: Hadii saúl gongora charissaalura Reynaldo, waisatuda luqadaha, qaybta kaalmasabine holly, levi logan. So [facility] [phone].    ATENCIÓN: Si habla español, tiene a serrano disposición servicios gratuitos de asistencia lingüística. Llame al [phone].    We comply with applicable federal civil rights laws and [place] laws. We do not discriminate on the basis of race, color, national origin, age, disability, sex, sexual orientation, or gender identity.            Thank you!     Thank you for choosing [facility]  for your care. Our goal is always to provide you with excellent care. Hearing back from our patients is one way we can continue to improve our services. Please take a few minutes to complete the written survey that you may receive in the mail after your visit with us. Thank you!             Your Updated Medication List - Protect others around you: Learn how to safely use, store and throw away your medicines at www.disposemymeds.org.          This list is accurate as of 7/30/18  9:45 AM.  Always use your most recent med list.                   Brand Name Dispense Instructions for use Diagnosis    acetaminophen 500 MG tablet    TYLENOL     Take 500-1,000 mg by mouth 3 times daily as needed for mild pain        amLODIPine 10 MG tablet    NORVASC    90 tablet    Take 1 tablet (10 mg) by mouth daily    Essential hypertension, benign       aspirin 81 MG tablet     100 tablet    Take 1 tablet (81 mg) by mouth daily    Type II or unspecified type diabetes mellitus without mention of complication, not stated as uncontrolled       atorvastatin 20 MG tablet    LIPITOR    90 tablet    Take 1 tablet (20 mg) by mouth daily    Hyperlipidemia LDL goal <70       calcium carbonate 500 MG tablet    OS-MANJEET 500 mg [place]. Ca     Take 500 mg by mouth 2 times daily        insulin  UNIT/ML injection    NovoLIN N VIAL    10 mL    10 units  "before breakfast, 10 units before dinner    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)       insulin syringe-needle U-100 30G X 1/2\" 0.5 ML    BD insulin syringe ULTRAFINE    100 each    Use one syringe twice daily or as directed.    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)       losartan 50 MG tablet    COZAAR    180 tablet    TAKE 2 TABLETS BY MOUTH DAILY    Essential hypertension with goal blood pressure less than 140/90, Type 2 diabetes mellitus without complication, with long-term current use of insulin (H)       metFORMIN 500 MG 24 hr tablet    GLUCOPHAGE-XR    270 tablet    TAKE 2 TABLETS BY MOUTH IN MORNING, AND 1 TABLET IN EVENING.    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)       metoprolol tartrate 50 MG tablet    LOPRESSOR    270 tablet    TAKE ONE AND ONE-HALF TABLETS BY MOUTH TWICE DAILY    Essential hypertension, benign       nitroGLYcerin 0.4 MG sublingual tablet    NITROSTAT    25 tablet    Place 1 tablet (0.4 mg) under the tongue every 5 minutes as needed for chest pain if you are still having symptoms after 3 doses (15 minutes) call 911.    Postsurgical percutaneous transluminal coronary angioplasty status, Status post coronary angioplasty       ONETOUCH ULTRA test strip   Generic drug:  blood glucose monitoring     300 strip    USE TO TEST THREE TIMES DAILY    Type 2 diabetes, HbA1c goal < 7% (H)       pantoprazole 40 MG EC tablet    PROTONIX    90 tablet    TAKE 1 TABLET BY MOUTH ONCE DAILY 30 TO 60 MINUTES BEFORE A MEAL    Gastroesophageal reflux disease, esophagitis presence not specified       vitamin D 1000 units capsule     30    1 CAPSULE DAILY    Vitamin D deficiency         "

## 2018-07-30 NOTE — PROGRESS NOTES
"SUBJECTIVE/OBJECTIVE:                Cara Camarillo is a 78 year old female called for a follow-up visit for Medication Therapy Management.  She was referred to me from Dr. Alvarez, she has since established care with Dr. Gonsalez.     Chief Complaint: Follow up from our visit on 4/27/18.  She called to ask some questions about lipid management.  She notes she'll schedule a f/up appt \"to discuss everything else.\"    Tobacco: No tobacco use  Alcohol: not currently using    Medication Adherence/Access: no issues reported    Hyperlipidemia: Current therapy includes rosuvastatin 5mg once daily.  Pt reports no significant myalgias or other side effects.  She reports the price of rosuvastatin went up considerably in July, and it will now cost her >$150 for a 1 month supply.  She reports in the past she had been told she could take either rosuvastatin or atorvastatin, she'd like to go back to atorvastatin for cost savings.    Other medications/conditions were not discussed today - pt will schedule f/up in person visit.    Today's Vitals: There were no vitals taken for this visit. - telephone visit      ASSESSMENT:              Current medications were reviewed today as discussed above.      Medication Adherence: good, no issues identified    Hyperlipidemia: Needs Improvement. Moderate intensity statin therapy is warranted given pt's CAD diagnosis, but atorvastatin would be appropriate as well as rosuvastatin.      PLAN:                  1.  Rx sent to [facility] for atorvastatin 20mg daily to replace rosuvastatin.    I spent 15 minutes with this patient today. All changes were made via collaborative practice agreement with Kole Gonsalez. A copy of the visit note was provided to the patient's primary care provider.     Will follow up according to previous visits.    The patient declined a summary of these recommendations as an after visit summary.    Lissy Diana, PharmD, BCACP  Medication Therapy Management " Provider  Pager: [phone]

## 2018-08-01 DIAGNOSIS — E78.5 HYPERLIPIDEMIA LDL GOAL <70: ICD-10-CM

## 2018-08-01 LAB
ALT SERPL W P-5'-P-CCNC: <5 U/L (ref 5–30)
CHOLEST SERPL-MCNC: 134 MG/DL
HDLC SERPL-MCNC: 51 MG/DL
LDLC SERPL CALC-MCNC: 61 MG/DL
NONHDLC SERPL-MCNC: 83 MG/DL
TRIGL SERPL-MCNC: 111 MG/DL

## 2018-08-01 PROCEDURE — 80061 LIPID PANEL: CPT | Performed by: INTERNAL MEDICINE

## 2018-08-01 PROCEDURE — 84460 ALANINE AMINO (ALT) (SGPT): CPT | Performed by: INTERNAL MEDICINE

## 2018-08-01 PROCEDURE — 36415 COLL VENOUS BLD VENIPUNCTURE: CPT | Performed by: INTERNAL MEDICINE

## 2018-08-02 ENCOUNTER — TELEPHONE (OUTPATIENT)
Dept: CARDIOLOGY | Facility: CLINIC | Age: 78
End: 2018-08-02

## 2018-08-02 DIAGNOSIS — E78.5 HYPERLIPIDEMIA LDL GOAL <100: Primary | ICD-10-CM

## 2018-08-02 NOTE — TELEPHONE ENCOUNTER
RN called patient with lipid panel results and Dr. Montana's recommendations below. Patient acknowledged understanding and has no further questions at this time.     Well controlled lipids and normal ALT.  No change in atorvastatin.  Would recheck in 2 years.  Kyle Montana MD, FACC   August 1, 2018 5:41 PM

## 2018-08-17 ENCOUNTER — OFFICE VISIT (OUTPATIENT)
Dept: PHARMACY | Facility: CLINIC | Age: 78
End: 2018-08-17
Payer: COMMERCIAL

## 2018-08-17 VITALS
DIASTOLIC BLOOD PRESSURE: 64 MMHG | SYSTOLIC BLOOD PRESSURE: 133 MMHG | HEART RATE: 86 BPM | WEIGHT: 215 LBS | BODY MASS INDEX: 39.32 KG/M2

## 2018-08-17 DIAGNOSIS — E63.9 NUTRITIONAL DISORDER: ICD-10-CM

## 2018-08-17 DIAGNOSIS — E78.5 HYPERLIPIDEMIA LDL GOAL <100: ICD-10-CM

## 2018-08-17 DIAGNOSIS — E11.59 TYPE 2 DIABETES MELLITUS WITH VASCULAR DISEASE (H): Primary | ICD-10-CM

## 2018-08-17 DIAGNOSIS — E11.59 TYPE 2 DIABETES MELLITUS WITH VASCULAR DISEASE (H): ICD-10-CM

## 2018-08-17 DIAGNOSIS — R52 INTERMITTENT PAIN: ICD-10-CM

## 2018-08-17 DIAGNOSIS — I10 ESSENTIAL HYPERTENSION, BENIGN: ICD-10-CM

## 2018-08-17 DIAGNOSIS — I25.10 CORONARY ARTERY DISEASE INVOLVING NATIVE CORONARY ARTERY OF NATIVE HEART WITHOUT ANGINA PECTORIS: ICD-10-CM

## 2018-08-17 DIAGNOSIS — K21.9 GASTROESOPHAGEAL REFLUX DISEASE, ESOPHAGITIS PRESENCE NOT SPECIFIED: ICD-10-CM

## 2018-08-17 LAB
ALBUMIN SERPL-MCNC: 3.8 G/DL (ref 3.4–5)
ALP SERPL-CCNC: 91 U/L (ref 40–150)
ALT SERPL W P-5'-P-CCNC: 19 U/L (ref 0–50)
ANION GAP SERPL CALCULATED.3IONS-SCNC: 9 MMOL/L (ref 3–14)
AST SERPL W P-5'-P-CCNC: 17 U/L (ref 0–45)
BILIRUB SERPL-MCNC: 0.6 MG/DL (ref 0.2–1.3)
BUN SERPL-MCNC: 22 MG/DL (ref 7–30)
CALCIUM SERPL-MCNC: 9.2 MG/DL (ref 8.5–10.1)
CHLORIDE SERPL-SCNC: 110 MMOL/L (ref 94–109)
CO2 SERPL-SCNC: 23 MMOL/L (ref 20–32)
CREAT SERPL-MCNC: 1.08 MG/DL (ref 0.52–1.04)
GFR SERPL CREATININE-BSD FRML MDRD: 49 ML/MIN/1.7M2
GLUCOSE SERPL-MCNC: 183 MG/DL (ref 70–99)
HBA1C MFR BLD: 7.7 % (ref 0–5.6)
POTASSIUM SERPL-SCNC: 4.5 MMOL/L (ref 3.4–5.3)
PROT SERPL-MCNC: 7.2 G/DL (ref 6.8–8.8)
SODIUM SERPL-SCNC: 142 MMOL/L (ref 133–144)

## 2018-08-17 PROCEDURE — 99607 MTMS BY PHARM ADDL 15 MIN: CPT | Performed by: PHARMACIST

## 2018-08-17 PROCEDURE — 80053 COMPREHEN METABOLIC PANEL: CPT | Performed by: INTERNAL MEDICINE

## 2018-08-17 PROCEDURE — 99606 MTMS BY PHARM EST 15 MIN: CPT | Performed by: PHARMACIST

## 2018-08-17 PROCEDURE — 36415 COLL VENOUS BLD VENIPUNCTURE: CPT | Performed by: INTERNAL MEDICINE

## 2018-08-17 PROCEDURE — 82043 UR ALBUMIN QUANTITATIVE: CPT | Performed by: INTERNAL MEDICINE

## 2018-08-17 PROCEDURE — 83036 HEMOGLOBIN GLYCOSYLATED A1C: CPT | Performed by: INTERNAL MEDICINE

## 2018-08-17 NOTE — PROGRESS NOTES
SUBJECTIVE/OBJECTIVE:                Cara Camarillo is a 78 year old female coming in for a follow-up visit for Medication Therapy Management.  She was referred to me from Dr. Alvarez, she has since established care with Dr. Gonsalez.     Chief Complaint: Follow up from our visit on 7/30/18.  She has no specific questions or concerns today.    Tobacco: No tobacco use  Alcohol: not currently using    Medication Adherence/Access:  no issues reported    Diabetes:  Pt currently taking Novolin N 10 units twice a day (before breakfast and dinner), metformin ER 1500mg daily (reduced dose due to renal dysfunction). Pt is not experiencing side effects.   SMBG: Daily, alternating between AM (fasting) and HS   Ranges (from patient's glucose log):   AM: 211, 189, 227, 166, 163, 193  HS: 140, 124, 187, 192, 149, 136  Patient is not experiencing hypoglycemia.   Recent symptoms of high blood sugar? none  Eye exam: up to date  Foot exam: up to date  Microalbumin is < 30 mg/g. Pt is taking an ACEi/ARB.  Aspirin: Taking 81mg daily and she denies side effects today  Lab Results   Component Value Date    A1C 7.8 04/27/2018    A1C 7.8 12/29/2017    A1C 7.9 09/27/2017    A1C 7.7 06/16/2017    A1C 8.3 03/06/2017     GFR Estimate   Date Value Ref Range Status   04/12/2017 41 (L) >60 mL/min/1.7m2 Final     Comment:     Non  GFR Calc     Hypertension/CAD: Current medications include ASA 81mg daily, amlodipine 10mg daily, losartan 100mg daily, metoprolol tartrate 75mg BID and SL NTG PRN (no recent use).  Patient does not self-monitor BP.  Patient reports no current medication side effects.     Hyperlipidemia: Current therapy includes atorvastatin 20mg once daily.  Pt reports no significant myalgias or other side effects.    Pain:  She continues using APAP as needed for general aches and pains, generally 500-1000mg QHS, sometimes another dose during the day if needed.  She denies side effects.    GERD: Current medications  include: pantoprazole 40mg - she's back to taking this daily (she tried tapering off but was unsuccessful with this).   Pt c/o no current symptoms.  Patient feels that current regimen is effective.     Supplements:  She's currently taking calcium 500mg BID and Vitamin D 1000 IU daily. She eats yogurt daily, some milk in her cereal.  She occasionally has cottage cheese.  She tried increasing dietary calcium but was unable to keep up with this so she went back to taking calcium supplementation.    Today's Vitals: /64  Pulse 86  Wt 215 lb (97.5 kg)  BMI 39.32 kg/m2    ASSESSMENT:              Current medications were reviewed today as discussed above.      Medication Adherence: good, no issues identified    Diabetes: Needs Improvement. Patient is meeting A1c goal of < 8%, due for re-check.  Also due for microalbumin and CMP.  If her GFR remains <45ml/min will need to reduce metformin dose to 1000mg/day. Self monitoring of blood glucose is not at goal of fasting  mg/dL and post prandial < 180 mg/dL.      Hypertension/CAD: Stable.  BP at goal <140/90mmHg.      Hyperlipidemia: Stable. Pt is on moderate intensity statin which is indicated based on 2013 ACC/AHA guidelines for lipid management.       Pain: Stable.    GERD: Stable.  Current treatment is effective.     Supplements:  Stable.     PLAN:                  1.  Orders placed for A1c, microalbumin and CMP to be checked today.  2.  If GFR remains <45ml/min, will reduce metformin ER to 1000mg daily.    I spent 30 minutes with this patient today. All changes were made via collaborative practice agreement with Dr. Gonsalez. A copy of the visit note was provided to the patient's primary care provider.     Will follow up pending lab results.    The patient was given a summary of these recommendations as an after visit summary.    Lissy Diana, PharmD, [facility]  Medication Therapy Management Provider  Pager: [phone]

## 2018-08-17 NOTE — MR AVS SNAPSHOT
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   Today we reviewed what your medicines are for, how to know if they are working, that your medicines are safe and how to make your medicine regimen as easy as possible.     1.  Labs today - A1c, protein in the urine, kidney function/electrolytes.    Next MTM visit:  Pending lab results    To schedule another MTM appointment, please call the clinic directly or you may call the MTM scheduling line at [phone] or toll-free at [phone].     My Clinical Pharmacist's contact information:                                                      It was a pleasure seeing you today!  Please feel free to contact me with any questions or concerns you have.      Lissy Diana, PharmD, [facility]  Medication Therapy Management Provider  Pager: [phone]     You may receive a survey about the MTM services you received.  I would appreciate your feedback to help me serve you better in the future. Please fill it out and return it when you can. Your comments will be anonymous.                     Follow-ups after your visit        Your next 10 appointments already scheduled     Aug 17, 2018 10:15 AM CDT   LAB with  LAB   [facility]    [address] [address] [phone]           Please do not eat 10-12 hours before your appointment if you are  "coming in fasting for labs on lipids, cholesterol, or glucose (sugar). This does not apply to pregnant women. Water, hot tea and black coffee (with nothing added) are okay. Do not drink other fluids, diet soda or chew gum.            Aug 29, 2018  1:00 PM CDT   [facility]    [address] [facility] [address] [phone]           Bring a current list of meds and any records pertaining to this visit. For Physicals, please bring immunization records and any forms needing to be filled out. Please arrive 10 minutes early to complete paperwork.              Future tests that were ordered for you today     Open Future Orders        Priority Expected Expires Ordered    Hemoglobin A1c Routine  8/17/2019 8/17/2018    Comprehensive metabolic panel Routine  8/17/2019 8/17/2018    Albumin Random Urine Quantitative with Creat Ratio Routine  8/17/2019 8/17/2018            Who to contact     If you have questions or need follow up information about today's clinic visit or your schedule please contact [facility] [place] MTM directly at [phone].  Normal or non-critical lab and imaging results will be communicated to you by MyChart, letter or phone within 4 business days after the clinic has received the results. If you do not hear from us within 7 days, please contact the clinic through [employer] or phone. If you have a critical or abnormal lab result, we will notify you by phone as soon as possible.  Submit refill requests through [employer] or call your pharmacy and they will forward the refill request to us. Please allow 3 business days for your refill to be completed.          Additional Information About Your Visit        MyChart Information     [employer] lets you send messages to your doctor, view your test results, renew your prescriptions, schedule appointments and more. To sign up, go to [email or web address] . Click on \"Log in\" " "on the left side of the screen, which will take you to the Welcome page. Then click on \"Sign up Now\" on the right side of the page.     You will be asked to enter the access code listed below, as well as some personal information. Please follow the directions to create your username and password.     Your access code is: [email or web address]  Expires: 10/28/2018  9:45 AM     Your access code will  in 90 days. If you need help or a new code, please call your [facility] clinic or [phone].        Care EveryWhere ID     This is your Care EveryWhere ID. This could be used by other organizations to access your [facility] medical records  [id number]        Your Vitals Were     Pulse BMI (Body Mass Index)                86 39.32 kg/m2           Blood Pressure from Last 3 Encounters:   18 133/64   18 132/72   18 136/76    Weight from Last 3 Encounters:   18 215 lb (97.5 kg)   18 223 lb (101.2 kg)   18 221 lb (100.2 kg)               Primary Care Provider Office Phone # Fax #    Kole Gonsalez [phone] [address] [address]        Equal Access to Services     : Hadii aad ku hadasho Soomaali, waaxda luqadaha, qaybta kaalmada adeegyada, levi levine hayclarice santizo . So [facility] [phone].    ATENCIÓN: Si habla español, tiene a serrano disposición servicios gratuitos de asistencia lingüística. Llame al [phone].    We comply with applicable federal civil rights laws and [place] laws. We do not discriminate on the basis of race, color, national origin, age, disability, sex, sexual orientation, or gender identity.            Thank you!     Thank you for choosing [facility]  for your care. Our goal is always to provide you with excellent care. Hearing back from our patients is one way we can continue to improve our services. Please take a few minutes to complete the written survey that you may receive in " "the mail after your visit with us. Thank you!             Your Updated Medication List - Protect others around you: Learn how to safely use, store and throw away your medicines at www.disposemymeds.org.          This list is accurate as of 8/17/18  9:54 AM.  Always use your most recent med list.                   Brand Name Dispense Instructions for use Diagnosis    acetaminophen 500 MG tablet    TYLENOL     Take 500-1,000 mg by mouth 3 times daily as needed for mild pain        amLODIPine 10 MG tablet    NORVASC    90 tablet    Take 1 tablet (10 mg) by mouth daily    Essential hypertension, benign       aspirin 81 MG tablet     100 tablet    Take 1 tablet (81 mg) by mouth daily    Type II or unspecified type diabetes mellitus without mention of complication, not stated as uncontrolled       atorvastatin 20 MG tablet    LIPITOR    90 tablet    Take 1 tablet (20 mg) by mouth daily    Hyperlipidemia LDL goal <70       calcium carbonate 500 MG tablet    OS-MANJEET 500 mg [place]. 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)       losartan 50 MG tablet    COZAAR    180 tablet    TAKE 2 TABLETS BY MOUTH DAILY    Essential hypertension with goal blood pressure less than 140/90, Type 2 diabetes mellitus without complication, with long-term current use of insulin (H)       metFORMIN 500 MG 24 hr tablet    GLUCOPHAGE-XR    270 tablet    TAKE 2 TABLETS BY MOUTH IN MORNING, AND 1 TABLET IN EVENING.    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)       metoprolol tartrate 50 MG tablet    LOPRESSOR    270 tablet    TAKE ONE AND ONE-HALF TABLETS BY MOUTH TWICE DAILY "    Essential hypertension, benign       nitroGLYcerin 0.4 MG sublingual tablet    NITROSTAT    25 tablet    Place 1 tablet (0.4 mg) under the tongue every 5 minutes as needed for chest pain if you are still having symptoms after 3 doses (15 minutes) call 911.    Postsurgical percutaneous transluminal coronary angioplasty status, Status post coronary angioplasty       ONETOUCH ULTRA test strip   Generic drug:  blood glucose monitoring     300 strip    USE TO TEST THREE TIMES DAILY    Type 2 diabetes, HbA1c goal < 7% (H)       pantoprazole 40 MG EC tablet    PROTONIX    90 tablet    TAKE 1 TABLET BY MOUTH ONCE DAILY 30 TO 60 MINUTES BEFORE A MEAL    Gastroesophageal reflux disease, esophagitis presence not specified       vitamin D 1000 units capsule     30    1 CAPSULE DAILY    Vitamin D deficiency

## 2018-08-17 NOTE — LETTER
[facility]  [address]  [address]  [place]  [address]  Tel: [phone]    August 20, 2018    Cara Camarillo  [address] [address]        Dear Ms. Camarillo,    The following letter pertains to your most recent diagnostic tests:    -Your microalbumin level which is a diabetes urine test to check for any evidence of early kidney injury from diabetes returned negative.    -Liver and gallbladder tests are normal for you. (ALT,AST, Alk phos, bilirubin), kidney function is stable for you (Creatinine, GFR), Sodium is normal, Potassium is normal for you, Calcium is normal for you.     -Your hemoglobin A1c test which is a diabetes blood test that represents and average of your blood sugars over the last 3 months returned at 7.7 which is at your goal of hemoglobin A1c less than 8.     Bottom line:  Lab results look OK      Follow up:  I look forward to seeing you in October or sooner if you need me.      Sincerely,    Kole Gonsalez MD/SULY          Enclosure: Lab Results  Results for orders placed or performed in visit on 08/17/18   Hemoglobin A1c   Result Value Ref Range    Hemoglobin A1C 7.7 (H) 0 - 5.6 %   Comprehensive metabolic panel   Result Value Ref Range    Sodium 142 133 - 144 mmol/L    Potassium 4.5 3.4 - 5.3 mmol/L    Chloride 110 (H) 94 - 109 mmol/L    Carbon Dioxide 23 20 - 32 mmol/L    Anion Gap 9 3 - 14 mmol/L    Glucose 183 (H) 70 - 99 mg/dL    Urea Nitrogen 22 7 - 30 mg/dL    Creatinine 1.08 (H) 0.52 - 1.04 mg/dL    GFR Estimate 49 (L) >60 mL/min/1.7m2    GFR Estimate If Black 59 (L) >60 mL/min/1.7m2    Calcium 9.2 8.5 - 10.1 mg/dL    Bilirubin Total 0.6 0.2 - 1.3 mg/dL    Albumin 3.8 3.4 - 5.0 g/dL    Protein Total 7.2 6.8 - 8.8 g/dL    Alkaline Phosphatase 91 40 - 150 U/L    ALT 19 0 - 50 U/L    AST 17 0 - 45 U/L   Albumin Random Urine Quantitative with Creat Ratio   Result Value Ref Range    Creatinine Urine 370 mg/dL    Albumin Urine mg/L 50 mg/L     Albumin Urine mg/g Cr 13.41 0 - 25 mg/g Cr

## 2018-08-17 NOTE — PATIENT INSTRUCTIONS
Recommendations from today's MTM visit:                                                    MTM (medication therapy management) is a service provided by a clinical pharmacist designed to help you get the most of out of your medicines.   Today we reviewed what your medicines are for, how to know if they are working, that your medicines are safe and how to make your medicine regimen as easy as possible.     1.  Labs today - A1c, protein in the urine, kidney function/electrolytes.    Next MTM visit:  Pending lab results    To schedule another MTM appointment, please call the clinic directly or you may call the MTM scheduling line at [phone] or toll-free at [phone].     My Clinical Pharmacist's contact information:                                                      It was a pleasure seeing you today!  Please feel free to contact me with any questions or concerns you have.      Lissy Diana PharmD, [facility]  Medication Therapy Management Provider  Pager: [phone]     You may receive a survey about the MTM services you received.  I would appreciate your feedback to help me serve you better in the future. Please fill it out and return it when you can. Your comments will be anonymous.

## 2018-08-18 LAB
CREAT UR-MCNC: 370 MG/DL
MICROALBUMIN UR-MCNC: 50 MG/L
MICROALBUMIN/CREAT UR: 13.41 MG/G CR (ref 0–25)

## 2018-08-18 ASSESSMENT — PATIENT HEALTH QUESTIONNAIRE - PHQ9: SUM OF ALL RESPONSES TO PHQ QUESTIONS 1-9: 6

## 2018-08-19 NOTE — PROGRESS NOTES
The following letter pertains to your most recent diagnostic tests:    -Your microalbumin level which is a diabetes urine test to check for any evidence of early kidney injury from diabetes returned negative.    -Liver and gallbladder tests are normal for you. (ALT,AST, Alk phos, bilirubin), kidney function is stable for you (Creatinine, GFR), Sodium is normal, Potassium is normal for you, Calcium is normal for you.     -Your hemoglobin A1c test which is a diabetes blood test that represents and average of your blood sugars over the last 3 months returned at 7.7 which is at your goal of hemoglobin A1c less than 8.     Bottom line:  Lab results look OK      Follow up:  I look forward to seeing you in October or sooner if you need me.        Sincerely,    Dr. Gonsalez

## 2018-09-13 DIAGNOSIS — E11.59 TYPE 2 DIABETES MELLITUS WITH VASCULAR DISEASE (H): ICD-10-CM

## 2018-09-13 NOTE — TELEPHONE ENCOUNTER
Prescription approved per [facility] Refill Protocol.  Jaymie WOOTEN RN

## 2018-09-13 NOTE — TELEPHONE ENCOUNTER
"Last Written Prescription Date:  6/21/17  Last Fill Quantity: 100 each,  # refills: 11   Last office visit: 10/27/2017 with prescribing provider:  Wallace   Future Office Visit:   Next 5 appointments (look out 90 days)     Oct 31, 2018  1:00 PM CDT   Office Visit with Kole Gonsalez MD   [facility]    [address] [facility] [address]   [phone]                 Requested Prescriptions   Pending Prescriptions Disp Refills     insulin syringe-needle U-100 (BD INSULIN SYRINGE ULTRAFINE) 30G X 1/2\" 0.5  each 11     Sig: Use one syringe twice daily or as directed.    Diabetic Supplies Protocol Failed    9/13/2018 10:49 AM       Failed - Recent (6 mo) or future (30 days) visit within the authorizing provider's specialty    Patient had office visit in the last 6 months or has a visit in the next 30 days with authorizing provider.  See \"Patient Info\" tab in inbasket, or \"Choose Columns\" in Meds & Orders section of the refill encounter.           Passed - Patient is 18 years of age or older          "

## 2018-09-25 DIAGNOSIS — I10 ESSENTIAL HYPERTENSION, BENIGN: ICD-10-CM

## 2018-09-26 NOTE — TELEPHONE ENCOUNTER
"Requested Prescriptions   Pending Prescriptions Disp Refills     metoprolol tartrate (LOPRESSOR) 50 MG tablet [Pharmacy Med Name: METOPROLOL TARTRATE 50 MG TAB]  Last Written Prescription Date:  12/29/2017  Last Fill Quantity: 270,  # refills: 2   Last office visit: 10/27/2017 with prescribing provider:  MICAELA   Future Office Visit:   Next 5 appointments (look out 90 days)     Oct 31, 2018  1:00 PM CDT   [facility]   [facility]    [address] [address]   [phone]                  270 tablet 2     Sig: TAKE ONE AND ONE-HALF TABLETS BY MOUTH TWICE DAILY    Beta-Blockers Protocol Passed    9/26/2018  5:01 PM       Passed - Blood pressure under 140/90 in past 12 months    BP Readings from Last 3 Encounters:   08/17/18 133/64   05/03/18 132/72   04/27/18 136/76                Passed - Patient is age 6 or older       Passed - Recent (12 mo) or future (30 days) visit within the authorizing provider's specialty    Patient had office visit in the last 12 months or has a visit in the next 30 days with authorizing provider or within the authorizing provider's specialty.  See \"Patient Info\" tab in inbasket, or \"Choose Columns\" in Meds & Orders section of the refill encounter.              "

## 2018-09-27 RX ORDER — METOPROLOL TARTRATE 50 MG
TABLET ORAL
Qty: 270 TABLET | Refills: 2 | Status: SHIPPED | OUTPATIENT
Start: 2018-09-27 | End: 2019-06-26

## 2018-10-08 DIAGNOSIS — E11.9 TYPE 2 DIABETES, HBA1C GOAL < 7% (H): ICD-10-CM

## 2018-10-09 NOTE — TELEPHONE ENCOUNTER
"ONE TOUCH ULTRA test strip 300 strip 1 6/9/2017  No      Sig: USE TO TEST THREE TIMES DAILY     Last Written Prescription Date:  6-9-2017  Last Fill Quantity: 300,  # refills: 1   Last office visit: 10/27/2017 with prescribing provider:     Future Office Visit:   Next 5 appointments (look out 90 days)     Oct 31, 2018  1:00 PM CDT   [facility]    [address] [address] [phone]                 Requested Prescriptions   Pending Prescriptions Disp Refills     ONETOUCH ULTRA test strip [Pharmacy Med Name: ONE TOUCH ULTRA TEST STRIPS] 300 strip 1     Sig: USE TO TEST 3 TIMES DAILY    Diabetic Supplies Protocol Passed    10/8/2018  9:44 AM       Passed - Patient is 18 years of age or older       Passed - Recent (6 mo) or future (30 days) visit within the authorizing provider's specialty    Patient had office visit in the last 6 months or has a visit in the next 30 days with authorizing provider.  See \"Patient Info\" tab in inbasket, or \"Choose Columns\" in Meds & Orders section of the refill encounter.              "

## 2018-10-09 NOTE — TELEPHONE ENCOUNTER
Prescription approved per [facility] Refill Protocol.  Ana CHAVES RN

## 2018-10-30 NOTE — PROGRESS NOTES
SUBJECTIVE:   Cara Camarillo is a 78 year old female who presents to clinic today for the following health issues:      Medication Followup     Taking Medication as prescribed: yes    Side Effects:  None       Diabetes Follow-up    Patient is checking blood sugars: twice daily.  Results are as follows:         am - 180's    Diabetic concerns: None     Symptoms of hypoglycemia (low blood sugar): none     Paresthesias (numbness or burning in feet) or sores: No     Date of last diabetic eye exam: reminded to schedule annual exam     Diabetes Management Resources    Hyperlipidemia Follow-Up      Rate your low fat/cholesterol diet?: good    Taking statin?  Yes, no muscle aches from statin    Other lipid medications/supplements?:  none    Hypertension Follow-up      Outpatient blood pressures are not being checked.    Low Salt Diet: no added salt    BP Readings from Last 2 Encounters:   10/31/18 136/74   08/17/18 133/64     Hemoglobin A1C (%)   Date Value   08/17/2018 7.7 (H)   04/27/2018 7.8 (H)     LDL Cholesterol Calculated (mg/dL)   Date Value   08/01/2018 61   09/27/2017 48     Vascular Disease Follow-up:  Coronary Artery Disease (CAD)      Chest pain or pressure, left side neck or arm pain: No    Shortness of breath/increased sweats/nausea with exertion: No    Pain in calves walking 1-2 blocks: No    Worsened or new symptoms since last visit: No    Nitroglycerin use: no    Daily aspirin use: Yes      Problem list and histories reviewed & adjusted, as indicated.  Additional history: as documented    Patient Active Problem List   Diagnosis     History of cancer of uterus     Dysthymic disorder     Herpes zoster     Essential hypertension, benign     SOLITARY KIDNEY ANOMALY NEC     Hyperlipidemia LDL goal <70     CKD (chronic kidney disease) stage 3, GFR 30-59 ml/min (H)     Advanced directives, counseling/discussion     Anxiety     Tubular adenoma     OA (osteoarthritis)     Aortic stenosis, mild     IVCD  (intraventricular conduction defect)     B12 deficiency     Iron deficiency     Esophageal reflux     Overweight BMI 35-40     Osteopenia     RBBB     Left ventricular diastolic dysfunction     Coronary artery disease     Type 2 diabetes mellitus with diabetic peripheral angiopathy without gangrene, without long-term current use of insulin (H)     Chest wall pain     Bilateral edema of lower extremity     Coronary artery disease involving native coronary artery of native heart without angina pectoris     Past Surgical History:   Procedure Laterality Date     ARTHROPLASTY KNEE  2013    Procedure: ARTHROPLASTY KNEE;  RIGHT TOTAL KNEE ARTHROPLASTY;  Surgeon: Romaine Constantino MD;  Location:  OR     ARTHROPLASTY KNEE  2/3/2014    Procedure: ARTHROPLASTY KNEE;  LEFT TOTAL KNEE ARTHROPLASTY (BIOMET)^;  Surgeon: Romaine Constantino MD;  Location:  OR     C ANESTH, SECTION       C NONSPECIFIC PROCEDURE  3/20/06    CT scan of chest/abd/pelvis- negative for recurrence of CA     EXCISE MASS LOWER EXTREMITY Left 2015    Procedure: EXCISE MASS LOWER EXTREMITY;  Surgeon: Sloan Richardson MD;  Location: [facility] UGI ENDOSCOPY W EUS  2013    Procedure: COMBINED ENDOSCOPIC ULTRASOUND, ESOPHAGOSCOPY, GASTROSCOPY, DUODENOSCOPY (EGD);  Surgeon: Lyric Simons MD;  Location:  GI     HYSTERECTOMY      Vaginal     HYSTERECTOMY, VAGINAL  05    Uterine CA     LAPAROSCOPIC CHOLECYSTECTOMY  1/10/2013    Procedure: LAPAROSCOPIC CHOLECYSTECTOMY;  LAPAROSCOPIC CHOLECYSTECTOMY ;  Surgeon: Eduardo Taylor MD;  Location:  OR     NEPHRECTOMY BILATERAL       NEPHRECTOMY RT/LT  OCT 2005     OTHER SURGICAL HISTORY      angiogram 2016: ISIDRO to PDA     OTHER SURGICAL HISTORY      angiogram 2016: ISIDRO to LAD       Social History   Substance Use Topics     Smoking status: Never Smoker     Smokeless tobacco: Never Used     Alcohol use No     Family History   Problem Relation Age of Onset      "Diabetes Father      Adult onset     Other - See Comments Mother 95     Old age         Current Outpatient Prescriptions   Medication Sig Dispense Refill     acetaminophen (TYLENOL) 500 MG tablet Take 500-1,000 mg by mouth 3 times daily as needed for mild pain       amLODIPine (NORVASC) 10 MG tablet TAKE 1 TABLET BY MOUTH ONCE DAILY 90 tablet 3     aspirin 81 MG tablet Take 1 tablet (81 mg) by mouth daily 100 tablet 3     atorvastatin (LIPITOR) 20 MG tablet Take 1 tablet (20 mg) by mouth daily 90 tablet 1     insulin NPH (NOVOLIN N VIAL) 100 UNIT/ML injection 10 units before breakfast, 10 units before dinner 10 mL 1     insulin syringe-needle U-100 (BD INSULIN SYRINGE ULTRAFINE) 30G X 1/2\" 0.5 ML Use one syringe twice daily or as directed. 100 each 0     losartan (COZAAR) 50 MG tablet TAKE 2 TABLETS BY MOUTH DAILY 180 tablet 3     metFORMIN (GLUCOPHAGE-XR) 500 MG 24 hr tablet TAKE 2 TABLETS BY MOUTH IN MORNING, AND 1 TABLET IN EVENING. 270 tablet 3     metoprolol tartrate (LOPRESSOR) 50 MG tablet TAKE ONE AND ONE-HALF TABLETS BY MOUTH TWICE DAILY 270 tablet 2     nitroglycerin (NITROSTAT) 0.4 MG SL tablet Place 1 tablet (0.4 mg) under the tongue every 5 minutes as needed for chest pain if you are still having symptoms after 3 doses (15 minutes) call 911. 25 tablet 1     ONETOUCH ULTRA test strip USE TO TEST 3 TIMES DAILY 300 strip 0     VITAMIN D 1000 UNIT OR CAPS 1 CAPSULE DAILY 30 0     pantoprazole (PROTONIX) 40 MG EC tablet TAKE 1 TABLET BY MOUTH ONCE DAILY 30 TO 60 MINUTES BEFORE A MEAL 90 tablet 0     Allergies   Allergen Reactions     Actos [Pioglitazone]      Lower extremity edema       Enalapril      Renal failure     Hydrochlorothiazide      Dry mouth     Lisinopril      Hyperkalemia         Reviewed and updated as needed this visit by clinical staff  Tobacco  Allergies  Meds       Reviewed and updated as needed this visit by Provider         ROS:        Constitutional, HEENT, cardiovascular, pulmonary, " "gi and gu systems are negative, except as otherwise noted.    OBJECTIVE:     /74  Pulse 70  Temp 97.2  F (36.2  C) (Oral)  Ht 5' 2\" (1.575 m)  Wt 217 lb (98.4 kg)  SpO2 96%  BMI 39.69 kg/m2  Body mass index is 39.69 kg/(m^2).  GENERAL: healthy, alert and no distress  NECK: no adenopathy, no asymmetry, masses, or scars and thyroid normal to palpation  RESP: lungs clear to auscultation - no rales, rhonchi or wheezes  CV: regular rate and rhythm, normal S1 S2, no S3 or S4, no murmur, click or rub, no peripheral edema and peripheral pulses strong  CV: Heart with regular rate and rhythm. Blowing quality systolic murmur heard across precordium  ABDOMEN: Obese, soft, nontender, no hepatosplenomegaly, no masses and bowel sounds normal  MS: no gross musculoskeletal defects noted, no edema  FEET:  Normal sensation to 10g monofilament in bilateral feet without obvious foot ulcers   Diagnostic Test Results:  Results for orders placed or performed in visit on 08/17/18   Hemoglobin A1c   Result Value Ref Range    Hemoglobin A1C 7.7 (H) 0 - 5.6 %   Comprehensive metabolic panel   Result Value Ref Range    Sodium 142 133 - 144 mmol/L    Potassium 4.5 3.4 - 5.3 mmol/L    Chloride 110 (H) 94 - 109 mmol/L    Carbon Dioxide 23 20 - 32 mmol/L    Anion Gap 9 3 - 14 mmol/L    Glucose 183 (H) 70 - 99 mg/dL    Urea Nitrogen 22 7 - 30 mg/dL    Creatinine 1.08 (H) 0.52 - 1.04 mg/dL    GFR Estimate 49 (L) >60 mL/min/1.7m2    GFR Estimate If Black 59 (L) >60 mL/min/1.7m2    Calcium 9.2 8.5 - 10.1 mg/dL    Bilirubin Total 0.6 0.2 - 1.3 mg/dL    Albumin 3.8 3.4 - 5.0 g/dL    Protein Total 7.2 6.8 - 8.8 g/dL    Alkaline Phosphatase 91 40 - 150 U/L    ALT 19 0 - 50 U/L    AST 17 0 - 45 U/L   Albumin Random Urine Quantitative with Creat Ratio   Result Value Ref Range    Creatinine Urine 370 mg/dL    Albumin Urine mg/L 50 mg/L    Albumin Urine mg/g Cr 13.41 0 - 25 mg/g Cr       ASSESSMENT/PLAN:       1. Type 2 diabetes mellitus with " diabetic peripheral angiopathy without gangrene, without long-term current use of insulin (H)  Diet considerations discussed today  Recheck A1c in April of 2019  - FOOT EXAM    2. Morbid obesity (H)  Counseled on diet and exercise interventions to promote weight loss     3. CKD (chronic kidney disease) stage 3, GFR 30-59 ml/min (H)  Stable Cr when checked in August     4. Coronary artery disease involving native coronary artery of native heart without angina pectoris  Stable symptoms     5. Hyperlipidemia LDL goal <70  On statin therapy with LDL at goal in 8/2018    6. Essential hypertension, benign  Second check demonstrates good blood pressure control     7. History of cancer of uterus  Status post hysterectomy in 2005    She declines my recommendation for flu shot today  I discussed shingrix, she declined that shot as well      FUTURE APPOINTMENTS:       - Follow-up visit in April of 2019 with A1c or sooner as needed     Kole Gonsalez MD  [facility]

## 2018-10-31 ENCOUNTER — OFFICE VISIT (OUTPATIENT)
Dept: FAMILY MEDICINE | Facility: CLINIC | Age: 78
End: 2018-10-31
Payer: COMMERCIAL

## 2018-10-31 VITALS
TEMPERATURE: 97.2 F | DIASTOLIC BLOOD PRESSURE: 74 MMHG | OXYGEN SATURATION: 96 % | WEIGHT: 217 LBS | HEIGHT: 62 IN | SYSTOLIC BLOOD PRESSURE: 136 MMHG | BODY MASS INDEX: 39.93 KG/M2 | HEART RATE: 70 BPM

## 2018-10-31 DIAGNOSIS — I25.10 CORONARY ARTERY DISEASE INVOLVING NATIVE CORONARY ARTERY OF NATIVE HEART WITHOUT ANGINA PECTORIS: ICD-10-CM

## 2018-10-31 DIAGNOSIS — Z85.42 HISTORY OF CANCER OF UTERUS: ICD-10-CM

## 2018-10-31 DIAGNOSIS — E78.5 HYPERLIPIDEMIA LDL GOAL <70: ICD-10-CM

## 2018-10-31 DIAGNOSIS — N18.30 CKD (CHRONIC KIDNEY DISEASE) STAGE 3, GFR 30-59 ML/MIN (H): ICD-10-CM

## 2018-10-31 DIAGNOSIS — E11.51 TYPE 2 DIABETES MELLITUS WITH DIABETIC PERIPHERAL ANGIOPATHY WITHOUT GANGRENE, WITHOUT LONG-TERM CURRENT USE OF INSULIN (H): Primary | ICD-10-CM

## 2018-10-31 DIAGNOSIS — I10 ESSENTIAL HYPERTENSION, BENIGN: ICD-10-CM

## 2018-10-31 DIAGNOSIS — E66.01 MORBID OBESITY (H): ICD-10-CM

## 2018-10-31 PROCEDURE — 99214 OFFICE O/P EST MOD 30 MIN: CPT | Performed by: INTERNAL MEDICINE

## 2018-10-31 PROCEDURE — 99207 C FOOT EXAM  NO CHARGE: CPT | Mod: 25 | Performed by: INTERNAL MEDICINE

## 2018-10-31 NOTE — NURSING NOTE
"Chief Complaint   Patient presents with     Recheck Medication        Initial /75 (BP Location: Right arm, Patient Position: Chair, Cuff Size: Adult Large)  Pulse 70  Temp 97.2  F (36.2  C) (Oral)  Ht 5' 2\" (1.575 m)  Wt 217 lb (98.4 kg)  SpO2 96%  BMI 39.69 kg/m2 Estimated body mass index is 39.69 kg/(m^2) as calculated from the following:    Height as of this encounter: 5' 2\" (1.575 m).    Weight as of this encounter: 217 lb (98.4 kg)..    BP completed using cuff size: large  MEDICATIONS REVIEWED  SOCIAL AND FAMILY HX REVIEWED  Michelle Huff CMA  "

## 2018-10-31 NOTE — MR AVS SNAPSHOT
"              After Visit Summary   10/31/2018    Cara Camarillo    MRN: [id number]           Patient Information     Date Of Birth          1940        Visit Information        Provider Department      10/31/2018 1:00 PM Kole Gonsalez MD [facility] [place]        Today's Diagnoses     Type 2 diabetes mellitus with diabetic peripheral angiopathy without gangrene, without long-term current use of insulin (H)    -  1    Morbid obesity (H)        CKD (chronic kidney disease) stage 3, GFR 30-59 ml/min (H)        Coronary artery disease involving native coronary artery of native heart without angina pectoris        Hyperlipidemia LDL goal <70        Essential hypertension, benign        History of cancer of uterus           Follow-ups after your visit        Follow-up notes from your care team     Return in about 5 months (around 4/1/2019) for Diabetes Check.      Who to contact     If you have questions or need follow up information about today's clinic visit or your schedule please contact [facility] directly at [phone].  Normal or non-critical lab and imaging results will be communicated to you by MyChart, letter or phone within 4 business days after the clinic has received the results. If you do not hear from us within 7 days, please contact the clinic through [employer] or phone. If you have a critical or abnormal lab result, we will notify you by phone as soon as possible.  Submit refill requests through [employer] or call your pharmacy and they will forward the refill request to us. Please allow 3 business days for your refill to be completed.          Additional Information About Your Visit        Care EveryWhere ID     This is your Care EveryWhere ID. This could be used by other organizations to access your [place] medical records  [id number]        Your Vitals Were     Pulse Temperature Height Pulse Oximetry BMI (Body Mass Index)       70 97.2  F (36.2  C) (Oral) 5' 2\" (1.575 m) 96% " 39.69 kg/m2        Blood Pressure from Last 3 Encounters:   10/31/18 136/74   08/17/18 133/64   05/03/18 132/72    Weight from Last 3 Encounters:   10/31/18 217 lb (98.4 kg)   08/17/18 215 lb (97.5 kg)   05/03/18 223 lb (101.2 kg)              We Performed the Following     FOOT EXAM          Today's Medication Changes          These changes are accurate as of 10/31/18  1:38 PM.  If you have any questions, ask your nurse or doctor.               Stop taking these medicines if you haven't already. Please contact your care team if you have questions.     calcium carbonate 500 mg (elemental) 500 MG tablet   Commonly known as:  OS-MANJEET   Stopped by:  Kole Gonsalez MD                    Primary Care Provider Office Phone # Fax #    Kole Gonsalez [phone] [address] [facility] [address]  [facility] [address]        Equal Access to Services     [facility] : Hadii saúl gongora hadasho Sovicki, waaxda luqadaha, qaybta kaalmada adeegyada, levi santizo . So [facility] [phone].    ATENCIÓN: Si habla español, tiene a serrano disposición servicios gratuitos de asistencia lingüística. Llame al [phone].    We comply with applicable federal civil rights laws and [place] laws. We do not discriminate on the basis of race, color, national origin, age, disability, sex, sexual orientation, or gender identity.            Thank you!     Thank you for choosing [facility]  for your care. Our goal is always to provide you with excellent care. Hearing back from our patients is one way we can continue to improve our services. Please take a few minutes to complete the written survey that you may receive in the mail after your visit with us. Thank you!             Your Updated Medication List - Protect others around you: Learn how to safely use, store and throw away your medicines at www.disposemymeds.org.          This list is accurate as of 10/31/18  1:38 PM.  Always use your most recent  "med list.                   Brand Name Dispense Instructions for use Diagnosis    acetaminophen 500 MG tablet    TYLENOL     Take 500-1,000 mg by mouth 3 times daily as needed for mild pain        amLODIPine 10 MG tablet    NORVASC    90 tablet    TAKE 1 TABLET BY MOUTH ONCE DAILY    Essential hypertension, benign       aspirin 81 MG tablet     100 tablet    Take 1 tablet (81 mg) by mouth daily    Type II or unspecified type diabetes mellitus without mention of complication, not stated as uncontrolled       atorvastatin 20 MG tablet    LIPITOR    90 tablet    Take 1 tablet (20 mg) by mouth daily    Hyperlipidemia LDL goal <70       insulin  UNIT/ML injection    NovoLIN N VIAL    10 mL    10 units before breakfast, 10 units before dinner    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)       insulin syringe-needle U-100 30G X 1/2\" 0.5 ML    BD insulin syringe ULTRAFINE    100 each    Use one syringe twice daily or as directed.    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)       losartan 50 MG tablet    COZAAR    180 tablet    TAKE 2 TABLETS BY MOUTH DAILY    Essential hypertension with goal blood pressure less than 140/90, Type 2 diabetes mellitus without complication, with long-term current use of insulin (H)       metFORMIN 500 MG 24 hr tablet    GLUCOPHAGE-XR    270 tablet    TAKE 2 TABLETS BY MOUTH IN MORNING, AND 1 TABLET IN EVENING.    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)       metoprolol tartrate 50 MG tablet    LOPRESSOR    270 tablet    TAKE ONE AND ONE-HALF TABLETS BY MOUTH TWICE DAILY    Essential hypertension, benign       nitroGLYcerin 0.4 MG sublingual tablet    NITROSTAT    25 tablet    Place 1 tablet (0.4 mg) under the tongue every 5 minutes as needed for chest pain if you are still having symptoms after 3 doses (15 minutes) call 911.    Postsurgical percutaneous transluminal coronary angioplasty status, Status post coronary angioplasty       ONETOUCH ULTRA test strip "   Generic drug:  blood glucose monitoring     300 strip    USE TO TEST 3 TIMES DAILY    Type 2 diabetes, HbA1c goal < 7% (H)       pantoprazole 40 MG EC tablet    PROTONIX    90 tablet    TAKE 1 TABLET BY MOUTH ONCE DAILY 30 TO 60 MINUTES BEFORE A MEAL    Gastroesophageal reflux disease, esophagitis presence not specified       vitamin D 1000 units capsule     30    1 CAPSULE DAILY    Vitamin D deficiency

## 2018-12-10 ENCOUNTER — OFFICE VISIT (OUTPATIENT)
Dept: PHARMACY | Facility: CLINIC | Age: 78
End: 2018-12-10
Payer: COMMERCIAL

## 2018-12-10 VITALS
HEART RATE: 71 BPM | DIASTOLIC BLOOD PRESSURE: 77 MMHG | WEIGHT: 221 LBS | BODY MASS INDEX: 40.42 KG/M2 | SYSTOLIC BLOOD PRESSURE: 129 MMHG

## 2018-12-10 DIAGNOSIS — I10 ESSENTIAL HYPERTENSION, BENIGN: ICD-10-CM

## 2018-12-10 DIAGNOSIS — R52 INTERMITTENT PAIN: ICD-10-CM

## 2018-12-10 DIAGNOSIS — E11.59 TYPE 2 DIABETES MELLITUS WITH VASCULAR DISEASE (H): Primary | ICD-10-CM

## 2018-12-10 DIAGNOSIS — I25.10 CORONARY ARTERY DISEASE INVOLVING NATIVE CORONARY ARTERY OF NATIVE HEART WITHOUT ANGINA PECTORIS: ICD-10-CM

## 2018-12-10 DIAGNOSIS — E78.5 HYPERLIPIDEMIA LDL GOAL <100: ICD-10-CM

## 2018-12-10 DIAGNOSIS — E63.9 NUTRITIONAL DISORDER: ICD-10-CM

## 2018-12-10 DIAGNOSIS — K21.9 GASTROESOPHAGEAL REFLUX DISEASE, ESOPHAGITIS PRESENCE NOT SPECIFIED: ICD-10-CM

## 2018-12-10 PROCEDURE — 99606 MTMS BY PHARM EST 15 MIN: CPT | Performed by: PHARMACIST

## 2018-12-10 PROCEDURE — 99607 MTMS BY PHARM ADDL 15 MIN: CPT | Performed by: PHARMACIST

## 2018-12-10 NOTE — PROGRESS NOTES
SUBJECTIVE/OBJECTIVE:                Cara Camarillo is a 78 year old female coming in for a follow-up visit for Medication Therapy Management.  She was referred to me from Dr. Alvarez, she has since established care with Dr. Gonsalez.     Chief Complaint: Follow up from our visit on 8/17/18.  She has no specific questions or concerns today.    Tobacco: No tobacco use  Alcohol: not currently using    Medication Adherence/Access: no issues reported    Diabetes:  Pt currently taking Novolin N 10 units twice a day (before breakfast and dinner), metformin ER 1500mg daily (reduced dose due to renal dysfunction - GFR has hovered around 45ml/min).  Today Cara tells me she's having diarrhea (and has had for a long time) - she attributes this to metformin.  This is the first time she's told me about this.  SMBG: Daily, alternating between AM (fasting) and HS     Ranges (from patient's glucose log):   AM: 157, 203, 230, 160, 140, 152  HS: 133, 163, 128, 109, 104, 121  Patient is not experiencing hypoglycemia.   Recent symptoms of high blood sugar? none  Eye exam: up to date  Foot exam: up to date  Microalbumin is < 30 mg/g. Pt is taking an ACEi/ARB.  Aspirin: Taking 81mg daily and she denies side effects today  Lab Results   Component Value Date    A1C 7.7 08/17/2018    A1C 7.8 04/27/2018    A1C 7.8 12/29/2017    A1C 7.9 09/27/2017    A1C 7.7 06/16/2017     GFR Estimate   Date Value Ref Range Status   08/17/2018 49 (L) >60 mL/min/1.7m2 Final     Comment:     Non  GFR Calc     Hypertension/CAD: Current medications include ASA 81mg daily, amlodipine 10mg daily, losartan 100mg daily, metoprolol tartrate 75mg BID and SL NTG PRN (no recent use).  Patient does not self-monitor BP.  Patient reports some lightheadedness, reports she only drinks about 1 cup of fluids in a day.  BP Readings from Last 3 Encounters:   10/31/18 136/74   08/17/18 133/64   05/03/18 132/72      Hyperlipidemia: Current therapy includes  atorvastatin 20mg once daily.  Pt reports no significant myalgias or other side effects.  She has had myalgias with higher statin doses  LDL Cholesterol Calculated   Date Value Ref Range Status   08/01/2018 61 <100 mg/dL Final     Comment:     Desirable:       <100 mg/dl      Pain:  She continues using APAP as needed for general aches and pains, generally 500-1000mg QHS, sometimes another dose during the day if needed.  She denies side effects.    GERD: Current medications include: pantoprazole 40mg - she's trying to taper off of this again so is now taking every 3rd day.   Pt c/o no current symptoms.  Patient feels that current regimen is effective.     Supplements:  She's finishing her supply of calcium 500mg daily (will then stop) and continues Vitamin D 1000 IU daily. She eats yogurt daily, some milk in her cereal.  She occasionally has cottage cheese.  Her plan is to focus on increasing dietary calcium intake.      Today's Vitals: /77 (BP Location: Right arm, Patient Position: Sitting, Cuff Size: Adult Large)   Pulse 71   Wt 221 lb (100.2 kg)   BMI 40.42 kg/m      ASSESSMENT:              Current medications were reviewed today as discussed above.      Medication Adherence: good, no issues identified    Diabetes: Unimproved.  A1c is at goal <8%; fasting BG are above goal 90-150mg/dL, would benefit from increasing insulin dose, which she declines.  We also could discontinue metformin and increase her insulin to help with diarrhea, she also declines this.    Hypertension/CAD: Stable.  BP at goal <140/90mmHg.  Would benefit from increasing fluid intake throughout the day.    Hyperlipidemia: Stable. Pt is on moderate intensity statin which is indicated based on 2013 ACC/AHA guidelines for lipid management.       Pain: Stable.    GERD: Stable.  Current treatment is effective.     Supplements:  Stable.     PLAN:                  1.  Try to increase the amount of fluid that you drink each day.  This is good  for your overall health and will help prevent dizziness.  2.  We can certainly get rid of metformin, but we'll need to increase insulin.  Think about it.    I spent 30 minutes with this patient today. All changes were made via collaborative practice agreement with Dr. Gonsalez. A copy of the visit note was provided to the patient's primary care provider.     Will follow up with Dr. Gonsalez in April (appt already scheduled), will f/up thereafter based on lab results.    The patient was given a summary of these recommendations as an after visit summary.    Lissy Diana, PharmD, [facility]  Medication Therapy Management Provider  Pager: [phone]

## 2018-12-10 NOTE — PATIENT INSTRUCTIONS
Recommendations from today's MTM visit:                                                    MTM (medication therapy management) is a service provided by a clinical pharmacist designed to help you get the most of out of your medicines.   Today we reviewed what your medicines are for, how to know if they are working, that your medicines are safe and how to make your medicine regimen as easy as possible.     1.  Try to increase the amount of fluid that you drink each day.  This is good for your overall health and will help prevent dizziness.    2.  We can certainly get rid of metformin, but we'll need to increase insulin.  Think about it.    Next MTM visit:  Pending labs in April    To schedule another MTM appointment, please call the clinic directly or you may call the MTM scheduling line at [phone] or toll-free at [phone].     My Clinical Pharmacist's contact information:                                                      It was a pleasure talking with you today!  Please feel free to contact me with any questions or concerns you have.      Екатерина Garcia PharmD  Medication Therapy Management (MTM) Resident  Pager: [phone]    Lissy Diana PharmD, [facility]  Medication Therapy Management Provider  Pager: [phone]      You may receive a survey about the MTM services you received.  I would appreciate your feedback to help me serve you better in the future. Please fill it out and return it when you can. Your comments will be anonymous.

## 2018-12-27 ENCOUNTER — TELEPHONE (OUTPATIENT)
Dept: FAMILY MEDICINE | Facility: CLINIC | Age: 78
End: 2018-12-27

## 2018-12-27 NOTE — TELEPHONE ENCOUNTER
The letter states that atorvastatin 20mg has a quantity limit of 45 tablets/30 days and losartan 50mg has a quantity limit of 60 tablets/30 days.    Phone # listed to call with questions is [phone].  Cara and I tried calling this # several times and just received a VM stating that call volumes were high and to call back later (no option to hold).    I'm uncertain if this limits her to a 30 day supply, or if she can continue to receive 90 days.  I will continue to try to call to see what I can find out.      Lissy Diana, PharmD, [facility]  Medication Therapy Management Provider  Pager: [phone]

## 2018-12-27 NOTE — TELEPHONE ENCOUNTER
Reason for Call:  Other Medication Change    Detailed comments: The patient came in with her Medicare form about her   atorvastatin (LIPITOR) 20 MG tablet  losartan (COZAAR) 50 MG tablet  There has been quantity limit put on these and she wants us to request an exception or make a change please    Phone Number Patient can be reached at: Home number on file [phone] (home)    Best Time: anytime    Can we leave a detailed message on this number? YES    Call taken on 12/27/2018 at 10:32 AM by Estrella Mederos

## 2018-12-28 NOTE — TELEPHONE ENCOUNTER
I was able to reach a representative with [facility] today.  She stated these quantity limits are only per month, pt can continue to get 90 day supplies.  No changes should be required for Cara's Rx's.      I called Cara back and left her a message letting her know no changes will be needed.    Lissy Diana, PharmD, [facility]  Medication Therapy Management Provider  Pager: [phone]

## 2019-01-21 DIAGNOSIS — K21.9 GASTROESOPHAGEAL REFLUX DISEASE, ESOPHAGITIS PRESENCE NOT SPECIFIED: ICD-10-CM

## 2019-01-21 DIAGNOSIS — E11.9 TYPE 2 DIABETES, HBA1C GOAL < 7% (H): ICD-10-CM

## 2019-01-21 DIAGNOSIS — E78.5 HYPERLIPIDEMIA LDL GOAL <70: ICD-10-CM

## 2019-01-21 DIAGNOSIS — E11.59 TYPE 2 DIABETES MELLITUS WITH VASCULAR DISEASE (H): ICD-10-CM

## 2019-01-22 RX ORDER — ATORVASTATIN CALCIUM 20 MG/1
TABLET, FILM COATED ORAL
Qty: 90 TABLET | Refills: 1 | Status: SHIPPED | OUTPATIENT
Start: 2019-01-22 | End: 2019-07-26

## 2019-01-22 RX ORDER — PEN NEEDLE, DIABETIC 29 G X1/2"
NEEDLE, DISPOSABLE MISCELLANEOUS
Qty: 100 EACH | Refills: 1 | Status: SHIPPED | OUTPATIENT
Start: 2019-01-22

## 2019-01-22 RX ORDER — PANTOPRAZOLE SODIUM 40 MG/1
TABLET, DELAYED RELEASE ORAL
Qty: 90 TABLET | Refills: 0 | Status: SHIPPED | OUTPATIENT
Start: 2019-01-22 | End: 2019-04-29

## 2019-01-22 NOTE — TELEPHONE ENCOUNTER
"atorvastatin (LIPITOR) 20 MG tablet  Last Written Prescription Date:  7/30/18  Last Fill Quantity: 90 tablet,  # refills: 1   Last office visit: 10/31/2018 with prescribing provider:  Wallace Mendoza Office Visit:      ONETOUCH ULTRA test strip  Last Written Prescription Date:  10/09/18  Last Fill Quantity: 300 strip,  # refills: 0   Last office visit: 10/31/2018 with prescribing provider:  Wallace Mendoza Office Visit:      BD INSULIN SYRINGE U/F 30G X 1/2\" 0.5 ML miscellaneous  Last Written Prescription Date:  9/13/18  Last Fill Quantity: 100 each,  # refills: 0   Last office visit: 10/31/2018 with prescribing provider:  Wallace Mendoza Office Visit:      pantoprazole (PROTONIX) 40 MG EC tablet  Last Written Prescription Date:  7/10/18  Last Fill Quantity: 90 tablet,  # refills: 0   Last office visit: 10/31/2018 with prescribing provider:  Wallace Mendoza Office Visit:        Requested Prescriptions   Pending Prescriptions Disp Refills     atorvastatin (LIPITOR) 20 MG tablet [Pharmacy Med Name: ATORVASTATIN 20 MG TABLET] 90 tablet 1     Sig: TAKE 1 TABLET BY MOUTH EVERY DAY    Statins Protocol Passed - 1/21/2019  9:53 AM       Passed - LDL on file in past 12 months    Recent Labs   Lab Test 08/01/18  0759   LDL 61            Passed - No abnormal creatine kinase in past 12 months    Recent Labs   Lab Test 01/23/17  1557   CKT 68               Passed - Recent (12 mo) or future (30 days) visit within the authorizing provider's specialty    Patient had office visit in the last 12 months or has a visit in the next 30 days with authorizing provider or within the authorizing provider's specialty.  See \"Patient Info\" tab in inbasket, or \"Choose Columns\" in Meds & Orders section of the refill encounter.             Passed - Medication is active on med list       Passed - Patient is age 18 or older       Passed - No active pregnancy on record       Passed - No positive pregnancy test in past 12 months        ONETOUCH ULTRA " "test strip [Pharmacy Med Name: ONE TOUCH ULTRA BLUE TEST STRP] 300 strip 0     Sig: USE TO TEST 3 TIMES DAILY    Diabetic Supplies Protocol Passed - 1/21/2019  9:53 AM       Passed - Medication is active on med list       Passed - Patient is 18 years of age or older       Passed - Recent (6 mo) or future (30 days) visit within the authorizing provider's specialty    Patient had office visit in the last 6 months or has a visit in the next 30 days with authorizing provider.  See \"Patient Info\" tab in inbasket, or \"Choose Columns\" in Meds & Orders section of the refill encounter.            BD INSULIN SYRINGE U/F 30G X 1/2\" 0.5 ML miscellaneous [Pharmacy Med Name: BD INS SYR UF 0.5ML [id number]]  0     Sig: USE ONE SYRINGE TWICE DAILY OR AS DIRECTED.    Diabetic Supplies Protocol Passed - 1/21/2019  9:53 AM       Passed - Medication is active on med list       Passed - Patient is 18 years of age or older       Passed - Recent (6 mo) or future (30 days) visit within the authorizing provider's specialty    Patient had office visit in the last 6 months or has a visit in the next 30 days with authorizing provider.  See \"Patient Info\" tab in inbasket, or \"Choose Columns\" in Meds & Orders section of the refill encounter.            pantoprazole (PROTONIX) 40 MG EC tablet [Pharmacy Med Name: PANTOPRAZOLE SOD DR 40 MG TAB] 90 tablet 0     Sig: TAKE 1 TABLET BY MOUTH ONCE DAILY 30 TO 60 MINUTES BEFORE A MEAL    PPI Protocol Passed - 1/21/2019  9:53 AM       Passed - Not on Clopidogrel (unless Pantoprazole ordered)       Passed - No diagnosis of osteoporosis on record       Passed - Recent (12 mo) or future (30 days) visit within the authorizing provider's specialty    Patient had office visit in the last 12 months or has a visit in the next 30 days with authorizing provider or within the authorizing provider's specialty.  See \"Patient Info\" tab in inbasket, or \"Choose Columns\" in Meds & Orders section of the refill encounter.  "            Passed - Medication is active on med list       Passed - Patient is age 18 or older       Passed - No active pregnacy on record       Passed - No positive pregnancy test in past 12 months

## 2019-01-22 NOTE — TELEPHONE ENCOUNTER
Atorvastatin and diabetes supplies   Prescriptions approved per [facility] [place].    Pantoprazole - Routing refill request to provider for review/approval because:  Per note from MTM on med list for pantoprazole:   Medication Notes          NAYELI SOLOMON Dec 10, 2018 11:23 AM  Is tapering off - taking every 3rd day              Please advise   Ana CHAVES RN

## 2019-04-29 ENCOUNTER — OFFICE VISIT (OUTPATIENT)
Dept: FAMILY MEDICINE | Facility: CLINIC | Age: 79
End: 2019-04-29
Payer: COMMERCIAL

## 2019-04-29 VITALS
OXYGEN SATURATION: 97 % | TEMPERATURE: 98.1 F | DIASTOLIC BLOOD PRESSURE: 80 MMHG | HEIGHT: 62 IN | WEIGHT: 223 LBS | BODY MASS INDEX: 41.04 KG/M2 | SYSTOLIC BLOOD PRESSURE: 132 MMHG | HEART RATE: 77 BPM

## 2019-04-29 DIAGNOSIS — I25.10 CORONARY ARTERY DISEASE INVOLVING NATIVE CORONARY ARTERY OF NATIVE HEART WITHOUT ANGINA PECTORIS: Primary | ICD-10-CM

## 2019-04-29 DIAGNOSIS — N18.30 CKD (CHRONIC KIDNEY DISEASE) STAGE 3, GFR 30-59 ML/MIN (H): ICD-10-CM

## 2019-04-29 DIAGNOSIS — E11.59 TYPE 2 DIABETES MELLITUS WITH VASCULAR DISEASE (H): ICD-10-CM

## 2019-04-29 DIAGNOSIS — E66.01 MORBID OBESITY (H): ICD-10-CM

## 2019-04-29 DIAGNOSIS — I10 ESSENTIAL HYPERTENSION, BENIGN: ICD-10-CM

## 2019-04-29 DIAGNOSIS — E78.5 HYPERLIPIDEMIA LDL GOAL <70: ICD-10-CM

## 2019-04-29 LAB
ERYTHROCYTE [DISTWIDTH] IN BLOOD BY AUTOMATED COUNT: 14.6 % (ref 10–15)
HBA1C MFR BLD: 7.8 % (ref 0–5.6)
HCT VFR BLD AUTO: 46.2 % (ref 35–47)
HGB BLD-MCNC: 15.2 G/DL (ref 11.7–15.7)
MCH RBC QN AUTO: 29.2 PG (ref 26.5–33)
MCHC RBC AUTO-ENTMCNC: 32.9 G/DL (ref 31.5–36.5)
MCV RBC AUTO: 89 FL (ref 78–100)
PLATELET # BLD AUTO: 213 10E9/L (ref 150–450)
RBC # BLD AUTO: 5.2 10E12/L (ref 3.8–5.2)
WBC # BLD AUTO: 7.7 10E9/L (ref 4–11)

## 2019-04-29 PROCEDURE — 82043 UR ALBUMIN QUANTITATIVE: CPT | Performed by: INTERNAL MEDICINE

## 2019-04-29 PROCEDURE — 99214 OFFICE O/P EST MOD 30 MIN: CPT | Performed by: INTERNAL MEDICINE

## 2019-04-29 PROCEDURE — 85027 COMPLETE CBC AUTOMATED: CPT | Performed by: INTERNAL MEDICINE

## 2019-04-29 PROCEDURE — 83721 ASSAY OF BLOOD LIPOPROTEIN: CPT | Performed by: INTERNAL MEDICINE

## 2019-04-29 PROCEDURE — 36415 COLL VENOUS BLD VENIPUNCTURE: CPT | Performed by: INTERNAL MEDICINE

## 2019-04-29 PROCEDURE — 84443 ASSAY THYROID STIM HORMONE: CPT | Performed by: INTERNAL MEDICINE

## 2019-04-29 PROCEDURE — 80053 COMPREHEN METABOLIC PANEL: CPT | Performed by: INTERNAL MEDICINE

## 2019-04-29 PROCEDURE — 83036 HEMOGLOBIN GLYCOSYLATED A1C: CPT | Performed by: INTERNAL MEDICINE

## 2019-04-29 ASSESSMENT — MIFFLIN-ST. JEOR: SCORE: 1429.77

## 2019-04-29 NOTE — LETTER
[facility]  [address] [place] [address]  [address]  [place]  [address]  Tel: [phone]    April 30, 2019    Cara NOVOA Marquise  [address] [facility] [address]        Dear MsCelena Marquise,    The following letter pertains to your most recent diagnostic tests:     -Your microalbumin level which is a diabetes urine test to check for any evidence of early kidney injury from diabetes returned negative.     -TSH (thyroid stimulating hormone) level is normal which indicates normal circulating thyroid hormone levels.       -Cholesterol is OK.     -Liver and gallbladder tests are normal for you. (ALT,AST, Alk phos, bilirubin), kidney function is stable for you (Creatinine, GFR), Sodium is normal, Potassium is normal for you, Calcium is normal for you, Glucose (blood sugar) is normal for you.       -Your hemoglobin A1c test which is a diabetes blood test that represents and average of your blood sugars over the last 3 months returned at 7.8 which is at your goal of hemoglobin A1c less than 8.   .   -Your complete blood counts including your hemoglobin returned normal for you.       Bottom line:  Your lab results look stable.  No need to make any changes to your medications based on this result.         Follow up:  Office visit follow up in 6 months to check on diabetes.       If you have any further questions or problems, please contact our office.      Sincerely,    Kole Gonsalez MD/ARISTIDES          Enclosure: Lab Results  Results for orders placed or performed in visit on 04/29/19   Comprehensive metabolic panel   Result Value Ref Range    Sodium 140 133 - 144 mmol/L    Potassium 4.8 3.4 - 5.3 mmol/L    Chloride 109 94 - 109 mmol/L    Carbon Dioxide 23 20 - 32 mmol/L    Anion Gap 8 3 - 14 mmol/L    Glucose 137 (H) 70 - 99 mg/dL    Urea Nitrogen 22 7 - 30 mg/dL    Creatinine 1.17 (H) 0.52 - 1.04 mg/dL    GFR Estimate 44 (L) >60 mL/min/[1.73_m2]    GFR Estimate If Black 51 (L) >60 mL/min/[1.73_m2]    Calcium 9.2  8.5 - 10.1 mg/dL    Bilirubin Total 0.5 0.2 - 1.3 mg/dL    Albumin 3.8 3.4 - 5.0 g/dL    Protein Total 7.6 6.8 - 8.8 g/dL    Alkaline Phosphatase 116 40 - 150 U/L    ALT 26 0 - 50 U/L    AST 20 0 - 45 U/L   CBC with platelets   Result Value Ref Range    WBC 7.7 4.0 - 11.0 10e9/L    RBC Count 5.20 3.8 - 5.2 10e12/L    Hemoglobin 15.2 11.7 - 15.7 g/dL    Hematocrit 46.2 35.0 - 47.0 %    MCV 89 78 - 100 fl    MCH 29.2 26.5 - 33.0 pg    MCHC 32.9 31.5 - 36.5 g/dL    RDW 14.6 10.0 - 15.0 %    Platelet Count 213 150 - 450 10e9/L   TSH   Result Value Ref Range    TSH 5.33 (H) 0.40 - 4.00 mU/L   Albumin Random Urine Quantitative with Creat Ratio   Result Value Ref Range    Creatinine Urine 245 mg/dL    Albumin Urine mg/L 48 mg/L    Albumin Urine mg/g Cr 19.59 0 - 25 mg/g Cr   Hemoglobin A1c   Result Value Ref Range    Hemoglobin A1C 7.8 (H) 0 - 5.6 %   LDL cholesterol direct   Result Value Ref Range    LDL Cholesterol Direct 73 <100 mg/dL

## 2019-04-29 NOTE — PROGRESS NOTES
SUBJECTIVE:   Cara Camarillo is a 79 year old female who presents to clinic today for the following   health issues:    Diabetes Follow-up    Patient is checking blood sugars: once daily.  Results are as follows:         160's-170's average    Diabetic concerns: None     Symptoms of hypoglycemia (low blood sugar): none     Paresthesias (numbness or burning in feet) or sores: No     Date of last diabetic eye exam: over 1 year ago    BP Readings from Last 2 Encounters:   04/29/19 132/80   12/10/18 129/77     Hemoglobin A1C (%)   Date Value   08/17/2018 7.7 (H)   04/27/2018 7.8 (H)     LDL Cholesterol Calculated (mg/dL)   Date Value   08/01/2018 61   09/27/2017 48       Diabetes Management Resources      Amount of exercise or physical activity: None    Problems taking medications regularly: No    Medication side effects: some diarrhea    Diet: regular (no restrictions)      Plan she returns in follow-up regarding her coronary artery disease, type 2 diabetes on insulin, hypertension, hyperlipidemia, chronic kidney disease, obesity.  She feels well and is without specific complaints today.  We reviewed her blood glucose log.    Additional history: as documented    Reviewed  and updated as needed this visit by clinical staff  Tobacco  Allergies  Meds         Reviewed and updated as needed this visit by Provider  Tobacco         Patient Active Problem List   Diagnosis     History of cancer of uterus     Dysthymic disorder     Herpes zoster     Essential hypertension, benign     SOLITARY KIDNEY ANOMALY NEC     Hyperlipidemia LDL goal <70     CKD (chronic kidney disease) stage 3, GFR 30-59 ml/min (H)     Advanced directives, counseling/discussion     Anxiety     Tubular adenoma     OA (osteoarthritis)     Aortic stenosis, mild     IVCD (intraventricular conduction defect)     B12 deficiency     Iron deficiency     Esophageal reflux     Overweight BMI 35-40     Osteopenia     RBBB     Left ventricular diastolic  dysfunction     Coronary artery disease     Type 2 diabetes mellitus with diabetic peripheral angiopathy without gangrene, without long-term current use of insulin (H)     Chest wall pain     Bilateral edema of lower extremity     Coronary artery disease involving native coronary artery of native heart without angina pectoris     Past Surgical History:   Procedure Laterality Date     ARTHROPLASTY KNEE  2013    Procedure: ARTHROPLASTY KNEE;  RIGHT TOTAL KNEE ARTHROPLASTY;  Surgeon: Romaine Constantino MD;  Location:  OR     ARTHROPLASTY KNEE  2/3/2014    Procedure: ARTHROPLASTY KNEE;  LEFT TOTAL KNEE ARTHROPLASTY (BIOMET)^;  Surgeon: Romaine Constantino MD;  Location:  OR     C ANESTH, SECTION       C NONSPECIFIC PROCEDURE  3/20/06    CT scan of chest/abd/pelvis- negative for recurrence of CA     EXCISE MASS LOWER EXTREMITY Left 2015    Procedure: EXCISE MASS LOWER EXTREMITY;  Surgeon: Sloan Richardson MD;  Location: [facility] UGI ENDOSCOPY W EUS  2013    Procedure: COMBINED ENDOSCOPIC ULTRASOUND, ESOPHAGOSCOPY, GASTROSCOPY, DUODENOSCOPY (EGD);  Surgeon: Lyric Simons MD;  Location:  GI     HYSTERECTOMY      Vaginal     HYSTERECTOMY, VAGINAL  05    Uterine CA     LAPAROSCOPIC CHOLECYSTECTOMY  1/10/2013    Procedure: LAPAROSCOPIC CHOLECYSTECTOMY;  LAPAROSCOPIC CHOLECYSTECTOMY ;  Surgeon: Eduardo Taylor MD;  Location:  OR     NEPHRECTOMY BILATERAL       NEPHRECTOMY RT/LT  OCT 2005     OTHER SURGICAL HISTORY      angiogram 2016: ISIDRO to PDA     OTHER SURGICAL HISTORY      angiogram 2016: ISIDRO to LAD       Social History     Tobacco Use     Smoking status: Never Smoker     Smokeless tobacco: Never Used   Substance Use Topics     Alcohol use: No     Alcohol/week: 0.0 oz     Family History   Problem Relation Age of Onset     Diabetes Father         Adult onset     Other - See Comments Mother 95        Old age         Current Outpatient Medications   Medication  "Sig Dispense Refill     acetaminophen (TYLENOL) 500 MG tablet Take 500-1,000 mg by mouth 3 times daily as needed for mild pain       amLODIPine (NORVASC) 10 MG tablet TAKE 1 TABLET BY MOUTH ONCE DAILY 90 tablet 3     aspirin 81 MG tablet Take 1 tablet (81 mg) by mouth daily 100 tablet 3     atorvastatin (LIPITOR) 20 MG tablet TAKE 1 TABLET BY MOUTH EVERY DAY 90 tablet 1     BD INSULIN SYRINGE U/F 30G X 1/2\" 0.5 ML miscellaneous USE ONE SYRINGE TWICE DAILY OR AS DIRECTED. 100 each 1     insulin NPH (NOVOLIN N VIAL) 100 UNIT/ML injection 10 units before breakfast, 10 units before dinner 10 mL 1     losartan (COZAAR) 50 MG tablet TAKE 2 TABLETS BY MOUTH DAILY 180 tablet 3     metFORMIN (GLUCOPHAGE-XR) 500 MG 24 hr tablet TAKE 2 TABLETS BY MOUTH IN MORNING, AND 1 TABLET IN EVENING. 270 tablet 3     metoprolol tartrate (LOPRESSOR) 50 MG tablet TAKE ONE AND ONE-HALF TABLETS BY MOUTH TWICE DAILY 270 tablet 2     nitroglycerin (NITROSTAT) 0.4 MG SL tablet Place 1 tablet (0.4 mg) under the tongue every 5 minutes as needed for chest pain if you are still having symptoms after 3 doses (15 minutes) call 911. 25 tablet 1     ONETOUCH ULTRA test strip USE TO TEST 3 TIMES DAILY 300 strip 1     VITAMIN D 1000 UNIT OR CAPS 1 CAPSULE DAILY 30 0     Allergies   Allergen Reactions     Actos [Pioglitazone]      Lower extremity edema       Enalapril      Renal failure     Hydrochlorothiazide      Dry mouth     Lisinopril      Hyperkalemia         ROS:  Constitutional, HEENT, cardiovascular, pulmonary, gi and gu systems are negative, except as otherwise noted.    OBJECTIVE:     /80 (BP Location: Right arm, Patient Position: Sitting, Cuff Size: Adult Large)   Pulse 77   Temp 98.1  F (36.7  C) (Tympanic)   Ht 1.575 m (5' 2\")   Wt 101.2 kg (223 lb)   SpO2 97%   Breastfeeding? No   BMI 40.79 kg/m    Body mass index is 40.79 kg/m .  GENERAL: healthy, alert and no distress  NECK: no adenopathy, no asymmetry, masses, or scars and " thyroid normal to palpation  RESP: lungs clear to auscultation - no rales, rhonchi or wheezes  CV: Heart with regular rate and rhythm.  No carotid bruits  ABDOMEN: soft, nontender, no hepatosplenomegaly, no masses and bowel sounds normal  MS: no gross musculoskeletal defects noted, no edema  NEURO: Normal strength and tone, mentation intact and speech normal  PSYCH: mentation appears normal, affect normal/bright    Diagnostic Test Results:  Labs pending    ASSESSMENT/PLAN:       1. 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)  Recheck A1c and other labs  - Comprehensive metabolic panel  - CBC with platelets  - TSH  - Albumin Random Urine Quantitative with Creat Ratio  - Hemoglobin A1c    2. Morbid obesity (H)  Counseled on diet and exercise interventions to promote weight loss     3. Coronary artery disease involving native coronary artery of native heart without angina pectoris  Stable symptoms; continue follow-up with cardiology as directed    4. Essential hypertension, benign  On second check in clinic, blood pressure under adequate control    5. CKD (chronic kidney disease) stage 3, GFR 30-59 ml/min (H)  Recheck renal function    6. Hyperlipidemia LDL goal <70  On statin therapy, check direct LDL today  - LDL cholesterol direct        Kole Gonsalez MD  [facility]

## 2019-04-30 LAB
ALBUMIN SERPL-MCNC: 3.8 G/DL (ref 3.4–5)
ALP SERPL-CCNC: 116 U/L (ref 40–150)
ALT SERPL W P-5'-P-CCNC: 26 U/L (ref 0–50)
ANION GAP SERPL CALCULATED.3IONS-SCNC: 8 MMOL/L (ref 3–14)
AST SERPL W P-5'-P-CCNC: 20 U/L (ref 0–45)
BILIRUB SERPL-MCNC: 0.5 MG/DL (ref 0.2–1.3)
BUN SERPL-MCNC: 22 MG/DL (ref 7–30)
CALCIUM SERPL-MCNC: 9.2 MG/DL (ref 8.5–10.1)
CHLORIDE SERPL-SCNC: 109 MMOL/L (ref 94–109)
CO2 SERPL-SCNC: 23 MMOL/L (ref 20–32)
CREAT SERPL-MCNC: 1.17 MG/DL (ref 0.52–1.04)
CREAT UR-MCNC: 245 MG/DL
GFR SERPL CREATININE-BSD FRML MDRD: 44 ML/MIN/{1.73_M2}
GLUCOSE SERPL-MCNC: 137 MG/DL (ref 70–99)
LDLC SERPL DIRECT ASSAY-MCNC: 73 MG/DL
MICROALBUMIN UR-MCNC: 48 MG/L
MICROALBUMIN/CREAT UR: 19.59 MG/G CR (ref 0–25)
POTASSIUM SERPL-SCNC: 4.8 MMOL/L (ref 3.4–5.3)
PROT SERPL-MCNC: 7.6 G/DL (ref 6.8–8.8)
SODIUM SERPL-SCNC: 140 MMOL/L (ref 133–144)
TSH SERPL DL<=0.005 MIU/L-ACNC: 5.33 MU/L (ref 0.4–4)

## 2019-04-30 NOTE — RESULT ENCOUNTER NOTE
The following letter pertains to your most recent diagnostic tests:    -Your microalbumin level which is a diabetes urine test to check for any evidence of early kidney injury from diabetes returned negative.     -TSH (thyroid stimulating hormone) level is normal which indicates normal circulating thyroid hormone levels.      -Cholesterol is OK.     -Liver and gallbladder tests are normal for you. (ALT,AST, Alk phos, bilirubin), kidney function is stable for you (Creatinine, GFR), Sodium is normal, Potassium is normal for you, Calcium is normal for you, Glucose (blood sugar) is normal for you.      -Your hemoglobin A1c test which is a diabetes blood test that represents and average of your blood sugars over the last 3 months returned at 7.8 which is at your goal of hemoglobin A1c less than 8.   .  -Your complete blood counts including your hemoglobin returned normal for you.       Bottom line:  Your lab results look stable.  No need to make any changes to your medications based on this result.        Follow up:  Office visit follow up in 6 months to check on diabetes.        Sincerely,    Dr. Gonsalez

## 2019-05-07 DIAGNOSIS — E11.59 TYPE 2 DIABETES MELLITUS WITH VASCULAR DISEASE (H): ICD-10-CM

## 2019-05-07 NOTE — TELEPHONE ENCOUNTER
metFORMIN (GLUCOPHAGE-XR) 500 MG 24 hr tablet 270 tablet 3 4/30/2018     Last Written Prescription Date:  4/30/18  Last Fill Quantity: 270,  # refills: 3   Last office visit: 12/10/2018 with prescribing provider:  Wallace   Future Office Visit:  None    Requested Prescriptions   Pending Prescriptions Disp Refills     metFORMIN (GLUCOPHAGE-XR) 500 MG 24 hr tablet [Pharmacy Med Name: METFORMIN  MG TABLET] 270 tablet 3     Sig: TAKE 2 TABLETS BY MOUTH IN MORNING, AND 1 TABLET IN EVENING.       Biguanide Agents Passed - 5/7/2019 11:28 AM        Passed - Blood pressure less than 140/90 in past 6 months     BP Readings from Last 3 Encounters:   04/29/19 132/80   12/10/18 129/77   10/31/18 136/74                 Passed - Patient has documented LDL within the past 12 mos.     Recent Labs   Lab Test 04/29/19  1447   LDL 73             Passed - Patient has had a Microalbumin in the past 15 mos.     Recent Labs   Lab Test 04/29/19  1447   MICROL 48   UMALCR 19.59             Passed - Patient is age 10 or older        Passed - Patient has documented A1c within the specified period of time.     If HgbA1C is 8 or greater, it needs to be on file within the past 3 months.  If less than 8, must be on file within the past 6 months.     Recent Labs   Lab Test 04/29/19  1447   A1C 7.8*             Passed - Patient's CR is NOT>1.4 OR Patient's EGFR is NOT<45 within past 12 mos.     Recent Labs   Lab Test 04/29/19  1447   GFRESTIMATED 44*   GFRESTBLACK 51*       Recent Labs   Lab Test 04/29/19  1447   CR 1.17*             Passed - Patient does NOT have a diagnosis of CHF.        Passed - Medication is active on med list        Passed - Patient is not pregnant        Passed - Patient has not had a positive pregnancy test within the past 12 mos.         Passed - Recent (6 mo) or future (30 days) visit within the authorizing provider's specialty     Patient had office visit in the last 6 months or has a visit in the next 30 days with  "authorizing provider or within the authorizing provider's specialty.  See \"Patient Info\" tab in inbasket, or \"Choose Columns\" in Meds & Orders section of the refill encounter.            [facility] Follow-up to PHQ 3/16/2016 4/12/2017 8/17/2018   PHQ-9 9. Suicide Ideation past 2 weeks Not at all Not at all Not at all         "

## 2019-05-08 RX ORDER — METFORMIN HCL 500 MG
TABLET, EXTENDED RELEASE 24 HR ORAL
Qty: 270 TABLET | Refills: 3 | Status: SHIPPED | OUTPATIENT
Start: 2019-05-08 | End: 2020-05-09

## 2019-05-08 NOTE — TELEPHONE ENCOUNTER
Prescription approved per [facility] Refill Protocol.  Ritika Truong RN on 5/8/2019 at 2:08 PM

## 2019-05-22 DIAGNOSIS — I10 ESSENTIAL HYPERTENSION WITH GOAL BLOOD PRESSURE LESS THAN 140/90: ICD-10-CM

## 2019-05-22 DIAGNOSIS — E11.9 TYPE 2 DIABETES MELLITUS WITHOUT COMPLICATION, WITH LONG-TERM CURRENT USE OF INSULIN (H): ICD-10-CM

## 2019-05-22 DIAGNOSIS — Z79.4 TYPE 2 DIABETES MELLITUS WITHOUT COMPLICATION, WITH LONG-TERM CURRENT USE OF INSULIN (H): ICD-10-CM

## 2019-05-22 NOTE — TELEPHONE ENCOUNTER
"losartan (COZAAR) 50 MG tablet  Last Written Prescription Date:  4/30/18  Last Fill Quantity: 180,  # refills: 3   Last office visit: 12/10/2018 with prescribing provider:  Wallace    Future Office Visit:      Requested Prescriptions   Pending Prescriptions Disp Refills     losartan (COZAAR) 50 MG tablet [Pharmacy Med Name: LOSARTAN POTASSIUM 50 MG TAB] 180 tablet 3     Sig: TAKE 2 TABLETS BY MOUTH EVERY DAY       Angiotensin-II Receptors Failed - 5/22/2019  7:31 AM        Failed - Normal serum creatinine on file in past 12 months     Recent Labs   Lab Test 04/29/19  1447   CR 1.17*             Passed - Blood pressure under 140/90 in past 12 months     BP Readings from Last 3 Encounters:   04/29/19 132/80   12/10/18 129/77   10/31/18 136/74                 Passed - Recent (12 mo) or future (30 days) visit within the authorizing provider's specialty     Patient had office visit in the last 12 months or has a visit in the next 30 days with authorizing provider or within the authorizing provider's specialty.  See \"Patient Info\" tab in inbasket, or \"Choose Columns\" in Meds & Orders section of the refill encounter.              Passed - Medication is active on med list        Passed - Patient is age 18 or older        Passed - No active pregnancy on record        Passed - Normal serum potassium on file in past 12 months     Recent Labs   Lab Test 04/29/19  1447   POTASSIUM 4.8                    Passed - No positive pregnancy test in past 12 months          "

## 2019-05-23 RX ORDER — LOSARTAN POTASSIUM 50 MG/1
TABLET ORAL
Qty: 180 TABLET | Refills: 3 | Status: SHIPPED | OUTPATIENT
Start: 2019-05-23 | End: 2020-06-05

## 2019-05-23 NOTE — TELEPHONE ENCOUNTER
"Prescription approved per [facility] [place]ill Protocol.  Per Lab note: \"kidney function is stable for you\"    Yoel NOVOA RN  "

## 2019-06-26 DIAGNOSIS — I10 ESSENTIAL HYPERTENSION, BENIGN: ICD-10-CM

## 2019-06-27 NOTE — TELEPHONE ENCOUNTER
"Last Written Prescription Date:  9/27/18  Last Fill Quantity: 270 tablet,  # refills: 2   Last office visit: 4/29/2019 with prescribing provider:  Wallace   Future Office Visit:      Requested Prescriptions   Pending Prescriptions Disp Refills     metoprolol tartrate (LOPRESSOR) 50 MG tablet [Pharmacy Med Name: METOPROLOL TARTRATE 50 MG TAB] 270 tablet 2     Sig: TAKE ONE AND ONE-HALF TABLETS BY MOUTH TWICE DAILY       Beta-Blockers Protocol Passed - 6/26/2019  5:25 PM        Passed - Blood pressure under 140/90 in past 12 months     BP Readings from Last 3 Encounters:   04/29/19 132/80   12/10/18 129/77   10/31/18 136/74                 Passed - Patient is age 6 or older        Passed - Recent (12 mo) or future (30 days) visit within the authorizing provider's specialty     Patient had office visit in the last 12 months or has a visit in the next 30 days with authorizing provider or within the authorizing provider's specialty.  See \"Patient Info\" tab in inbasket, or \"Choose Columns\" in Meds & Orders section of the refill encounter.              Passed - Medication is active on med list          "

## 2019-06-28 RX ORDER — METOPROLOL TARTRATE 50 MG
TABLET ORAL
Qty: 270 TABLET | Refills: 2 | Status: SHIPPED | OUTPATIENT
Start: 2019-06-28 | End: 2020-04-01

## 2019-06-28 NOTE — TELEPHONE ENCOUNTER
Prescription approved per [facility] Refill Protocol.  Ana CHAVES RN

## 2019-07-26 DIAGNOSIS — E78.5 HYPERLIPIDEMIA LDL GOAL <70: ICD-10-CM

## 2019-07-26 RX ORDER — ATORVASTATIN CALCIUM 20 MG/1
TABLET, FILM COATED ORAL
Qty: 90 TABLET | Refills: 1 | Status: SHIPPED | OUTPATIENT
Start: 2019-07-26 | End: 2020-01-27

## 2019-07-26 NOTE — TELEPHONE ENCOUNTER
"Pending Prescriptions:                       Disp   Refills    atorvastatin (LIPITOR) 20 MG tablet [Phar*90 tab*1            Sig: TAKE 1 TABLET BY MOUTH EVERY DAY    Last Written Prescription Date:  01/22/2019  Last Fill Quantity: 90,  # refills: 1   Last office visit: 04/29//2019 with prescribing provider:     Future Office Visit:    Requested Prescriptions   Pending Prescriptions Disp Refills     atorvastatin (LIPITOR) 20 MG tablet [Pharmacy Med Name: ATORVASTATIN 20 MG TABLET] 90 tablet 1     Sig: TAKE 1 TABLET BY MOUTH EVERY DAY       Statins Protocol Passed - 7/26/2019  3:29 AM        Passed - LDL on file in past 12 months     Recent Labs   Lab Test 04/29/19  1447   LDL 73             Passed - No abnormal creatine kinase in past 12 months     Recent Labs   Lab Test 01/23/17  1557   CKT 68                Passed - Recent (12 mo) or future (30 days) visit within the authorizing provider's specialty     Patient had office visit in the last 12 months or has a visit in the next 30 days with authorizing provider or within the authorizing provider's specialty.  See \"Patient Info\" tab in inbasket, or \"Choose Columns\" in Meds & Orders section of the refill encounter.              Passed - Medication is active on med list        Passed - Patient is age 18 or older        Passed - No active pregnancy on record        Passed - No positive pregnancy test in past 12 months          "

## 2019-08-19 ENCOUNTER — OFFICE VISIT (OUTPATIENT)
Dept: PHARMACY | Facility: CLINIC | Age: 79
End: 2019-08-19
Payer: COMMERCIAL

## 2019-08-19 VITALS — SYSTOLIC BLOOD PRESSURE: 132 MMHG | DIASTOLIC BLOOD PRESSURE: 72 MMHG

## 2019-08-19 DIAGNOSIS — E11.59 TYPE 2 DIABETES MELLITUS WITH VASCULAR DISEASE (H): ICD-10-CM

## 2019-08-19 DIAGNOSIS — I25.10 CORONARY ARTERY DISEASE INVOLVING NATIVE CORONARY ARTERY OF NATIVE HEART WITHOUT ANGINA PECTORIS: ICD-10-CM

## 2019-08-19 DIAGNOSIS — E78.5 HYPERLIPIDEMIA LDL GOAL <70: ICD-10-CM

## 2019-08-19 DIAGNOSIS — H90.3 SENSORY HEARING LOSS, BILATERAL: Primary | ICD-10-CM

## 2019-08-19 DIAGNOSIS — Z78.9 TAKES DIETARY SUPPLEMENTS: ICD-10-CM

## 2019-08-19 DIAGNOSIS — K21.9 GASTROESOPHAGEAL REFLUX DISEASE, ESOPHAGITIS PRESENCE NOT SPECIFIED: ICD-10-CM

## 2019-08-19 DIAGNOSIS — I10 ESSENTIAL HYPERTENSION WITH GOAL BLOOD PRESSURE LESS THAN 140/90: ICD-10-CM

## 2019-08-19 LAB — HBA1C MFR BLD: 7.6 % (ref 0–5.6)

## 2019-08-19 PROCEDURE — 99207 ZZC NO CHARGE LOS: CPT | Performed by: PHARMACIST

## 2019-08-19 PROCEDURE — 83036 HEMOGLOBIN GLYCOSYLATED A1C: CPT | Performed by: INTERNAL MEDICINE

## 2019-08-19 PROCEDURE — 36415 COLL VENOUS BLD VENIPUNCTURE: CPT | Performed by: INTERNAL MEDICINE

## 2019-08-19 PROCEDURE — 80061 LIPID PANEL: CPT | Performed by: INTERNAL MEDICINE

## 2019-08-19 NOTE — LETTER
"     [facility]     Date: 2019    Cara Camarillo  [address] [address]    Dear Ms. Camarillo,    Thank you for talking with me on 19 about your health and medications. Medicare s MTM (Medication Therapy Management) program helps you understand your medications and use them safely.      This letter includes an action plan (Medication Action Plan) and medication list (Personal Medication List). The action plan has steps you should take to help you get the best results from your medications. The medication list will help you keep track of your medications and how to use them the right way.       Have your action plan and medication list with you when you talk with your doctors, pharmacists, and other healthcare providers in your care team.     Ask your doctors, pharmacists, and other healthcare providers to update the action plan and medication list at every visit.     Take your medication list with you if you go to the hospital or emergency room.     Give a copy of the action plan and medication list to your family or caregivers.     If you want to talk about this letter or any of the papers with it, please call   [phone].   We look forward to working with you, your doctors, and other healthcare providers to help you stay healthy.     Sincerely,    Connie Rosen      Enclosed: Medication Action Plan and Personal Medication List    MEDICATION ACTION PLAN FOR Cara Camarillo,  1940     This action plan will help you get the best results from your medications if you:   1. Read \"What we talked about.\"   2. Take the steps listed in the \"What I need to do\" boxes.   3. Fill in \"What I did and when I did it.\"   4. Fill in \"My follow-up plan\" and \"Questions I want to ask.\"     Have this action plan with you when you talk with your doctors, pharmacists, and other healthcare providers in your care team. Share this with your family or caregivers too.  DATE " PREPARED: 2019  What we talked about: routine labs                                              What I need to do: check your A1c and cholesterol today       What I did and when I did it:                                              My follow-up plan:                 Questions I want to ask:              If you have any questions about your action plan, call Connie Rosen, Phone: [phone] , Monday-Friday 8-4:30pm.           MEDICATION LIST FOR MIS Pereira 1940     This medication list was made for you after we talked. We also used information from your doctor's chart.      Use blank rows to add new medications. Then fill in the dates you started using them.    Cross out medications when you no longer use them. Then write the date and why you stopped using them.    Ask your doctors, pharmacists, and other healthcare providers to update this list at every visit. Keep this list up-to-date with:       Prescription medications    Over the counter drugs     Herbals    Vitamins    Minerals      If you go to the hospital or emergency room, take this list with you. Share this with your family or caregivers too.     DATE PREPARED: 2019  Allergies or side effects: Actos [pioglitazone]; Enalapril; Hydrochlorothiazide; and Lisinopril     Medication:  ACETAMINOPHEN 500 MG PO TABS      How I use it:  Take 500-1,000 mg by mouth 3 times daily as needed for mild pain      Why I use it:  Pain    Prescriber:  Patient Reported      Date I started using it:       Date I stopped using it:         Why I stopped using it:            Medication:  AMLODIPINE BESYLATE 10 MG PO TABS      How I use it:  TAKE 1 TABLET BY MOUTH ONCE DAILY      Why I use it: Essential hypertension, benign    Prescriber:  Kole Gonsalez MD      Date I started using it:       Date I stopped using it:         Why I stopped using it:            Medication:  ASPIRIN 81 MG PO TABS      How I use it:  Take 1 tablet (81 mg) by mouth daily     "  Why I use it: Type II or unspecified type diabetes mellitus without mention of complication, not stated as uncontrolled    Prescriber:  Ventura Alvarez MD      Date I started using it:       Date I stopped using it:         Why I stopped using it:            Medication:  ATORVASTATIN CALCIUM 20 MG PO TABS      How I use it:  TAKE 1 TABLET BY MOUTH EVERY DAY      Why I use it: Hyperlipidemia LDL goal <70    Prescriber:  Kole Gonsalez MD      Date I started using it:       Date I stopped using it:         Why I stopped using it:            Medication:  BD INSULIN SYRINGE U/F 30G X 1/2\" 0.5 ML MISC      How I use it:  USE ONE SYRINGE TWICE DAILY OR AS DIRECTED.      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)    Prescriber:  Ventura Alvarez MD      Date I started using it:       Date I stopped using it:         Why I stopped using it:            Medication:  LOSARTAN POTASSIUM 50 MG PO TABS      How I use it:  TAKE 2 TABLETS BY MOUTH EVERY DAY      Why I use it: Essential hypertension with goal blood pressure less than 140/90; Type 2 diabetes mellitus without complication, with long-term current use of insulin (H)    Prescriber:  Kole Gonsalez MD      Date I started using it:       Date I stopped using it:         Why I stopped " using it:            Medication:  METFORMIN HCL  MG PO TB24      How I use it:  TAKE 2 TABLETS BY MOUTH IN MORNING, AND 1 TABLET IN EVENING.      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)    Prescriber:  Kole Gonsalez MD      Date I started using it:       Date I stopped using it:         Why I stopped using it:            Medication:  METOPROLOL TARTRATE 50 MG PO TABS      How I use it:  TAKE ONE AND ONE-HALF TABLETS BY MOUTH TWICE DAILY      Why I use it: Essential hypertension, benign    Prescriber:  Kole Gonsalez MD      Date I started using it:       Date I stopped using it:         Why I stopped using it:            Medication:  NITROGLYCERIN 0.4 MG SL SUBL      How I use it:  Place 1 tablet (0.4 mg) under the tongue every 5 minutes as needed for chest pain if you are still having symptoms after 3 doses (15 minutes) call 911.      Why I use it: Postsurgical percutaneous transluminal coronary angioplasty status; Status post coronary angioplasty    Prescriber:  AKANKSHA Garcia CNP      Date I started using it:       Date I stopped using it:         Why I stopped using it:            Medication:  VITAMIN D 1000 UNIT OR CAPS      How I use it:  1 CAPSULE DAILY      Why I use it: Vitamin D deficiency    Prescriber:  Curtis Alvarez DO      Date I started using it:       Date I stopped using it:         Why I stopped using it:            Medication:         How I use it:         Why I use it:      Prescriber:         Date I started using it:       Date I stopped using it:         Why I stopped using it:            Medication:         How I use it:         Why I use it:      Prescriber:         Date I started using it:       Date I stopped using it:         Why I stopped using it:            Medication:         How I use it:         Why I use it:      Prescriber:         Date I started using it:       Date I stopped using it:         Why I stopped using it:               Other Information:     If you have any questions about your action plan, call [phone].    According to the Paperwork Reduction Act of 1995, no persons are required to respond to a collection of information unless it displays a valid OMB control number. The valid OMB number for this information collection is 0429-1207. The time required to complete this information collection is estimated to average 40 minutes per response, including the time to review instructions, searching existing data resources, gather the data needed, and complete and review the information collection. If you have any comments concerning the accuracy of the time estimate(s) or suggestions for improving this form, please write to: CMS, Attn: SPENCER Reports Clearance Officer, [address] [place] [phone].

## 2019-08-19 NOTE — PROGRESS NOTES
"SUBJECTIVE/OBJECTIVE:                Cara Camarillo is a 79 year old female coming in for a follow-up visit for Medication Therapy Management.  She was referred to me from Dr. Alvarez, she has sine established care with Dr. Gonsalez. This is her first visit of the year and will serve as an initial visit for 2019.     Chief Complaint: Follow up from our visit on 12/10/18.  Patient has no medication questions today.     Tobacco: No tobacco use  Alcohol: not currently using    Medication Adherence/Access:  no issues reported    Hearing loss: Patient reports her hearing has gotten really bad. She can no longer hear the phone ring and sleeps with a radio on her pillow in order to hear it. She is not interested in getting hearing aids because she \"won't be around long.\"    Diabetes:  Pt currently taking Novolin N 10 units twice a day (before breakfast and dinner), metformin ER 1500mg daily (reduced dose due to renal dysfunction - GFR has hovered around 45ml/min).  Diarrhea is not as bad as it was previously, but she still has it occasionally.   Pt says she stopped taking her insulin before eating when she eats out. She eats out ~2x/month.   SMBG: Daily, alternating between AM (fasting) and HS, patient forgot her recent BG log today.  Requests refill for SMBG supplies  Patient is not experiencing hypoglycemia.   Recent symptoms of high blood sugar? Patient states she is tired always and likes to stay in bed all the time. She said she could sleep 24 hours a day.   Eye exam: due, she is planning to go as soon as her daughter can take her (likely November)  Foot exam: up to date  Microalbumin is < 30 mg/g. Pt is taking an ACEi/ARB.  Aspirin: Taking 81mg daily and she denies side effects today    Hemoglobin A1C   Date Value Ref Range Status   04/29/2019 7.8 (H) 0 - 5.6 % Final     Comment:     Normal <5.7% Prediabetes 5.7-6.4%  Diabetes 6.5% or higher - adopted from ADA   consensus guidelines.         GFR Estimate   Date " Value Ref Range Status   04/29/2019 44 (L) >60 mL/min/[1.73_m2] Final     Comment:     Non  GFR Calc  Starting 12/18/2018, serum creatinine based estimated GFR (eGFR) will be   calculated using the Chronic Kidney Disease Epidemiology Collaboration   (CKD-EPI) equation.       Hypertension/CAD: Current medications include ASA 81mg daily, amlodipine 10mg daily, losartan 100mg daily, metoprolol tartrate 75mg BID and SL NTG PRN (no recent use). Patient does not self-monitor BP. Patient reports pain in her right arm at night when she is tired, likely muscle because she uses her arms to pull herself out of bed.      BP Readings from Last 3 Encounters:   08/19/19 132/72   04/29/19 132/80   12/10/18 129/77     Hyperlipidemia: Current therapy includes atorvastatin 20mg once daily.  Pt reports no significant myalgias or other side effects.  She has had myalgias with higher statin doses    Lab Results   Component Value Date    CHOL 134 08/01/2018     Lab Results   Component Value Date    HDL 51 08/01/2018     Lab Results   Component Value Date    LDL 73 04/29/2019    LDL 61 08/01/2018     Lab Results   Component Value Date    TRIG 111 08/01/2018     Lab Results   Component Value Date    CHOLHDLRATIO 4.4 10/24/2014       GERD: She is no longer taking pantoprazole, does not report any GERD symptoms.     Supplements:  She takes Vitamin D 1000 IU daily.     Today's Vitals: /72     ASSESSMENT:              Current medications were reviewed today as discussed above.  Medicare Part D topics discussed:Lab monitoring    Medication Adherence: good, no issues identified    Hearing Loss:  Unimproved.  She declines further evaluation/treatment.    Diabetes: Stable. Patient is due for an a1c today.    Hypertension/CAD: Stable.    Hyperlipidemia:   Stable. Patient is due for a lipid panel today.     GERD: Resolved.     Supplements:  Stable.     PLAN:                  1. We [facility]     2. Complete A1c and lipid labs today.     I spent 45 minutes with this patient today. All changes were made via collaborative practice agreement with Dr. Gonsalez. A copy of the visit note was provided to the patient's primary care provider.     Will follow up in 6 months or sooner if needed.     The patient was given a summary of these recommendations as an after visit summary.     Connie Rosen, PharmD  Medication Therapy Management Resident    Lissy Diana PharmD, [facility]  Medication Therapy Management Provider  Pager: [phone]

## 2019-08-19 NOTE — PATIENT INSTRUCTIONS
Recommendations from today's MTM visit:                                                    MTM (medication therapy management) is a service provided by a clinical pharmacist designed to help you get the most of out of your medicines.   Today we reviewed what your medicines are for, how to know if they are working, that your medicines are safe and how to make your medicine regimen as easy as possible.      1. We will send a new prescription for test strips to [facility].    2. Complete A1c and lipid labs today.     It was great to speak with you today.  I value your experience and would be very thankful for your time with providing feedback on our clinic survey. You may receive a survey via email or text message in the next few days.     Next MTM visit: 6 months, or sooner if needed.     To schedule another MTM appointment, please call the clinic directly or you may call the MTM scheduling line at [phone] or toll-free at [phone].     My Clinical Pharmacist's contact information:                                                      It was a pleasure talking with you today!  Please feel free to contact me with any questions or concerns you have.      Indigo OrtizD  Medication Therapy Management Resident  Pager: [phone]    Lissy Diana PharmD, [facility]  Medication Therapy Management Provider  Pager: [phone]

## 2019-08-19 NOTE — LETTER
[facility]  [address] [place] [address] [facility]  [address]  [place]  [address]  Tel: [phone]    August 20, 2019    Cara Camarillo  [address] [address]        Dear Ms. Camarillo,    The following letter pertains to your most recent diagnostic tests:    -Your cholesterol panel looks healthy.     -Your hemoglobin A1c test which is a diabetes blood test that represents and average of your blood sugars over the last 3 months returned at 7.6 which is at your goal of hemoglobin A1c less than 8.       Bottom line:  Lab results look OK           Sincerely,    Dr. Gonsalez/SML        Enclosure: Lab Results  Results for orders placed or performed in visit on 08/19/19   Lipid panel reflex to direct LDL Non-fasting   Result Value Ref Range    Cholesterol 166 <200 mg/dL    Triglycerides 158 (H) <150 mg/dL    HDL Cholesterol 52 >49 mg/dL    LDL Cholesterol Calculated 82 <100 mg/dL    Non HDL Cholesterol 114 <130 mg/dL   Hemoglobin A1c   Result Value Ref Range    Hemoglobin A1C 7.6 (H) 0 - 5.6 %

## 2019-08-20 LAB
CHOLEST SERPL-MCNC: 166 MG/DL
HDLC SERPL-MCNC: 52 MG/DL
LDLC SERPL CALC-MCNC: 82 MG/DL
NONHDLC SERPL-MCNC: 114 MG/DL
TRIGL SERPL-MCNC: 158 MG/DL

## 2019-08-20 NOTE — RESULT ENCOUNTER NOTE
The following letter pertains to your most recent diagnostic tests:    -Your cholesterol panel looks healthy.     -Your hemoglobin A1c test which is a diabetes blood test that represents and average of your blood sugars over the last 3 months returned at 7.6 which is at your goal of hemoglobin A1c less than 8.       Bottom line:  Lab results look OK           Sincerely,    Dr. Gonsalez

## 2019-08-27 NOTE — NURSING NOTE
"Chief Complaint   Patient presents with     Hypertension     BP check       Initial /78 (BP Location: Right arm, Cuff Size: Adult Large)  Pulse 70  Temp 96.9  F (36.1  C) (Oral)  Ht 5' 2\" (1.575 m)  Wt 220 lb (99.8 kg)  SpO2 96%  BMI 40.24 kg/m2 Estimated body mass index is 40.24 kg/(m^2) as calculated from the following:    Height as of this encounter: 5' 2\" (1.575 m).    Weight as of this encounter: 220 lb (99.8 kg).  Medication Reconciliation: complete.    Pat Dominguez CMA      " Addended by: MICHAEL EPPERSON on: 8/27/2019 06:42 PM     Modules accepted: Orders

## 2019-09-16 DIAGNOSIS — E11.9 TYPE 2 DIABETES, HBA1C GOAL < 7% (H): ICD-10-CM

## 2019-09-17 NOTE — TELEPHONE ENCOUNTER
"blood glucose (ONETOUCH ULTRA) test strip 100 strip 3 8/19/2019     Last Written Prescription Date:  8/19/2019  Last Fill Quantity: 100,  # refills: 3   Last office visit: 4/29/2019 with prescribing provider: ELENI Gonsalez    Future Office Visit:   Next 5 appointments (look out 90 days)    Nov 04, 2019  1:30 PM CST  Office Visit with Kole Gonsalez MD  [facility] [address] [facility] [id number] [phone]         Requested Prescriptions   Pending Prescriptions Disp Refills     ONETOUCH ULTRA test strip [Pharmacy Med Name: ONE TOUCH ULTRA BLUE TEST STRP] 300 strip 0     Sig: USE TO TEST 3 TIMES DAILY       Diabetic Supplies Protocol Passed - 9/16/2019 10:19 AM        Passed - Medication is active on med list        Passed - Patient is 18 years of age or older        Passed - Recent (6 mo) or future (30 days) visit within the authorizing provider's specialty     Patient had office visit in the last 6 months or has a visit in the next 30 days with authorizing provider.  See \"Patient Info\" tab in inbasket, or \"Choose Columns\" in Meds & Orders section of the refill encounter.              "

## 2019-09-18 NOTE — TELEPHONE ENCOUNTER
Prescription approved per [facility] Refill Protocol.    Danielle HESS RN

## 2019-09-18 NOTE — TELEPHONE ENCOUNTER
Last Rx sent for testing 1x/day  Pharmacy requesting Rx now for testing 3x/day  Left VM for patient to callback [facility] Triage  How often is pt checking blood sugar each day?  Looks like 1x/day per last MTM notes in Aug 2019  Jaymie WOOTEN RN

## 2019-09-18 NOTE — TELEPHONE ENCOUNTER
Patient calling back, she said most days she tests 2 x a day but if she  Is not feeling well she will test a 3rd time, so she is asking for a box total of  300 cause her BP sure goes up and down    Thank you

## 2019-09-19 ENCOUNTER — TELEPHONE (OUTPATIENT)
Dept: FAMILY MEDICINE | Facility: CLINIC | Age: 79
End: 2019-09-19

## 2019-09-19 DIAGNOSIS — E11.59 TYPE 2 DIABETES MELLITUS WITH VASCULAR DISEASE (H): Primary | ICD-10-CM

## 2019-09-19 RX ORDER — BLOOD-GLUCOSE METER
EACH MISCELLANEOUS
Qty: 1 KIT | Refills: 0 | Status: SHIPPED | OUTPATIENT
Start: 2019-09-19

## 2019-09-19 NOTE — TELEPHONE ENCOUNTER
Reason for Call:  Medication or medication refill:    Do you use a [facility]?  Name of the pharmacy and phone number for the current request:  [facility] [id number] [place] [facility] [place] [address]    Name of the medication requested:   ONETOUCH ULTRA test strip 300 strip 1         Other request:  Per pharmacy this rx is not covered by insurance and ALT rx. Pt just has voice mail but if msg left shell right back . Pt is out. Please advise    Can we leave a detailed message on this number? YES    Phone number patient can be reached at: Home number on file [phone] (home)    Best Time: any    Call taken on 9/19/2019 at 11:41 AM by Carlos Elias

## 2019-09-19 NOTE — TELEPHONE ENCOUNTER
Called and spoke with pharmacist.     Patient's insurance no longer covering OneTouch.     Patient needs new meter (Contour Next0 and all testing supplies.     RX sent for new meter and kit,   Also RX sent for additional test strips for this new meter.     Betzaida BANKS RN,BSN

## 2019-10-01 DIAGNOSIS — I10 ESSENTIAL HYPERTENSION, BENIGN: ICD-10-CM

## 2019-10-01 NOTE — TELEPHONE ENCOUNTER
"Last Written Prescription Date:  9/17/18  Last Fill Quantity: 90 tablet,  # refills: 3   Last office visit: 4/29/2019 with prescribing provider:  Wallace   Future Office Visit:   Next 5 appointments (look out 90 days)    Nov 04, 2019  1:30 PM CST  [facility] [address] [place] [facility] [id number] [phone]         Requested Prescriptions   Pending Prescriptions Disp Refills     amLODIPine (NORVASC) 10 MG tablet [Pharmacy Med Name: AMLODIPINE BESYLATE 10 MG TAB] 90 tablet 3     Sig: TAKE 1 TABLET BY MOUTH EVERY DAY       Calcium Channel Blockers Protocol  Failed - 10/1/2019  9:05 AM        Failed - Normal serum creatinine on file in past 12 months     Recent Labs   Lab Test 04/29/19  1447   CR 1.17*             Passed - Blood pressure under 140/90 in past 12 months     BP Readings from Last 3 Encounters:   08/19/19 132/72   04/29/19 132/80   12/10/18 129/77                 Passed - Recent (12 mo) or future (30 days) visit within the authorizing provider's specialty     Patient has had an office visit with the authorizing provider or a provider within the authorizing providers department within the previous 12 mos or has a future within next 30 days. See \"Patient Info\" tab in inbasket, or \"Choose Columns\" in Meds & Orders section of the refill encounter.              Passed - Medication is active on med list        Passed - Patient is age 18 or older        Passed - No active pregnancy on record        Passed - No positive pregnancy test in past 12 months          "

## 2019-10-02 ENCOUNTER — OFFICE VISIT (OUTPATIENT)
Dept: URGENT CARE | Facility: URGENT CARE | Age: 79
End: 2019-10-02
Payer: COMMERCIAL

## 2019-10-02 VITALS
SYSTOLIC BLOOD PRESSURE: 127 MMHG | DIASTOLIC BLOOD PRESSURE: 85 MMHG | HEART RATE: 102 BPM | BODY MASS INDEX: 40.6 KG/M2 | OXYGEN SATURATION: 98 % | TEMPERATURE: 97.2 F | WEIGHT: 222 LBS

## 2019-10-02 DIAGNOSIS — R30.0 DYSURIA: Primary | ICD-10-CM

## 2019-10-02 DIAGNOSIS — R82.90 NONSPECIFIC FINDING ON EXAMINATION OF URINE: ICD-10-CM

## 2019-10-02 LAB
ALBUMIN UR-MCNC: 30 MG/DL
APPEARANCE UR: ABNORMAL
BACTERIA #/AREA URNS HPF: ABNORMAL /HPF
BILIRUB UR QL STRIP: NEGATIVE
COLOR UR AUTO: YELLOW
GLUCOSE UR STRIP-MCNC: NEGATIVE MG/DL
HGB UR QL STRIP: ABNORMAL
KETONES UR STRIP-MCNC: NEGATIVE MG/DL
LEUKOCYTE ESTERASE UR QL STRIP: ABNORMAL
NITRATE UR QL: NEGATIVE
NON-SQ EPI CELLS #/AREA URNS LPF: ABNORMAL /LPF
PH UR STRIP: 5.5 PH (ref 5–7)
RBC #/AREA URNS AUTO: ABNORMAL /HPF
SOURCE: ABNORMAL
SP GR UR STRIP: 1.02 (ref 1–1.03)
UROBILINOGEN UR STRIP-ACNC: 0.2 EU/DL (ref 0.2–1)
WBC #/AREA URNS AUTO: >100 /HPF

## 2019-10-02 PROCEDURE — 87088 URINE BACTERIA CULTURE: CPT | Performed by: FAMILY MEDICINE

## 2019-10-02 PROCEDURE — 99203 OFFICE O/P NEW LOW 30 MIN: CPT | Performed by: FAMILY MEDICINE

## 2019-10-02 PROCEDURE — 87186 SC STD MICRODIL/AGAR DIL: CPT | Performed by: FAMILY MEDICINE

## 2019-10-02 PROCEDURE — 81001 URINALYSIS AUTO W/SCOPE: CPT | Performed by: FAMILY MEDICINE

## 2019-10-02 PROCEDURE — 87086 URINE CULTURE/COLONY COUNT: CPT | Performed by: FAMILY MEDICINE

## 2019-10-02 RX ORDER — AMLODIPINE BESYLATE 10 MG/1
TABLET ORAL
Qty: 90 TABLET | Refills: 3 | Status: SHIPPED | OUTPATIENT
Start: 2019-10-02 | End: 2020-08-10

## 2019-10-02 RX ORDER — CIPROFLOXACIN 250 MG/1
250 TABLET, FILM COATED ORAL 2 TIMES DAILY
Qty: 6 TABLET | Refills: 0 | Status: SHIPPED | OUTPATIENT
Start: 2019-10-02 | End: 2019-11-18

## 2019-10-02 NOTE — TELEPHONE ENCOUNTER
Routing refill request to provider for review/approval because:  Labs out of range:  SUSAN HartmannN, RN  [employer] Triage

## 2019-10-03 NOTE — PROGRESS NOTES
"SUBJECTIVE:   Cara Camarillo is a 79 year old female who  presents today for a possible UTI. Symptoms of dysuria, urgency and frequency have been going on for 2day(s).  Hematuria no.  sudden onset and mild.  There is no history of fever, chills, nausea or vomiting.  No history of vaginal or penile discharge. This patient does not have a history of urinary tract infections. Patient denies long duration and rigors or dyspareunia (pain in labia/pelvis)     Past Medical History:   Diagnosis Date     Adenoma     tubal     Anxiety      Anxiety      Aortic stenosis      Arthritis     ankles     Chronic kidney disease (CKD), stage III (moderate) (H)      Coronary artery disease     cardiac cath Feb 2016: ISIDRO to LAD, cath Jan 2016: ISIDRO to PDA     Decubitus ulcer of coccyx      Dysthymic disorder      Essential hypertension, benign 03/01/2205     Herpes zoster without mention of complication Aug 2006    left leg (thigh)     Hydronephrosis      Hyperlipidaemia LDL goal < 100      Left ventricular diastolic dysfunction      Malignant neoplasm of corpus uteri, 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    endometrial CA, s/p external beam radiation & radiation implant & FIDEL/BSO       Obesity      Pulmonary hyperinflation      Sciatica      Type II or unspecified type diabetes mellitus without mention of complication, not stated as uncontrolled 03/01/2005     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 100 each " 1     blood glucose (NO BRAND SPECIFIED) test strip Use to test blood sugar 3 times daily or as directed. 300 strip 0     blood glucose (ONETOUCH ULTRA) test strip Use to test blood sugar once daily or as directed. 100 strip 3     blood glucose monitoring (CONTOUR NEXT MONITOR W/DEVICE KIT) meter device kit Use to test blood sugar 3 times daily or as directed. 1 kit 0     insulin NPH (NOVOLIN N VIAL) 100 UNIT/ML injection 10 units before breakfast, 10 units before dinner 10 mL 1     losartan (COZAAR) 50 MG tablet TAKE 2 TABLETS BY MOUTH EVERY  tablet 3     metFORMIN (GLUCOPHAGE-XR) 500 MG 24 hr tablet TAKE 2 TABLETS BY MOUTH IN MORNING, AND 1 TABLET IN EVENING. 270 tablet 3     metoprolol tartrate (LOPRESSOR) 50 MG tablet TAKE ONE AND ONE-HALF TABLETS BY MOUTH TWICE DAILY 270 tablet 2     nitroglycerin (NITROSTAT) 0.4 MG SL tablet Place 1 tablet (0.4 mg) under the tongue every 5 minutes as needed for chest pain if you are still having symptoms after 3 doses (15 minutes) call 911. 25 tablet 1     ONETOUCH ULTRA test strip USE TO TEST 3 TIMES DAILY 300 strip 1     VITAMIN D 1000 UNIT OR CAPS 1 CAPSULE DAILY 30 0     Social History     Tobacco Use     Smoking status: Never Smoker     Smokeless tobacco: Never Used   Substance Use Topics     Alcohol use: No     Alcohol/week: 0.0 standard drinks       ROS:   INTEGUMENTARY/SKIN: NEGATIVE for worrisome rashes, moles or lesions  EYES: NEGATIVE for vision changes or irritation    OBJECTIVE:  /85   Pulse 102   Temp 97.2  F (36.2  C) (Oral)   Wt 100.7 kg (222 lb)   SpO2 98%   BMI 40.60 kg/m    GENERAL APPEARANCE: healthy, alert and no distress  RESP: lungs clear to auscultation - no rales, rhonchi or wheezes  CV: regular rates and rhythm, normal S1 S2, no murmur noted  ABDOMEN:  soft, nontender, no HSM or masses and bowel sounds normal  BACK: No CVA tenderness  SKIN: no suspicious lesions or rashes    ASSESSMENT:   Lower, uncomplicated urinary tract  infection.    PLAN:  As per ordered above  Drink plenty of fluids.  Prevention and treatment of UTI's discussed.Signs and symptoms of pyelonephritis mentioned.  Follow up with primary care physician if not improving

## 2019-10-04 LAB
BACTERIA SPEC CULT: ABNORMAL
SPECIMEN SOURCE: ABNORMAL

## 2019-11-18 ENCOUNTER — OFFICE VISIT (OUTPATIENT)
Dept: FAMILY MEDICINE | Facility: CLINIC | Age: 79
End: 2019-11-18
Payer: COMMERCIAL

## 2019-11-18 VITALS
OXYGEN SATURATION: 97 % | HEART RATE: 66 BPM | DIASTOLIC BLOOD PRESSURE: 80 MMHG | HEIGHT: 62 IN | BODY MASS INDEX: 41.22 KG/M2 | WEIGHT: 224 LBS | TEMPERATURE: 97 F | SYSTOLIC BLOOD PRESSURE: 132 MMHG

## 2019-11-18 DIAGNOSIS — I10 ESSENTIAL HYPERTENSION, BENIGN: ICD-10-CM

## 2019-11-18 DIAGNOSIS — E78.5 HYPERLIPIDEMIA LDL GOAL <70: ICD-10-CM

## 2019-11-18 DIAGNOSIS — Z23 NEED FOR PROPHYLACTIC VACCINATION AND INOCULATION AGAINST INFLUENZA: ICD-10-CM

## 2019-11-18 DIAGNOSIS — E11.59 TYPE 2 DIABETES MELLITUS WITH VASCULAR DISEASE (H): Primary | ICD-10-CM

## 2019-11-18 LAB — HBA1C MFR BLD: 7.3 % (ref 0–5.6)

## 2019-11-18 PROCEDURE — 83036 HEMOGLOBIN GLYCOSYLATED A1C: CPT | Performed by: INTERNAL MEDICINE

## 2019-11-18 PROCEDURE — 99214 OFFICE O/P EST MOD 30 MIN: CPT | Performed by: INTERNAL MEDICINE

## 2019-11-18 PROCEDURE — G0008 ADMIN INFLUENZA VIRUS VAC: HCPCS | Performed by: INTERNAL MEDICINE

## 2019-11-18 PROCEDURE — 36415 COLL VENOUS BLD VENIPUNCTURE: CPT | Performed by: INTERNAL MEDICINE

## 2019-11-18 PROCEDURE — 90662 IIV NO PRSV INCREASED AG IM: CPT | Performed by: INTERNAL MEDICINE

## 2019-11-18 ASSESSMENT — MIFFLIN-ST. JEOR: SCORE: 1434.31

## 2019-11-18 NOTE — LETTER
[facility]  [address] [place], [place] [address]  [address]  [place]  [address]  Tel: [phone]    November 19, 2019    Cara Camarillo  [address] [facility] [facility] [address]        Dear Ms. Camarillo,    The following letter pertains to your most recent diagnostic tests:     Good news! -Your hemoglobin A1c test which is a diabetes blood test that represents and average of your blood sugars over the last 3 months returned at 7.3 which is at your goal of hemoglobin A1c less than 8.       Bottom line:  Nice result.         Follow up:  Return in 6 months to follow up on the diabetes or sooner if needed.         Sincerely,     Dr. Gonsalez         Enclosure: Lab Results  Results for orders placed or performed in visit on 11/18/19   HEMOGLOBIN A1C     Status: Abnormal   Result Value Ref Range    Hemoglobin A1C 7.3 (H) 0 - 5.6 %

## 2019-11-18 NOTE — PROGRESS NOTES
Subjective     Cara Camarillo is a 79 year old female who presents to clinic today for the following health issues:    HPI   Diabetes Follow-up    How often are you checking your blood sugar? Two times daily  Blood sugar testing frequency justification:  Uncontrolled diabetes  What time of day are you checking your blood sugars (select all that apply)?  Before meals  Have you had any blood sugars above 200?  No  Have you had any blood sugars below 70?  No    What symptoms do you notice when your blood sugar is low?  Dizzy    What concerns do you have today about your diabetes? None     Do you have any of these symptoms? (Select all that apply)  Blurry vision     Have you had a diabetic eye exam in the last 12 months? No - have an appt in January 2020    BP Readings from Last 2 Encounters:   11/18/19 132/80   10/02/19 127/85     Hemoglobin A1C (%)   Date Value   08/19/2019 7.6 (H)   04/29/2019 7.8 (H)     LDL Cholesterol Calculated (mg/dL)   Date Value   08/19/2019 82   08/01/2018 61     LDL Cholesterol Direct (mg/dL)   Date Value   04/29/2019 73       Pleasant 79-year-old female with diabetes insulin-dependent presents for routine follow-up of the condition in addition to follow-up of her hypertension, hyperlipidemia.  She feels well but has some questions about her new glucometer.  She was used to using a One Touch glucometer now has a contour glucometer that is harder to use.  She takes her blood sugars twice daily.  She rarely has blood sugars greater than 150 in the morning.  She has no low blood sugars.  She is having difficulty getting into see her eye doctor but was reminded to schedule an appointment.  She has questions about whether she has to take her NPH insulin with a meal or not?  She is not sure she wants to have a flu shot?  She had a lot of questions about this.    Patient Active Problem List   Diagnosis     History of cancer of uterus     Dysthymic disorder     Herpes zoster     Essential  hypertension, benign     SOLITARY KIDNEY ANOMALY NEC     Hyperlipidemia LDL goal <70     CKD (chronic kidney disease) stage 3, GFR 30-59 ml/min (H)     Advanced directives, counseling/discussion     Anxiety     Tubular adenoma     OA (osteoarthritis)     Aortic stenosis, mild     IVCD (intraventricular conduction defect)     B12 deficiency     Iron deficiency     Esophageal reflux     Overweight BMI 35-40     Osteopenia     RBBB     Left ventricular diastolic dysfunction     Coronary artery disease     Type 2 diabetes mellitus with diabetic peripheral angiopathy without gangrene, without long-term current use of insulin (H)     Chest wall pain     Bilateral edema of lower extremity     Coronary artery disease involving native coronary artery of native heart without angina pectoris     Past Surgical History:   Procedure Laterality Date     ARTHROPLASTY KNEE  2013    Procedure: ARTHROPLASTY KNEE;  RIGHT TOTAL KNEE ARTHROPLASTY;  Surgeon: Romaine Constantino MD;  Location:  OR     ARTHROPLASTY KNEE  2/3/2014    Procedure: ARTHROPLASTY KNEE;  LEFT TOTAL KNEE ARTHROPLASTY (BIOMET)^;  Surgeon: Romaine Constantino MD;  Location:  OR     C ANESTH, SECTION       C NONSPECIFIC PROCEDURE  3/20/06    CT scan of chest/abd/pelvis- negative for recurrence of CA     EXCISE MASS LOWER EXTREMITY Left 2015    Procedure: EXCISE MASS LOWER EXTREMITY;  Surgeon: Sloan Richardson MD;  Location: [facility] UGI ENDOSCOPY W EUS  2013    Procedure: COMBINED ENDOSCOPIC ULTRASOUND, ESOPHAGOSCOPY, GASTROSCOPY, DUODENOSCOPY (EGD);  Surgeon: Lyric Simons MD;  Location:  GI     HYSTERECTOMY      Vaginal     HYSTERECTOMY, VAGINAL  05    Uterine CA     LAPAROSCOPIC CHOLECYSTECTOMY  1/10/2013    Procedure: LAPAROSCOPIC CHOLECYSTECTOMY;  LAPAROSCOPIC CHOLECYSTECTOMY ;  Surgeon: Eduardo Taylor MD;  Location:  OR     NEPHRECTOMY BILATERAL       NEPHRECTOMY RT/LT  OCT 2005     OTHER SURGICAL HISTORY  "     angiogram Jan 2016: ISIDRO to PDA     OTHER SURGICAL HISTORY      angiogram Feb 2016: ISIDRO to LAD       Social History     Tobacco Use     Smoking status: Never Smoker     Smokeless tobacco: Never Used   Substance Use Topics     Alcohol use: No     Alcohol/week: 0.0 standard drinks     Family History   Problem Relation Age of Onset     Diabetes Father         Adult onset     Other - See Comments Mother 95        Old age         Current Outpatient Medications   Medication Sig Dispense Refill     acetaminophen (TYLENOL) 500 MG tablet Take 500-1,000 mg by mouth 3 times daily as needed for mild pain       amLODIPine (NORVASC) 10 MG tablet TAKE 1 TABLET BY MOUTH EVERY DAY 90 tablet 3     aspirin 81 MG tablet Take 1 tablet (81 mg) by mouth daily 100 tablet 3     atorvastatin (LIPITOR) 20 MG tablet TAKE 1 TABLET BY MOUTH EVERY DAY 90 tablet 1     BD INSULIN SYRINGE U/F 30G X 1/2\" 0.5 ML miscellaneous USE ONE SYRINGE TWICE DAILY OR AS DIRECTED. 100 each 1     blood glucose (NO BRAND SPECIFIED) test strip Use to test blood sugar 3 times daily or as directed. 300 strip 0     blood glucose monitoring (CONTOUR NEXT MONITOR W/DEVICE KIT) meter device kit Use to test blood sugar 3 times daily or as directed. 1 kit 0     insulin NPH (NOVOLIN N VIAL) 100 UNIT/ML injection 10 units before breakfast, 10 units before dinner 10 mL 1     losartan (COZAAR) 50 MG tablet TAKE 2 TABLETS BY MOUTH EVERY  tablet 3     metFORMIN (GLUCOPHAGE-XR) 500 MG 24 hr tablet TAKE 2 TABLETS BY MOUTH IN MORNING, AND 1 TABLET IN EVENING. 270 tablet 3     metoprolol tartrate (LOPRESSOR) 50 MG tablet TAKE ONE AND ONE-HALF TABLETS BY MOUTH TWICE DAILY 270 tablet 2     nitroglycerin (NITROSTAT) 0.4 MG SL tablet Place 1 tablet (0.4 mg) under the tongue every 5 minutes as needed for chest pain if you are still having symptoms after 3 doses (15 minutes) call 911. 25 tablet 1     ONETOUCH ULTRA test strip USE TO TEST 3 TIMES DAILY 300 strip 1     VITAMIN D " "1000 UNIT OR CAPS 1 CAPSULE DAILY 30 0     blood glucose (ONETOUCH ULTRA) test strip Use to test blood sugar once daily or as directed. (Patient not taking: Reported on 11/18/2019) 100 strip 3     Allergies   Allergen Reactions     Actos [Pioglitazone]      Lower extremity edema       Enalapril      Renal failure     Hydrochlorothiazide      Dry mouth     Lisinopril      Hyperkalemia         Reviewed and updated as needed this visit by Provider         Review of Systems   ROS COMP: Constitutional, HEENT, cardiovascular, pulmonary, gi and gu systems are negative, except as otherwise noted.      Objective    /80 (BP Location: Right arm, Patient Position: Sitting, Cuff Size: Adult Large)   Pulse 66   Temp 97  F (36.1  C) (Oral)   Ht 1.575 m (5' 2\")   Wt 101.6 kg (224 lb)   SpO2 97%   Breastfeeding No   BMI 40.97 kg/m    Body mass index is 40.97 kg/m .  Physical Exam   GENERAL: healthy, alert and no distress  NECK: no adenopathy, no asymmetry, masses, or scars and thyroid normal to palpation  RESP: lungs clear to auscultation - no rales, rhonchi or wheezes  CV: Heart with regular rate and rhythm.  No carotid bruits  ABDOMEN: soft, nontender, no hepatosplenomegaly, no masses and bowel sounds normal  MS: no gross musculoskeletal defects noted, no edema  SKIN: no suspicious lesions or rashes  NEURO: Normal strength and tone, mentation intact and speech normal  PSYCH: mentation appears normal, affect normal/bright  Diabetic foot exam: normal DP and PT pulses, no trophic changes or ulcerative lesions and normal sensory exam    A1c pending        Assessment & Plan     1. 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)  Has been reasonably well controlled, reviewed blood glucose log, blood sugar control has been very consistent, at this point, I think she can reduce the frequency at which she checks her blood sugars, we did also had a long discussion about the timing of insulin administration as well as her " "concerns about a flu shot which I highly recommend.  - HEMOGLOBIN A1C    2. Hyperlipidemia LDL goal <70  She is on statin therapy with LDL at goal as of August 2019, recheck next August    3. Essential hypertension, benign  Under adequate control, discussed diet and exercise modification, return in 6 months to recheck       BMI:   Estimated body mass index is 40.97 kg/m  as calculated from the following:    Height as of this encounter: 1.575 m (5' 2\").    Weight as of this encounter: 101.6 kg (224 lb).   Weight management plan: Discussed healthy diet and exercise guidelines            Return in about 6 months (around 5/18/2020) for Diabetes Check.  Or return sooner as needed    Kole Gonsalez MD  [facility]    Total face to face contact time was greater than 25 minutes of which more than 50% of this time was spent counseling and coordinating care regarding the above topics.    "

## 2019-11-19 NOTE — RESULT ENCOUNTER NOTE
The following letter pertains to your most recent diagnostic tests:    Good news! -Your hemoglobin A1c test which is a diabetes blood test that represents and average of your blood sugars over the last 3 months returned at 7.3 which is at your goal of hemoglobin A1c less than 8.       Bottom line:  Nice result.        Follow up:  Return in 6 months to follow up on the diabetes or sooner if needed.        Sincerely,    Dr. Gonsalez

## 2020-01-24 DIAGNOSIS — E78.5 HYPERLIPIDEMIA LDL GOAL <70: ICD-10-CM

## 2020-01-24 NOTE — TELEPHONE ENCOUNTER
"atorvastatin (LIPITOR) 20 MG tablet    Last Written Prescription Date:  07/26/2019  Last Fill Quantity: 90,  # refills: 1   Last office visit: 8/19/2019 with prescribing provider:  Adalgisa   Future Office Visit:  Unknown       Requested Prescriptions   Pending Prescriptions Disp Refills     atorvastatin (LIPITOR) 20 MG tablet [Pharmacy Med Name: ATORVASTATIN 20 MG TABLET] 90 tablet 1     Sig: TAKE 1 TABLET BY MOUTH EVERY DAY       Statins Protocol Passed - 1/24/2020  1:45 AM        Passed - LDL on file in past 12 months     Recent Labs   Lab Test 08/19/19  1446   LDL 82             Passed - No abnormal creatine kinase in past 12 months     Recent Labs   Lab Test 01/23/17  1557   CKT 68                Passed - Recent (12 mo) or future (30 days) visit within the authorizing provider's specialty     Patient has had an office visit with the authorizing provider or a provider within the authorizing providers department within the previous 12 mos or has a future within next 30 days. See \"Patient Info\" tab in inbasket, or \"Choose Columns\" in Meds & Orders section of the refill encounter.              Passed - Medication is active on med list        Passed - Patient is age 18 or older        Passed - No active pregnancy on record        Passed - No positive pregnancy test in past 12 months          "

## 2020-01-27 RX ORDER — ATORVASTATIN CALCIUM 20 MG/1
TABLET, FILM COATED ORAL
Qty: 90 TABLET | Refills: 1 | Status: SHIPPED | OUTPATIENT
Start: 2020-01-27 | End: 2020-07-20

## 2020-03-31 DIAGNOSIS — I10 ESSENTIAL HYPERTENSION, BENIGN: ICD-10-CM

## 2020-04-01 RX ORDER — METOPROLOL TARTRATE 50 MG
TABLET ORAL
Qty: 270 TABLET | Refills: 0 | Status: SHIPPED | OUTPATIENT
Start: 2020-04-01 | End: 2020-06-25

## 2020-04-01 NOTE — TELEPHONE ENCOUNTER
"Requested Prescriptions   Pending Prescriptions Disp Refills     metoprolol tartrate (LOPRESSOR) 50 MG tablet [Pharmacy Med Name: METOPROLOL TARTRATE 50 MG TAB] 270 tablet 2     Sig: TAKE ONE AND ONE-HALF TABLETS BY MOUTH TWICE DAILY       Beta-Blockers Protocol Passed - 3/31/2020  7:53 PM        Passed - Blood pressure under 140/90 in past 12 months     BP Readings from Last 3 Encounters:   11/18/19 132/80   10/02/19 127/85   08/19/19 132/72                 Passed - Patient is age 6 or older        Passed - Recent (12 mo) or future (30 days) visit within the authorizing provider's specialty     Patient has had an office visit with the authorizing provider or a provider within the authorizing providers department within the previous 12 mos or has a future within next 30 days. See \"Patient Info\" tab in inbasket, or \"Choose Columns\" in Meds & Orders section of the refill encounter.              Passed - Medication is active on med list           Last Written Prescription Date:  06/28/19  Last Fill Quantity: 270,  # refills: 2   Last office visit: 11/18/2019 with prescribing provider:     Future Office Visit:   Next 5 appointments (look out 90 days)    Apr 13, 2020  1:00 PM CDT  SHORT with Lissy Diana RPH  [facility] ([address] [address] [phone]   May 18, 2020  1:30 PM CDT  [facility] [address] [place] [id number] [phone]         Mahsa Meza MA    "

## 2020-04-01 NOTE — TELEPHONE ENCOUNTER
Prescription approved per [facility] Refill Protocol.  Jaymie WOOTEN RN

## 2020-04-20 ENCOUNTER — TELEPHONE (OUTPATIENT)
Dept: PHARMACY | Facility: CLINIC | Age: 80
End: 2020-04-20

## 2020-04-20 NOTE — TELEPHONE ENCOUNTER
This patient is due for MT follow-up. I called the patient to schedule an appointment and left a message with the clinic phone number for the patient to call to schedule.    Connie Levi, PharmD  PGY1 Medication Therapy Management Resident   [phone]

## 2020-05-09 DIAGNOSIS — E11.59 TYPE 2 DIABETES MELLITUS WITH VASCULAR DISEASE (H): ICD-10-CM

## 2020-05-11 RX ORDER — METFORMIN HCL 500 MG
TABLET, EXTENDED RELEASE 24 HR ORAL
Qty: 270 TABLET | Refills: 1 | Status: SHIPPED | OUTPATIENT
Start: 2020-05-11 | End: 2020-08-10

## 2020-05-11 NOTE — TELEPHONE ENCOUNTER
Routing refill request to provider for review/approval because:  Labs out of range:  Cr  Labs not current:  Cr    Creatinine   Date Value Ref Range Status   04/29/2019 1.17 (H) 0.52 - 1.04 mg/dL Final     Note: Pt has appt 5/18/2020 with PCP     Please review and authorize if appropriate,     Thank you,   Danielle LAINEZ RN

## 2020-05-28 ENCOUNTER — TELEPHONE (OUTPATIENT)
Dept: PHARMACY | Facility: CLINIC | Age: 80
End: 2020-05-28

## 2020-05-28 NOTE — TELEPHONE ENCOUNTER
This patient is due for MT follow-up. I called the patient to schedule an appointment and left a message with the clinic phone number for the patient to call to schedule.    Connie Levi, PharmD  PGY1 Medication Therapy Management Resident   [phone]

## 2020-06-04 DIAGNOSIS — E11.9 TYPE 2 DIABETES MELLITUS WITHOUT COMPLICATION, WITH LONG-TERM CURRENT USE OF INSULIN (H): ICD-10-CM

## 2020-06-04 DIAGNOSIS — I10 ESSENTIAL HYPERTENSION WITH GOAL BLOOD PRESSURE LESS THAN 140/90: ICD-10-CM

## 2020-06-04 DIAGNOSIS — Z79.4 TYPE 2 DIABETES MELLITUS WITHOUT COMPLICATION, WITH LONG-TERM CURRENT USE OF INSULIN (H): ICD-10-CM

## 2020-06-04 NOTE — TELEPHONE ENCOUNTER
TC's,  Please schedule pt for apt.  If pt needs a refill until appointment, please document and route back to triage.  Thanks,  Tonia Sandoval RN      Office Visit   Return in about 6 months (around 5/18/2020) for Diabetes Check.

## 2020-06-05 RX ORDER — LOSARTAN POTASSIUM 50 MG/1
TABLET ORAL
Qty: 180 TABLET | Refills: 0 | Status: SHIPPED | OUTPATIENT
Start: 2020-06-05 | End: 2020-08-10

## 2020-06-05 NOTE — TELEPHONE ENCOUNTER
Appt.has been made. Prescription approved per [facility] Refill Protocol.  Ritika Truong RN on 6/5/2020 at 1:31 PM

## 2020-06-05 NOTE — TELEPHONE ENCOUNTER
Pt called back and made an appointment for 7/9/2020. She is needing this to be refilled ASAP for enough to get to the visit.

## 2020-07-01 ENCOUNTER — TELEPHONE (OUTPATIENT)
Dept: PHARMACY | Facility: CLINIC | Age: 80
End: 2020-07-01

## 2020-07-02 NOTE — TELEPHONE ENCOUNTER
Tried calling patient x2 today to f/up on BG.  LVM both times.  Appears she did call back and schedule in person appt in a few weeks; will see if I'm able to offer any in person visits at that time.    Lissy Diana, PharmD, [facility]  Medication Therapy Management Provider  Pager: [phone]

## 2020-07-07 ENCOUNTER — TELEPHONE (OUTPATIENT)
Dept: FAMILY MEDICINE | Facility: CLINIC | Age: 80
End: 2020-07-07

## 2020-07-07 NOTE — TELEPHONE ENCOUNTER
I spoke to Cara and she needed the form completed to take with her on 7/9/20 for her drivers test.  I will complete what I can on the form and ask the DOD to complete the rest and sign. Also, can you answer the question for me... To your knowledge is the patient qualified in all medical respects to exercise reasonable and ordinary control over a motor vehicle?    Mahsa Meza MA

## 2020-07-07 NOTE — TELEPHONE ENCOUNTER
Reason for Call:  Form, our goal is to have forms completed with 72 hours, however, some forms may require a visit or additional information.    Type of letter, form or note:  dmv form     Who is the form from?: n/a (if other please explain)    Where did the form come from:  n/a    What clinic location was the form placed at?: [facility]    Where the form was placed: n/a    What number is listed as a contact on the form?:  [phone]       Additional comments:  Pt needs needs a diabetic form for the DMV fill outed from [employer] . Please advise    Call taken on 7/7/2020 at 1:35 PM by Carlos Elias

## 2020-07-20 ENCOUNTER — ALLIED HEALTH/NURSE VISIT (OUTPATIENT)
Dept: PHARMACY | Facility: CLINIC | Age: 80
End: 2020-07-20
Payer: COMMERCIAL

## 2020-07-20 DIAGNOSIS — R52 INTERMITTENT PAIN: ICD-10-CM

## 2020-07-20 DIAGNOSIS — I10 ESSENTIAL HYPERTENSION, BENIGN: ICD-10-CM

## 2020-07-20 DIAGNOSIS — E11.59 TYPE 2 DIABETES MELLITUS WITH VASCULAR DISEASE (H): Primary | ICD-10-CM

## 2020-07-20 DIAGNOSIS — E78.5 HYPERLIPIDEMIA LDL GOAL <70: ICD-10-CM

## 2020-07-20 DIAGNOSIS — Z78.9 TAKES DIETARY SUPPLEMENTS: ICD-10-CM

## 2020-07-20 DIAGNOSIS — I25.10 CORONARY ARTERY DISEASE INVOLVING NATIVE CORONARY ARTERY OF NATIVE HEART WITHOUT ANGINA PECTORIS: ICD-10-CM

## 2020-07-20 PROCEDURE — 99207 ZZC NO CHARGE LOS: CPT | Performed by: PHARMACIST

## 2020-07-20 RX ORDER — ATORVASTATIN CALCIUM 20 MG/1
20 TABLET, FILM COATED ORAL DAILY
Qty: 90 TABLET | Refills: 1 | Status: SHIPPED | OUTPATIENT
Start: 2020-07-20 | End: 2020-08-10

## 2020-07-20 RX ORDER — VITAMIN B COMPLEX
1 TABLET ORAL DAILY
COMMUNITY
End: 2023-11-02

## 2020-07-20 RX ORDER — METOPROLOL TARTRATE 50 MG
TABLET ORAL
Qty: 270 TABLET | Refills: 0 | Status: SHIPPED | OUTPATIENT
Start: 2020-07-20 | End: 2020-08-10

## 2020-07-20 NOTE — PROGRESS NOTES
MTM ENCOUNTER  SUBJECTIVE/OBJECTIVE:                           Cara Camarillo is a 80 year old female called for a follow-up visit. She was referred to me from Dr. Alvarez, she has since established with Dr. Gonsalez.  Today's visit is a follow-up MTM visit from 19, serving as an initial visit for .    Patient consented to a telehealth visit: yes  Telemedicine Visit Details  Type of service:  Telephone visit  Start Time: 3:02 PM  End Time: 3:16 PM  Originating Location (pt. Location): Home  Distant Location (provider location):  [facility] [place]  Mode of Communication:  Telephone    Chief Complaint: DM f/up.  She has f/up appt scheduled with PCP 8/10 and plans to have labs done then    Tobacco:  reports that she has never smoked. She has never used smokeless tobacco.  Alcohol: not currently using    Medication Adherence/Access: no issues reported    Diabetes:  Pt currently taking Novolin N 10 units twice a day (before breakfast and dinner), metformin ER 1500mg daily (reduced dose due to renal dysfunction - GFR has hovered around 45ml/min).    SMBx/week, alternating between AM (fasting) and HS  AM: 163, 126, 159, 162  PM: 118, 128, 161  Patient is not experiencing hypoglycemia.   Recent symptoms of high blood sugar? Fatigue  Eye exam: due  Foot exam: due  Microalbumin is < 30 mg/g. Pt is taking an ACEi/ARB.  Lab Results   Component Value Date    UMALCR 19.59 2019   Aspirin: Taking 81mg daily and she denies side effects today    Lab Results   Component Value Date    A1C 7.3 2019    A1C 7.6 2019    A1C 7.8 2019    A1C 7.7 2018    A1C 7.8 2018     GFR Estimate   Date Value Ref Range Status   2019 44 (L) >60 mL/min/[1.73_m2] Final     Comment:     Non  GFR Calc  Starting 2018, serum creatinine based estimated GFR (eGFR) will be   calculated using the Chronic Kidney Disease Epidemiology Collaboration   (CKD-EPI) equation.        Hypertension/CAD: Current medications include ASA 81mg daily, amlodipine 10mg daily, losartan 100mg daily, metoprolol tartrate 75mg BID and SL NTG PRN (no recent use). Patient does not self-monitor BP. She denies side effects of therapy.  BP Readings from Last 3 Encounters:   11/18/19 132/80   10/02/19 127/85   08/19/19 132/72      Hyperlipidemia: Current therapy includes atorvastatin 20mg once daily.  Pt reports no significant myalgias or other side effects.  She has had myalgias with higher statin doses.  LDL Cholesterol Calculated   Date Value Ref Range Status   08/19/2019 82 <100 mg/dL Final     Comment:     Desirable:       <100 mg/dl      Pain:  She has APAP available for use, is not currently using.  She does find this effective when needed.    Supplements:  She takes Vitamin D 2000 international unit(s) every other day.  She was previously taking 1000 international unit(s) daily but hasn't been able to find this dose.  Vitamin D Deficiency Screening Results:  Lab Results   Component Value Date    VITDT 37 07/21/2014      Today's Vitals: There were no vitals taken for this visit. - telephone visit due to COVID-19 pandemic    ASSESSMENT:                          Medicare Part D topics discussed:Medications reviewed-no issues identified or changes needed    Medication Adherence: good, no issues identified    Type 2 Diabetes: Stable. Patient is meeting A1c goal of < 8%. Self monitoring of blood glucose is not at goal of fasting  mg/dL, is meeting post-prandial goal <180mg/dL.  SMBG is limited, will await next A1c result before making changes to medication regimen (if needed).    Hypertension/CAD: Stable. Patient is meeting BP goal of < 140/90mmHg.     Hyperlipidemia: Stable. Pt is on maximally tolerated statin therapy.     Pain: Stable.    Supplements: Stable.    PLAN:                            1.  Continue current medication regimen.  2.  Will await labs from upcoming PCP visit.    I spent 14 minutes  with this patient today. All changes were made via collaborative practice agreement with Dr. Gonsalez. A copy of the visit note was provided to the patient's primary care provider.    Will follow up pending labs with PCP next month.    The patient was mailed a summary of these recommendations as a medication action plan.     Lissy Diana PharmD, [facility]  Medication Therapy Management Provider  Pager: [phone]

## 2020-08-03 DIAGNOSIS — E78.5 HYPERLIPIDEMIA LDL GOAL <100: Primary | ICD-10-CM

## 2020-08-10 ENCOUNTER — OFFICE VISIT (OUTPATIENT)
Dept: FAMILY MEDICINE | Facility: CLINIC | Age: 80
End: 2020-08-10
Payer: COMMERCIAL

## 2020-08-10 VITALS
DIASTOLIC BLOOD PRESSURE: 66 MMHG | SYSTOLIC BLOOD PRESSURE: 139 MMHG | HEIGHT: 62 IN | TEMPERATURE: 98.5 F | OXYGEN SATURATION: 96 % | BODY MASS INDEX: 41.28 KG/M2 | HEART RATE: 80 BPM | WEIGHT: 224.3 LBS

## 2020-08-10 DIAGNOSIS — N18.30 CKD (CHRONIC KIDNEY DISEASE) STAGE 3, GFR 30-59 ML/MIN (H): ICD-10-CM

## 2020-08-10 DIAGNOSIS — E78.5 HYPERLIPIDEMIA LDL GOAL <70: ICD-10-CM

## 2020-08-10 DIAGNOSIS — I10 ESSENTIAL HYPERTENSION, BENIGN: ICD-10-CM

## 2020-08-10 DIAGNOSIS — E66.01 MORBID OBESITY (H): ICD-10-CM

## 2020-08-10 DIAGNOSIS — E11.59 TYPE 2 DIABETES MELLITUS WITH VASCULAR DISEASE (H): ICD-10-CM

## 2020-08-10 DIAGNOSIS — R19.7 DIARRHEA, UNSPECIFIED TYPE: Primary | ICD-10-CM

## 2020-08-10 LAB
ERYTHROCYTE [DISTWIDTH] IN BLOOD BY AUTOMATED COUNT: 14.6 % (ref 10–15)
HBA1C MFR BLD: 7.5 % (ref 0–5.6)
HCT VFR BLD AUTO: 42.9 % (ref 35–47)
HGB BLD-MCNC: 14.3 G/DL (ref 11.7–15.7)
MCH RBC QN AUTO: 29.4 PG (ref 26.5–33)
MCHC RBC AUTO-ENTMCNC: 33.3 G/DL (ref 31.5–36.5)
MCV RBC AUTO: 88 FL (ref 78–100)
PLATELET # BLD AUTO: 246 10E9/L (ref 150–450)
RBC # BLD AUTO: 4.87 10E12/L (ref 3.8–5.2)
WBC # BLD AUTO: 7.6 10E9/L (ref 4–11)

## 2020-08-10 PROCEDURE — 85027 COMPLETE CBC AUTOMATED: CPT | Performed by: INTERNAL MEDICINE

## 2020-08-10 PROCEDURE — 99214 OFFICE O/P EST MOD 30 MIN: CPT | Performed by: INTERNAL MEDICINE

## 2020-08-10 PROCEDURE — 80061 LIPID PANEL: CPT | Performed by: INTERNAL MEDICINE

## 2020-08-10 PROCEDURE — 80048 BASIC METABOLIC PNL TOTAL CA: CPT | Performed by: INTERNAL MEDICINE

## 2020-08-10 PROCEDURE — 36415 COLL VENOUS BLD VENIPUNCTURE: CPT | Performed by: INTERNAL MEDICINE

## 2020-08-10 PROCEDURE — 83036 HEMOGLOBIN GLYCOSYLATED A1C: CPT | Performed by: INTERNAL MEDICINE

## 2020-08-10 PROCEDURE — 82043 UR ALBUMIN QUANTITATIVE: CPT | Performed by: INTERNAL MEDICINE

## 2020-08-10 RX ORDER — ATORVASTATIN CALCIUM 20 MG/1
20 TABLET, FILM COATED ORAL DAILY
Qty: 90 TABLET | Refills: 1 | Status: SHIPPED | OUTPATIENT
Start: 2020-08-10 | End: 2021-02-01

## 2020-08-10 RX ORDER — METFORMIN HCL 500 MG
TABLET, EXTENDED RELEASE 24 HR ORAL
Qty: 270 TABLET | Refills: 1 | Status: CANCELLED | OUTPATIENT
Start: 2020-08-10

## 2020-08-10 RX ORDER — LOSARTAN POTASSIUM 50 MG/1
50 TABLET ORAL DAILY
Qty: 180 TABLET | Refills: 3 | Status: SHIPPED | OUTPATIENT
Start: 2020-08-10 | End: 2020-09-10

## 2020-08-10 RX ORDER — METOPROLOL TARTRATE 50 MG
TABLET ORAL
Qty: 270 TABLET | Refills: 0 | Status: SHIPPED | OUTPATIENT
Start: 2020-08-10 | End: 2021-02-01

## 2020-08-10 RX ORDER — AMLODIPINE BESYLATE 10 MG/1
10 TABLET ORAL DAILY
Qty: 90 TABLET | Refills: 3 | Status: SHIPPED | OUTPATIENT
Start: 2020-08-10 | End: 2021-09-17

## 2020-08-10 ASSESSMENT — MIFFLIN-ST. JEOR: SCORE: 1430.67

## 2020-08-10 ASSESSMENT — PATIENT HEALTH QUESTIONNAIRE - PHQ9: SUM OF ALL RESPONSES TO PHQ QUESTIONS 1-9: 3

## 2020-08-10 NOTE — PROGRESS NOTES
Subjective     Cara Camarillo is a 80 year old female who presents to clinic today for the following health issues:    HPI       Diabetes Follow-up    How often are you checking your blood sugar? A few times a month (once per week)   What time of day are you checking your blood sugars (select all that apply)?  Before meals  Have you had any blood sugars above 200?  No  Have you had any blood sugars below 70?  No    What symptoms do you notice when your blood sugar is low?  None    What concerns do you have today about your diabetes? None     Do you have any of these symptoms? (Select all that apply)  No numbness or tingling in feet.  No redness, sores or blisters on feet.  No complaints of excessive thirst.  No reports of blurry vision.  No significant changes to weight.    Have you had a diabetic eye exam in the last 12 months? No        BP Readings from Last 2 Encounters:   08/10/20 139/66   11/18/19 132/80     Hemoglobin A1C (%)   Date Value   11/18/2019 7.3 (H)   08/19/2019 7.6 (H)     LDL Cholesterol Calculated (mg/dL)   Date Value   08/19/2019 82   08/01/2018 61     LDL Cholesterol Direct (mg/dL)   Date Value   04/29/2019 73           Hearing is getting worse, but she does not want hearing aids  Metformin causes diarrhea which severely impacts quality of life   She is suffering from isolation and loneliness due to covid  She has many questions about her diet and how it impacts her sugar control    Patient Active Problem List   Diagnosis     History of cancer of uterus     Dysthymic disorder     Herpes zoster     Essential hypertension, benign     SOLITARY KIDNEY ANOMALY NEC     Hyperlipidemia LDL goal <70     CKD (chronic kidney disease) stage 3, GFR 30-59 ml/min (H)     Advanced directives, counseling/discussion     Anxiety     Tubular adenoma     OA (osteoarthritis)     Aortic stenosis, mild     IVCD (intraventricular conduction defect)     B12 deficiency     Iron deficiency     Esophageal reflux      Overweight BMI 35-40     Osteopenia     RBBB     Left ventricular diastolic dysfunction     Coronary artery disease     Type 2 diabetes mellitus with diabetic peripheral angiopathy without gangrene, without long-term current use of insulin (H)     Chest wall pain     Bilateral edema of lower extremity     Coronary artery disease involving native coronary artery of native heart without angina pectoris     Past Surgical History:   Procedure Laterality Date     ARTHROPLASTY KNEE  2013    Procedure: ARTHROPLASTY KNEE;  RIGHT TOTAL KNEE ARTHROPLASTY;  Surgeon: Romaine Constantino MD;  Location:  OR     ARTHROPLASTY KNEE  2/3/2014    Procedure: ARTHROPLASTY KNEE;  LEFT TOTAL KNEE ARTHROPLASTY (BIOMET)^;  Surgeon: Romaine Constantino MD;  Location:  OR     C ANESTH, SECTION       C NONSPECIFIC PROCEDURE  3/20/06    CT scan of chest/abd/pelvis- negative for recurrence of CA     EXCISE MASS LOWER EXTREMITY Left 2015    Procedure: EXCISE MASS LOWER EXTREMITY;  Surgeon: Sloan Richardson MD;  Location: [facility] UGI ENDOSCOPY W EUS  2013    Procedure: COMBINED ENDOSCOPIC ULTRASOUND, ESOPHAGOSCOPY, GASTROSCOPY, DUODENOSCOPY (EGD);  Surgeon: Lyric Simons MD;  Location:  GI     HYSTERECTOMY      Vaginal     HYSTERECTOMY, VAGINAL  05    Uterine CA     LAPAROSCOPIC CHOLECYSTECTOMY  1/10/2013    Procedure: LAPAROSCOPIC CHOLECYSTECTOMY;  LAPAROSCOPIC CHOLECYSTECTOMY ;  Surgeon: Eduardo Taylor MD;  Location:  OR     NEPHRECTOMY BILATERAL       NEPHRECTOMY RT/LT  OCT 2005     OTHER SURGICAL HISTORY      angiogram 2016: ISIDRO to PDA     OTHER SURGICAL HISTORY      angiogram 2016: ISIDRO to LAD       Social History     Tobacco Use     Smoking status: Never Smoker     Smokeless tobacco: Never Used   Substance Use Topics     Alcohol use: No     Alcohol/week: 0.0 standard drinks     Family History   Problem Relation Age of Onset     Diabetes Father         Adult onset     Other -  "See Comments Mother 95        Old age         Current Outpatient Medications   Medication Sig Dispense Refill     acetaminophen (TYLENOL) 500 MG tablet Take 500-1,000 mg by mouth 3 times daily as needed for mild pain       amLODIPine (NORVASC) 10 MG tablet Take 1 tablet (10 mg) by mouth daily 90 tablet 3     aspirin 81 MG tablet Take 1 tablet (81 mg) by mouth daily 100 tablet 3     atorvastatin (LIPITOR) 20 MG tablet Take 1 tablet (20 mg) by mouth daily 90 tablet 1     BD INSULIN SYRINGE U/F 30G X 1/2\" 0.5 ML miscellaneous USE ONE SYRINGE TWICE DAILY OR AS DIRECTED. 100 each 1     blood glucose (NO BRAND SPECIFIED) test strip Use to test blood sugar 3 times daily or as directed. 300 strip 0     blood glucose (ONETOUCH ULTRA) test strip Use to test blood sugar once daily or as directed. 100 strip 3     blood glucose monitoring (CONTOUR NEXT MONITOR W/DEVICE KIT) meter device kit Use to test blood sugar 3 times daily or as directed. 1 kit 0     insulin NPH (NOVOLIN N VIAL) 100 UNIT/ML injection 10 units before breakfast, 10 units before dinner 10 mL 1     losartan (COZAAR) 50 MG tablet Take 1 tablet (50 mg) by mouth daily 180 tablet 3     metoprolol tartrate (LOPRESSOR) 50 MG tablet TAKE 1 1/2 TABLETS BY MOUTH TWICE DAILY 270 tablet 0     nitroglycerin (NITROSTAT) 0.4 MG SL tablet Place 1 tablet (0.4 mg) under the tongue every 5 minutes as needed for chest pain if you are still having symptoms after 3 doses (15 minutes) call 911. 25 tablet 1     ONETOUCH ULTRA test strip USE TO TEST 3 TIMES DAILY 300 strip 1     vitamin D3 (CHOLECALCIFEROL) 50 mcg (2000 units) tablet Take 1 tablet by mouth every other day       Allergies   Allergen Reactions     Actos [Pioglitazone]      Lower extremity edema       Enalapril      Renal failure     Hydrochlorothiazide      Dry mouth     Lisinopril      Hyperkalemia         Reviewed and updated as needed this visit by Provider         Review of Systems   Constitutional, HEENT, " "cardiovascular, pulmonary, gi and gu systems are negative, except as otherwise noted.      Objective    /66 (BP Location: Left arm, Patient Position: Sitting, Cuff Size: Adult Large)   Pulse 80   Temp 98.5  F (36.9  C) (Tympanic)   Ht 1.575 m (5' 2\")   Wt 101.7 kg (224 lb 4.8 oz)   SpO2 96%   Breastfeeding No   BMI 41.03 kg/m    Body mass index is 41.03 kg/m .  Physical Exam   Well appearing no distress  Bilateral tympanic membranes appear normal    Labs pending         Assessment & Plan       ICD-10-CM    1. Diarrhea, unspecified type  R19.7    2. 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)  E11.59 Hemoglobin A1c     Hemoglobin A1c     Albumin Random Urine Quantitative with Creat Ratio   3. CKD (chronic kidney disease) stage 3, GFR 30-59 ml/min (H)  N18.3    4. Morbid obesity (H)  E66.01    5. BENIGN HYPERTENSION  I10 amLODIPine (NORVASC) 10 MG tablet   6. Hyperlipidemia LDL goal <70  E78.5 atorvastatin (LIPITOR) 20 MG tablet     Lipid panel reflex to direct LDL Fasting   Stop metformin due to diarrhea side effects   Check A1c at 3 month interval and adjust lantus based on result  Blood pressure well controlled  Counseled on diet  Recommended she consider hearing aids for quality of life, she declines     BMI:   Estimated body mass index is 41.03 kg/m  as calculated from the following:    Height as of this encounter: 1.575 m (5' 2\").    Weight as of this encounter: 101.7 kg (224 lb 4.8 oz).   Weight management plan: Discussed healthy diet and exercise guidelines            Return in about 3 months (around 11/10/2020) for Pre-visit non-fasting Lab for A1c so result can be discussed at office visit appointment .    Kole Gonsalez MD  [facility]      Total face to face contact time was greater than 25 minutes of which more than 50% of this time was spent counseling and coordinating care regarding the above topics.    "

## 2020-08-10 NOTE — LETTER
August 12, 2020      Cara Camarillo  [address] [address]        Dear ,    We are writing to inform you of your test results.      -Your microalbumin level which is a diabetes urine test to check for any evidence of early kidney injury from diabetes returned negative.       -Your cholesterol panel looks healthy.     -Kidney function is normal for you (Creatinine, GFR), Sodium is normal for you, Potassium is normal for you, Calcium is normal for you, Glucose (blood sugar) is normal for you.     -Your hemoglobin A1c test which is a diabetes blood test that represents and average of your blood sugars over the last 3 months returned at 7.5 which is at your goal of hemoglobin A1c less than 8.       Bottom line:  Your lab results look OK.  I think it is reasonable to stop the metformin and recheck in 3 months.         Follow up:  Office visit appointment in 3 months with A1c recheck prior to office visit so we can discuss the results.         Sincerely,     Dr. Gonsalez     Resulted Orders   Lipid panel reflex to direct LDL Fasting   Result Value Ref Range    Cholesterol 140 <200 mg/dL    Triglycerides 95 <150 mg/dL    HDL Cholesterol 55 >49 mg/dL    LDL Cholesterol Calculated 66 <100 mg/dL      Comment:      Desirable:       <100 mg/dl    Non HDL Cholesterol 85 <130 mg/dL   Basic metabolic panel   Result Value Ref Range    Sodium 141 133 - 144 mmol/L    Potassium 4.8 3.4 - 5.3 mmol/L    Chloride 112 (H) 94 - 109 mmol/L    Carbon Dioxide 21 20 - 32 mmol/L    Anion Gap 8 3 - 14 mmol/L    Glucose 121 (H) 70 - 99 mg/dL    Urea Nitrogen 29 7 - 30 mg/dL    Creatinine 1.14 (H) 0.52 - 1.04 mg/dL    GFR Estimate 45 (L) >60 mL/min/[1.73_m2]      Comment:      Non  GFR Calc  Starting 12/18/2018, serum creatinine based estimated GFR (eGFR) will be   calculated using the Chronic Kidney Disease Epidemiology Collaboration   (CKD-EPI) equation.      GFR Estimate If Black 52 (L) >60  mL/min/[1.73_m2]      Comment:       GFR Calc  Starting 12/18/2018, serum creatinine based estimated GFR (eGFR) will be   calculated using the Chronic Kidney Disease Epidemiology Collaboration   (CKD-EPI) equation.      Calcium 9.5 8.5 - 10.1 mg/dL   CBC with platelets   Result Value Ref Range    WBC 7.6 4.0 - 11.0 10e9/L    RBC Count 4.87 3.8 - 5.2 10e12/L    Hemoglobin 14.3 11.7 - 15.7 g/dL    Hematocrit 42.9 35.0 - 47.0 %    MCV 88 78 - 100 fl    MCH 29.4 26.5 - 33.0 pg    MCHC 33.3 31.5 - 36.5 g/dL    RDW 14.6 10.0 - 15.0 %    Platelet Count 246 150 - 450 10e9/L   Hemoglobin A1c   Result Value Ref Range    Hemoglobin A1C 7.5 (H) 0 - 5.6 %      Comment:      Normal <5.7% Prediabetes 5.7-6.4%  Diabetes 6.5% or higher - adopted from ADA   consensus guidelines.     Albumin Random Urine Quantitative with Creat Ratio   Result Value Ref Range    Creatinine Urine 107 mg/dL    Albumin Urine mg/L 20 mg/L    Albumin Urine mg/g Cr 18.97 0 - 25 mg/g Cr       If you have any questions or concerns, please call the clinic at the number listed above.       Sincerely,        Kole Gonsalez MD

## 2020-08-11 LAB
ANION GAP SERPL CALCULATED.3IONS-SCNC: 8 MMOL/L (ref 3–14)
BUN SERPL-MCNC: 29 MG/DL (ref 7–30)
CALCIUM SERPL-MCNC: 9.5 MG/DL (ref 8.5–10.1)
CHLORIDE SERPL-SCNC: 112 MMOL/L (ref 94–109)
CHOLEST SERPL-MCNC: 140 MG/DL
CO2 SERPL-SCNC: 21 MMOL/L (ref 20–32)
CREAT SERPL-MCNC: 1.14 MG/DL (ref 0.52–1.04)
CREAT UR-MCNC: 107 MG/DL
GFR SERPL CREATININE-BSD FRML MDRD: 45 ML/MIN/{1.73_M2}
GLUCOSE SERPL-MCNC: 121 MG/DL (ref 70–99)
HDLC SERPL-MCNC: 55 MG/DL
LDLC SERPL CALC-MCNC: 66 MG/DL
MICROALBUMIN UR-MCNC: 20 MG/L
MICROALBUMIN/CREAT UR: 18.97 MG/G CR (ref 0–25)
NONHDLC SERPL-MCNC: 85 MG/DL
POTASSIUM SERPL-SCNC: 4.8 MMOL/L (ref 3.4–5.3)
SODIUM SERPL-SCNC: 141 MMOL/L (ref 133–144)
TRIGL SERPL-MCNC: 95 MG/DL

## 2020-08-27 NOTE — PROGRESS NOTES
Labs returned stable.  Pt was advised to discontinue metformin due to diarrhea and f/up in November.  Will do chart review at that time.  Lab Results   Component Value Date    A1C 7.5 08/10/2020    A1C 7.3 11/18/2019    A1C 7.6 08/19/2019    A1C 7.8 04/29/2019    A1C 7.7 08/17/2018        Lissy Diana, PharmD, [facility]  Medication Therapy Management Provider  Pager: [phone]

## 2020-08-31 DIAGNOSIS — E11.9 TYPE 2 DIABETES MELLITUS WITHOUT COMPLICATIONS (H): ICD-10-CM

## 2020-08-31 DIAGNOSIS — I10 ESSENTIAL (PRIMARY) HYPERTENSION: ICD-10-CM

## 2020-08-31 RX ORDER — LOSARTAN POTASSIUM 50 MG/1
TABLET ORAL
Qty: 180 TABLET | Refills: 0 | OUTPATIENT
Start: 2020-08-31

## 2020-08-31 NOTE — TELEPHONE ENCOUNTER
losartan (COZAAR) 50 MG tablet  180 tablet  3  8/10/2020   No    Sig - Route: Take 1 tablet (50 mg) by mouth daily - Oral    Sent to pharmacy as: Losartan Potassium 50 MG Oral Tablet (COZAAR)    Class: E-Prescribe    Order: [id number]    E-Prescribing Status: Receipt confirmed by pharmacy (8/10/2020  4:42 PM CDT)    Printout Tracking     External Result Report    Pharmacy     [id number] - [place], [place] - [address]

## 2020-09-03 ENCOUNTER — TELEPHONE (OUTPATIENT)
Dept: FAMILY MEDICINE | Facility: CLINIC | Age: 80
End: 2020-09-03

## 2020-09-03 DIAGNOSIS — H91.90 HEARING LOSS, UNSPECIFIED HEARING LOSS TYPE, UNSPECIFIED LATERALITY: Primary | ICD-10-CM

## 2020-09-03 NOTE — TELEPHONE ENCOUNTER
Reason for Call: Request for an order or referral:    Order or referral being requested: audiologist referral    Date needed: at your convenience    Has the patient been seen by the PCP for this problem? Not Applicable    Additional comments: pt was told that she needs a referral/order for hearing check by the audiologist office    [facility]  [address] [place]    Fax: [phone]    Phone number Patient can be reached at:  Cell number on file:    Telephone Information:   Mobile [phone]       Best Time:  any    Can we leave a detailed message on this number?  YES    Call taken on 9/3/2020 at 11:55 AM by Miah Alex

## 2020-09-03 NOTE — TELEPHONE ENCOUNTER
"To PCP:       Pt's Audiology office/insurance requires PCP referral for hearing test     Pended referral for completion, if appropriate. (Unsure of proper \"Services\" Audiogram vs comprehensive audiology exam)     Thank you,   Danielle HESS RN    "

## 2020-09-04 ENCOUNTER — TELEPHONE (OUTPATIENT)
Dept: FAMILY MEDICINE | Facility: CLINIC | Age: 80
End: 2020-09-04

## 2020-09-10 ENCOUNTER — TELEPHONE (OUTPATIENT)
Dept: FAMILY MEDICINE | Facility: CLINIC | Age: 80
End: 2020-09-10

## 2020-09-10 DIAGNOSIS — I10 ESSENTIAL (PRIMARY) HYPERTENSION: Primary | ICD-10-CM

## 2020-09-10 RX ORDER — LOSARTAN POTASSIUM 50 MG/1
100 TABLET ORAL DAILY
Qty: 180 TABLET | Refills: 3 | Status: SHIPPED | OUTPATIENT
Start: 2020-09-10 | End: 2021-10-18

## 2020-09-10 NOTE — TELEPHONE ENCOUNTER
I think the safest thing to do would be to check with Mrs Camarillo re how she is currently taking her losartan and not change the dose from what she is currently taking since her blood pressure was acceptable at her last visit.  Hopefully, this does not cause her too much stress and confusion.  Can and RN call her and inquire about how she is currently taking her losartan?

## 2020-09-10 NOTE — TELEPHONE ENCOUNTER
losartan (COZAAR) 50 MG tablet (Discontinued)  180 tablet  0  6/5/2020  8/10/2020  No    Sig: TAKE 2 TABLETS BY MOUTH EVERY DAY        losartan (COZAAR) 50 MG tablet  180 tablet  3  8/10/2020   No    Sig - Route: Take 1 tablet (50 mg) by mouth daily - Oral      Fax from pharmacy requesting Losartan 50mg    Rx dosing was changed at LOV 8-10-20 from 100mg daily to 50mg daily.  No mention in OV notes about dose change.  Quantity on 8/10/20 Rx is still #180.    Please clarify for pharmacy - is dose changed to 50mg daily?  If yes - please send new Rx with corrected quantity and note to pharmacy as dose change.    If dose is 100mg daily, please send new Rx with dosing instructions corrected.    No medication pended - needs Dr Gonsalez to review.    LOV 8-10-20    Next 5 appointments (look out 90 days)    Nov 11, 2020  4:30 PM CST  PHYSICAL with Kole Gonsalez MD  [facility] [address] [facility] [address] [phone]        Liza Gordon, RT (R)

## 2020-09-10 NOTE — TELEPHONE ENCOUNTER
Patient returned call.     Patient confirmed she takes Losartan 50 mg---2 tabs daily (100 mg daily).     New RX with correct sig and quantity sent to patient's pharmacy.     Betzaida BANKS RN,BSN

## 2020-12-11 ENCOUNTER — OFFICE VISIT (OUTPATIENT)
Dept: FAMILY MEDICINE | Facility: CLINIC | Age: 80
End: 2020-12-11
Payer: COMMERCIAL

## 2020-12-11 VITALS
OXYGEN SATURATION: 96 % | TEMPERATURE: 98.7 F | HEIGHT: 62 IN | BODY MASS INDEX: 41.04 KG/M2 | DIASTOLIC BLOOD PRESSURE: 70 MMHG | SYSTOLIC BLOOD PRESSURE: 136 MMHG | HEART RATE: 80 BPM | WEIGHT: 223 LBS

## 2020-12-11 DIAGNOSIS — E11.51 TYPE 2 DIABETES MELLITUS WITH DIABETIC PERIPHERAL ANGIOPATHY WITHOUT GANGRENE, WITHOUT LONG-TERM CURRENT USE OF INSULIN (H): Primary | ICD-10-CM

## 2020-12-11 DIAGNOSIS — E78.5 HYPERLIPIDEMIA LDL GOAL <70: ICD-10-CM

## 2020-12-11 DIAGNOSIS — I10 ESSENTIAL HYPERTENSION, BENIGN: ICD-10-CM

## 2020-12-11 DIAGNOSIS — E11.59 TYPE 2 DIABETES MELLITUS WITH VASCULAR DISEASE (H): ICD-10-CM

## 2020-12-11 LAB — HBA1C MFR BLD: 8.4 % (ref 0–5.6)

## 2020-12-11 PROCEDURE — 83036 HEMOGLOBIN GLYCOSYLATED A1C: CPT | Performed by: INTERNAL MEDICINE

## 2020-12-11 PROCEDURE — 99214 OFFICE O/P EST MOD 30 MIN: CPT | Performed by: INTERNAL MEDICINE

## 2020-12-11 PROCEDURE — 36415 COLL VENOUS BLD VENIPUNCTURE: CPT | Performed by: INTERNAL MEDICINE

## 2020-12-11 PROCEDURE — 99207 PR FOOT EXAM NO CHARGE: CPT | Performed by: INTERNAL MEDICINE

## 2020-12-11 RX ORDER — NITROGLYCERIN 0.4 MG/1
0.4 TABLET SUBLINGUAL EVERY 5 MIN PRN
Qty: 25 TABLET | Refills: 1 | Status: SHIPPED | OUTPATIENT
Start: 2020-12-11

## 2020-12-11 RX ORDER — HUMAN INSULIN 100 [IU]/ML
INJECTION, SUSPENSION SUBCUTANEOUS
Qty: 10 ML | Refills: 1 | Status: SHIPPED | OUTPATIENT
Start: 2020-12-11 | End: 2021-03-12

## 2020-12-11 ASSESSMENT — MIFFLIN-ST. JEOR: SCORE: 1424.77

## 2020-12-11 NOTE — PROGRESS NOTES
"Subjective     Cara Camarillo is a 80 year old female who presents to clinic today for the following health issues:    HPI         3 month follow up - A1c/Diabetes   Diarrhea resolved after stopping metformin... great relief  AM sugars in 160's  PM sugars are at goal  Her kids bought her hearing aids but they do not help enough  We communicated by me typing questions on the computer screen    Review of Systems   Unable to obtain due to severe hearing loss      Objective    /70 (BP Location: Right arm, Patient Position: Sitting, Cuff Size: Adult Large)   Pulse 80   Temp 98.7  F (37.1  C) (Temporal)   Ht 1.575 m (5' 2\")   Wt 101.2 kg (223 lb)   SpO2 96%   Breastfeeding No   BMI 40.79 kg/m    Body mass index is 40.79 kg/m .  Physical Exam   GENERAL: healthy, alert and no distress  RESP: lungs clear to auscultation - no rales, rhonchi or wheezes  CV: Heart with regular rate and rhythm.   MS: no gross musculoskeletal defects noted, no edema  NEURO: Normal strength and tone, mentation intact and speech normal  PSYCH: mentation appears normal, affect normal/bright  Diabetic foot exam: normal DP and PT pulses, no trophic changes or ulcerative lesions and normal sensory exam    A1c 8.4        Assessment & Plan     Type 2 diabetes mellitus with diabetic peripheral angiopathy without gangrene, without long-term current use of insulin (H)    - RI FOOT EXAM NO CHARGE    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)  A1c up after stopping metformin, but diarrhea resolved  Increase PM NPH to improve AM fasting glucose   - Hemoglobin A1c  - insulin NPH (NOVOLIN N VIAL) 100 UNIT/ML vial; 10 units before breakfast, 12 units before dinner    Essential hypertension, benign  Well controlled     Hyperlipidemia LDL goal <70  Recheck this summer               Return in about 3 months (around 3/11/2021) for Non-fasting Lab Only Visit for hgb A1c.    Kole Gonsalez MD  [facility]    "

## 2020-12-15 ENCOUNTER — TELEPHONE (OUTPATIENT)
Dept: FAMILY MEDICINE | Facility: CLINIC | Age: 80
End: 2020-12-15

## 2020-12-15 DIAGNOSIS — E11.59 TYPE 2 DIABETES MELLITUS WITH VASCULAR DISEASE (H): Primary | ICD-10-CM

## 2020-12-15 NOTE — TELEPHONE ENCOUNTER
Insurance will not cover Novolin N. Insurance prefers Humulin N for insulin. New Rx pended for Humulin N with same sig and sent to PCP for signature.    Kentrell Thapa, CMA on 12/15/2020 at 2:52 PM

## 2021-01-21 ENCOUNTER — TRANSFERRED RECORDS (OUTPATIENT)
Dept: MULTI SPECIALTY CLINIC | Facility: CLINIC | Age: 81
End: 2021-01-21

## 2021-01-21 LAB — RETINOPATHY: NORMAL

## 2021-02-01 DIAGNOSIS — I10 ESSENTIAL (PRIMARY) HYPERTENSION: ICD-10-CM

## 2021-02-01 DIAGNOSIS — E78.5 HYPERLIPIDEMIA LDL GOAL <70: ICD-10-CM

## 2021-02-01 RX ORDER — ATORVASTATIN CALCIUM 20 MG/1
TABLET, FILM COATED ORAL
Qty: 90 TABLET | Refills: 1 | Status: SHIPPED | OUTPATIENT
Start: 2021-02-01 | End: 2021-10-18

## 2021-02-01 RX ORDER — METOPROLOL TARTRATE 50 MG
TABLET ORAL
Qty: 270 TABLET | Refills: 0 | Status: SHIPPED | OUTPATIENT
Start: 2021-02-01 | End: 2021-04-27

## 2021-03-01 ENCOUNTER — IMMUNIZATION (OUTPATIENT)
Dept: NURSING | Facility: CLINIC | Age: 81
End: 2021-03-01
Attending: INTERNAL MEDICINE
Payer: COMMERCIAL

## 2021-03-01 PROCEDURE — 0002A PR COVID VAC PFIZER DIL RECON 30 MCG/0.3 ML IM: CPT

## 2021-03-01 PROCEDURE — 91300 PR COVID VAC PFIZER DIL RECON 30 MCG/0.3 ML IM: CPT

## 2021-03-09 DIAGNOSIS — E11.59 TYPE 2 DIABETES MELLITUS WITH VASCULAR DISEASE (H): ICD-10-CM

## 2021-03-09 PROCEDURE — 83036 HEMOGLOBIN GLYCOSYLATED A1C: CPT | Performed by: INTERNAL MEDICINE

## 2021-03-09 PROCEDURE — 36415 COLL VENOUS BLD VENIPUNCTURE: CPT | Performed by: INTERNAL MEDICINE

## 2021-03-09 NOTE — LETTER
March 11, 2021      [place] M Marquise  [address] [address]        Dear ,    The following letter pertains to your most recent diagnostic tests:     -Your hemoglobin A1c test which is a diabetes blood test that represents and average of your blood sugars over the last 3 months returned at 8.4 which is unfortunately still too high.     Bottom line:  I will have my staff call you to arrange an appointment so we can discuss next steps by phone.       Resulted Orders   **A1C FUTURE anytime   Result Value Ref Range    Hemoglobin A1C 8.4 (H) 0 - 5.6 %      Comment:      Normal <5.7% Prediabetes 5.7-6.4%  Diabetes 6.5% or higher - adopted from ADA   consensus guidelines.         If you have any questions or concerns, please call the clinic at the number listed above.       Sincerely,      Kole Gonsalez MD        [facility]

## 2021-03-10 LAB — HBA1C MFR BLD: 8.4 % (ref 0–5.6)

## 2021-03-10 NOTE — RESULT ENCOUNTER NOTE
The following letter pertains to your most recent diagnostic tests:    -Your hemoglobin A1c test which is a diabetes blood test that represents and average of your blood sugars over the last 3 months returned at 8.4 which is unfortunately still too high.    Bottom line:  I will have my staff call you to arrange an appointment so we can discuss next steps by phone.          Sincerely,    Dr. Gonsalez

## 2021-03-12 ENCOUNTER — VIRTUAL VISIT (OUTPATIENT)
Dept: FAMILY MEDICINE | Facility: CLINIC | Age: 81
End: 2021-03-12
Payer: COMMERCIAL

## 2021-03-12 DIAGNOSIS — E66.01 MORBID OBESITY (H): ICD-10-CM

## 2021-03-12 DIAGNOSIS — E11.51 TYPE 2 DIABETES MELLITUS WITH DIABETIC PERIPHERAL ANGIOPATHY WITHOUT GANGRENE, WITHOUT LONG-TERM CURRENT USE OF INSULIN (H): ICD-10-CM

## 2021-03-12 DIAGNOSIS — N18.31 STAGE 3A CHRONIC KIDNEY DISEASE (H): ICD-10-CM

## 2021-03-12 PROCEDURE — 99214 OFFICE O/P EST MOD 30 MIN: CPT | Mod: 95 | Performed by: INTERNAL MEDICINE

## 2021-03-12 RX ORDER — HUMAN INSULIN 100 [IU]/ML
INJECTION, SUSPENSION SUBCUTANEOUS
Qty: 30 ML | Refills: 11 | Status: SHIPPED | OUTPATIENT
Start: 2021-03-12 | End: 2021-05-17

## 2021-03-12 ASSESSMENT — PATIENT HEALTH QUESTIONNAIRE - PHQ9: SUM OF ALL RESPONSES TO PHQ QUESTIONS 1-9: 0

## 2021-03-12 NOTE — PROGRESS NOTES
"Cara is a 80 year old who is being evaluated via a billable telephone visit.      What phone number would you like to be contacted at? [phone]  How would you like to obtain your AVS? [facility]    Assessment & Plan     Type 2 diabetes mellitus with diabetic peripheral angiopathy without gangrene, without long-term current use of insulin (H)      Morbid obesity (H)      Stage 3a chronic kidney disease        Discussed diet modification  Discussed increasing PM insulin from 12 units to 15 units to get AM sugars at least less than 150  New prescription sent to [facility]   Telephone follow up in 4 weeks to inquire about sugar readings   Plan for follow up A1c in 3 months and full labs including monitoring or renal function in late summer at one year interval      15 minutes spent on the date of the encounter doing chart review, history and exam, documentation and further activities as noted above       BMI:   Estimated body mass index is 40.79 kg/m  as calculated from the following:    Height as of 12/11/20: 1.575 m (5' 2\").    Weight as of 12/11/20: 101.2 kg (223 lb).   Weight management plan: Discussed healthy diet and exercise guidelines        Return in about 1 month (around 4/12/2021) for telephone diabetes follow up with Dr. Gonsalez in one month .  Patient instructed to return to clinic or contact us sooner if symptoms worsen or new symptoms develop.   Kole Gonsalez MD  [facility]    Marivel Marroquin is a 80 year old who presents for the following health issues     HPI     Chief Complaint   Patient presents with     Follow Up     to discuss lab results       Follow up A1c  Diabetes not well controlled  She checks 2 twice weekly  AM values in 160's  PM values generally in 140's  No low sugar readings  She finds getting novolin NPH at [facility] most affordable for her insulin  Metformin caused intolerable diarrhea          Review of Systems         Objective           Vitals:  No vitals " were obtained today due to virtual visit.    Physical Exam   healthy, alert and no distress  PSYCH: Alert and oriented times 3; coherent speech, normal   rate and volume, able to articulate logical thoughts, able   to abstract reason, no tangential thoughts, no hallucinations   or delusions  Her affect is normal  RESP: No cough, no audible wheezing, able to talk in full sentences  Remainder of exam unable to be completed due to telephone visits                Phone call duration: 10 minutes

## 2021-03-12 NOTE — Clinical Note
Please abstract the following data from this visit with this patient into the appropriate field in Epic:    Tests that can be patient reported without a hard copy:    Eye exam with ophthalmology on this date: 01/21/2021 at [facility]

## 2021-04-27 DIAGNOSIS — I10 ESSENTIAL (PRIMARY) HYPERTENSION: ICD-10-CM

## 2021-04-27 RX ORDER — METOPROLOL TARTRATE 50 MG
TABLET ORAL
Qty: 270 TABLET | Refills: 1 | Status: SHIPPED | OUTPATIENT
Start: 2021-04-27 | End: 2021-11-05

## 2021-04-27 NOTE — TELEPHONE ENCOUNTER
"  Requested Prescriptions   Pending Prescriptions Disp Refills     metoprolol tartrate (LOPRESSOR) 50 MG tablet [Pharmacy Med Name: METOPROLOL TARTRATE 50 MG TAB] 270 tablet 0     Sig: TAKE ONE-AND-A-HALF TAB BY MOUTH TWICE A DAY       Beta-Blockers Protocol Passed - 4/27/2021  2:23 AM        Passed - Blood pressure under 140/90 in past 12 months     BP Readings from Last 3 Encounters:   12/11/20 136/70   08/10/20 139/66   11/18/19 132/80                 Passed - Patient is age 6 or older        Passed - Recent (12 mo) or future (30 days) visit within the authorizing provider's specialty     Patient has had an office visit with the authorizing provider or a provider within the authorizing providers department within the previous 12 mos or has a future within next 30 days. See \"Patient Info\" tab in inbasket, or \"Choose Columns\" in Meds & Orders section of the refill encounter.              Passed - Medication is active on med list             "

## 2021-05-17 ENCOUNTER — OFFICE VISIT (OUTPATIENT)
Dept: PHARMACY | Facility: CLINIC | Age: 81
End: 2021-05-17
Payer: COMMERCIAL

## 2021-05-17 VITALS
WEIGHT: 228 LBS | HEART RATE: 69 BPM | DIASTOLIC BLOOD PRESSURE: 83 MMHG | BODY MASS INDEX: 41.7 KG/M2 | SYSTOLIC BLOOD PRESSURE: 145 MMHG

## 2021-05-17 DIAGNOSIS — I10 ESSENTIAL HYPERTENSION, BENIGN: ICD-10-CM

## 2021-05-17 DIAGNOSIS — I25.10 CORONARY ARTERY DISEASE INVOLVING NATIVE CORONARY ARTERY OF NATIVE HEART WITHOUT ANGINA PECTORIS: ICD-10-CM

## 2021-05-17 DIAGNOSIS — R52 INTERMITTENT PAIN: ICD-10-CM

## 2021-05-17 DIAGNOSIS — E78.5 HYPERLIPIDEMIA LDL GOAL <70: ICD-10-CM

## 2021-05-17 DIAGNOSIS — E11.59 TYPE 2 DIABETES MELLITUS WITH VASCULAR DISEASE (H): ICD-10-CM

## 2021-05-17 DIAGNOSIS — H90.3 SENSORY HEARING LOSS, BILATERAL: Primary | ICD-10-CM

## 2021-05-17 DIAGNOSIS — Z78.9 TAKES DIETARY SUPPLEMENTS: ICD-10-CM

## 2021-05-17 DIAGNOSIS — Z23 NEED FOR VACCINATION: ICD-10-CM

## 2021-05-17 PROCEDURE — 99605 MTMS BY PHARM NP 15 MIN: CPT | Performed by: PHARMACIST

## 2021-05-17 PROCEDURE — 99607 MTMS BY PHARM ADDL 15 MIN: CPT | Performed by: PHARMACIST

## 2021-05-17 RX ORDER — HUMAN INSULIN 100 [IU]/ML
INJECTION, SUSPENSION SUBCUTANEOUS
Qty: 30 ML | Refills: 11 | Status: SHIPPED | OUTPATIENT
Start: 2021-05-17 | End: 2022-08-16

## 2021-05-17 NOTE — PROGRESS NOTES
Medication Therapy Management (MTM) Encounter    ASSESSMENT:                            Medication Adherence/Access: No issues identified    Hearing Loss: Unimproved, may benefit from f/up with audiology.    Type 2 Diabetes: Patient is not meeting A1c goal of < 8%. Self monitoring of blood glucose is not at goal of fasting  mg/dL, post-prandial readings are at goal <180mg/dL.  May benefit from increasing PM insulin dose to help control AM blood sugars better.     Hypertension/CAD: Blood pressure is not at goal <140/90mmHg during our visit today, but previous readings have been at goal.  She says she's nervous today.  She's seeing Dr. Gonsalez next week, blood pressure can be re-checked at that time.    Hyperlipidemia: Stable. Pt is on maximally tolerated statin therapy.     Pain: Stable.    Supplements: Stable.    Immunizations: Due for Shingrix vaccine, she declines.    PLAN:                            1.  Changed Novolin N to 10 units with breakfast and 18 units with dinner.  2.  Recommended Shingrix, she declined.  3.  Encouraged patient to f/up with audiology.    Follow-up: Return in about 3 months (around 8/17/2021) for diabetes follow-up.    SUBJECTIVE/OBJECTIVE:                          Cara Camarillo is a 81 year old female coming in for a follow-up visit. She was referred to me from Dr. Gonsalez.  Today's visit is a follow-up MTM visit from 7/20/2020.     Reason for visit: Routine f/up.    Tobacco: She reports that she has never smoked. She has never used smokeless tobacco.  Alcohol: not currently using    Medication Adherence/Access: no issues reported    Hearing Loss:  Cara purchased hearing aids earlier this year, but she doesn't find them helpful.  She feels she can feel better without them.  She has had f/up with where they were purchased (unclear where this is), but no changes were made.      Diabetes:  Pt currently taking Novolin N 10 units with breakfast and 15 units with dinner.  She was  taken off metformin due to diarrhea.  SMBx/week, alternating between AM (fasting) and HS  AM: 160, 178, 179, 181, 157, 178, 154  PM: 118, 121, 131, 128, 114, 119  Patient is not experiencing hypoglycemia.   Recent symptoms of high blood sugar? Fatigue  Eye exam: up to date  Foot exam: up to date  Microalbumin is < 30 mg/g. Pt is taking an ACEi/ARB.  Lab Results   Component Value Date    UMALCR 18.97 08/10/2020   Aspirin: Taking 81mg daily and she denies side effects today  Lab Results   Component Value Date    A1C 8.4 2021    A1C 8.4 2020    A1C 7.5 08/10/2020    A1C 7.3 2019    A1C 7.6 2019     GFR Estimate   Date Value Ref Range Status   08/10/2020 45 (L) >60 mL/min/[1.73_m2] Final     Comment:     Non  GFR Calc  Starting 2018, serum creatinine based estimated GFR (eGFR) will be   calculated using the Chronic Kidney Disease Epidemiology Collaboration   (CKD-EPI) equation.       Hypertension/CAD: Current medications include aspirin 81mg daily, amlodipine 10mg daily, losartan 100mg daily, metoprolol tartrate 75mg twice daily and sublingual nitroglycerin as needed (no recent use, she acknowledges hers is probably  and she declines having this refilled). Patient does not self-monitor BP. She denies side effects of therapy.  BP Readings from Last 3 Encounters:   20 136/70   08/10/20 139/66   19 132/80       Hyperlipidemia: Current therapy includes atorvastatin 20mg once daily.  Pt reports no significant myalgias or other side effects.  She has had myalgias with higher statin doses.  Recent Labs   Lab Test 08/10/20  1659 19  1446 10/24/14  0847 10/24/14  0847 13  0905 13  0905   CHOL 140 166   < > 215*  --  157   HDL 55 52   < > 49*   < > 56   LDL 66 82   < > 137*   < > 83   TRIG 95 158*   < > 143  --  93   CHOLHDLRATIO  --   --   --  4.4  --  2.8    < > = values in this interval not displayed.       Pain:  She has acetaminophen  available for use, which she takes occasionally, last dose was a few days ago.  She's been having some neck, shoulder and ankle pain, for which she uses some ice and finds this helpful.  She does find acetaminophen effective when needed.    Supplements:  She takes Vitamin D 2000 international unit(s) every other day.  She was previously taking 1000 international unit(s) daily but hasn't been able to find this dose.  Vitamin D Deficiency Screening Results:  Lab Results   Component Value Date    VITDT 37 07/21/2014      Immunizations:  Patient has received 2 COVID-19 vaccine doses (Pfizer - 2/8/21 and 3/1/21).  She received high dose influenza vaccine fall 2020.  She's received Pneumovax since turning 63 and hsa received Prevnar.  Last tetanus was 2015.  She has not received Shingrix.    Today's Vitals: BP (!) 145/83   Pulse 69   Wt 228 lb (103.4 kg)   BMI 41.70 kg/m    ----------------    I spent 35 minutes with this patient today. All changes were made via collaborative practice agreement with Dr. Gonsalez. A copy of the visit note was provided to the patient's primary care provider.    The patient was given a summary of these recommendations.     Lissy Diana, PharmD, [facility]  Medication Therapy Management Provider  Pager: [phone]       Medication Therapy Recommendations  Need for vaccination    Rationale: Preventive therapy - Needs additional medication therapy - Indication   Recommendation: Order Vaccine - Shingrix 50 MCG/0.5ML Susr   Status: Declined per Patient         Type 2 diabetes mellitus with diabetic peripheral angiopathy without gangrene, without long-term current use of insulin (H)    Current Medication: insulin NPH (NOVOLIN N VIAL) 100 UNIT/ML vial   Rationale: Dose too low - Dosage too low - Effectiveness   Recommendation: Increase Dose - Changed Novolin N to 10 units with breakfast and 18 units with dinner   Status: Accepted per CPA

## 2021-05-17 NOTE — PATIENT INSTRUCTIONS
Recommendations from today's MTM visit:                                                    MTM (medication therapy management) is a service provided by a clinical pharmacist designed to help you get the most of out of your medicines.   Today we reviewed what your medicines are for, how to know if they are working, that your medicines are safe and how to make your medicine regimen as easy as possible.      1.  You are due for a shingles vaccine, called Shingrix.  This can be given at any pharmacy and they'll be able to tell you if your insurance will cover it or not.      2.  Increase insulin dose with dinner to 18 units.  Continue 10 units with breakfast.     3.  Talk with Dr. Gonsalez about your ankle pain when you see him.    Follow-up: Return in about 3 months (around 8/17/2021) for diabetes follow-up.    It was great to speak with you today.  I value your experience and would be very thankful for your time with providing feedback on our clinic survey. You may receive a survey via email or text message in the next few days.     To schedule another MTM appointment, please call the clinic directly or you may call the MTM scheduling line at [phone] or toll-free at [phone].     My Clinical Pharmacist's contact information:                                                      Please feel free to contact me with any questions or concerns you have.      Lissy Diana PharmD, [facility]  Medication Therapy Management Provider  Pager: [phone]     Lena Castillo PharmD  Medication Therapy Management Resident  Pager: [phone]

## 2021-05-17 NOTE — LETTER
"        Date: 2021    Cara Camarillo  [address] [address]    Dear Ms. Camarillo,    Thank you for talking with me on 2021 about your health and medications. Medicare s MTM (Medication Therapy Management) program helps you understand your medications and use them safely.      This letter includes an action plan (Medication Action Plan) and medication list (Personal Medication List). The action plan has steps you should take to help you get the best results from your medications. The medication list will help you keep track of your medications and how to use them the right way.       Have your action plan and medication list with you when you talk with your doctors, pharmacists, and other healthcare providers in your care team.     Ask your doctors, pharmacists, and other healthcare providers to update the action plan and medication list at every visit.     Take your medication list with you if you go to the hospital or emergency room.     Give a copy of the action plan and medication list to your family or caregivers.     If you want to talk about this letter or any of the papers with it, please call   [phone].We look forward to working with you, your doctors, and other healthcare providers to help you stay healthy through the [employer] [place] MTM program.    Sincerely,  Lissy Diana [facility]    Enclosed: Medication Action Plan and Personal Medication List    MEDICATION ACTION PLAN FOR Cara Camarillo,  1940     This action plan will help you get the best results from your medications if you:   1. Read \"What we talked about.\"   2. Take the steps listed in the \"What I need to do\" boxes.   3. Fill in \"What I did and when I did it.\"   4. Fill in \"My follow-up plan\" and \"Questions I want to ask.\"     Have this action plan with you when you talk with your doctors, pharmacists, and other healthcare providers in your care team. Share this with your " family or caregivers too.  DATE PREPARED: 2021  What we talked about: Diabetes                                                  What I need to do: We changed your Novolin N insulin dose to 10 units with breakfast and 18 units with dinner. What I did and when I did it:                                              What we talked about: Vaccines                                                  What I need to do: I recommended the shingles vaccine, called Shingrix.  You did not feel like you were interested in this, but let me know if you change your mind. What I did and when I did it:                                                My follow-up plan:                 Questions I want to ask:              If you have any questions about your action plan, call Lissy Diana [facility], Phone: [phone] , Monday-Friday 8-4:30pm.           PERSONAL MEDICATION LIST FOR MIS Pereira 1940     This medication list was made for you after we talked. We also used information from your doctor's chart.      Use blank rows to add new medications. Then fill in the dates you started using them.    Cross out medications when you no longer use them. Then write the date and why you stopped using them.    Ask your doctors, pharmacists, and other healthcare providers to update this list at every visit. Keep this list up-to-date with:       Prescription medications    Over the counter drugs     Herbals    Vitamins    Minerals      If you go to the hospital or emergency room, take this list with you. Share this with your family or caregivers too.     DATE PREPARED: 2021  Allergies or side effects: Actos [pioglitazone], Enalapril, Hydrochlorothiazide, Lisinopril, and Metformin     Medication:  ACETAMINOPHEN 500 MG PO TABS      How I use it:  Take 500-1,000 mg by mouth 3 times daily as needed for mild pain      Why I use it:  Pain    Prescriber:  Self      Date I started using it:       Date I stopped using it:        "  Why I stopped using it:            Medication:  AMLODIPINE BESYLATE 10 MG PO TABS      How I use it:  Take 1 tablet (10 mg) by mouth daily      Why I use it: Essential hypertension, benign    Prescriber:  Kole Gonsalez MD      Date I started using it:       Date I stopped using it:         Why I stopped using it:            Medication:  ASPIRIN 81 MG PO TABS      How I use it:  Take 1 tablet (81 mg) by mouth daily      Why I use it: Type II or unspecified type diabetes mellitus without mention of complication, not stated as uncontrolled    Prescriber:  Ventura Alvarez MD      Date I started using it:       Date I stopped using it:         Why I stopped using it:            Medication:  ATORVASTATIN CALCIUM 20 MG PO TABS      How I use it:  TAKE 1 TABLET BY MOUTH EVERY DAY      Why I use it: Hyperlipidemia LDL goal <70    Prescriber:  Kole Gonsalez MD      Date I started using it:       Date I stopped using it:         Why I stopped using it:            Medication:  BD INSULIN SYRINGE U/F 30G X 1/2\" 0.5 ML MISC      How I use it:  USE ONE SYRINGE TWICE DAILY OR AS DIRECTED.      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)    Prescriber:  Kole Gonsalez MD      Date I started using it:       Date I stopped using it:         Why I stopped using it:            Medication:  CONTOUR NEXT MONITOR W/DEVICE KIT      How I use it:  Use to test blood sugar 3 times daily or as directed.      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)    Prescriber:  Kole Gonsalez MD      Date I started using it:       Date I stopped using it:         Why I stopped using it:            Medication:  CONTOUR NEXT TEST VI STRP      How I use it:  Use to test blood sugar daily or as directed.      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)    Prescriber:  Kole Gonsalez MD      Date I started using it:       Date I stopped using it:         Why I stopped " using it:            Medication:  LOSARTAN POTASSIUM 50 MG PO TABS      How I use it:  Take 2 tablets (100 mg) by mouth daily      Why I use it: Essential (primary) hypertension    Prescriber:  Kole Gonsalez MD      Date I started using it:       Date I stopped using it:         Why I stopped using it:            Medication:  METOPROLOL TARTRATE 50 MG PO TABS      How I use it:  TAKE ONE-AND-A-HALF TAB BY MOUTH TWICE A DAY      Why I use it: Essential (primary) hypertension    Prescriber:  Kole Gonsalez MD      Date I started using it:       Date I stopped using it:         Why I stopped using it:            Medication:  NITROGLYCERIN 0.4 MG SL SUBL      How I use it:  Place 1 tablet (0.4 mg) under the tongue every 5 minutes as needed for chest pain if you are still having symptoms after 3 doses (15 minutes) call 911.      Why I use it:  CAD    Prescriber:  Kole Gonsalez MD      Date I started using it:       Date I stopped using it:         Why I stopped using it:            Medication:  NOVOLIN N VIAL 100 UNIT/ML SC SUSP      How I use it:  10 units before breakfast, 18 units before dinner      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)    Prescriber:  Kole Gonsalez MD      Date I started using it:       Date I stopped using it:         Why I stopped using it:            Medication:  VITAMIN D 50 MCG (2000 UT) PO TABS      How I use it:  Take 1 tablet by mouth every other day      Why I use it:  General Health     Prescriber:  Self      Date I started using it:       Date I stopped using it:         Why I stopped using it:            Medication:         How I use it:         Why I use it:      Prescriber:         Date I started using it:       Date I stopped using it:         Why I stopped using it:            Medication:         How I use it:         Why I use it:      Prescriber:         Date I started using it:       Date I stopped using it:         Why I stopped using it:             Medication:         How I use it:         Why I use it:      Prescriber:         Date I started using it:       Date I stopped using it:         Why I stopped using it:              Other Information:     If you have any questions about your medication list, call Lissy Diana [facility], Phone: [phone] , Monday-Friday 8-4:30pm.    According to the Paperwork Reduction Act of 1995, no persons are required to respond to a collection of information unless it displays a valid OMB control number. The valid OMB number for this information collection is [id number]. The time required to complete this information collection is estimated to average 40 minutes per response, including the time to review instructions, searching existing data resources, gather the data needed, and complete and review the information collection. If you have any comments concerning the accuracy of the time estimate(s) or suggestions for improving this form, please write to: CMS, Attn: SPENCER Reports Clearance Officer, [address] [place] [id number].

## 2021-05-25 ENCOUNTER — ANCILLARY PROCEDURE (OUTPATIENT)
Dept: GENERAL RADIOLOGY | Facility: CLINIC | Age: 81
End: 2021-05-25
Attending: INTERNAL MEDICINE
Payer: COMMERCIAL

## 2021-05-25 ENCOUNTER — OFFICE VISIT (OUTPATIENT)
Dept: FAMILY MEDICINE | Facility: CLINIC | Age: 81
End: 2021-05-25
Payer: COMMERCIAL

## 2021-05-25 VITALS
HEIGHT: 62 IN | WEIGHT: 227 LBS | BODY MASS INDEX: 41.77 KG/M2 | OXYGEN SATURATION: 97 % | TEMPERATURE: 98.2 F | HEART RATE: 69 BPM | SYSTOLIC BLOOD PRESSURE: 146 MMHG | DIASTOLIC BLOOD PRESSURE: 85 MMHG

## 2021-05-25 DIAGNOSIS — E11.51 TYPE 2 DIABETES MELLITUS WITH DIABETIC PERIPHERAL ANGIOPATHY WITHOUT GANGRENE, WITHOUT LONG-TERM CURRENT USE OF INSULIN (H): ICD-10-CM

## 2021-05-25 DIAGNOSIS — M79.671 RIGHT FOOT PAIN: ICD-10-CM

## 2021-05-25 DIAGNOSIS — M54.2 NECK PAIN: Primary | ICD-10-CM

## 2021-05-25 DIAGNOSIS — I10 ESSENTIAL HYPERTENSION, BENIGN: ICD-10-CM

## 2021-05-25 LAB — ERYTHROCYTE [SEDIMENTATION RATE] IN BLOOD BY WESTERGREN METHOD: 18 MM/H (ref 0–30)

## 2021-05-25 PROCEDURE — 73630 X-RAY EXAM OF FOOT: CPT | Mod: RT | Performed by: RADIOLOGY

## 2021-05-25 PROCEDURE — 99214 OFFICE O/P EST MOD 30 MIN: CPT | Performed by: INTERNAL MEDICINE

## 2021-05-25 PROCEDURE — 36415 COLL VENOUS BLD VENIPUNCTURE: CPT | Performed by: INTERNAL MEDICINE

## 2021-05-25 PROCEDURE — 85652 RBC SED RATE AUTOMATED: CPT | Performed by: INTERNAL MEDICINE

## 2021-05-25 ASSESSMENT — MIFFLIN-ST. JEOR: SCORE: 1437.92

## 2021-05-25 NOTE — LETTER
May 26, 2021      Cara Camarillo  [address] [address]        Dear ,    The following letter pertains to your most recent diagnostic tests:     The x-ray does not show any fractures.         Sincerely,     Dr. Gonsalez    Resulted Orders   XR Foot Right G/E 3 Views    Narrative    XR FOOT RIGHT G/E 3 VIEWS 5/25/2021 5:34 PM     HISTORY: right foot pain; Right foot pain      Impression    IMPRESSION: No apparent fracture. Forefoot atherosclerotic  calcification.    CARROLL JIMENEZ MD

## 2021-05-25 NOTE — PROGRESS NOTES
Assessment & Plan     Neck pain  I suspect that this is myofascial pain, I recommended physical therapy, she declined, her daughter encouraged her to consider it, I ordered the therapy in case she changes her mind, I told her that heat and Tylenol might help as well  - Erythrocyte sedimentation rate auto  - [facility]; Future    Type 2 diabetes mellitus with diabetic peripheral angiopathy without gangrene, without long-term current use of insulin (H)  Continue to work with clinical pharmacist to titrate insulin, check hemoglobin A1c next month    Essential hypertension, benign  Okay control given her age, goal blood pressure less than 150/90    Right foot pain  Check x-ray to exclude fracture, consider physical therapy if no fracture is identified pending symptoms  - XR Foot Right G/E 3 Views; Future      30 minutes spent on the date of the encounter doing chart review, history and exam, documentation and further activities per the note           Return in about 1 month (around 6/25/2021) for Diabetes Check, recheck if symptoms persist.     Kole Gonsalez MD  [facility] IS[place]EL Marroquin is a 81 year old who presents for the following health issues  accompanied by her daughter, Kalina:    HPI     Follow Up Chronic Health Cares  Here for follow-up of hypertension, type 2 diabetes, obesity, hyperlipidemia  Complains of neck pain and foot pain  Neck pain is worse when she twists her neck no injury or trauma no radiation down arms no arm numbness or weakness  Foot pain happened after she dropped something on her ankle several months ago  Pain has been persistent  She wonders if she has a fracture?    Blood sugar readings have been improving with the help of the clinical pharmacist helping with titration of her insulin dose        Review of Systems         Objective    BP (!) 146/85 (BP Location: Right arm, Cuff Size: Adult Large)   Pulse 69   Temp 98.2  F (36.8  C)  "(Temporal)   Ht 1.575 m (5' 2\")   Wt 103 kg (227 lb)   SpO2 97%   Breastfeeding No   BMI 41.52 kg/m    Body mass index is 41.52 kg/m .  Physical Exam   General: This is a well-appearing female in no acute distress who appears quite comfortable she is a little anxious.  Neck: There is no midline cervical spine tenderness, Spurling's maneuver does not elicit radicular symptoms, this paraspinous muscles of the neck are tender.  Foot: She is tender inferior to the medial malleolus of the right foot, there is no foot ulcer, pulses intact, venous stasis skin changes are present, no other areas of focal bony tenderness                "

## 2021-05-25 NOTE — LETTER
May 26, 2021      Cara Camarillo  [address] [address]        Dear ,    The following letter pertains to your most recent diagnostic tests:     -Your ESR test (Sed Rate) returned normal.  This is a test of body inflammation.    This result means that prednisone is unlikely to help your neck and shoulder pain.       Sincerely,     Dr. Gonsalez    Resulted Orders   Erythrocyte sedimentation rate auto   Result Value Ref Range    Sed Rate 18 0 - 30 mm/h

## 2021-05-26 NOTE — RESULT ENCOUNTER NOTE
The following letter pertains to your most recent diagnostic tests:    -Your ESR test (Sed Rate) returned normal.  This is a test of body inflammation.    This result means that prednisone is unlikely to help your neck and shoulder pain.      Sincerely,    Dr. Gonsalez

## 2021-05-26 NOTE — RESULT ENCOUNTER NOTE
The following letter pertains to your most recent diagnostic tests:    The x-ray does not show any fractures.        Sincerely,    Dr. Gonsalez

## 2021-07-10 ENCOUNTER — HEALTH MAINTENANCE LETTER (OUTPATIENT)
Age: 81
End: 2021-07-10

## 2021-09-04 ENCOUNTER — HEALTH MAINTENANCE LETTER (OUTPATIENT)
Age: 81
End: 2021-09-04

## 2021-09-10 ENCOUNTER — THERAPY VISIT (OUTPATIENT)
Dept: PHYSICAL THERAPY | Facility: CLINIC | Age: 81
End: 2021-09-10
Attending: INTERNAL MEDICINE
Payer: COMMERCIAL

## 2021-09-10 DIAGNOSIS — M54.2 NECK PAIN: ICD-10-CM

## 2021-09-10 PROCEDURE — 97110 THERAPEUTIC EXERCISES: CPT | Mod: GP | Performed by: PHYSICAL THERAPIST

## 2021-09-10 PROCEDURE — 97140 MANUAL THERAPY 1/> REGIONS: CPT | Mod: GP | Performed by: PHYSICAL THERAPIST

## 2021-09-10 PROCEDURE — 97161 PT EVAL LOW COMPLEX 20 MIN: CPT | Mod: GP | Performed by: PHYSICAL THERAPIST

## 2021-09-10 NOTE — PROGRESS NOTES
Physical Therapy Initial Evaluation  Subjective:  The history is provided by the patient and a relative (Daughter Kalina).   Patient Health History  Cara Camarillo being seen for stiff neck.     Problem began: 6/1/2021.   Problem occurred: gradual worsening   Pain is reported as 7/10 on pain scale.  General health as reported by patient is fair.  Pertinent medical history includes: diabetes, high blood pressure, kidney disease and overweight.     Medical allergies: none.   Surgeries include:  Orthopedic surgery and cancer surgery.    Current medications:  High blood pressure medication.    Current occupation is retired.   Primary job tasks include:  Other.   Other job/home tasks details: light household activities and walking; per daughter- sedentary. Doesn't drink enough water..                Therapist Generated HPI Evaluation         Type of problem:  Cervical spine.    This is a new condition.  Condition occurred with:  Degenerative joint disease.  Where condition occurred: other.  Patient reports pain:  Cervical right side and upper cervical spine.  Pain is described as aching (L side cramp) and is intermittent.  Pain radiates to:  Head. Pain is worse in the P.M..  Since onset symptoms are gradually worsening.  Associated symptoms:  Loss of motion/stiffness and headache. Symptoms are exacerbated by looking up or down, driving and rotating head  and relieved by NSAID's and ice.                              Objective:  Standing Alignment:    Cervical/Thoracic:  Forward head, cervical lordosis increased and thoracic kyphosis increased  Shoulder/UE:  Rounded shoulders                                  Cervical/Thoracic Evaluation    AROM:  AROM Cervical:    Flexion:          Min - B tightness  Extension:       Mod - B tightness  Rotation:         Left: Major - with L side pain     Right: min - B neck pain  Side Bend:      Left:     Right:       Headaches: cervical          Cervical Palpation:    Tenderness present  at Left:    Sternocleidomstoid; Upper Trap and Suboccipitals  Tenderness present at Right:    Upper Trap and Suboccipitals                                                  General     ROS    Assessment/Plan:    Patient is a 81 year old female with cervical complaints.    Patient has the following significant findings with corresponding treatment plan.                Neck Pain    Pain -  hot/cold therapy, manual therapy, self management, education, directional preference exercise and home program  Decreased ROM/flexibility - manual therapy, therapeutic exercise and home program  Decreased joint mobility - manual therapy, therapeutic exercise and home program  Decreased strength - therapeutic exercise, therapeutic activities and home program  Decreased proprioception - neuro re-education, therapeutic activities and home program  Impaired posture - neuro re-education, therapeutic activities and home program    Therapy Evaluation Codes:   Cumulative Therapy Evaluation is: Low complexity.    Previous and current functional limitations:  (See Goal Flow Sheet for this information)    Short term and Long term goals: (See Goal Flow Sheet for this information)     Communication ability:  Patient has a relative for communication clarity.  Treatment Explanation - The following has been discussed with the patient:   RX ordered/plan of care  Anticipated outcomes  Possible risks and side effects  This patient would benefit from PT intervention to resume normal activities.   Rehab potential is good.    Frequency:  1 X week, once daily  Duration:  for 6 weeks  Discharge Plan:  Achieve all LTG.  Independent in home treatment program.  Reach maximal therapeutic benefit.    Please refer to the daily flowsheet for treatment today, total treatment time and time spent performing 1:1 timed codes.

## 2021-09-16 ENCOUNTER — THERAPY VISIT (OUTPATIENT)
Dept: PHYSICAL THERAPY | Facility: CLINIC | Age: 81
End: 2021-09-16
Payer: COMMERCIAL

## 2021-09-16 DIAGNOSIS — M54.2 NECK PAIN: Primary | ICD-10-CM

## 2021-09-16 PROCEDURE — 97140 MANUAL THERAPY 1/> REGIONS: CPT | Mod: GP | Performed by: PHYSICAL THERAPIST

## 2021-09-16 PROCEDURE — 97110 THERAPEUTIC EXERCISES: CPT | Mod: GP | Performed by: PHYSICAL THERAPIST

## 2021-09-17 DIAGNOSIS — I10 ESSENTIAL HYPERTENSION, BENIGN: ICD-10-CM

## 2021-09-17 RX ORDER — AMLODIPINE BESYLATE 10 MG/1
TABLET ORAL
Qty: 90 TABLET | Refills: 3 | Status: SHIPPED | OUTPATIENT
Start: 2021-09-17 | End: 2022-02-15

## 2021-09-17 NOTE — TELEPHONE ENCOUNTER
Routing refill request to provider for review/approval because:  Labs not current:    Creatinine   Date Value Ref Range Status   08/10/2020 1.14 (H) 0.52 - 1.04 mg/dL Final       BP Readings from Last 3 Encounters:   05/25/21 (!) 146/85   05/17/21 (!) 145/83   12/11/20 136/70     Codie Jessica RN

## 2021-09-22 ENCOUNTER — THERAPY VISIT (OUTPATIENT)
Dept: PHYSICAL THERAPY | Facility: CLINIC | Age: 81
End: 2021-09-22
Payer: COMMERCIAL

## 2021-09-22 DIAGNOSIS — M54.2 NECK PAIN: Primary | ICD-10-CM

## 2021-09-22 PROCEDURE — 97140 MANUAL THERAPY 1/> REGIONS: CPT | Mod: GP | Performed by: PHYSICAL THERAPIST

## 2021-09-22 PROCEDURE — 97110 THERAPEUTIC EXERCISES: CPT | Mod: GP | Performed by: PHYSICAL THERAPIST

## 2021-10-11 ENCOUNTER — TELEPHONE (OUTPATIENT)
Dept: PHARMACY | Facility: CLINIC | Age: 81
End: 2021-10-11

## 2021-10-11 NOTE — TELEPHONE ENCOUNTER
Patient is due for a follow-up appointment with MTM pharmacist. Left message to call us back to schedule an appointment.     Natalia Encarnacion, [employer]   Pharmaceutical Care Resident   Medication Therapy Management

## 2021-10-14 DIAGNOSIS — E78.5 HYPERLIPIDEMIA LDL GOAL <70: ICD-10-CM

## 2021-10-14 DIAGNOSIS — I10 ESSENTIAL (PRIMARY) HYPERTENSION: ICD-10-CM

## 2021-10-18 RX ORDER — LOSARTAN POTASSIUM 50 MG/1
TABLET ORAL
Qty: 180 TABLET | Refills: 1 | Status: SHIPPED | OUTPATIENT
Start: 2021-10-18 | End: 2022-03-04

## 2021-10-18 RX ORDER — ATORVASTATIN CALCIUM 20 MG/1
TABLET, FILM COATED ORAL
Qty: 90 TABLET | Refills: 1 | Status: SHIPPED | OUTPATIENT
Start: 2021-10-18 | End: 2022-03-04

## 2021-10-18 NOTE — TELEPHONE ENCOUNTER
Routing refill request to provider for review/approval because:  Labs not current:    Lab Test 08/10/20  1659   LDL 66     Last office vist: 5/25/2021    Pended 90 day supply & 1 refills. Please Review.    Next office visit: none scheduled    Sweta Rodriguez RN  [facility]

## 2021-10-30 ENCOUNTER — HEALTH MAINTENANCE LETTER (OUTPATIENT)
Age: 81
End: 2021-10-30

## 2021-11-04 DIAGNOSIS — I10 ESSENTIAL (PRIMARY) HYPERTENSION: ICD-10-CM

## 2021-11-05 RX ORDER — METOPROLOL TARTRATE 50 MG
TABLET ORAL
Qty: 270 TABLET | Refills: 1 | Status: SHIPPED | OUTPATIENT
Start: 2021-11-05 | End: 2021-11-15 | Stop reason: DRUGHIGH

## 2021-11-05 NOTE — TELEPHONE ENCOUNTER
Routing refill request to provider for review/approval because:  BP Readings from Last 3 Encounters:   05/25/21 (!) 146/85   05/17/21 (!) 145/83   12/11/20 136/70    Kalina Kneney RN

## 2021-11-15 ENCOUNTER — OFFICE VISIT (OUTPATIENT)
Dept: PHARMACY | Facility: CLINIC | Age: 81
End: 2021-11-15
Payer: COMMERCIAL

## 2021-11-15 ENCOUNTER — LAB (OUTPATIENT)
Dept: LAB | Facility: CLINIC | Age: 81
End: 2021-11-15
Payer: COMMERCIAL

## 2021-11-15 VITALS
SYSTOLIC BLOOD PRESSURE: 151 MMHG | DIASTOLIC BLOOD PRESSURE: 79 MMHG | WEIGHT: 212 LBS | HEART RATE: 81 BPM | BODY MASS INDEX: 38.78 KG/M2

## 2021-11-15 DIAGNOSIS — I10 ESSENTIAL HYPERTENSION, BENIGN: ICD-10-CM

## 2021-11-15 DIAGNOSIS — E11.59 TYPE 2 DIABETES MELLITUS WITH VASCULAR DISEASE (H): ICD-10-CM

## 2021-11-15 DIAGNOSIS — M79.662 PAIN OF LEFT LOWER LEG: Primary | ICD-10-CM

## 2021-11-15 DIAGNOSIS — Z23 NEED FOR VACCINATION: ICD-10-CM

## 2021-11-15 DIAGNOSIS — E78.5 HYPERLIPIDEMIA LDL GOAL <70: ICD-10-CM

## 2021-11-15 DIAGNOSIS — I25.10 CORONARY ARTERY DISEASE INVOLVING NATIVE CORONARY ARTERY OF NATIVE HEART WITHOUT ANGINA PECTORIS: ICD-10-CM

## 2021-11-15 DIAGNOSIS — Z78.9 TAKES DIETARY SUPPLEMENTS: ICD-10-CM

## 2021-11-15 LAB — HBA1C MFR BLD: 7.4 % (ref 0–5.6)

## 2021-11-15 PROCEDURE — 82043 UR ALBUMIN QUANTITATIVE: CPT

## 2021-11-15 PROCEDURE — 80048 BASIC METABOLIC PNL TOTAL CA: CPT

## 2021-11-15 PROCEDURE — 80061 LIPID PANEL: CPT

## 2021-11-15 PROCEDURE — 83036 HEMOGLOBIN GLYCOSYLATED A1C: CPT

## 2021-11-15 PROCEDURE — 99607 MTMS BY PHARM ADDL 15 MIN: CPT | Performed by: PHARMACIST

## 2021-11-15 PROCEDURE — 36415 COLL VENOUS BLD VENIPUNCTURE: CPT

## 2021-11-15 PROCEDURE — 99606 MTMS BY PHARM EST 15 MIN: CPT | Performed by: PHARMACIST

## 2021-11-15 RX ORDER — METOPROLOL TARTRATE 100 MG
100 TABLET ORAL 2 TIMES DAILY
Qty: 180 TABLET | Refills: 3 | Status: SHIPPED | OUTPATIENT
Start: 2021-11-15 | End: 2022-08-16

## 2021-11-15 NOTE — LETTER
"November 16, 2021      Cara NOVOA Pankajnavid  [address] [address]        Dear ,    We are writing to inform you of your test results.    The following letter pertains to your most recent diagnostic tests:     -Your micro albumin level is elevated. This means you have trace amounts of protein in the urine. It indicates that your kidneys are being affected by diabetes. Keeping blood pressure low is the best treatment in order to keep this stable. People with microalbuminuria should avoid anti-inflamatory agents such as motrin, alleve and ibuprofen as much as possible because excessive use of these medications can be harmful to the kidneys. Anybody that has microalbuminuria should be on lisinopril or losartan-as you already are-since these medications are protective for the kidney's in this situation.     -Kidney function is STABLE for you (Creatinine, GFR), Sodium is normal for you, Potassium is normal for you, Calcium is normal for you, Glucose (blood sugar) is normal for you.      -Your cholesterol panel looks healthy with the exception of mildly low 'good cholesterol' HDL.   Increasing exercise can improve HDL (\"good cholesterol\") levels.  A good target to shoot for is 30 minutes of aerobic activity at least 4 days per week.      -Your hemoglobin A1c test which is a diabetes blood test that represents and average of your blood sugars over the last 3 months returned at 7.4 which is at your goal of hemoglobin A1c less than 8.          Resulted Orders   Hemoglobin A1c   Result Value Ref Range    Hemoglobin A1C 7.4 (H) 0.0 - 5.6 %      Comment:      Normal <5.7%   Prediabetes 5.7-6.4%    Diabetes 6.5% or higher     Note: Adopted from ADA consensus guidelines.   Basic metabolic panel   Result Value Ref Range    Sodium 143 133 - 144 mmol/L    Potassium 4.9 3.4 - 5.3 mmol/L    Chloride 113 (H) 94 - 109 mmol/L    Carbon Dioxide (CO2) 25 20 - 32 mmol/L    Anion Gap 5 3 - 14 mmol/L    Urea " Nitrogen 27 7 - 30 mg/dL    Creatinine 1.26 (H) 0.52 - 1.04 mg/dL    Calcium 9.1 8.5 - 10.1 mg/dL    Glucose 116 (H) 70 - 99 mg/dL    GFR Estimate 39 (L) >60 mL/min/1.73m2      Comment:      As of July 11, 2021, eGFR is calculated by the CKD-EPI creatinine equation, without race adjustment. eGFR can be influenced by muscle mass, exercise, and diet. The reported eGFR is an estimation only and is only applicable if the renal function is stable.   Lipid panel reflex to direct LDL Non-fasting   Result Value Ref Range    Cholesterol 146 <200 mg/dL    Triglycerides 109 <150 mg/dL    Direct Measure HDL 45 (L) >=50 mg/dL    LDL Cholesterol Calculated 79 <=100 mg/dL    Non HDL Cholesterol 101 <130 mg/dL    Patient Fasting > 8hrs? No     Narrative    Cholesterol  Desirable:  <200 mg/dL    Triglycerides  Normal:  Less than 150 mg/dL  Borderline High:  150-199 mg/dL  High:  200-499 mg/dL  Very High:  Greater than or equal to 500 mg/dL    Direct Measure HDL  Female:  Greater than or equal to 50 mg/dL   Male:  Greater than or equal to 40 mg/dL    LDL Cholesterol  Desirable:  <100mg/dL  Above Desirable:  100-129 mg/dL   Borderline High:  130-159 mg/dL   High:  160-189 mg/dL   Very High:  >= 190 mg/dL    Non HDL Cholesterol  Desirable:  130 mg/dL  Above Desirable:  130-159 mg/dL  Borderline High:  160-189 mg/dL  High:  190-219 mg/dL  Very High:  Greater than or equal to 220 mg/dL   Albumin Random Urine Quantitative with Creat Ratio   Result Value Ref Range    Creatinine Urine mg/dL 157 mg/dL    Albumin Urine mg/L 45 mg/L    Albumin Urine mg/g Cr 28.66 (H) 0.00 - 25.00 mg/g Cr       If you have any questions or concerns, please call the clinic at the number listed above.       Sincerely,      Kole Gonsalez MD

## 2021-11-15 NOTE — PROGRESS NOTES
Medication Therapy Management (MTM) Encounter    ASSESSMENT:                            Medication Adherence/Access: No issues identified    Leg Pain: Needs further evaluation.    Type 2 Diabetes: Patient is not meeting A1c goal of < 8%. Self monitoring of blood glucose is at goal of fasting  mg/dL and post prandial < 180 mg/dL.  Due for A1c and microalbumin.    Hypertension/CAD: Patient is not meeting blood pressure goal of < 140/90mmHg.  Would benefit from increasing metoprolol dose.  Due for BMP.    Hyperlipidemia: Due for lipid panel.    Supplements:  Stable.    Immunizations:  Due for influenza vaccine.  She declines Shingrix.     PLAN:                            1.  Labs today - A1c, cholesterol, kidney function/electrolytes and protein in the urine.  2.  Increase metoprolol to 100mg twice daily.  For now, you can take 2 of the 50mg tablets twice daily, and I sent in a prescription for 100mg tablets to switch to once you finish your supply of 50mg tablets.  3.  Flu shot here tomorrow at 9:45am  4.  Assisted patient in scheduling follow-up with Dr. Gonsalez re: leg pain.    Follow-up: Return pending lab results.    SUBJECTIVE/OBJECTIVE:                          Cara Camarillo is a 81 year old female coming in for a follow-up visit. She was referred to me from Dr. Gonsalez.  Today's visit is a follow-up MTM visit from 5/17/2021.  She is joined by her daughter, Kalina, for our visit today.    Reason for visit: Routine follow-up.    Tobacco: She reports that she has never smoked. She has never used smokeless tobacco.  Alcohol: not currently using    Medication Adherence/Access: no issues reported    Leg Pain:  She c/o pain radiating down her left leg.  She is taking acetaminophen 1000mg three times daily and also does her physical therapy exercises regularly.  She has not seen Dr. Gonsalez regarding this.    Diabetes:  Pt currently taking Novolin N 10 units with breakfast and 18 units with dinner.  She was taken  off metformin due to diarrhea.  She denies side effects.  SMBx/week, alternating between AM (fasting) and HS  AM: 146, 152, 120, 136, 157, 130, 157, 182, 123, 126mg/dL  PM: 136, 121, 112, 111, 147, 117, 115, 102, 87, 114mg/dL  Patient is not experiencing hypoglycemia.   Recent symptoms of high blood sugar? Fatigue  Eye exam: up to date  Foot exam: up to date  Microalbumin is < 30 mg/g. Pt is taking an ACEi/ARB.  Lab Results   Component Value Date    UMALCR 18.97 08/10/2020   Aspirin: Taking 81mg daily and she denies side effects today  Lab Results   Component Value Date    A1C 8.4 2021    A1C 8.4 2020    A1C 7.5 08/10/2020    A1C 7.3 2019    A1C 7.6 2019     GFR Estimate   Date Value Ref Range Status   08/10/2020 45 (L) >60 mL/min/[1.73_m2] Final     Comment:     Non  GFR Calc  Starting 2018, serum creatinine based estimated GFR (eGFR) will be   calculated using the Chronic Kidney Disease Epidemiology Collaboration   (CKD-EPI) equation.       Hypertension/CAD: Current medications include aspirin 81mg daily, amlodipine 10mg daily, losartan 100mg daily, metoprolol tartrate 75mg twice daily and sublingual nitroglycerin as needed (no recent use). Patient does not self-monitor BP. She denies side effects of therapy.  BP Readings from Last 3 Encounters:   11/15/21 (!) 151/79   21 (!) 146/85   21 (!) 145/83        Hyperlipidemia: Current therapy includes atorvastatin 20mg once daily.  Pt reports no significant myalgias or other side effects.  She has had myalgias with higher statin doses.    Recent Labs   Lab Test 08/10/20  1659 19  1446 14  1556 10/24/14  0847   CHOL 140 166   < > 215*   HDL 55 52   < > 49*   LDL 66 82   < > 137*   TRIG 95 158*   < > 143   CHOLHDLRATIO  --   --   --  4.4    < > = values in this interval not displayed.        Supplements:  She takes Vitamin D 2000 international unit(s) every other day.  She denies side  effects.  Vitamin D Deficiency Screening Results:  Lab Results   Component Value Date    VITDT 37 07/21/2014       Immunizations:  Patient has not received influenza vaccine and has declined Shingrix.  Most Recent Immunizations   Administered Date(s) Administered     COVID-19,PF,Pfizer (12+ Yrs) 10/08/2021, 3/1/2021, 2/8/2021     Influenza (High Dose) 3 valent vaccine 11/18/2019     Influenza (IIV3) PF 10/12/2012     Influenza, Quad, High Dose, Pf, 65yr+ (Fluzone HD) 11/03/2020     Pneumo Conj 13-V (2010&after) 05/21/2015     Pneumococcal 23 valent 05/16/2014     TD (ADULT, 7+) 04/15/2011     TDAP Vaccine (Adacel) 01/29/2015      Today's Vitals: BP (!) 151/79   Pulse 81   Wt 212 lb (96.2 kg)   BMI 38.78 kg/m    ----------------    I spent 30 minutes with this patient today. All changes were made via collaborative practice agreement with Dr. Gonsalez. A copy of the visit note was provided to the patient's primary care provider.    The patient was given a summary of these recommendations.     Lissy Diana, PharmD, [facility]  Medication Therapy Management Provider  Pager: [phone]      Medication Therapy Recommendations  Essential hypertension, benign    Current Medication: metoprolol tartrate (LOPRESSOR) 50 MG tablet (Discontinued)   Rationale: Dose too low - Dosage too low - Effectiveness   Recommendation: Increase Dose - metoprolol tartrate 100 MG tablet   Status: Accepted per CPA         Need for vaccination    Rationale: Preventive therapy - Needs additional medication therapy - Indication   Recommendation: Order Vaccine - INFLUENZA VAC SPLIT HIGH-DOSE   Status: Accepted - no CPA Needed         Type 2 diabetes mellitus with diabetic peripheral angiopathy without gangrene, without long-term current use of insulin (H)    Current Medication: insulin NPH (NOVOLIN N VIAL) 100 UNIT/ML vial   Rationale: Medication requires monitoring - Needs additional monitoring - Effectiveness   Recommendation: Order Lab   Status:  Accepted per CPA

## 2021-11-15 NOTE — LETTER
"November 16, 2021      Cara NOVOA Pankajnavid  [address] [address]        Dear ,    We are writing to inform you of your test results.    The following letter pertains to your most recent diagnostic tests:     -Your micro albumin level is elevated. This means you have trace amounts of protein in the urine. It indicates that your kidneys are being affected by diabetes. Keeping blood pressure low is the best treatment in order to keep this stable. People with microalbuminuria should avoid anti-inflamatory agents such as motrin, alleve and ibuprofen as much as possible because excessive use of these medications can be harmful to the kidneys. Anybody that has microalbuminuria should be on lisinopril or losartan-as you already are-since these medications are protective for the kidney's in this situation.     -Kidney function is STABLE for you (Creatinine, GFR), Sodium is normal for you, Potassium is normal for you, Calcium is normal for you, Glucose (blood sugar) is normal for you.     -Your cholesterol panel looks healthy with the exception of mildly low 'good cholesterol' HDL.   Increasing exercise can improve HDL (\"good cholesterol\") levels.  A good target to shoot for is 30 minutes of aerobic activity at least 4 days per week.     -Your hemoglobin A1c test which is a diabetes blood test that represents and average of your blood sugars over the last 3 months returned at 7.4 which is at your goal of hemoglobin A1c less than 8.        Resulted Orders   Hemoglobin A1c   Result Value Ref Range    Hemoglobin A1C 7.4 (H) 0.0 - 5.6 %      Comment:      Normal <5.7%   Prediabetes 5.7-6.4%    Diabetes 6.5% or higher     Note: Adopted from ADA consensus guidelines.   Basic metabolic panel   Result Value Ref Range    Sodium 143 133 - 144 mmol/L    Potassium 4.9 3.4 - 5.3 mmol/L    Chloride 113 (H) 94 - 109 mmol/L    Carbon Dioxide (CO2) 25 20 - 32 mmol/L    Anion Gap 5 3 - 14 mmol/L    Urea " Nitrogen 27 7 - 30 mg/dL    Creatinine 1.26 (H) 0.52 - 1.04 mg/dL    Calcium 9.1 8.5 - 10.1 mg/dL    Glucose 116 (H) 70 - 99 mg/dL    GFR Estimate 39 (L) >60 mL/min/1.73m2      Comment:      As of July 11, 2021, eGFR is calculated by the CKD-EPI creatinine equation, without race adjustment. eGFR can be influenced by muscle mass, exercise, and diet. The reported eGFR is an estimation only and is only applicable if the renal function is stable.   Lipid panel reflex to direct LDL Non-fasting   Result Value Ref Range    Cholesterol 146 <200 mg/dL    Triglycerides 109 <150 mg/dL    Direct Measure HDL 45 (L) >=50 mg/dL    LDL Cholesterol Calculated 79 <=100 mg/dL    Non HDL Cholesterol 101 <130 mg/dL    Patient Fasting > 8hrs? No     Narrative    Cholesterol  Desirable:  <200 mg/dL    Triglycerides  Normal:  Less than 150 mg/dL  Borderline High:  150-199 mg/dL  High:  200-499 mg/dL  Very High:  Greater than or equal to 500 mg/dL    Direct Measure HDL  Female:  Greater than or equal to 50 mg/dL   Male:  Greater than or equal to 40 mg/dL    LDL Cholesterol  Desirable:  <100mg/dL  Above Desirable:  100-129 mg/dL   Borderline High:  130-159 mg/dL   High:  160-189 mg/dL   Very High:  >= 190 mg/dL    Non HDL Cholesterol  Desirable:  130 mg/dL  Above Desirable:  130-159 mg/dL  Borderline High:  160-189 mg/dL  High:  190-219 mg/dL  Very High:  Greater than or equal to 220 mg/dL   Albumin Random Urine Quantitative with Creat Ratio   Result Value Ref Range    Creatinine Urine mg/dL 157 mg/dL    Albumin Urine mg/L 45 mg/L    Albumin Urine mg/g Cr 28.66 (H) 0.00 - 25.00 mg/g Cr       If you have any questions or concerns, please call the clinic at the number listed above.       Sincerely,      Kole Gonsalez MD

## 2021-11-15 NOTE — PATIENT INSTRUCTIONS
Recommendations from today's MTM visit:                                                    MTM (medication therapy management) is a service provided by a clinical pharmacist designed to help you get the most of out of your medicines.   Today we reviewed what your medicines are for, how to know if they are working, that your medicines are safe and how to make your medicine regimen as easy as possible.      1.  Labs today - A1c, cholesterol, kidney function/electrolytes and protein in the urine.    2.  Increase metoprolol to 100mg twice daily.  For now, you can take 2 of the 50mg tablets twice daily, and I sent in a prescription for 100mg tablets to switch to once you finish your supply of 50mg tablets.    3.  Flu shot here tomorrow at 9:45am    Follow-up: Return pending lab results.    It was great to speak with you today.  I value your experience and would be very thankful for your time with providing feedback on our clinic survey. You may receive a survey via email or text message in the next few days.     To schedule another MTM appointment, please call the clinic directly or you may call the MTM scheduling line at [phone] or toll-free at [phone].     My Clinical Pharmacist's contact information:                                                      Please feel free to contact me with any questions or concerns you have.      Lissy Diana PharmD, [facility]  Medication Therapy Management Provider  Pager: [phone]     aNtalia Encarnacion, [place]  Medication Therapy Management Resident  Pager: [phone]

## 2021-11-16 ENCOUNTER — ALLIED HEALTH/NURSE VISIT (OUTPATIENT)
Dept: FAMILY MEDICINE | Facility: CLINIC | Age: 81
End: 2021-11-16
Payer: COMMERCIAL

## 2021-11-16 DIAGNOSIS — Z23 NEED FOR PROPHYLACTIC VACCINATION AND INOCULATION AGAINST INFLUENZA: Primary | ICD-10-CM

## 2021-11-16 LAB
ANION GAP SERPL CALCULATED.3IONS-SCNC: 5 MMOL/L (ref 3–14)
BUN SERPL-MCNC: 27 MG/DL (ref 7–30)
CALCIUM SERPL-MCNC: 9.1 MG/DL (ref 8.5–10.1)
CHLORIDE BLD-SCNC: 113 MMOL/L (ref 94–109)
CHOLEST SERPL-MCNC: 146 MG/DL
CO2 SERPL-SCNC: 25 MMOL/L (ref 20–32)
CREAT SERPL-MCNC: 1.26 MG/DL (ref 0.52–1.04)
CREAT UR-MCNC: 157 MG/DL
FASTING STATUS PATIENT QL REPORTED: NO
GFR SERPL CREATININE-BSD FRML MDRD: 39 ML/MIN/1.73M2
GLUCOSE BLD-MCNC: 116 MG/DL (ref 70–99)
HDLC SERPL-MCNC: 45 MG/DL
LDLC SERPL CALC-MCNC: 79 MG/DL
MICROALBUMIN UR-MCNC: 45 MG/L
MICROALBUMIN/CREAT UR: 28.66 MG/G CR (ref 0–25)
NONHDLC SERPL-MCNC: 101 MG/DL
POTASSIUM BLD-SCNC: 4.9 MMOL/L (ref 3.4–5.3)
SODIUM SERPL-SCNC: 143 MMOL/L (ref 133–144)
TRIGL SERPL-MCNC: 109 MG/DL

## 2021-11-16 PROCEDURE — G0008 ADMIN INFLUENZA VIRUS VAC: HCPCS

## 2021-11-16 PROCEDURE — 90662 IIV NO PRSV INCREASED AG IM: CPT

## 2021-11-16 PROCEDURE — 99207 PR NO CHARGE NURSE ONLY: CPT

## 2021-11-16 NOTE — RESULT ENCOUNTER NOTE
"The following letter pertains to your most recent diagnostic tests:    -Your micro albumin level is elevated. This means you have trace amounts of protein in the urine. It indicates that your kidneys are being affected by diabetes. Keeping blood pressure low is the best treatment in order to keep this stable. People with microalbuminuria should avoid anti-inflamatory agents such as motrin, alleve and ibuprofen as much as possible because excessive use of these medications can be harmful to the kidneys. Anybody that has microalbuminuria should be on lisinopril or losartan-as you already are-since these medications are protective for the kidney's in this situation.    -Kidney function is STABLE for you (Creatinine, GFR), Sodium is normal for you, Potassium is normal for you, Calcium is normal for you, Glucose (blood sugar) is normal for you.     -Your cholesterol panel looks healthy with the exception of mildly low 'good cholesterol' HDL.   Increasing exercise can improve HDL (\"good cholesterol\") levels.  A good target to shoot for is 30 minutes of aerobic activity at least 4 days per week.     -Your hemoglobin A1c test which is a diabetes blood test that represents and average of your blood sugars over the last 3 months returned at 7.4 which is at your goal of hemoglobin A1c less than 8.      Sincerely,    Dr. Gonsalez"

## 2021-11-21 NOTE — PROGRESS NOTES
DISCHARGE REPORT    Cara did not return for further treatment after the last visit on 9/22/21.   Current status is unknown.  Please see information below for last known relevant information.  Patient seen for 3 visits.    SUBJECTIVE  Subjective changes noted by patient:  Exercises can produce some dizziness and nausea. Has been drinking more water, less spasms.   .  Current pain level is 4/10.     Previous pain level was   .   Changes in function: (See Goal flowsheet attached for changes in current functional level)  Adverse reaction to treatment or activity: None    OBJECTIVE  Changes noted in objective findings:   Cspine AROM: Rot min - R, Mod - L with pain. Ext and Flex mod - but both produce dizziness. Eye movement does not provoke dizziness     ASSESSMENT/PLAN  Diagnosis: Neck pain   Updated problem list and treatment plan:  Patient will continue to utilize HEP for any remaining deficits.   STG/LTGs have been met or progress has been made towards goals:  Please see goal flowsheet for most current information  Assessment of Progress: current status is unknown.    Last current status: Pt is progressing as expected   Self Management Plans:  HEP  I have re-evaluated this patient and find that the nature, scope, duration and intensity of the therapy is appropriate for the medical condition of the patient.  Cara continues to require the following intervention to meet STG and LTG's:  HEP.    Recommendations:  Discharge with current home program.  Patient to follow up with MD as needed.    Please refer to the daily flowsheet for treatment today, total treatment time and time spent performing 1:1 timed codes.

## 2022-01-04 ENCOUNTER — OFFICE VISIT (OUTPATIENT)
Dept: FAMILY MEDICINE | Facility: CLINIC | Age: 82
End: 2022-01-04
Payer: COMMERCIAL

## 2022-01-04 VITALS
TEMPERATURE: 99 F | HEART RATE: 66 BPM | HEIGHT: 62 IN | BODY MASS INDEX: 41.18 KG/M2 | SYSTOLIC BLOOD PRESSURE: 146 MMHG | DIASTOLIC BLOOD PRESSURE: 72 MMHG | RESPIRATION RATE: 16 BRPM | OXYGEN SATURATION: 96 % | WEIGHT: 223.8 LBS

## 2022-01-04 DIAGNOSIS — M79.605 LOW BACK PAIN RADIATING TO LEFT LEG: ICD-10-CM

## 2022-01-04 DIAGNOSIS — E11.51 TYPE 2 DIABETES MELLITUS WITH DIABETIC PERIPHERAL ANGIOPATHY WITHOUT GANGRENE, WITHOUT LONG-TERM CURRENT USE OF INSULIN (H): ICD-10-CM

## 2022-01-04 DIAGNOSIS — E66.01 MORBID OBESITY (H): ICD-10-CM

## 2022-01-04 DIAGNOSIS — M54.2 NECK PAIN: ICD-10-CM

## 2022-01-04 DIAGNOSIS — E11.59 TYPE 2 DIABETES MELLITUS WITH VASCULAR DISEASE (H): Primary | ICD-10-CM

## 2022-01-04 DIAGNOSIS — M54.50 LOW BACK PAIN RADIATING TO LEFT LEG: ICD-10-CM

## 2022-01-04 DIAGNOSIS — N18.31 STAGE 3A CHRONIC KIDNEY DISEASE (H): ICD-10-CM

## 2022-01-04 PROCEDURE — 99214 OFFICE O/P EST MOD 30 MIN: CPT | Performed by: INTERNAL MEDICINE

## 2022-01-04 RX ORDER — GABAPENTIN 100 MG/1
CAPSULE ORAL
Qty: 360 CAPSULE | Refills: 3 | Status: SHIPPED | OUTPATIENT
Start: 2022-01-04 | End: 2022-07-13

## 2022-01-04 ASSESSMENT — PAIN SCALES - GENERAL: PAINLEVEL: MILD PAIN (2)

## 2022-01-04 ASSESSMENT — MIFFLIN-ST. JEOR: SCORE: 1423.4

## 2022-01-04 NOTE — PROGRESS NOTES
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  We discussed a referral for physical therapy for this, but the patient was not enthusiastic about that and the daughter did not think that she would be motivated to participate in that.  They were hoping for medication that might mitigate some of the symptoms.  They are hoping that there is a medication that might improve some of the scalp pain that she describes as well as the left neck pain that she describes as well.  We discussed the pros and cons of a trial of low-dose gabapentin.  We carefully discussed the risks and side effects.  They would like to try this.  Arrange for short-term follow-up to assess therapy and to titrate dose if needed.  She will be due for diabetes follow-up at that point as well.  We can check on her renal function at that time as well.      37 minutes spent on the date of the encounter doing chart review, history and exam, documentation and further activities per the note      Return in about 6 weeks (around 2/15/2022) for recheck pain and follow up diabetes .  Patient instructed to return to clinic or contact us sooner if symptoms worsen or new symptoms develop.     Kole Gonsalez MD  [facility] ISRAEL Marroquin is a 81 year old who presents for the following health issues  accompanied by her daughter.    HPI   Leg and neck pain      This is a pleasant 81-year-old female with a history of diabetes, obesity, chart history of anxiety, coronary artery disease, mild aortic  "stenosis, hyperlipidemia, hypertension, remote history of uterus cancer, gastroesophageal reflux disease, chronic kidney disease.    She is here with her daughter    She was seen in May with complaints of neck and head pain    Her sed rate was normal    She was referred to us physical therapy but did not start physical therapy until September    She does not think the physical therapy helped very much    She thinks that the physical therapy triggered a new problem which is pain originating in her left lower back that radiates down her buttock and down the posterior aspect of her left leg    The pain seems to be most noticeable when she initiates her first steps of the day    The pain does improve with Tylenol    She is taking 1000 mg of Tylenol 3 times daily    She is hoping for something that might relieve some of the pain in her neck and around her scalp as well as pain in her left leg    Her daughter does not think she would be particularly motivated to return to physical therapy to address these problems    On a happier note, she has adopted an older sheltie dog and this has improved her mood according to the daughter    Review of Systems         Objective    BP (!) 146/72 (BP Location: Right arm, Cuff Size: Adult Large)   Pulse 66   Temp 99  F (37.2  C) (Tympanic)   Resp 16   Ht 1.575 m (5' 2\")   Wt 101.5 kg (223 lb 12.8 oz)   SpO2 96%   BMI 40.93 kg/m    Body mass index is 40.93 kg/m .  Physical Exam   General: This is a reasonably comfortable appearing elderly female in no acute distress who does not appear toxic  Neck: There is no midline cervical spine tenderness, the neck has a full range of motion, there is no temporal artery tenderness bilaterally  Back: There is no midline spine tenderness, straight leg raises do not elicit radicular symptoms bilaterally, there is 5 out of 5 strength in lower extremity muscle groups, there is normal sensation to light touch in lower extremity dermatomes, deep " tendon reflexes at the patella and Achilles tendons are 2+ bilaterally, the patient demonstrates normal gait  Hip: There is no pain that is reproduced with passive range of motion of the left hip joint and there is no greater trochanteric bursal tenderness

## 2022-01-11 ENCOUNTER — TRANSFERRED RECORDS (OUTPATIENT)
Dept: HEALTH INFORMATION MANAGEMENT | Facility: CLINIC | Age: 82
End: 2022-01-11
Payer: COMMERCIAL

## 2022-01-11 LAB — RETINOPATHY: POSITIVE

## 2022-02-15 ENCOUNTER — OFFICE VISIT (OUTPATIENT)
Dept: FAMILY MEDICINE | Facility: CLINIC | Age: 82
End: 2022-02-15
Payer: COMMERCIAL

## 2022-02-15 VITALS
HEIGHT: 62 IN | HEART RATE: 67 BPM | TEMPERATURE: 98.7 F | OXYGEN SATURATION: 96 % | SYSTOLIC BLOOD PRESSURE: 132 MMHG | DIASTOLIC BLOOD PRESSURE: 68 MMHG | RESPIRATION RATE: 16 BRPM | BODY MASS INDEX: 41.72 KG/M2 | WEIGHT: 226.7 LBS

## 2022-02-15 DIAGNOSIS — I10 ESSENTIAL HYPERTENSION, BENIGN: ICD-10-CM

## 2022-02-15 DIAGNOSIS — E11.51 TYPE 2 DIABETES MELLITUS WITH DIABETIC PERIPHERAL ANGIOPATHY WITHOUT GANGRENE, WITHOUT LONG-TERM CURRENT USE OF INSULIN (H): Primary | ICD-10-CM

## 2022-02-15 DIAGNOSIS — E78.5 HYPERLIPIDEMIA LDL GOAL <70: ICD-10-CM

## 2022-02-15 LAB — HBA1C MFR BLD: 7.4 % (ref 0–5.6)

## 2022-02-15 PROCEDURE — 36415 COLL VENOUS BLD VENIPUNCTURE: CPT | Performed by: INTERNAL MEDICINE

## 2022-02-15 PROCEDURE — 83036 HEMOGLOBIN GLYCOSYLATED A1C: CPT | Performed by: INTERNAL MEDICINE

## 2022-02-15 PROCEDURE — 99214 OFFICE O/P EST MOD 30 MIN: CPT | Performed by: INTERNAL MEDICINE

## 2022-02-15 PROCEDURE — 99207 PR FOOT EXAM NO CHARGE: CPT | Performed by: INTERNAL MEDICINE

## 2022-02-15 RX ORDER — AMLODIPINE BESYLATE 10 MG/1
10 TABLET ORAL DAILY
Qty: 90 TABLET | Refills: 3 | Status: SHIPPED | OUTPATIENT
Start: 2022-02-15 | End: 2022-09-13

## 2022-02-15 ASSESSMENT — MIFFLIN-ST. JEOR: SCORE: 1436.55

## 2022-02-15 ASSESSMENT — PAIN SCALES - GENERAL: PAINLEVEL: NO PAIN (0)

## 2022-02-15 ASSESSMENT — PATIENT HEALTH QUESTIONNAIRE - PHQ9: SUM OF ALL RESPONSES TO PHQ QUESTIONS 1-9: 0

## 2022-02-15 NOTE — RESULT ENCOUNTER NOTE
The following letter pertains to your most recent diagnostic tests:    -Your hemoglobin A1c test which is a diabetes blood test that represents and average of your blood sugars over the last 3 months returned at 7.4 which is at your goal of hemoglobin A1c less than 8.         Sincerely,    Dr. Gonsalez

## 2022-02-15 NOTE — PROGRESS NOTES
"  Assessment & Plan     Type 2 diabetes mellitus with diabetic peripheral angiopathy without gangrene, without long-term current use of insulin (H)  Recheck A1c  - HEMOGLOBIN A1C; Future  - FOOT EXAM  - HEMOGLOBIN A1C    BENIGN HYPERTENSION  Well-controlled upon second check  - amLODIPine (NORVASC) 10 MG tablet; Take 1 tablet (10 mg) by mouth daily    Hyperlipidemia LDL goal <70  On statin therapy, recheck lipids in November    The gabapentin seems to be working well for the sciatica without side effects, she can restart acetaminophen        20 minutes spent on the date of the encounter doing chart review, history and exam, documentation and further activities per the note         Return in about 6 months (around 8/15/2022) for recheck.  Patient instructed to return to clinic or contact us sooner if symptoms worsen or new symptoms develop.     Kole Gonsalez MD  [facility] ISRAEL Marroquin is a 81 year old who presents for the following health issues  accompanied by her daughter.    HPI       Her pain is better on the gabapentin, she stopped taking Tylenol because she did not know that she could take it with gabapentin, she was relieved to hear that she could take both together, home blood sugar readings are well controlled.  No side effects from the gabapentin.    Review of Systems   Constitutional, HEENT, cardiovascular, pulmonary, gi and gu systems are negative, except as otherwise noted.      Objective    /68 (BP Location: Left arm, Patient Position: Sitting, Cuff Size: Adult Large)   Pulse 67   Temp 98.7  F (37.1  C) (Tympanic)   Resp 16   Ht 1.575 m (5' 2\")   Wt 102.8 kg (226 lb 11.2 oz)   SpO2 96%   BMI 41.46 kg/m    Body mass index is 41.46 kg/m .  Physical Exam   General: This is a well-appearing, comfortable appearing female in no acute distress.  FEET:  Normal sensation to 10g monofilament in bilateral feet without obvious foot ulcers     A1c pending             "

## 2022-02-15 NOTE — NURSING NOTE
"Cara Camarillo is a 81 year old patient who comes in today for a clinic visit  Initial BP:  /68 (BP Location: Left arm, Patient Position: Sitting, Cuff Size: Adult Large)   Pulse 67   Temp 98.7  F (37.1  C) (Tympanic)   Resp 16   Ht 1.575 m (5' 2\")   Wt 102.8 kg (226 lb 11.2 oz)   SpO2 96%   BMI 41.46 kg/m         Disposition: provider notified elevated BP while patient in the clinic    Mar Eric MA      "

## 2022-02-19 ENCOUNTER — HEALTH MAINTENANCE LETTER (OUTPATIENT)
Age: 82
End: 2022-02-19

## 2022-03-04 DIAGNOSIS — E78.5 HYPERLIPIDEMIA LDL GOAL <70: ICD-10-CM

## 2022-03-04 DIAGNOSIS — I10 ESSENTIAL (PRIMARY) HYPERTENSION: ICD-10-CM

## 2022-03-04 RX ORDER — LOSARTAN POTASSIUM 50 MG/1
TABLET ORAL
Qty: 180 TABLET | Refills: 1 | Status: SHIPPED | OUTPATIENT
Start: 2022-03-04 | End: 2022-08-16

## 2022-03-04 RX ORDER — ATORVASTATIN CALCIUM 20 MG/1
TABLET, FILM COATED ORAL
Qty: 90 TABLET | Refills: 1 | Status: SHIPPED | OUTPATIENT
Start: 2022-03-04 | End: 2022-08-16

## 2022-03-04 NOTE — TELEPHONE ENCOUNTER
Routing refill request to provider for review/approval because:       Angiotensin-II Receptors Failed - 3/4/2022 11:46 AM        Failed - Normal serum creatinine on file in past 12 months     Recent Labs   Lab Test 11/15/21  1554   CR 1.26*       Ok to refill medication if creatinine is low       Ana CHAVES, Triage RN  [facility]

## 2022-03-07 NOTE — RESULT ENCOUNTER NOTE
The following letter pertains to your most recent diagnostic tests:    -Your microalbumin level which is a diabetes urine test to check for any evidence of early kidney injury from diabetes returned negative.      -Your cholesterol panel looks healthy.     -Kidney function is normal for you (Creatinine, GFR), Sodium is normal for you, Potassium is normal for you, Calcium is normal for you, Glucose (blood sugar) is normal for you.     -Your hemoglobin A1c test which is a diabetes blood test that represents and average of your blood sugars over the last 3 months returned at 7.5 which is at your goal of hemoglobin A1c less than 8.       Bottom line:  Your lab results look OK.  I think it is reasonable to stop the metformin and recheck in 3 months.        Follow up:  Office visit appointment in 3 months with A1c recheck prior to office visit so we can discuss the results.        Sincerely,    Dr. Gonsalez [FreeTextEntry1] :  \par 1. HTN: on medications, uncontrolled. Her Torpol will be changed to coreg today.\par 2. Hyperlipidemia: on statin. No SE are noted.\par 3. 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 \par She underwent cardiac cath and underwent PCI of the LCx and LM and is on ASA and plavix.\par \par Patient had COVID in January, 2021 and was admitted with her  to [facility]. \par Her  passed away.\par \par She received her COVID vaccine.\par  \par \par The patient has had difficulty with Toprol XL doses. She will be started on coreg 3.125 BID today.

## 2022-04-22 ENCOUNTER — IMMUNIZATION (OUTPATIENT)
Dept: NURSING | Facility: CLINIC | Age: 82
End: 2022-04-22
Payer: COMMERCIAL

## 2022-04-22 PROCEDURE — 0054A COVID-19,PF,PFIZER (12+ YRS): CPT

## 2022-04-22 PROCEDURE — 91305 COVID-19,PF,PFIZER (12+ YRS): CPT

## 2022-05-28 DIAGNOSIS — E11.59 TYPE 2 DIABETES MELLITUS WITH VASCULAR DISEASE (H): ICD-10-CM

## 2022-06-11 ENCOUNTER — HEALTH MAINTENANCE LETTER (OUTPATIENT)
Age: 82
End: 2022-06-11

## 2022-07-13 ENCOUNTER — OFFICE VISIT (OUTPATIENT)
Dept: PHARMACY | Facility: CLINIC | Age: 82
End: 2022-07-13
Payer: COMMERCIAL

## 2022-07-13 ENCOUNTER — LAB (OUTPATIENT)
Dept: LAB | Facility: CLINIC | Age: 82
End: 2022-07-13
Payer: COMMERCIAL

## 2022-07-13 VITALS
BODY MASS INDEX: 41.15 KG/M2 | SYSTOLIC BLOOD PRESSURE: 132 MMHG | DIASTOLIC BLOOD PRESSURE: 80 MMHG | WEIGHT: 225 LBS | HEART RATE: 67 BPM

## 2022-07-13 DIAGNOSIS — M79.662 PAIN OF LEFT LOWER LEG: Primary | ICD-10-CM

## 2022-07-13 DIAGNOSIS — I10 ESSENTIAL HYPERTENSION, BENIGN: ICD-10-CM

## 2022-07-13 DIAGNOSIS — E78.5 HYPERLIPIDEMIA LDL GOAL <70: ICD-10-CM

## 2022-07-13 DIAGNOSIS — E11.59 TYPE 2 DIABETES MELLITUS WITH VASCULAR DISEASE (H): ICD-10-CM

## 2022-07-13 DIAGNOSIS — Z78.9 TAKES DIETARY SUPPLEMENTS: ICD-10-CM

## 2022-07-13 DIAGNOSIS — I25.10 CORONARY ARTERY DISEASE INVOLVING NATIVE CORONARY ARTERY OF NATIVE HEART WITHOUT ANGINA PECTORIS: ICD-10-CM

## 2022-07-13 LAB — HBA1C MFR BLD: 7.2 % (ref 0–5.6)

## 2022-07-13 PROCEDURE — 99607 MTMS BY PHARM ADDL 15 MIN: CPT | Performed by: PHARMACIST

## 2022-07-13 PROCEDURE — 36415 COLL VENOUS BLD VENIPUNCTURE: CPT

## 2022-07-13 PROCEDURE — 99605 MTMS BY PHARM NP 15 MIN: CPT | Performed by: PHARMACIST

## 2022-07-13 PROCEDURE — 83036 HEMOGLOBIN GLYCOSYLATED A1C: CPT

## 2022-07-13 PROCEDURE — 80061 LIPID PANEL: CPT

## 2022-07-13 PROCEDURE — 80048 BASIC METABOLIC PNL TOTAL CA: CPT

## 2022-07-13 NOTE — LETTER
"_  Medication List        Prepared on: 7/13/2022     Bring your Medication List when you go to the doctor, hospital, or   emergency room. And, share it with your family or caregivers.     Note any changes to how you take your medications.  Cross out medications when you no longer use them.    Medication How I take it Why I use it Prescriber   acetaminophen (TYLENOL) 500 MG tablet Take 500-1,000 mg by mouth 3 times daily as needed for mild pain Pain Self   amLODIPine (NORVASC) 10 MG tablet Take 1 tablet (10 mg) by mouth daily Essential Hypertension, Benign Kole Gonsalez MD   aspirin 81 MG tablet Take 1 tablet (81 mg) by mouth daily DM w/o Complication Type II Ventura Alvarez MD   atorvastatin (LIPITOR) 20 MG tablet TAKE 1 TABLET BY MOUTH EVERY DAY Hyperlipidemia LDL Goal <70 Hammad Frank MD   BD INSULIN SYRINGE U/F 30G X 1/2\" 0.5 ML miscellaneous USE ONE SYRINGE TWICE DAILY OR AS DIRECTED. 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 Coronary Artery Disease Kole Gonsalez MD   vitamin D3 (CHOLECALCIFEROL) 50 mcg (2000 units) tablet Take 1 tablet by mouth every other day General Health  Self         Add new medications, over-the-counter drugs, herbals, vitamins, or  minerals in the blank rows below.    Medication How I take it Why I use it Prescriber                          Allergies:      actos [pioglitazone]; enalapril; hydrochlorothiazide; lisinopril; metformin        Side effects I have had:               Other Information:              My notes and questions:

## 2022-07-13 NOTE — PATIENT INSTRUCTIONS
"Recommendations from today's MTM visit:                                                    MTM (medication therapy management) is a service provided by a clinical pharmacist designed to help you get the most of out of your medicines.   Today we reviewed what your medicines are for, how to know if they are working, that your medicines are safe and how to make your medicine regimen as easy as possible.      1.  Try topical therapy for your hip/leg pain.  You can use IcyHot or Bengay creams OR Lidocaine patches.  All of these can be purchased over the counter.    2.  Labs today - A1c, kidney function/electrolytes, and cholesterol    Follow-up: Return pending lab results.    It was great speaking with you today.  I value your experience and would be very thankful for your time in providing feedback in our clinic survey. In the next few days, you may receive an email or text message from [employer] with a link to a survey related to your  clinical pharmacist.\"     To schedule another MTM appointment, please call the clinic directly or you may call the MTM scheduling line at [phone] or toll-free at [phone].     My Clinical Pharmacist's contact information:                                                      Please feel free to contact me with any questions or concerns you have.      Lissy Diana, Sonia, [facility]  Medication Therapy Management Provider  Pager: [phone]    "

## 2022-07-13 NOTE — PROGRESS NOTES
Medication Therapy Management (MTM) Encounter    ASSESSMENT:                            Medication Adherence/Access: No issues identified    Leg Pain: May benefit from topical therapy.    Type 2 Diabetes: Patient is meeting A1c goal of < 8%. Self monitoring of blood glucose is at goal of fasting  mg/dL and post prandial < 180 mg/dL.  Due for A1c re-check.    Hypertension/CAD: Stable. Patient is meeting blood pressure goal of < 140/90mmHg.  Due for BMP re-check.    Hyperlipidemia: Due for lipid panel.    Supplements:  Stable.    PLAN:                            1.  Try topical therapy for your hip/leg pain.  You can use IcyHot or Bengay creams OR Lidocaine patches.  All of these can be purchased over the counter.  2.  Labs today - A1c, kidney function/electrolytes, and cholesterol    Follow-up: Return pending lab results.    SUBJECTIVE/OBJECTIVE:                          Cara Camarillo is a 82 year old female coming in for a follow-up visit.  Today's visit is a follow-up MTM visit from 11/15/2021.  She is joined by her daughter, Kalina, for our visit today.    Reason for visit: Routine follow-up.    Tobacco: She reports that she has never smoked. She has never used smokeless tobacco.  Alcohol: not currently using    Medication Adherence/Access: no issues reported    Leg Pain:  Patient did see Dr. Gonsalez regarding this and was started on gabapentin - she reports she took this for 90 days but she didn't find it helpful so she stopped.   She does take acetaminophen as needed, which she finds effective.  She has not tried any topical therapy.    Diabetes:  Pt currently taking Novolin N 10 units with breakfast and 18 units with dinner.  She was taken off metformin due to diarrhea.  She denies side effects.  SMBx/week, alternating between AM (fasting) and HS  AM: 159, 133, 125, 152, 139, 116, 102, 120, 126, 122, 118mg/dL  PM: 134, 125, 139, 152, 116, 133, 112, 108, 100, 126, 131mg/dL  Patient is not experiencing  hypoglycemia.   Recent symptoms of high blood sugar? None  Eye exam: up to date  Foot exam: up to date  Microalbumin is < 30 mg/g. Pt is taking an ACEi/ARB.  Lab Results   Component Value Date    UMALCR 28.66 (H) 11/15/2021   Aspirin: Taking 81mg daily and she denies side effects today  Lab Results   Component Value Date    A1C 7.4 02/15/2022    A1C 7.4 11/15/2021    A1C 8.4 03/09/2021    A1C 8.4 12/11/2020    A1C 7.5 08/10/2020    A1C 7.3 11/18/2019    A1C 7.6 08/19/2019     GFR Estimate   Date Value Ref Range Status   11/15/2021 39 (L) >60 mL/min/1.73m2 Final     Comment:     As of July 11, 2021, eGFR is calculated by the CKD-EPI creatinine equation, without race adjustment. eGFR can be influenced by muscle mass, exercise, and diet. The reported eGFR is an estimation only and is only applicable if the renal function is stable.   08/10/2020 45 (L) >60 mL/min/[1.73_m2] Final     Comment:     Non  GFR Calc  Starting 12/18/2018, serum creatinine based estimated GFR (eGFR) will be   calculated using the Chronic Kidney Disease Epidemiology Collaboration   (CKD-EPI) equation.       Hypertension/CAD: Current medications include aspirin 81mg daily, amlodipine 10mg daily, losartan 100mg daily, metoprolol tartrate 75mg twice daily and sublingual nitroglycerin as needed (no recent use). Patient does not self-monitor blood pressure. She denies side effects of therapy.  BP Readings from Last 3 Encounters:   07/13/22 132/80   02/15/22 132/68   01/04/22 (!) 146/72         Hyperlipidemia: Current therapy includes atorvastatin 20mg once daily.  Pt reports no significant myalgias or other side effects.  She has had myalgias with higher statin doses.    Recent Labs   Lab Test 11/15/21  1554 08/10/20  1659 11/21/14  1556 10/24/14  0847   CHOL 146 140   < > 215*   HDL 45* 55   < > 49*   LDL 79 66   < > 137*   TRIG 109 95   < > 143   CHOLHDLRATIO  --   --   --  4.4    < > = values in this interval not displayed.          Supplements:  She takes Vitamin D 2000 international unit(s) every other day.  She denies side effects.  Vitamin D Deficiency Screening Results:  Lab Results   Component Value Date    VITDT 37 07/21/2014     Today's Vitals: /80   Pulse 67   Wt 225 lb (102.1 kg)   BMI 41.15 kg/m    ----------------    I spent 30 minutes with this patient today. All changes were made via collaborative practice agreement with Dr. Gonsalez. A copy of the visit note was provided to the patient's provider(s).    The patient was given a summary of these recommendations.     Lissy Diana, PharmD, [facility]  Medication Therapy Management Provider  Pager: [phone]      Medication Therapy Recommendations  Pain of left lower leg    Current Medication: acetaminophen (TYLENOL) 500 MG tablet   Rationale: Synergistic therapy - Needs additional medication therapy - Indication   Recommendation: Start Medication - ICY HOT ADVANCED PAIN RELIEF EX   Status: Accepted - no CPA Needed

## 2022-07-13 NOTE — LETTER
[facility]  [address] [place] [address]  [address]  [place]  [address]  Tel: [phone]    July 15, 2022    Pearcyuday Camarillo  [address] [facility] [address]        Dear Ms. Camarillo,    The following letter pertains to your most recent diagnostic tests:     Good news! These results look stable and at goal.         Sincerely,     Dr. Gonsalez/L        Enclosure: Lab Results  Results for orders placed or performed in visit on 07/13/22   Hemoglobin A1c     Status: Abnormal   Result Value Ref Range    Hemoglobin A1C 7.2 (H) 0.0 - 5.6 %   Lipid panel reflex to direct LDL Non-fasting     Status: None   Result Value Ref Range    Cholesterol 121 <200 mg/dL    Triglycerides 96 <150 mg/dL    Direct Measure HDL 51 >=50 mg/dL    LDL Cholesterol Calculated 51 <=100 mg/dL    Non HDL Cholesterol 70 <130 mg/dL    Patient Fasting > 8hrs? No     Narrative    Cholesterol  Desirable:  <200 mg/dL    Triglycerides  Normal:  Less than 150 mg/dL  Borderline High:  150-199 mg/dL  High:  200-499 mg/dL  Very High:  Greater than or equal to 500 mg/dL    Direct Measure HDL  Female:  Greater than or equal to 50 mg/dL   Male:  Greater than or equal to 40 mg/dL    LDL Cholesterol  Desirable:  <100mg/dL  Above Desirable:  100-129 mg/dL   Borderline High:  130-159 mg/dL   High:  160-189 mg/dL   Very High:  >= 190 mg/dL    Non HDL Cholesterol  Desirable:  130 mg/dL  Above Desirable:  130-159 mg/dL  Borderline High:  160-189 mg/dL  High:  190-219 mg/dL  Very High:  Greater than or equal to 220 mg/dL   Basic metabolic panel     Status: Abnormal   Result Value Ref Range    Sodium 140 133 - 144 mmol/L    Potassium 4.5 3.4 - 5.3 mmol/L    Chloride 112 (H) 94 - 109 mmol/L    Carbon Dioxide (CO2) 22 20 - 32 mmol/L    Anion Gap 6 3 - 14 mmol/L    Urea Nitrogen 18 7 - 30 mg/dL    Creatinine 1.00 0.52 - 1.04 mg/dL    Calcium 8.9 8.5 - 10.1 mg/dL    Glucose 109 (H) 70 - 99 mg/dL    GFR Estimate 55 (L) >60 mL/min/1.73m2

## 2022-07-13 NOTE — LETTER
"Recommended To-Do List      Prepared on: 7/13/2022     You can get the best results from your medications by completing the items on this \"To-Do List.\"      Bring your To-Do List when you go to your doctor. And, share it with your family or caregivers.    My To-Do List:  What we talked about: What I should do:   A new medication that may be of benefit to you    Start using ICY HOT ADVANCED PAIN RELIEF EX          What we talked about: What I should do:                       "

## 2022-07-13 NOTE — LETTER
July 13, 2022  Cara Camarillo  [address] [facility] [address] [address]    Dear BRIGHT Engle [facility]        Thank you for talking with me on Jul 13, 2022 about your health and medications. As a follow-up to our conversation, I have included two documents:      1. Your Recommended To-Do List has steps you should take to get the best results from your medications.  2. Your Medication List will help you keep track of your medications and how to take them.    If you want to talk about these documents, please call Lissy Diana PharmD at phone: [phone], Monday-Friday 8-4:30pm.    I look forward to working with you and your doctors to make sure your medications work well for you.    Sincerely,    Lissy Diana PharmD  [facility] Pharmacist, [facility]

## 2022-07-14 LAB
ANION GAP SERPL CALCULATED.3IONS-SCNC: 6 MMOL/L (ref 3–14)
BUN SERPL-MCNC: 18 MG/DL (ref 7–30)
CALCIUM SERPL-MCNC: 8.9 MG/DL (ref 8.5–10.1)
CHLORIDE BLD-SCNC: 112 MMOL/L (ref 94–109)
CHOLEST SERPL-MCNC: 121 MG/DL
CO2 SERPL-SCNC: 22 MMOL/L (ref 20–32)
CREAT SERPL-MCNC: 1 MG/DL (ref 0.52–1.04)
FASTING STATUS PATIENT QL REPORTED: NO
GFR SERPL CREATININE-BSD FRML MDRD: 55 ML/MIN/1.73M2
GLUCOSE BLD-MCNC: 109 MG/DL (ref 70–99)
HDLC SERPL-MCNC: 51 MG/DL
LDLC SERPL CALC-MCNC: 51 MG/DL
NONHDLC SERPL-MCNC: 70 MG/DL
POTASSIUM BLD-SCNC: 4.5 MMOL/L (ref 3.4–5.3)
SODIUM SERPL-SCNC: 140 MMOL/L (ref 133–144)
TRIGL SERPL-MCNC: 96 MG/DL

## 2022-07-14 NOTE — RESULT ENCOUNTER NOTE
The following letter pertains to your most recent diagnostic tests:    Good news! These results look stable and at goal.        Sincerely,    Dr. Gonsalez

## 2022-08-16 ENCOUNTER — OFFICE VISIT (OUTPATIENT)
Dept: FAMILY MEDICINE | Facility: CLINIC | Age: 82
End: 2022-08-16
Payer: COMMERCIAL

## 2022-08-16 VITALS
TEMPERATURE: 96.9 F | DIASTOLIC BLOOD PRESSURE: 68 MMHG | WEIGHT: 227.1 LBS | HEART RATE: 65 BPM | SYSTOLIC BLOOD PRESSURE: 126 MMHG | HEIGHT: 61 IN | OXYGEN SATURATION: 95 % | RESPIRATION RATE: 16 BRPM | BODY MASS INDEX: 42.88 KG/M2

## 2022-08-16 DIAGNOSIS — E11.59 TYPE 2 DIABETES MELLITUS WITH VASCULAR DISEASE (H): ICD-10-CM

## 2022-08-16 DIAGNOSIS — I10 ESSENTIAL HYPERTENSION, BENIGN: ICD-10-CM

## 2022-08-16 DIAGNOSIS — E78.5 HYPERLIPIDEMIA LDL GOAL <70: ICD-10-CM

## 2022-08-16 DIAGNOSIS — G89.29 CHRONIC LEFT-SIDED LOW BACK PAIN WITH LEFT-SIDED SCIATICA: Primary | ICD-10-CM

## 2022-08-16 DIAGNOSIS — Z00.00 ROUTINE GENERAL MEDICAL EXAMINATION AT A HEALTH CARE FACILITY: ICD-10-CM

## 2022-08-16 DIAGNOSIS — M54.42 CHRONIC LEFT-SIDED LOW BACK PAIN WITH LEFT-SIDED SCIATICA: Primary | ICD-10-CM

## 2022-08-16 PROCEDURE — G0439 PPPS, SUBSEQ VISIT: HCPCS | Performed by: INTERNAL MEDICINE

## 2022-08-16 PROCEDURE — 99214 OFFICE O/P EST MOD 30 MIN: CPT | Mod: 25 | Performed by: INTERNAL MEDICINE

## 2022-08-16 RX ORDER — HUMAN INSULIN 100 [IU]/ML
INJECTION, SUSPENSION SUBCUTANEOUS
Qty: 30 ML | Refills: 11 | Status: ON HOLD | OUTPATIENT
Start: 2022-08-16 | End: 2022-10-26

## 2022-08-16 RX ORDER — DULOXETIN HYDROCHLORIDE 30 MG/1
CAPSULE, DELAYED RELEASE ORAL
Qty: 90 CAPSULE | Refills: 3 | Status: SHIPPED | OUTPATIENT
Start: 2022-08-16 | End: 2023-02-16

## 2022-08-16 RX ORDER — METOPROLOL TARTRATE 100 MG
100 TABLET ORAL 2 TIMES DAILY
Qty: 180 TABLET | Refills: 3 | Status: SHIPPED | OUTPATIENT
Start: 2022-08-16 | End: 2023-11-13

## 2022-08-16 RX ORDER — ATORVASTATIN CALCIUM 20 MG/1
20 TABLET, FILM COATED ORAL DAILY
Qty: 90 TABLET | Refills: 3 | Status: SHIPPED | OUTPATIENT
Start: 2022-08-16 | End: 2023-11-03

## 2022-08-16 RX ORDER — LOSARTAN POTASSIUM 50 MG/1
TABLET ORAL
Qty: 180 TABLET | Refills: 3 | Status: SHIPPED | OUTPATIENT
Start: 2022-08-16 | End: 2023-02-16

## 2022-08-16 ASSESSMENT — ACTIVITIES OF DAILY LIVING (ADL): CURRENT_FUNCTION: NO ASSISTANCE NEEDED

## 2022-08-16 ASSESSMENT — PAIN SCALES - GENERAL: PAINLEVEL: NO PAIN (0)

## 2022-08-16 NOTE — PROGRESS NOTES
"SUBJECTIVE:   Cara Camarillo is a 82 year old female who presents for Preventive Visit.      Patient has been advised of split billing requirements and indicates understanding: Yes  Are you in the first 12 months of your Medicare coverage?  No    Healthy Habits:     In general, how would you rate your overall health?  Poor    Frequency of exercise:  None    Duration of exercise:  Less than 15 minutes    Do you usually eat at least 4 servings of fruit and vegetables a day, include whole grains    & fiber and avoid regularly eating high fat or \"junk\" foods?  Yes    Taking medications regularly:  Yes    Barriers to taking medications:  Not applicable    Medication side effects:  None    Ability to successfully perform activities of daily living:  No assistance needed    Home Safety:  No safety concerns identified    Hearing Impairment:  Difficulty following a conversation in a noisy restaurant or crowded room and need to ask people to speak up or repeat themselves    In the past 6 months, have you been bothered by leaking of urine?  No    In general, how would you rate your overall mental or emotional health?  Good      PHQ-2 Total Score: 0    Additional concerns today:  No    Still has left lower back pain that radiates down left leg past knee without leg numbness or weakness ; gabapentin did not help, so she stopped taking it; daughter would like to try something else because she is not as active as she could be due to complaints of pain     Do you feel safe in your environment? Yes    Have you ever done Advance Care Planning? (For example, a Health Directive, POLST, or a discussion with a medical provider or your loved ones about your wishes): No, advance care planning information given to patient to review.  Patient declined advance care planning discussion at this time.       Fall risk  Fallen 2 or more times in the past year?: No  Any fall with injury in the past year?: No    Cognitive Screening   1) Repeat 3 " items (Leader, Season, Table)    2) Clock draw: NORMAL  3) 3 item recall: Recalls 3 objects  Results: 3 items recalled: COGNITIVE IMPAIRMENT LESS LIKELY    Mini-CogTM Copyright JANY Arreaga. Licensed by the author for use in [facility]; reprinted with permission (nam@[facility]). All rights reserved.      Do you have sleep apnea, excessive snoring or daytime drowsiness?: no    Reviewed and updated as needed this visit by clinical staff   Tobacco  Allergies  Meds                Reviewed and updated as needed this visit by Provider                   Social History     Tobacco Use     Smoking status: Never Smoker     Smokeless tobacco: Never Used   Substance Use Topics     Alcohol use: No     Alcohol/week: 0.0 standard drinks     If you drink alcohol do you typically have >3 drinks per day or >7 drinks per week? No    Alcohol Use 8/16/2022   Prescreen: >3 drinks/day or >7 drinks/week? No               Current providers sharing in care for this patient include:   Patient Care Team:  Kole Gonsalez MD as PCP - General (Internal Medicine)  Nida Mak APRN CNP as Nurse Practitioner (Nurse Practitioner)  Kole Gonsalez MD as Assigned PCP  Lissy Diana PharmD as Pharmacist (Pharmacist)  Lissy Diana PharmD as Assigned MTM Pharmacist    The following health maintenance items are reviewed in Epic and correct as of today:  Health Maintenance Due   Topic Date Due     ZOSTER IMMUNIZATION (1 of 2) Never done     PHQ-9  08/15/2022     Patient Active Problem List   Diagnosis     History of cancer of uterus     Dysthymic disorder     Herpes zoster     Essential hypertension, benign     SOLITARY KIDNEY ANOMALY NEC     Hyperlipidemia LDL goal <70     CKD (chronic kidney disease) stage 3, GFR 30-59 ml/min (H)     Advanced directives, counseling/discussion     Anxiety     Tubular adenoma     OA (osteoarthritis)     Aortic stenosis, mild     IVCD (intraventricular conduction defect)     B12  deficiency     Iron deficiency     Esophageal reflux     Overweight BMI 35-40     Osteopenia     RBBB     Left ventricular diastolic dysfunction     Coronary artery disease     Type 2 diabetes mellitus with diabetic peripheral angiopathy without gangrene, without long-term current use of insulin (H)     Chest wall pain     Bilateral edema of lower extremity     Coronary artery disease involving native coronary artery of native heart without angina pectoris     Past Surgical History:   Procedure Laterality Date     ARTHROPLASTY KNEE  2013    Procedure: ARTHROPLASTY KNEE;  RIGHT TOTAL KNEE ARTHROPLASTY;  Surgeon: Romaine Constantino MD;  Location:  OR     ARTHROPLASTY KNEE  2/3/2014    Procedure: ARTHROPLASTY KNEE;  LEFT TOTAL KNEE ARTHROPLASTY (BIOMET)^;  Surgeon: Romaine Constantino MD;  Location:  OR     C ANESTH, SECTION       EXCISE MASS LOWER EXTREMITY Left 2015    Procedure: EXCISE MASS LOWER EXTREMITY;  Surgeon: Sloan Richardson MD;  Location: [facility] UGI ENDOSCOPY W EUS  2013    Procedure: COMBINED ENDOSCOPIC ULTRASOUND, ESOPHAGOSCOPY, GASTROSCOPY, DUODENOSCOPY (EGD);  Surgeon: Lyric iSmons MD;  Location:  GI     HYSTERECTOMY      Vaginal     HYSTERECTOMY, VAGINAL  05    Uterine CA     LAPAROSCOPIC CHOLECYSTECTOMY  1/10/2013    Procedure: LAPAROSCOPIC CHOLECYSTECTOMY;  LAPAROSCOPIC CHOLECYSTECTOMY ;  Surgeon: Eduardo Taylor MD;  Location:  OR     NEPHRECTOMY BILATERAL       NEPHRECTOMY RT/LT  OCT 2005     OTHER SURGICAL HISTORY      angiogram 2016: ISIDRO to PDA     OTHER SURGICAL HISTORY      angiogram 2016: ISIDRO to LAD     ZZC NONSPECIFIC PROCEDURE  3/20/06    CT scan of chest/abd/pelvis- negative for recurrence of CA       Social History     Tobacco Use     Smoking status: Never Smoker     Smokeless tobacco: Never Used   Substance Use Topics     Alcohol use: No     Alcohol/week: 0.0 standard drinks     Family History   Problem Relation Age of  "Onset     Diabetes Father         Adult onset     Other - See Comments Mother 95        Old age         Current Outpatient Medications   Medication Sig Dispense Refill     acetaminophen (TYLENOL) 500 MG tablet Take 500-1,000 mg by mouth 3 times daily as needed for mild pain       amLODIPine (NORVASC) 10 MG tablet Take 1 tablet (10 mg) by mouth daily 90 tablet 3     aspirin 81 MG tablet Take 1 tablet (81 mg) by mouth daily 100 tablet 3     atorvastatin (LIPITOR) 20 MG tablet Take 1 tablet (20 mg) by mouth daily 90 tablet 3     BD INSULIN SYRINGE U/F 30G X 1/2\" 0.5 ML miscellaneous USE ONE SYRINGE TWICE DAILY OR AS DIRECTED. 100 each 1     blood glucose monitoring (CONTOUR NEXT MONITOR W/DEVICE KIT) meter device kit Use to test blood sugar 3 times daily or as directed. 1 kit 0     CONTOUR NEXT TEST test strip USE TO TEST BLOOD SUGAR DAILY OR AS DIRECTED. 100 strip 0     DULoxetine (CYMBALTA) 30 MG capsule One capsule once daily for one week, then increase to two capsules once daily 90 capsule 3     insulin NPH (NOVOLIN N VIAL) 100 UNIT/ML vial 10 units before breakfast, 18 units before dinner 30 mL 11     losartan (COZAAR) 50 MG tablet TAKE 2 TABLETS BY MOUTH EVERY  tablet 3     metoprolol tartrate (LOPRESSOR) 100 MG tablet Take 1 tablet (100 mg) by mouth 2 times daily 180 tablet 3     nitroGLYcerin (NITROSTAT) 0.4 MG sublingual tablet Place 1 tablet (0.4 mg) under the tongue every 5 minutes as needed for chest pain if you are still having symptoms after 3 doses (15 minutes) call 911. 25 tablet 1     vitamin D3 (CHOLECALCIFEROL) 50 mcg (2000 units) tablet Take 1 tablet by mouth every other day       Allergies   Allergen Reactions     Actos [Pioglitazone]      Lower extremity edema       Enalapril      Renal failure     Hydrochlorothiazide      Dry mouth     Lisinopril      Hyperkalemia       Metformin Diarrhea     Diarrhea          Review of Systems  A 10 organ systems ROS is negative other than any pertinent " "positives or negatives previously stated.     OBJECTIVE:   /68 (BP Location: Left arm, Patient Position: Sitting, Cuff Size: Adult Large)   Pulse 65   Temp 96.9  F (36.1  C) (Temporal)   Resp 16   Ht 1.549 m (5' 1\")   Wt 103 kg (227 lb 1.6 oz)   SpO2 95%   BMI 42.91 kg/m   Estimated body mass index is 42.91 kg/m  as calculated from the following:    Height as of this encounter: 1.549 m (5' 1\").    Weight as of this encounter: 103 kg (227 lb 1.6 oz).  Physical Exam  GENERAL APPEARANCE: healthy, alert and no distress  EYES: Eyes grossly normal to inspection, PERRL and conjunctivae and sclerae normal  HENT: ear canals and TM's normal, nose and mouth without ulcers or lesions, oropharynx clear and oral mucous membranes moist  NECK: no adenopathy, no asymmetry, masses, or scars and thyroid normal to palpation  RESP: lungs clear to auscultation - no rales, rhonchi or wheezes  CV: Heart with regular rate and rhythm.   ABDOMEN: Obese soft, nontender, no hepatosplenomegaly, no masses and bowel sounds normal  MS: no musculoskeletal defects are noted and gait is age appropriate without ataxia  SKIN: no suspicious lesions or rashes  BACK:  No tenderness to palpation over the spinous processes of the thoracic and lumbar spine, deep tendon reflexes are 2+ at the bilateral patella and Achilles tendons, there is 5 out of 5 muscle strength in all lower extremity muscle groups, there is normal sensation to light touch in all lower extremity dermatomes, straight leg raise does not elicit radicular symptoms bilaterally; walks with cane  NEURO: Normal strength and tone, sensory exam grossly normal, mentation intact and speech normal  PSYCH: mentation appears normal and affect normal/bright    Labs from July reviewed     ASSESSMENT / PLAN:       ICD-10-CM    1. Chronic left-sided low back pain with left-sided sciatica  M54.42 DULoxetine (CYMBALTA) 30 MG capsule    G89.29    2. Hyperlipidemia LDL goal <70  E78.5 atorvastatin " "(LIPITOR) 20 MG tablet   3. 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)  E11.59 insulin NPH (NOVOLIN N VIAL) 100 UNIT/ML vial   4. Essential hypertension, benign  I10 metoprolol tartrate (LOPRESSOR) 100 MG tablet   5. Routine general medical examination at a health care facility  Z00.00      Discussed pros and cons of duloxetine  Discussed common side effects   Could try it for chronic back pain  She would like to   Offered follow up appointment to assess therapy, but they would rather 'play it by ear'  Diabetes well controlled  Lipids well controlled  Blood pressure ok on second check     Patient has been advised of split billing requirements and indicates understanding: Yes    COUNSELING:  Reviewed preventive health counseling, as reflected in patient instructions  Special attention given to:       Immunizations    Reminded patient and daughter about shingrix           Estimated body mass index is 42.91 kg/m  as calculated from the following:    Height as of this encounter: 1.549 m (5' 1\").    Weight as of this encounter: 103 kg (227 lb 1.6 oz).    Weight management plan: Discussed healthy diet and exercise guidelines    She reports that she has never smoked. She has never used smokeless tobacco.      Appropriate preventive services were discussed with this patient, including applicable screening as appropriate for cardiovascular disease, diabetes, osteopenia/osteoporosis, and glaucoma.  As appropriate for age/gender, discussed screening for colorectal cancer, prostate cancer, breast cancer, and cervical cancer. Checklist reviewing preventive services available has been given to the patient.    Reviewed patients plan of care and provided an AVS. The Basic Care Plan (routine screening as documented in Health Maintenance) for Cara meets the Care Plan requirement. This Care Plan has been established and reviewed with the Patient.    Counseling Resources:  ATP IV Guidelines  Pooled Cohorts Equation " Calculator  Breast Cancer Risk Calculator  Breast Cancer: Medication to Reduce Risk  FRAX Risk Assessment  ICSI Preventive Guidelines  Dietary Guidelines for Americans, 2010  [employer]'s MyPlate  ASA Prophylaxis  Lung CA Screening    Kole Gonsalez MD  [facility]    Identified Health Risks:

## 2022-08-29 DIAGNOSIS — E11.59 TYPE 2 DIABETES MELLITUS WITH VASCULAR DISEASE (H): ICD-10-CM

## 2022-08-31 NOTE — TELEPHONE ENCOUNTER
Prescription approved per [facility] Refill Protocol.    Lindsey Sol RN  [facility] Triage Team

## 2022-09-12 DIAGNOSIS — I10 ESSENTIAL HYPERTENSION, BENIGN: ICD-10-CM

## 2022-09-13 RX ORDER — AMLODIPINE BESYLATE 10 MG/1
TABLET ORAL
Qty: 90 TABLET | Refills: 3 | Status: SHIPPED | OUTPATIENT
Start: 2022-02-15 | End: 2022-11-30

## 2022-09-13 NOTE — TELEPHONE ENCOUNTER
90 tablets with 3 refills last sent on 2/15/2022 (1 year supply).  No change in pharmacy.     Prescription approved per [facility] Refill Protocol.    Re-sending with original start date.     Codie Ventura RN BSN MSN  [facility]

## 2022-10-22 ENCOUNTER — HEALTH MAINTENANCE LETTER (OUTPATIENT)
Age: 82
End: 2022-10-22

## 2022-10-23 ENCOUNTER — APPOINTMENT (OUTPATIENT)
Dept: CT IMAGING | Facility: CLINIC | Age: 82
End: 2022-10-23
Attending: EMERGENCY MEDICINE
Payer: COMMERCIAL

## 2022-10-23 ENCOUNTER — APPOINTMENT (OUTPATIENT)
Dept: GENERAL RADIOLOGY | Facility: CLINIC | Age: 82
End: 2022-10-23
Attending: EMERGENCY MEDICINE
Payer: COMMERCIAL

## 2022-10-23 ENCOUNTER — HOSPITAL ENCOUNTER (OUTPATIENT)
Facility: CLINIC | Age: 82
Setting detail: OBSERVATION
Discharge: NURSING, REHAB OR LONG-TERM CARE | End: 2022-10-26
Attending: EMERGENCY MEDICINE | Admitting: STUDENT IN AN ORGANIZED HEALTH CARE EDUCATION/TRAINING PROGRAM
Payer: COMMERCIAL

## 2022-10-23 DIAGNOSIS — S09.90XA CLOSED HEAD INJURY, INITIAL ENCOUNTER: ICD-10-CM
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
## 2022-10-23 DIAGNOSIS — I95.1 ORTHOSTATIC HYPOTENSION: ICD-10-CM

## 2022-10-23 DIAGNOSIS — S20.229A BACK CONTUSION, UNSPECIFIED LATERALITY, INITIAL ENCOUNTER: ICD-10-CM

## 2022-10-23 DIAGNOSIS — S30.0XXA CONTUSION OF PELVIS, INITIAL ENCOUNTER: ICD-10-CM

## 2022-10-23 DIAGNOSIS — E11.51 TYPE 2 DIABETES MELLITUS WITH DIABETIC PERIPHERAL ANGIOPATHY WITHOUT GANGRENE, WITHOUT LONG-TERM CURRENT USE OF INSULIN (H): Primary | ICD-10-CM

## 2022-10-23 DIAGNOSIS — R55 NEAR SYNCOPE: ICD-10-CM

## 2022-10-23 LAB
ALBUMIN UR-MCNC: 50 MG/DL
ANION GAP SERPL CALCULATED.3IONS-SCNC: 6 MMOL/L (ref 3–14)
APPEARANCE UR: CLEAR
ATRIAL RATE - MUSE: 85 BPM
BASOPHILS # BLD AUTO: 0 10E3/UL (ref 0–0.2)
BASOPHILS NFR BLD AUTO: 0 %
BILIRUB UR QL STRIP: NEGATIVE
BUN SERPL-MCNC: 23 MG/DL (ref 7–30)
CALCIUM SERPL-MCNC: 8.9 MG/DL (ref 8.5–10.1)
CHLORIDE BLD-SCNC: 107 MMOL/L (ref 94–109)
CO2 SERPL-SCNC: 22 MMOL/L (ref 20–32)
COLOR UR AUTO: YELLOW
CREAT SERPL-MCNC: 0.99 MG/DL (ref 0.52–1.04)
DIASTOLIC BLOOD PRESSURE - MUSE: NORMAL MMHG
EOSINOPHIL # BLD AUTO: 0.1 10E3/UL (ref 0–0.7)
EOSINOPHIL NFR BLD AUTO: 1 %
ERYTHROCYTE [DISTWIDTH] IN BLOOD BY AUTOMATED COUNT: 14.6 % (ref 10–15)
GFR SERPL CREATININE-BSD FRML MDRD: 56 ML/MIN/1.73M2
GLUCOSE BLD-MCNC: 206 MG/DL (ref 70–99)
GLUCOSE BLDC GLUCOMTR-MCNC: 217 MG/DL (ref 70–99)
GLUCOSE UR STRIP-MCNC: 70 MG/DL
HCT VFR BLD AUTO: 45.7 % (ref 35–47)
HGB BLD-MCNC: 14.6 G/DL (ref 11.7–15.7)
HGB UR QL STRIP: NEGATIVE
HOLD SPECIMEN: NORMAL
HYALINE CASTS: 2 /LPF
IMM GRANULOCYTES # BLD: 0 10E3/UL
IMM GRANULOCYTES NFR BLD: 0 %
INTERPRETATION ECG - MUSE: NORMAL
KETONES UR STRIP-MCNC: 10 MG/DL
LEUKOCYTE ESTERASE UR QL STRIP: ABNORMAL
LYMPHOCYTES # BLD AUTO: 0.9 10E3/UL (ref 0.8–5.3)
LYMPHOCYTES NFR BLD AUTO: 9 %
MCH RBC QN AUTO: 28.5 PG (ref 26.5–33)
MCHC RBC AUTO-ENTMCNC: 31.9 G/DL (ref 31.5–36.5)
MCV RBC AUTO: 89 FL (ref 78–100)
MONOCYTES # BLD AUTO: 0.8 10E3/UL (ref 0–1.3)
MONOCYTES NFR BLD AUTO: 8 %
MUCOUS THREADS #/AREA URNS LPF: PRESENT /LPF
NEUTROPHILS # BLD AUTO: 8.6 10E3/UL (ref 1.6–8.3)
NEUTROPHILS NFR BLD AUTO: 82 %
NITRATE UR QL: NEGATIVE
NRBC # BLD AUTO: 0 10E3/UL
NRBC BLD AUTO-RTO: 0 /100
P AXIS - MUSE: -17 DEGREES
PH UR STRIP: 5.5 [PH] (ref 5–7)
PLATELET # BLD AUTO: 262 10E3/UL (ref 150–450)
POTASSIUM BLD-SCNC: 4.6 MMOL/L (ref 3.4–5.3)
PR INTERVAL - MUSE: 160 MS
QRS DURATION - MUSE: 128 MS
QT - MUSE: 394 MS
QTC - MUSE: 468 MS
R AXIS - MUSE: 53 DEGREES
RBC # BLD AUTO: 5.12 10E6/UL (ref 3.8–5.2)
RBC URINE: 0 /HPF
SARS-COV-2 RNA RESP QL NAA+PROBE: NEGATIVE
SODIUM SERPL-SCNC: 135 MMOL/L (ref 133–144)
SP GR UR STRIP: 1.03 (ref 1–1.03)
SQUAMOUS EPITHELIAL: <1 /HPF
SYSTOLIC BLOOD PRESSURE - MUSE: NORMAL MMHG
T AXIS - MUSE: -2 DEGREES
TROPONIN I SERPL HS-MCNC: 12 NG/L
UROBILINOGEN UR STRIP-MCNC: NORMAL MG/DL
VENTRICULAR RATE- MUSE: 85 BPM
WBC # BLD AUTO: 10.4 10E3/UL (ref 4–11)
WBC URINE: 5 /HPF

## 2022-10-23 PROCEDURE — 99285 EMERGENCY DEPT VISIT HI MDM: CPT | Mod: 25

## 2022-10-23 PROCEDURE — 81001 URINALYSIS AUTO W/SCOPE: CPT | Performed by: EMERGENCY MEDICINE

## 2022-10-23 PROCEDURE — 99220 PR INITIAL OBSERVATION CARE,LEVEL III: CPT | Performed by: STUDENT IN AN ORGANIZED HEALTH CARE EDUCATION/TRAINING PROGRAM

## 2022-10-23 PROCEDURE — 71045 X-RAY EXAM CHEST 1 VIEW: CPT

## 2022-10-23 PROCEDURE — 72125 CT NECK SPINE W/O DYE: CPT

## 2022-10-23 PROCEDURE — 93005 ELECTROCARDIOGRAM TRACING: CPT

## 2022-10-23 PROCEDURE — 36415 COLL VENOUS BLD VENIPUNCTURE: CPT | Performed by: EMERGENCY MEDICINE

## 2022-10-23 PROCEDURE — 72170 X-RAY EXAM OF PELVIS: CPT

## 2022-10-23 PROCEDURE — G0378 HOSPITAL OBSERVATION PER HR: HCPCS

## 2022-10-23 PROCEDURE — 84484 ASSAY OF TROPONIN QUANT: CPT | Performed by: EMERGENCY MEDICINE

## 2022-10-23 PROCEDURE — 72100 X-RAY EXAM L-S SPINE 2/3 VWS: CPT

## 2022-10-23 PROCEDURE — C9803 HOPD COVID-19 SPEC COLLECT: HCPCS

## 2022-10-23 PROCEDURE — 87086 URINE CULTURE/COLONY COUNT: CPT | Performed by: EMERGENCY MEDICINE

## 2022-10-23 PROCEDURE — 70450 CT HEAD/BRAIN W/O DYE: CPT

## 2022-10-23 PROCEDURE — 258N000003 HC RX IP 258 OP 636: Performed by: EMERGENCY MEDICINE

## 2022-10-23 PROCEDURE — U0005 INFEC AGEN DETEC AMPLI PROBE: HCPCS | Performed by: EMERGENCY MEDICINE

## 2022-10-23 PROCEDURE — 82310 ASSAY OF CALCIUM: CPT | Performed by: EMERGENCY MEDICINE

## 2022-10-23 PROCEDURE — 85025 COMPLETE CBC W/AUTO DIFF WBC: CPT | Performed by: EMERGENCY MEDICINE

## 2022-10-23 RX ORDER — ACETAMINOPHEN 325 MG/1
650 TABLET ORAL EVERY 6 HOURS PRN
Status: DISCONTINUED | OUTPATIENT
Start: 2022-10-23 | End: 2022-10-26 | Stop reason: HOSPADM

## 2022-10-23 RX ORDER — NICOTINE POLACRILEX 4 MG
15-30 LOZENGE BUCCAL
Status: DISCONTINUED | OUTPATIENT
Start: 2022-10-23 | End: 2022-10-26 | Stop reason: HOSPADM

## 2022-10-23 RX ORDER — ONDANSETRON 4 MG/1
4 TABLET, ORALLY DISINTEGRATING ORAL EVERY 6 HOURS PRN
Status: DISCONTINUED | OUTPATIENT
Start: 2022-10-23 | End: 2022-10-26 | Stop reason: HOSPADM

## 2022-10-23 RX ORDER — METOPROLOL TARTRATE 50 MG
100 TABLET ORAL 2 TIMES DAILY
Status: DISCONTINUED | OUTPATIENT
Start: 2022-10-23 | End: 2022-10-25

## 2022-10-23 RX ORDER — DEXTROSE MONOHYDRATE 25 G/50ML
25-50 INJECTION, SOLUTION INTRAVENOUS
Status: DISCONTINUED | OUTPATIENT
Start: 2022-10-23 | End: 2022-10-26 | Stop reason: HOSPADM

## 2022-10-23 RX ORDER — SODIUM CHLORIDE 9 MG/ML
INJECTION, SOLUTION INTRAVENOUS CONTINUOUS
Status: DISCONTINUED | OUTPATIENT
Start: 2022-10-23 | End: 2022-10-26 | Stop reason: HOSPADM

## 2022-10-23 RX ORDER — ACETAMINOPHEN 650 MG/1
650 SUPPOSITORY RECTAL EVERY 6 HOURS PRN
Status: DISCONTINUED | OUTPATIENT
Start: 2022-10-23 | End: 2022-10-26 | Stop reason: HOSPADM

## 2022-10-23 RX ORDER — ONDANSETRON 2 MG/ML
4 INJECTION INTRAMUSCULAR; INTRAVENOUS EVERY 6 HOURS PRN
Status: DISCONTINUED | OUTPATIENT
Start: 2022-10-23 | End: 2022-10-26 | Stop reason: HOSPADM

## 2022-10-23 RX ORDER — LOSARTAN POTASSIUM 100 MG/1
100 TABLET ORAL DAILY
Status: DISCONTINUED | OUTPATIENT
Start: 2022-10-24 | End: 2022-10-26 | Stop reason: HOSPADM

## 2022-10-23 RX ORDER — HYDRALAZINE HYDROCHLORIDE 20 MG/ML
5 INJECTION INTRAMUSCULAR; INTRAVENOUS EVERY 4 HOURS PRN
Status: DISCONTINUED | OUTPATIENT
Start: 2022-10-23 | End: 2022-10-26 | Stop reason: HOSPADM

## 2022-10-23 RX ORDER — AMLODIPINE BESYLATE 10 MG/1
10 TABLET ORAL DAILY
Status: DISCONTINUED | OUTPATIENT
Start: 2022-10-24 | End: 2022-10-26 | Stop reason: HOSPADM

## 2022-10-23 RX ADMIN — SODIUM CHLORIDE 1000 ML: 9 INJECTION, SOLUTION INTRAVENOUS at 20:00

## 2022-10-23 ASSESSMENT — ENCOUNTER SYMPTOMS
DYSURIA: 0
COUGH: 0
VOMITING: 0
WEAKNESS: 1
BACK PAIN: 1
DIARRHEA: 0
NAUSEA: 1
LIGHT-HEADEDNESS: 1
DIAPHORESIS: 1
APPETITE CHANGE: 0

## 2022-10-23 ASSESSMENT — ACTIVITIES OF DAILY LIVING (ADL)
ADLS_ACUITY_SCORE: 35

## 2022-10-23 NOTE — ED TRIAGE NOTES
Fall on Friday evening, patient was using her walker in the hallway- felt dizzy and fell. Unknown if hit head, no thinners. Patient crawled on floor to get to stairwell to assist to standing. Patient reports lower back pain and abd pain. Was able to ambulate to car with walker. A&Ox4. [place].

## 2022-10-23 NOTE — ED PROVIDER NOTES
History   Chief Complaint:  Fall       The history is provided by the patient.      Cara Camarillo is a 82 year old female with history of type II diabetes mellitus, CKD, and osteoarthritis who presents with leg weakness, lower back pain, and a headache after a fall that occurred Friday evening (2 days ago). Both of her children and her grandchild were out of town during her fall and she did not call them for help. They brought her to the ED today after they visited her today and found out about her fall. They states that the patient does not call for help unless it is her children, even 911. The patient states that she fell when she was going to bed after eating dinner. She states that she was using the walker when it went the opposite way she was walking, causing her to fall against the wall. She states that she felt light headed, clammy, and nauseated but she denies losing consciousness during the fall. The patient was unable to get up because of her leg weakness and decided to scoot on her butt towards the stairs. At the stairs, she was able to use the rail to stand up. As she presents to the ED, she states her legs still feel weak and she has bilateral lower back pain that prevents her from ambulating since the fall. She states that she has been feeling feverish this past week and has been experiencing diaphoresis. The patient deals with chronic neck pain and knee pain. She has gotten physical therapy for her stiff neck to help with her headaches. She states that she has been eating and drinking normally. She denies that she is experiencing vomiting, diarrhea, cough, or dysuria.     Per her children, she has not fallen in years and it is not a common occurrence.    Review of Systems   Constitutional: Positive for diaphoresis. Negative for appetite change.   Respiratory: Negative for cough.    Gastrointestinal: Positive for nausea (during fall). Negative for diarrhea and vomiting.   Genitourinary: Negative  "for dysuria.   Musculoskeletal: Positive for back pain.   Neurological: Positive for weakness and light-headedness (during fall). Negative for syncope.   All other systems reviewed and are negative.    Allergies:  Actos [Pioglitazone]  Enalapril  Hydrochlorothiazide  Lisinopril  Metformin    Medications:  Acetaminophen  Amlodipine  Aspirin 81 mg  Atorvastatin  Duloxetine  Insulin NPH  Losartan  Metoprolol tartrate  Nitroglycerin  Vitamin D3    Past Medical History:     Cancer of uterus  Dysthymic disorder  Herpes zoster  Essential hypertension  Hyperlipidemia  CKD  Anxiety  Tubular adenoma  OA (osteoarthritis)  Aortic stenosis  IVCD  B12 deficiency  Iron deficiency  Esophageal reflux  Overweight  Osteopenia  RBBB  Left ventricular diastolic dysfunction  Type 2 diabetes mellitus  Bilateral edema of lower extremity  CAD    Past Surgical History:    Knee arthroplasty x2   section  Excise lower mass extremity  EGD  Hysterectomy x2  Laparoscopic cholecystectomy  Nephrectomy x2  Angiogram x2    Family History:    Father: diabetes    Social History:  The patient presents to the ED with her son and daughter.  PCP: Kole Gonsalez     Physical Exam     Patient Vitals for the past 24 hrs:   BP Temp Temp src Pulse Resp SpO2 Height Weight   10/23/22 1830 (!) 160/96 -- -- 73 -- 96 % -- --   10/23/22 1815 -- -- -- -- -- 97 % 1.626 m (5' 4\") 104.3 kg (230 lb)   10/23/22 1615 132/58 98.3  F (36.8  C) Temporal 90 18 94 % -- --       Physical Exam  Physical Exam   Constitutional:  Patient is oriented to person, place, and time. They appear well-developed and well-nourished. Mild distress secondary to lower back pain.   HENT:    Atraumatic. No intraoral injury.   Mouth/Throat:   Oropharynx is clear and moist.   Eyes:    Conjunctivae normal and EOM are normal. Pupils are equal, round, and reactive to light.   Neck:    Normal range of motion.   Cardiovascular: Normal rate, regular rhythm and normal heart sounds.  Exam reveals no " gallop and no friction rub.  No murmur heard.  Pulmonary/Chest:  Effort normal and breath sounds normal. Patient has no wheezes. Patient has no rales.   Abdominal:   Soft. Bowel sounds are normal. Patient exhibits no mass. There is no tenderness. There is no rebound and no guarding.   Musculoskeletal:  Patient indicates low lumbar pain. No abrasions, ecchymosis, edema.    Neurological:   Patient is alert and oriented to person, place, and time. Patient is generally weak. No cranial nerve deficit or sensory deficit. GCS 15. NIH stroke scale of 0.  Skin:   Skin is warm and dry. No rash noted. No erythema.   Psychiatric:   Patient has a normal mood     Emergency Department Course   ECG  ECG results from 10/23/22   EKG 12-lead, tracing only     Value    Systolic Blood Pressure     Diastolic Blood Pressure     Ventricular Rate 85    Atrial Rate 85    MO Interval 160    QRS Duration 128        QTc 468    P Axis -17    R AXIS 53    T Axis -2    Interpretation ECG      Sinus rhythm  Right bundle branch block  T wave abnormality, consider inferior ischemia  Abnormal ECG  When compared with ECG of 22-FEB-2016 13:05,  No significant change was found  Confirmed by GENERATED REPORT, COMPUTER ([id number]),  Malka Godoy ([id number]) on 10/23/2022 4:37:28 PM       Imaging:  XR Chest 1 View   Final Result   IMPRESSION: No hydropneumothorax or fracture. Lungs are clear. Heart appears enlarged, likely magnified due to supine view. No signs of pneumonia or failure.      Lumbar spine XR, 2-3 views   Final Result      XR Pelvis 1/2 Views   Final Result   IMPRESSION: Osteopenia. No fractures are evident. Mild degenerative changes in the hips. Degenerative changes in the SI joints and lower lumbar spine. Surgical clips in the pelvis.      CT Head w/o Contrast   Final Result   IMPRESSION:   HEAD CT:   1.  No CT evidence for acute intracranial process.   2.  Brain atrophy and presumed chronic microvascular ischemic changes as above.       CERVICAL SPINE CT:   1.  No CT evidence for acute fracture or post traumatic subluxation.      CT Cervical Spine w/o Contrast   Final Result   IMPRESSION:   HEAD CT:   1.  No CT evidence for acute intracranial process.   2.  Brain atrophy and presumed chronic microvascular ischemic changes as above.      CERVICAL SPINE CT:   1.  No CT evidence for acute fracture or post traumatic subluxation.        Report per radiology    Laboratory:  Labs Ordered and Resulted from Time of ED Arrival to Time of ED Departure   GLUCOSE BY METER - Abnormal       Result Value    GLUCOSE BY METER POCT 217 (*)    BASIC METABOLIC PANEL - Abnormal    Sodium 135      Potassium 4.6      Chloride 107      Carbon Dioxide (CO2) 22      Anion Gap 6      Urea Nitrogen 23      Creatinine 0.99      Calcium 8.9      Glucose 206 (*)     GFR Estimate 56 (*)    ROUTINE UA WITH MICROSCOPIC REFLEX TO CULTURE - Abnormal    Color Urine Yellow      Appearance Urine Clear      Glucose Urine 70 (*)     Bilirubin Urine Negative      Ketones Urine 10 (*)     Specific Gravity Urine 1.031      Blood Urine Negative      pH Urine 5.5      Protein Albumin Urine 50 (*)     Urobilinogen Urine Normal      Nitrite Urine Negative      Leukocyte Esterase Urine Trace (*)     Mucus Urine Present (*)     RBC Urine 0      WBC Urine 5      Squamous Epithelials Urine <1      Hyaline Casts Urine 2     CBC WITH PLATELETS AND DIFFERENTIAL - Abnormal    WBC Count 10.4      RBC Count 5.12      Hemoglobin 14.6      Hematocrit 45.7      MCV 89      MCH 28.5      MCHC 31.9      RDW 14.6      Platelet Count 262      % Neutrophils 82      % Lymphocytes 9      % Monocytes 8      % Eosinophils 1      % Basophils 0      % Immature Granulocytes 0      NRBCs per 100 WBC 0      Absolute Neutrophils 8.6 (*)     Absolute Lymphocytes 0.9      Absolute Monocytes 0.8      Absolute Eosinophils 0.1      Absolute Basophils 0.0      Absolute Immature Granulocytes 0.0      Absolute NRBCs 0.0      TROPONIN I - Normal    Troponin I High Sensitivity 12     COVID-19 VIRUS (CORONAVIRUS) BY PCR   URINE CULTURE     Emergency Department Course:     Reviewed:  I reviewed nursing notes, vitals, past medical history and Care Everywhere    Assessments:  1830 I obtained history and examined the patient as noted above.   2000 I rechecked the patient and explained findings.     Consults:  2009 I spoke with Dr. Tang from the hospitalist service regarding the patient's presentation, workup here in the ED, and plan of care.    Interventions:  2000 NS 1 L IV    Disposition:  The patient was admitted to the hospital under the care of Dr. Tang.     Impression & Plan     Medical Decision Making:  Cara Camarillo is an 82-year-old female presenting after she had a fall at home and had difficulty getting up due to the pain in her legs and back.  It sounds like she may have been near syncopal as she felt lightheaded clammy and nauseated before she fell but she did not actually lose consciousness.  She also complained of a headache after the fall.  Physical exam did not show any focal neurologic deficits.  She had a lot of pain just turning over in bed due to the low back.  There is no lisandra bruising on any part of her body.  Imaging, blood work and urinalysis were obtained.  Her urine is not convincing for infection.  A CT of her head and neck shows no acute injury.  X-ray of the low back as well as the pelvis again is reassuringly normal.  The blood work all looks relatively normal no acute cause for her pain.  Cardiac enzyme is normal and ECG does not show any evidence of ischemia.  The patient is to generally weak to go home so we will bring her in for observation with plan for TCU placement.  Patient and family are on board with this plan.      Diagnosis:    ICD-10-CM    1. Near syncope  R55       2. Fall, initial encounter  W19.XXXA       3. Closed head injury, initial encounter  S09.90XA       4. Contusion of pelvis,  initial encounter  S30.0XXA       5. Back contusion, unspecified laterality, initial encounter  S20.229A         Scribe Disclosure:  I, Sharath Eric, am serving as a scribe at 6:30 PM on 10/23/2022 to document services personally performed by Екатерина Max MD based on my observations and the provider's statements to me.        Екатерина Max MD  10/23/22 2024

## 2022-10-24 ENCOUNTER — APPOINTMENT (OUTPATIENT)
Dept: CARDIOLOGY | Facility: CLINIC | Age: 82
End: 2022-10-24
Attending: STUDENT IN AN ORGANIZED HEALTH CARE EDUCATION/TRAINING PROGRAM
Payer: COMMERCIAL

## 2022-10-24 ENCOUNTER — APPOINTMENT (OUTPATIENT)
Dept: PHYSICAL THERAPY | Facility: CLINIC | Age: 82
End: 2022-10-24
Attending: STUDENT IN AN ORGANIZED HEALTH CARE EDUCATION/TRAINING PROGRAM
Payer: COMMERCIAL

## 2022-10-24 LAB
ALBUMIN SERPL-MCNC: 3 G/DL (ref 3.4–5)
ALP SERPL-CCNC: 96 U/L (ref 40–150)
ALT SERPL W P-5'-P-CCNC: 21 U/L (ref 0–50)
ANION GAP SERPL CALCULATED.3IONS-SCNC: 5 MMOL/L (ref 3–14)
AST SERPL W P-5'-P-CCNC: 20 U/L (ref 0–45)
BILIRUB SERPL-MCNC: 0.8 MG/DL (ref 0.2–1.3)
BUN SERPL-MCNC: 20 MG/DL (ref 7–30)
CALCIUM SERPL-MCNC: 8.6 MG/DL (ref 8.5–10.1)
CHLORIDE BLD-SCNC: 107 MMOL/L (ref 94–109)
CO2 SERPL-SCNC: 23 MMOL/L (ref 20–32)
CREAT SERPL-MCNC: 0.78 MG/DL (ref 0.52–1.04)
ERYTHROCYTE [DISTWIDTH] IN BLOOD BY AUTOMATED COUNT: 14.6 % (ref 10–15)
GFR SERPL CREATININE-BSD FRML MDRD: 74 ML/MIN/1.73M2
GLUCOSE BLD-MCNC: 204 MG/DL (ref 70–99)
GLUCOSE BLDC GLUCOMTR-MCNC: 163 MG/DL (ref 70–99)
GLUCOSE BLDC GLUCOMTR-MCNC: 163 MG/DL (ref 70–99)
GLUCOSE BLDC GLUCOMTR-MCNC: 208 MG/DL (ref 70–99)
GLUCOSE BLDC GLUCOMTR-MCNC: 210 MG/DL (ref 70–99)
GLUCOSE BLDC GLUCOMTR-MCNC: 213 MG/DL (ref 70–99)
HCT VFR BLD AUTO: 41.6 % (ref 35–47)
HGB BLD-MCNC: 13.5 G/DL (ref 11.7–15.7)
LVEF ECHO: NORMAL
MAGNESIUM SERPL-MCNC: 1.7 MG/DL (ref 1.6–2.3)
MCH RBC QN AUTO: 29 PG (ref 26.5–33)
MCHC RBC AUTO-ENTMCNC: 32.5 G/DL (ref 31.5–36.5)
MCV RBC AUTO: 90 FL (ref 78–100)
PHOSPHATE SERPL-MCNC: 2.7 MG/DL (ref 2.5–4.5)
PLATELET # BLD AUTO: 206 10E3/UL (ref 150–450)
POTASSIUM BLD-SCNC: 4.2 MMOL/L (ref 3.4–5.3)
PROT SERPL-MCNC: 6.6 G/DL (ref 6.8–8.8)
RBC # BLD AUTO: 4.65 10E6/UL (ref 3.8–5.2)
SODIUM SERPL-SCNC: 135 MMOL/L (ref 133–144)
T4 FREE SERPL-MCNC: 1.06 NG/DL (ref 0.76–1.46)
TSH SERPL DL<=0.005 MIU/L-ACNC: 4.13 MU/L (ref 0.4–4)
WBC # BLD AUTO: 11 10E3/UL (ref 4–11)

## 2022-10-24 PROCEDURE — 96372 THER/PROPH/DIAG INJ SC/IM: CPT | Performed by: STUDENT IN AN ORGANIZED HEALTH CARE EDUCATION/TRAINING PROGRAM

## 2022-10-24 PROCEDURE — 250N000013 HC RX MED GY IP 250 OP 250 PS 637: Performed by: INTERNAL MEDICINE

## 2022-10-24 PROCEDURE — 96374 THER/PROPH/DIAG INJ IV PUSH: CPT

## 2022-10-24 PROCEDURE — 250N000011 HC RX IP 250 OP 636: Performed by: PHYSICIAN ASSISTANT

## 2022-10-24 PROCEDURE — 250N000011 HC RX IP 250 OP 636: Performed by: STUDENT IN AN ORGANIZED HEALTH CARE EDUCATION/TRAINING PROGRAM

## 2022-10-24 PROCEDURE — 36415 COLL VENOUS BLD VENIPUNCTURE: CPT | Performed by: PHYSICIAN ASSISTANT

## 2022-10-24 PROCEDURE — G0378 HOSPITAL OBSERVATION PER HR: HCPCS

## 2022-10-24 PROCEDURE — 258N000003 HC RX IP 258 OP 636: Performed by: STUDENT IN AN ORGANIZED HEALTH CARE EDUCATION/TRAINING PROGRAM

## 2022-10-24 PROCEDURE — 82962 GLUCOSE BLOOD TEST: CPT

## 2022-10-24 PROCEDURE — 84443 ASSAY THYROID STIM HORMONE: CPT | Performed by: PHYSICIAN ASSISTANT

## 2022-10-24 PROCEDURE — 93306 TTE W/DOPPLER COMPLETE: CPT | Mod: 26 | Performed by: INTERNAL MEDICINE

## 2022-10-24 PROCEDURE — 999N000208 ECHOCARDIOGRAM COMPLETE

## 2022-10-24 PROCEDURE — 97161 PT EVAL LOW COMPLEX 20 MIN: CPT | Mod: GP

## 2022-10-24 PROCEDURE — 84439 ASSAY OF FREE THYROXINE: CPT | Performed by: PHYSICIAN ASSISTANT

## 2022-10-24 PROCEDURE — 85014 HEMATOCRIT: CPT | Performed by: PHYSICIAN ASSISTANT

## 2022-10-24 PROCEDURE — 80053 COMPREHEN METABOLIC PANEL: CPT | Performed by: PHYSICIAN ASSISTANT

## 2022-10-24 PROCEDURE — 84100 ASSAY OF PHOSPHORUS: CPT | Performed by: PHYSICIAN ASSISTANT

## 2022-10-24 PROCEDURE — 97116 GAIT TRAINING THERAPY: CPT | Mod: GP

## 2022-10-24 PROCEDURE — 250N000013 HC RX MED GY IP 250 OP 250 PS 637: Performed by: PHYSICIAN ASSISTANT

## 2022-10-24 PROCEDURE — 99226 PR SUBSEQUENT OBSERVATION CARE,LEVEL III: CPT | Performed by: PHYSICIAN ASSISTANT

## 2022-10-24 PROCEDURE — 255N000002 HC RX 255 OP 636: Performed by: STUDENT IN AN ORGANIZED HEALTH CARE EDUCATION/TRAINING PROGRAM

## 2022-10-24 PROCEDURE — 250N000012 HC RX MED GY IP 250 OP 636 PS 637: Performed by: STUDENT IN AN ORGANIZED HEALTH CARE EDUCATION/TRAINING PROGRAM

## 2022-10-24 PROCEDURE — 250N000013 HC RX MED GY IP 250 OP 250 PS 637: Performed by: STUDENT IN AN ORGANIZED HEALTH CARE EDUCATION/TRAINING PROGRAM

## 2022-10-24 PROCEDURE — 83735 ASSAY OF MAGNESIUM: CPT | Performed by: PHYSICIAN ASSISTANT

## 2022-10-24 RX ORDER — PROCHLORPERAZINE 25 MG
12.5 SUPPOSITORY, RECTAL RECTAL EVERY 12 HOURS PRN
Status: DISCONTINUED | OUTPATIENT
Start: 2022-10-24 | End: 2022-10-26 | Stop reason: HOSPADM

## 2022-10-24 RX ORDER — NITROGLYCERIN 0.4 MG/1
0.4 TABLET SUBLINGUAL ONCE
Status: COMPLETED | OUTPATIENT
Start: 2022-10-24 | End: 2022-10-24

## 2022-10-24 RX ORDER — LIDOCAINE 4 G/G
1 PATCH TOPICAL
Status: DISCONTINUED | OUTPATIENT
Start: 2022-10-24 | End: 2022-10-26 | Stop reason: HOSPADM

## 2022-10-24 RX ORDER — PROCHLORPERAZINE MALEATE 5 MG
5 TABLET ORAL EVERY 6 HOURS PRN
Status: DISCONTINUED | OUTPATIENT
Start: 2022-10-24 | End: 2022-10-26 | Stop reason: HOSPADM

## 2022-10-24 RX ORDER — DULOXETIN HYDROCHLORIDE 30 MG/1
30 CAPSULE, DELAYED RELEASE ORAL DAILY
Status: DISCONTINUED | OUTPATIENT
Start: 2022-10-24 | End: 2022-10-26 | Stop reason: HOSPADM

## 2022-10-24 RX ADMIN — ONDANSETRON 4 MG: 2 INJECTION INTRAMUSCULAR; INTRAVENOUS at 08:56

## 2022-10-24 RX ADMIN — LIDOCAINE 1 PATCH: 560 PATCH PERCUTANEOUS; TOPICAL; TRANSDERMAL at 20:49

## 2022-10-24 RX ADMIN — PROCHLORPERAZINE EDISYLATE 5 MG: 5 INJECTION INTRAMUSCULAR; INTRAVENOUS at 12:06

## 2022-10-24 RX ADMIN — METOPROLOL TARTRATE 100 MG: 50 TABLET, FILM COATED ORAL at 00:39

## 2022-10-24 RX ADMIN — SODIUM CHLORIDE: 9 INJECTION, SOLUTION INTRAVENOUS at 14:40

## 2022-10-24 RX ADMIN — SODIUM CHLORIDE: 9 INJECTION, SOLUTION INTRAVENOUS at 04:42

## 2022-10-24 RX ADMIN — METOPROLOL TARTRATE 100 MG: 50 TABLET, FILM COATED ORAL at 20:10

## 2022-10-24 RX ADMIN — METOPROLOL TARTRATE 100 MG: 50 TABLET, FILM COATED ORAL at 09:03

## 2022-10-24 RX ADMIN — HUMAN ALBUMIN MICROSPHERES AND PERFLUTREN 9 ML: 10; .22 INJECTION, SOLUTION INTRAVENOUS at 11:07

## 2022-10-24 RX ADMIN — AMLODIPINE BESYLATE 10 MG: 10 TABLET ORAL at 09:03

## 2022-10-24 RX ADMIN — NITROGLYCERIN 0.4 MG: 0.4 TABLET SUBLINGUAL at 04:36

## 2022-10-24 RX ADMIN — INSULIN HUMAN 10 UNITS: 100 INJECTION, SUSPENSION SUBCUTANEOUS at 18:08

## 2022-10-24 RX ADMIN — MICONAZOLE NITRATE 1 APPLICATOR: 2 POWDER TOPICAL at 20:49

## 2022-10-24 RX ADMIN — INSULIN HUMAN 10 UNITS: 100 INJECTION, SUSPENSION SUBCUTANEOUS at 12:23

## 2022-10-24 RX ADMIN — DULOXETINE HYDROCHLORIDE 30 MG: 30 CAPSULE, DELAYED RELEASE ORAL at 20:11

## 2022-10-24 RX ADMIN — ACETAMINOPHEN 650 MG: 325 TABLET, FILM COATED ORAL at 09:02

## 2022-10-24 RX ADMIN — HYDRALAZINE HYDROCHLORIDE 5 MG: 20 INJECTION, SOLUTION INTRAMUSCULAR; INTRAVENOUS at 02:53

## 2022-10-24 RX ADMIN — LOSARTAN POTASSIUM 100 MG: 100 TABLET, FILM COATED ORAL at 09:02

## 2022-10-24 RX ADMIN — ACETAMINOPHEN 650 MG: 325 TABLET, FILM COATED ORAL at 20:10

## 2022-10-24 RX ADMIN — SODIUM CHLORIDE: 9 INJECTION, SOLUTION INTRAVENOUS at 23:50

## 2022-10-24 RX ADMIN — ACETAMINOPHEN 650 MG: 325 TABLET, FILM COATED ORAL at 16:31

## 2022-10-24 ASSESSMENT — ACTIVITIES OF DAILY LIVING (ADL)
ADLS_ACUITY_SCORE: 37
ADLS_ACUITY_SCORE: 35
ADLS_ACUITY_SCORE: 37
ADLS_ACUITY_SCORE: 35
ADLS_ACUITY_SCORE: 37
ADLS_ACUITY_SCORE: 37
ADLS_ACUITY_SCORE: 35

## 2022-10-24 NOTE — PROVIDER NOTIFICATION
Brief update:    Elevated blood pressure in the 200 range, did not respond to hydralazine    Sublingual nitroglycerin 0.4x1, give a.m. losartan early as it is thought patient did not receive her home dose prior to arrival in the setting of failure to thrive    Otherwise asymptomatic    Izaiah Paris MD  4:26 AM

## 2022-10-24 NOTE — PROGRESS NOTES
PA Notification    Notified Person: PA    Notified Person Name: Mahsa Moss PA    Notification Date/Time: October 24, 2022  11:35 AM    Notification Interaction: cholo     Purpose of Notification: ask for a telemetry order since its in the H&P    Orders Received:Cardiac telemetry monitoring order     Comments:

## 2022-10-24 NOTE — PROVIDER NOTIFICATION
PA Notification    Notified Person: PA    Notified Person Name: Mahsa Moss     Notification Date/Time: 10/24/22  10:18 AM    Notification Interaction: cholo     Purpose of Notification: zofran ineffective asking for other antiemetic    pt not eating, NPH?    Orders Received: Hold NPH, entered order for compazine, see MAR     Comments:

## 2022-10-24 NOTE — PROGRESS NOTES
"Pt's bed alarm sounded and on my arrival to the room pt's R knee was on the ground while the rest of her body was elongated on the bed. I assisted pt to a standing position and then into a chair. No redness/abrasion/other abnormality to knee. Denied pain other than previous back pain w/ movement. She said she was \"trying to climb down from the ceiling.\" I reminded pt she was in the hospital and she was able to tell me her name, , current date, and that she had fallen a few days prior. She told me she didn't remember why she had been \"climbing down from the ceiling, but I was sure climbing like monkey.\" Discussed w/ charge nurse and pt was moved to room 4 inorder to be closer to the nurse's station. Pt's family (daughter, son, and grandson) arrived as soon as pt was moved into new room. Pt's daughter informed me that pt baseline has very vivid dreams.   "

## 2022-10-24 NOTE — CONSULTS
Care Management Initial Consult    General Information  Assessment completed with: Patient, Cara  Type of CM/SW Visit: Initial Assessment    Primary Care Provider verified and updated as needed: Yes   Readmission within the last 30 days: no previous admission in last 30 days      Reason for Consult: discharge planning  Advance Care Planning: Advance Care Planning Reviewed: no concerns identified          Communication Assessment  Patient's communication style: spoken language (English or Bilingual)        Cognitive  Cognitive/Neuro/Behavioral: WDL Best Language: 1 - Mild to moderate  Speech: other (see comments) (breathy)    Living Environment:   People in home: alone     Current living Arrangements:        Able to return to prior arrangements: yes    Family/Social Support:  Care provided by: self  Provides care for: no one  Marital Status:   Children          Description of Support System: Involved, Supportive    Support Assessment: Adequate family and caregiver support, Adequate social supports    Current Resources:   Patient receiving home care services:  n/a  Community Resources:    Equipment currently used at home: walker, standard  Supplies currently used at home:      Employment/Financial:  Employment Status: retired        Financial Concerns:       Lifestyle & Psychosocial Needs:  Social Determinants of Health     Tobacco Use: Low Risk      Smoking Tobacco Use: Never     Smokeless Tobacco Use: Never     Passive Exposure: Not on file   Alcohol Use: Not on file   Financial Resource Strain: Not on file   Food Insecurity: Not on file   Transportation Needs: Not on file   Physical Activity: Not on file   Stress: Not on file   Social Connections: Not on file   Intimate Partner Violence: Not on file   Depression: Not at risk     PHQ-2 Score: 0   Housing Stability: Not on file       Functional Status:  Prior to admission patient needed assistance: n/a     Mental Health Status:        Chemical Dependency  "Status:  Values/Beliefs:  Spiritual, Cultural Beliefs, Moravian Practices, Values that affect care:         Additional Information:  Per care management / social work consult SW met with patient. Patient stated she lives alone and does all her ADLs and IADLs herself. Patient stated her son, Florin visits everyday and her daughter visits once a week. Patient stated she is still an active drive and only drives short distances. SW explained TCU and patient said she needed to think about it. Patient shared that she is tired and just, \"can't do it.\"    SW and CC will continue to follow for discharge planning.    LIZETTE Corrales, LGSW      "

## 2022-10-24 NOTE — PHARMACY-ADMISSION MEDICATION HISTORY
"Pharmacy Medication History  Admission medication history interview status for the 10/23/2022  admission is complete. See EPIC admission navigator for prior to admission medications     Location of Interview: Patient room  Medication history sources: Patient and Surescripts    Significant changes made to the medication list:      In the past week, patient estimated taking medication this percent of the time: greater than 90%    Additional medication history information:       Medication reconciliation completed by provider prior to medication history? Yes     Time spent in this activity: 30 min     Prior to Admission medications    Medication Sig Last Dose Taking? Auth Provider Long Term End Date   acetaminophen (TYLENOL) 500 MG tablet Take 1,000 mg by mouth 3 times daily  Yes Reported, Patient     amLODIPine (NORVASC) 10 MG tablet TAKE 1 TABLET BY MOUTH EVERY DAY  Yes Kole Gonsalez MD Yes    aspirin 81 MG tablet Take 1 tablet (81 mg) by mouth daily  Yes Ventura Alvarez MD     atorvastatin (LIPITOR) 20 MG tablet Take 1 tablet (20 mg) by mouth daily  Yes Kole Gonsalez MD Yes    BD INSULIN SYRINGE U/F 30G X 1/2\" 0.5 ML miscellaneous USE ONE SYRINGE TWICE DAILY OR AS DIRECTED.  Yes Kole Gonsalez MD     CONTOUR NEXT TEST test strip USE TO TEST BLOOD SUGAR DAILY OR AS DIRECTED.   Kole Gonsalez MD No    DULoxetine (CYMBALTA) 30 MG capsule One capsule once daily for one week, then increase to two capsules once daily  Patient taking differently: Take 60 mg by mouth daily One capsule once daily for one week, then increase to two capsules once daily  Yes Kole Gonsalez MD Yes    insulin NPH (NOVOLIN N VIAL) 100 UNIT/ML vial 10 units before breakfast, 18 units before dinner  Yes Kole Gonsalez MD Yes    losartan (COZAAR) 50 MG tablet TAKE 2 TABLETS BY MOUTH EVERY DAY  Patient taking differently: Take 50 mg by mouth 2 times daily  Yes Kole Gonsalez MD Yes    metoprolol tartrate (LOPRESSOR) " 100 MG tablet Take 1 tablet (100 mg) by mouth 2 times daily  Yes Kole Gonsalez MD Yes    vitamin D3 (CHOLECALCIFEROL) 50 mcg (2000 units) tablet Take 1 tablet by mouth every other day  Yes Reported, Patient     blood glucose monitoring (CONTOUR NEXT MONITOR W/DEVICE KIT) meter device kit Use to test blood sugar 3 times daily or as directed.   Kole Gonsalez MD     nitroGLYcerin (NITROSTAT) 0.4 MG sublingual tablet Place 1 tablet (0.4 mg) under the tongue every 5 minutes as needed for chest pain if you are still having symptoms after 3 doses (15 minutes) call 911.   Kole Gonsalez MD Yes        The information provided in this note is only as accurate as the sources available at the time of update(s)   Isabella SoodD

## 2022-10-24 NOTE — PROGRESS NOTES
Obs goals     diagnostic tests and consults completed and resulted   -vital signs normal or at patient baseline- Met     -tolerating oral intake to maintain hydration- Not met, tolerating oral fluids but on IV fluids to maintain hydration. 1 episode of emesis this morning.    -adequate pain control on oral analgesics- Met     -returns to baseline functional status- Not met, up x1 GB walker, PT recommending TCU     -safe disposition plan has been identified- Not met, SW following    Nurse to notify provider when observation goals have been met and patient is ready for discharge.

## 2022-10-24 NOTE — PROGRESS NOTES
[facility]    Medicine Progress Note - Hospitalist Service    Date of Admission:  10/23/2022    Assessment & Plan      Cara Camarillo is a 82 year old female with past medical history significant for type II DM, CKD, and osteoarthritis admitted on 10/23/2022 with leg weakness, low back pain and headache after a fall two days ago.     Fall, possible near syncope   Generalized weakness  Acute on chronic Low back pain, neck pain and headache   Pt presents to the ED with weakness and pain after a fall two days ago. Two days ago the patient had a fall. It sounds like she may have had a near syncope event. She reports feeling light-headed, diaphoretic and nauseated prior to the fall, but did not actually lose consciousness. She was unable to get up initially, but scooted to the stairs and was able to use the railing to stand. She did not seek immediate medical care. On day of admit, her children noted her to be weak and complaining of back pain so encouraged her to be evaluated.   * Work up in the ED fairly unremarkable. Labs are wnl. Troponin is wnl. EKG showed sinus rhythm. CT head negative for acute pathology. CT cervical spine is normal. XR of the lumbar spine shows chronic T12 compression fracture and some other chronic degenerative change but no acute issues. Neurologic exam is non-focal she is just generally weak.   - observation status  - Cardiac monitoring   - ECHO as below  - PT/OT - recommend TCU  - CC/SW - patient is unsure about going to TCU - willing to consider this  - Pain control PRN - has only been needing Tylenol  - UA does not appear to be infected - follow culture    Hypertension  Hyperlipidemia   Hx of coronary artery disease s/p prior stenting  - Continue PTA amlodipine, losartan and metoprolol   - Continue PTA ASA an atorvastatin     Type II DM   PTA insulin regimen includes NPH 10 units before breakfast and 18 units before dinner   - Continue NPH at 10 units BID for  "now  - MDSSI       Obesity: Estimated body mass index is 39.48 kg/m  as calculated from the following:    Height as of this encounter: 1.626 m (5' 4\").    Weight as of this encounter: 104.3 kg (230 lb)       Diet: Regular Diet Adult    DVT Prophylaxis: Pneumatic Compression Devices  Tony Catheter: Not present  Central Lines: None  Cardiac Monitoring: None  Code Status: Full Code      Disposition Plan      Expected Discharge Date: 10/24/2022        Discharge Comments: New admit.        The patient's care was discussed with the Attending Physician, Dr. Vargas, Bedside Nurse and Patient.    Mahsa Moss PA-C  Hospitalist Service  [facility]  Securely message with the Vocera Web Console (learn more here)  Text page via [employer] Paging/Directory         Clinically Significant Risk Factors Present on Admission         # Hyponatremia: Lowest Na = 135 mmol/L (Ref range: 136-145) in last 2 days, will monitor as appropriate          # Hypertension: home medication list includes antihypertensive(s)     # Obesity: Estimated body mass index is 39.48 kg/m  as calculated from the following:    Height as of this encounter: 1.626 m (5' 4\").    Weight as of this encounter: 104.3 kg (230 lb).           ______________________________________________________________________    Interval History    This morning reports feeling somewhat better but still having pain and biggest concern this morning is that the bed is too soft. She was nauseous this morning and did vomit - unsure if was due to medications or something else; given compazine and did not vomit again and tolerated food later in day. HTN overnight requiring hydralazine.     Data reviewed today: I reviewed all medications, new labs and imaging results over the last 24 hours. I personally reviewed no images or EKG's today.    Physical Exam   Vital Signs: Temp: 97.5  F (36.4  C) Temp src: Oral BP: (!) 139/99 Pulse: 65   Resp: 16 SpO2: 95 % O2 Device: " None (Room air)    Weight: 230 lbs 0 oz  Constitutional: WD/WN, obese, elderly  female. Appearing in No apparent distress. VERY hard of hearing.   Eyes: Conjunctiva and pupils examined and normal.  HEENT: dry mucous membranes, normal dentition.  Respiratory: Clear to auscultation bilaterally, no crackles or wheezing.  Cardiovascular: Regular rate and rhythm, normal S1 and S2, and II/VI systolic murmur noted.  GI: Soft, non-distended, non-tender, normal bowel sounds.  Skin: No rashes, no cyanosis, no edema.  Musculoskeletal: No joint swelling, erythema or tenderness.  Neurologic: Cranial nerves 2-12 intact, normal strength and sensation.  Psychiatric: Alert, oriented to person, place and time, no obvious anxiety or depression    Data   Recent Labs   Lab 10/24/22  1746 10/24/22  1222 10/24/22  1044 10/24/22  0038 10/23/22  1625   WBC  --   --  11.0  --  10.4   HGB  --   --  13.5  --  14.6   MCV  --   --  90  --  89   PLT  --   --  206  --  262   NA  --   --  135  --  135   POTASSIUM  --   --  4.2  --  4.6   CHLORIDE  --   --  107  --  107   CO2  --   --  23  --  22   BUN  --   --  20  --  23   CR  --   --  0.78  --  0.99   ANIONGAP  --   --  5  --  6   MANJEET  --   --  8.6  --  8.9   * 213* 204*   < > 206*   ALBUMIN  --   --  3.0*  --   --    PROTTOTAL  --   --  6.6*  --   --    BILITOTAL  --   --  0.8  --   --    ALKPHOS  --   --  96  --   --    ALT  --   --  21  --   --    AST  --   --  20  --   --     < > = values in this interval not displayed.     Recent Results (from the past 24 hour(s))   CT Cervical Spine w/o Contrast    Narrative    EXAM: CT HEAD W/O CONTRAST, CT CERVICAL SPINE W/O CONTRAST  LOCATION: [facility]  DATE/TIME: 10/23/2022 7:07 PM    INDICATION: Head and neck injury, fall.  COMPARISON: None.  TECHNIQUE:   1) Routine CT Head without IV contrast. Multiplanar reformats. Dose reduction techniques were used.  2) Routine CT Cervical Spine without IV contrast.  Multiplanar reformats. Dose reduction techniques were used.    FINDINGS:   HEAD CT:   INTRACRANIAL CONTENTS: No intracranial hemorrhage, extraaxial collection, or mass effect.  No CT evidence of acute infarct. Moderate presumed chronic small vessel ischemic changes. Moderate generalized volume loss. No hydrocephalus.     VISUALIZED ORBITS/SINUSES/MASTOIDS: Prior bilateral cataract surgery. Visualized portions of the orbits are otherwise unremarkable. No paranasal sinus mucosal disease. No middle ear or mastoid effusion.    BONES/SOFT TISSUES: No acute abnormality.    CERVICAL SPINE CT:   VERTEBRA: Normal vertebral body heights. No fracture or posttraumatic subluxation. Slight anterolisthesis at C4-C5 and C5-C6. Osteopenic bones.    CANAL/FORAMINA: Mild/moderate multilevel degenerative changes. No high-grade canal or neural foraminal stenosis.    PARASPINAL: No extraspinal abnormality. Visualized lung fields are clear.      Impression    IMPRESSION:  HEAD CT:  1.  No CT evidence for acute intracranial process.  2.  Brain atrophy and presumed chronic microvascular ischemic changes as above.    CERVICAL SPINE CT:  1.  No CT evidence for acute fracture or post traumatic subluxation.   CT Head w/o Contrast    Narrative    EXAM: CT HEAD W/O CONTRAST, CT CERVICAL SPINE W/O CONTRAST  LOCATION: [facility]  DATE/TIME: 10/23/2022 7:07 PM    INDICATION: Head and neck injury, fall.  COMPARISON: None.  TECHNIQUE:   1) Routine CT Head without IV contrast. Multiplanar reformats. Dose reduction techniques were used.  2) Routine CT Cervical Spine without IV contrast. Multiplanar reformats. Dose reduction techniques were used.    FINDINGS:   HEAD CT:   INTRACRANIAL CONTENTS: No intracranial hemorrhage, extraaxial collection, or mass effect.  No CT evidence of acute infarct. Moderate presumed chronic small vessel ischemic changes. Moderate generalized volume loss. No hydrocephalus.     VISUALIZED  ORBITS/SINUSES/MASTOIDS: Prior bilateral cataract surgery. Visualized portions of the orbits are otherwise unremarkable. No paranasal sinus mucosal disease. No middle ear or mastoid effusion.    BONES/SOFT TISSUES: No acute abnormality.    CERVICAL SPINE CT:   VERTEBRA: Normal vertebral body heights. No fracture or posttraumatic subluxation. Slight anterolisthesis at C4-C5 and C5-C6. Osteopenic bones.    CANAL/FORAMINA: Mild/moderate multilevel degenerative changes. No high-grade canal or neural foraminal stenosis.    PARASPINAL: No extraspinal abnormality. Visualized lung fields are clear.      Impression    IMPRESSION:  HEAD CT:  1.  No CT evidence for acute intracranial process.  2.  Brain atrophy and presumed chronic microvascular ischemic changes as above.    CERVICAL SPINE CT:  1.  No CT evidence for acute fracture or post traumatic subluxation.   Lumbar spine XR, 2-3 views    Narrative    EXAM: XR LUMBAR SPINE 2/3 VIEWS  LOCATION: [facility]  DATE/TIME: 10/23/2022 7:18 PM    INDICATION: Fall, back injury, back pain.   COMPARISON: X-ray 06/20/2016  TECHNIQUE: Radiographs of lumbar spine in routine projections.    FINDINGS: Nomenclature based on 5 lumbar type vertebral bodies. Mild levocurvature centered at L3. Grade 1 anterolisthesis at L4-5. Minimal degenerative 2 mm retrolisthesis at L1-L2. Chronic 50% loss of height of T12, unchanged from prior. The vertebral   bodies are normal in height. Advanced L4-L5 and L5-S1 interbody degeneration. Advanced lower lumbar facet arthropathy. Unremarkable visualized bony pelvis. Cholecystectomy clips. Surgical clips are present bilaterally.   XR Pelvis 1/2 Views    Narrative    EXAM: XR PELVIS 1/2 VIEWS  LOCATION: [facility]  DATE/TIME: 10/23/2022 7:18 PM    INDICATION: Pain following fall.  COMPARISON: None.      Impression    IMPRESSION: Osteopenia. No fractures are evident. Mild degenerative changes in the hips.  Degenerative changes in the SI joints and lower lumbar spine. Surgical clips in the pelvis.   XR Chest 1 View    Narrative    EXAM: XR CHEST 1 VIEW  LOCATION: [facility]  DATE/TIME: 10/23/2022 7:20 PM    INDICATION: fall. Weakness and pain.  COMPARISON: Chest 2013      Impression    IMPRESSION: No hydropneumothorax or fracture. Lungs are clear. Heart appears enlarged, likely magnified due to supine view. No signs of pneumonia or failure.   Echocardiogram Complete   Result Value    LVEF  65-70%    Narrative    [id number]  [id number]  [id number]  2010^REBECCA^CONNIE^CRISPIN                                                                       Version 2     [facility]  Echocardiography Laboratory  [address] [place] [address]     Name: LUZMA LAGOS  MRN: [id number]  : 1940  Study Date: 10/24/2022 10:42 AM  Age: 82 yrs  Gender: Female  Patient Location: [facility]  Reason For Study: Syncope  Ordering Physician: CONNIE FERNANDEZ  Referring Physician: Kole Gonsalez  Performed By: Izaiah Rubalcava RDCS     BSA: 2.1 m2  Height: 64 in  Weight: 230 lb  HR: 63  BP: 180/86 mmHg  ______________________________________________________________________________  Procedure  Complete Portable Echo Adult. Optison (NDC #4016-1425) given intravenously.  ______________________________________________________________________________  Interpretation Summary     The visual ejection fraction is 65-70%.  Left ventricular systolic function is normal.  Moderate valvular aortic stenosis. Theree has beeen progression of the aortic  stenosis from previously.  Right ventricular systolic pressure is elevated, consistent with moderate  pulmonary hypertension.  The study was technically difficult.  ______________________________________________________________________________  Left Ventricle  The left ventricle is normal in size. There is mild concentric left  ventricular hypertrophy.  Diastolic Doppler findings (E/E' ratio and/or other  parameters) suggest left ventricular filling pressures are increased. The  visual ejection fraction is 65-70%. Left ventricular systolic function is  normal.     Right Ventricle  The right ventricle is normal size. Right ventricular function cannot be  assessed due to poor image quality.     Atria  The left atrium is mildly dilated. Right atrial size is normal. There is no  color Doppler evidence of an atrial shunt.     Mitral Valve  There is mild to moderate mitral annular calcification. There is mild (1+)  mitral regurgitation.     Tricuspid Valve  There is mild (1+) tricuspid regurgitation. The right ventricular systolic  pressure is approximated at 52mmHg plus the right atrial pressure. IVC  diameter <2.1 cm collapsing >50% with sniff suggests a normal RA pressure of 3  mmHg. Right ventricular systolic pressure is elevated, consistent with  moderate pulmonary hypertension.     Aortic Valve  The aortic valve is trileaflet. Significantly thickened and calcified aortic  valve leaftets. The number of leaflets can not be ascertained. No aortic  regurgitation is present. The calculated aortic valve are is 1.2 cm^2. The  peak AoV pressure gradient is 28.0 mmHg. The mean AoV pressure gradient is  15.7 mmHg. Moderate valvular aortic stenosis.     Pulmonic Valve  There is no pulmonic valvular regurgitation. Normal pulmonic valve velocity.     Vessels  The aortic root is normal size. Normal size ascending aorta. The inferior vena  cava was normal in size with preserved respiratory variability.     Pericardium  There is no pericardial effusion.     Rhythm  Sinus rhythm was noted.  ______________________________________________________________________________  MMode/2D Measurements & Calculations  IVSd: 1.3 cm     LVIDd: 3.5 cm  LVIDs: 1.7 cm  LVPWd: 1.2 cm  FS: 49.5 %  LV mass(C)d: 141.3 grams  LV mass(C)dI: 68.1 grams/m2  Ao root diam: 3.2 cm  LA dimension: 4.1 cm  asc  Aorta Diam: 3.1 cm  LA/Ao: 1.3  LVOT diam: 2.0 cm  LVOT area: 3.2 cm2  LA Volume (BP): 78.9 ml  LA Volume Index (BP): 37.9 ml/m2  RWT: 0.71     Doppler Measurements & Calculations  MV E max lyndon: 85.4 cm/sec  MV A max lyndon: 100.9 cm/sec  MV E/A: 0.85  MV max P.3 mmHg  MV mean P.4 mmHg  MV V2 VTI: 35.4 cm  MVA(VTI): 2.4 cm2  MV dec time: 0.24 sec  Ao V2 max: 264.2 cm/sec  Ao max P.0 mmHg  Ao V2 mean: 187.9 cm/sec  Ao mean PG: 15.7 mmHg  Ao V2 VTI: 70.8 cm  MAY(I,D): 1.2 cm2  MAY(V,D): 1.3 cm2  LV V1 max P.7 mmHg  LV V1 max: 108.7 cm/sec  LV V1 VTI: 26.4 cm  SV(LVOT): 85.0 ml  SI(LVOT): 40.9 ml/m2  PA acc time: 0.08 sec  TR max lyndon: 335.3 cm/sec  TR max P.2 mmHg  AV Lyndon Ratio (DI): 0.41  MAY Index (cm2/m2): 0.58  E/E' av.7  Lateral E/e': 17.4  Medial E/e': 16.0     ______________________________________________________________________________  Report approved by: Bhavin Alba 10/24/2022 12:37 PM

## 2022-10-24 NOTE — PLAN OF CARE
Goal Outcome Evaluation:  October 24, 2022 [id number]    Pt A&O x4 with confusion but reorients quickly. Observational patient. Admitted to the ER 10/23 after a fall 2 days previous w/ lumbar region pain. Assist of one with GB/W, states pain when standing and sitting. Pain managed with PRN tylenol. VSS on RA. Elevated TSH levels, albumin 3.0, , 213, 210. Continent of B&B, up to toilet to void, no BM this shift. Regulat diet with adequate appetite. PT recommending TCU placement or 24 hr supervision at home, SW following.

## 2022-10-24 NOTE — ED NOTES
"[facility]  ED Nurse Handoff Report    ED Chief complaint: Fall      ED Diagnosis:   Final diagnoses:   Near syncope   Fall, initial encounter   Closed head injury, initial encounter   Contusion of pelvis, initial encounter   Back contusion, unspecified laterality, initial encounter       Code Status: Full Code    Allergies:   Allergies   Allergen Reactions    Actos [Pioglitazone]      Lower extremity edema      Enalapril      Renal failure    Hydrochlorothiazide      Dry mouth    Lisinopril      Hyperkalemia      Metformin Diarrhea     Diarrhea        Patient Story: Patient presents to ED via EMS with weakness and pain in lower back.  She reports falling 2 days ago after her walker slipped out from under her along with feeling faint.  She denies LOC with the fall and denies hitting head.  Focused Assessment:  On arrival, she is unable to ambulate without assistance and is reporting pain in lower back and hips with movement.  She is alert and oriented.  She is significantly hard of hearing.  She has hearing aids, but family states they are not very effective.    Treatments and/or interventions provided: IVF  Patient's response to treatments and/or interventions: No notable change    To be done/followed up on inpatient unit:  PT/OT consult    Does this patient have any cognitive concerns?:  none    Activity level - Baseline/Home:  Independent  Activity Level - Current:   Stand with assist x2    Patient's Preferred language: English   Needed?: No    Isolation: None  Infection: Not Applicable  Patient tested for COVID 19 prior to admission: YES  Bariatric?: Yes    Vital Signs:   Vitals:    10/23/22 1615 10/23/22 1815 10/23/22 1830 10/23/22 2030   BP: 132/58  (!) 160/96 (!) 180/86   Pulse: 90  73 63   Resp: 18   16   Temp: 98.3  F (36.8  C)      TempSrc: Temporal      SpO2: 94% 97% 96% 95%   Weight:  104.3 kg (230 lb)     Height:  1.626 m (5' 4\")         Cardiac Rhythm:     Was the PSS-3 " completed:   Yes  What interventions are required if any?               Family Comments: Family requesting discharge to TCU.  OBS brochure/video discussed/provided to patient/family: Yes              Name of person given brochure if not patient: n/a              Relationship to patient: n/a    For the majority of the shift this patient's behavior was Green.   Behavioral interventions performed were none.    ED NURSE PHONE NUMBER: [phone]

## 2022-10-25 ENCOUNTER — APPOINTMENT (OUTPATIENT)
Dept: GENERAL RADIOLOGY | Facility: CLINIC | Age: 82
End: 2022-10-25
Attending: PHYSICIAN ASSISTANT
Payer: COMMERCIAL

## 2022-10-25 ENCOUNTER — APPOINTMENT (OUTPATIENT)
Dept: PHYSICAL THERAPY | Facility: CLINIC | Age: 82
End: 2022-10-25
Payer: COMMERCIAL

## 2022-10-25 LAB
GLUCOSE BLDC GLUCOMTR-MCNC: 111 MG/DL (ref 70–99)
GLUCOSE BLDC GLUCOMTR-MCNC: 123 MG/DL (ref 70–99)
GLUCOSE BLDC GLUCOMTR-MCNC: 131 MG/DL (ref 70–99)
GLUCOSE BLDC GLUCOMTR-MCNC: 156 MG/DL (ref 70–99)
GLUCOSE BLDC GLUCOMTR-MCNC: 173 MG/DL (ref 70–99)

## 2022-10-25 PROCEDURE — 250N000013 HC RX MED GY IP 250 OP 250 PS 637: Performed by: PHYSICIAN ASSISTANT

## 2022-10-25 PROCEDURE — 99226 PR SUBSEQUENT OBSERVATION CARE,LEVEL III: CPT | Performed by: PHYSICIAN ASSISTANT

## 2022-10-25 PROCEDURE — 250N000013 HC RX MED GY IP 250 OP 250 PS 637: Performed by: INTERNAL MEDICINE

## 2022-10-25 PROCEDURE — G0378 HOSPITAL OBSERVATION PER HR: HCPCS

## 2022-10-25 PROCEDURE — 97110 THERAPEUTIC EXERCISES: CPT | Mod: GP

## 2022-10-25 PROCEDURE — 96372 THER/PROPH/DIAG INJ SC/IM: CPT

## 2022-10-25 PROCEDURE — 71046 X-RAY EXAM CHEST 2 VIEWS: CPT

## 2022-10-25 PROCEDURE — 250N000013 HC RX MED GY IP 250 OP 250 PS 637: Performed by: STUDENT IN AN ORGANIZED HEALTH CARE EDUCATION/TRAINING PROGRAM

## 2022-10-25 PROCEDURE — 82962 GLUCOSE BLOOD TEST: CPT

## 2022-10-25 PROCEDURE — 97116 GAIT TRAINING THERAPY: CPT | Mod: GP

## 2022-10-25 RX ORDER — METOPROLOL TARTRATE 50 MG
100 TABLET ORAL 2 TIMES DAILY
Status: DISCONTINUED | OUTPATIENT
Start: 2022-10-25 | End: 2022-10-26 | Stop reason: HOSPADM

## 2022-10-25 RX ORDER — HYDRALAZINE HYDROCHLORIDE 20 MG/ML
5 INJECTION INTRAMUSCULAR; INTRAVENOUS ONCE
Status: DISCONTINUED | OUTPATIENT
Start: 2022-10-25 | End: 2022-10-25

## 2022-10-25 RX ADMIN — DULOXETINE HYDROCHLORIDE 30 MG: 30 CAPSULE, DELAYED RELEASE ORAL at 19:47

## 2022-10-25 RX ADMIN — AMLODIPINE BESYLATE 10 MG: 10 TABLET ORAL at 08:58

## 2022-10-25 RX ADMIN — ACETAMINOPHEN 650 MG: 325 TABLET, FILM COATED ORAL at 09:05

## 2022-10-25 RX ADMIN — LOSARTAN POTASSIUM 100 MG: 100 TABLET, FILM COATED ORAL at 08:58

## 2022-10-25 RX ADMIN — METOPROLOL TARTRATE 100 MG: 50 TABLET, FILM COATED ORAL at 19:47

## 2022-10-25 RX ADMIN — ACETAMINOPHEN 650 MG: 325 TABLET, FILM COATED ORAL at 16:42

## 2022-10-25 RX ADMIN — INSULIN HUMAN 10 UNITS: 100 INJECTION, SUSPENSION SUBCUTANEOUS at 17:19

## 2022-10-25 RX ADMIN — MICONAZOLE NITRATE: 2 POWDER TOPICAL at 12:46

## 2022-10-25 RX ADMIN — METOPROLOL TARTRATE 100 MG: 50 TABLET, FILM COATED ORAL at 08:58

## 2022-10-25 RX ADMIN — INSULIN HUMAN 10 UNITS: 100 INJECTION, SUSPENSION SUBCUTANEOUS at 08:58

## 2022-10-25 RX ADMIN — LIDOCAINE 1 PATCH: 560 PATCH PERCUTANEOUS; TOPICAL; TRANSDERMAL at 19:47

## 2022-10-25 RX ADMIN — MICONAZOLE NITRATE: 2 POWDER TOPICAL at 19:50

## 2022-10-25 ASSESSMENT — ACTIVITIES OF DAILY LIVING (ADL)
ADLS_ACUITY_SCORE: 44
ADLS_ACUITY_SCORE: 46
ADLS_ACUITY_SCORE: 44
ADLS_ACUITY_SCORE: 43
ADLS_ACUITY_SCORE: 46
ADLS_ACUITY_SCORE: 44
ADLS_ACUITY_SCORE: 46
ADLS_ACUITY_SCORE: 48

## 2022-10-25 NOTE — PROVIDER NOTIFICATION
MD Notification    Notified Person: MD    Notified Person Name: Dr. Paris    Notification Date/Time: 10/25/22, 0215    Notification Interaction: [employer]    Purpose of Notification: new exertional dyspnea and wheezing, sats ok, no peripheral edema, okay to stop IVF?    Orders Received: IVF provider hold    Comments:

## 2022-10-25 NOTE — PROGRESS NOTES
Obs goals     diagnostic tests and consults completed and resulted   -vital signs normal or at patient baseline- Met     -tolerating oral intake to maintain hydration- Met     -adequate pain control on oral analgesics- Met     -returns to baseline functional status- Not met, up x1 GB walker     -safe disposition plan has been identified- Met, SW following, discharge to [facility] tomorrow 10/26 @ noon via daughter at door 6     Nurse to notify provider when observation goals have been met and patient is ready for discharge.

## 2022-10-25 NOTE — PLAN OF CARE
Orientation/Cognitive: A&O x 4, can be confused upon waking  Observation Goals (Met/ Not Met): Not met    Mobility Level/Assist Equipment: A of 1, GB/W   Fall Risk (Y/N): Y  Behavior Concerns: Tele/VS/O2: VSS on RA tele SR discontinued this evening   ABNL Lab/BG: , 156, 173  Diet: Regular with good appetite   Bowel/Bladder: continent of B&B during this shift   Skin Concerns: abdominal fold and under breast has a slight rash   Drains/Devices: None   Tests/Procedure: chest xray complete   Anticipated DC date & active delays: To [facility] on 10/26 @ noon via daughter from door 6  Lower back pain controled with tylenol and aqua t pump, family supportive at bedside, [facility] has 2 hearing aids and dentures   Patient Stated Goal for Today: get a little bit better

## 2022-10-25 NOTE — PROGRESS NOTES
[facility]    Medicine Progress Note - Hospitalist Service    Date of Admission:  10/23/2022    Assessment & Plan   Cara Camarillo is a 82 year old female with past medical history significant for type II DM, CKD, and osteoarthritis admitted on 10/23/2022 with leg weakness, low back pain and headache after a fall two days ago.     Decreased O2 sats  Shortness of breath  - Oxygen sats dropped to 90 and did not increase - change from day prior  - IS regularly  - CXR - no concerns    Fall, possible near syncope   Generalized weakness  Acute on chronic Low back pain, neck pain and headache   Pt presents to the ED with weakness and pain after a fall two days ago. Two days ago the patient had a fall. It sounds like she may have had a near syncope event. She reports feeling light-headed, diaphoretic and nauseated prior to the fall, but did not actually lose consciousness. She was unable to get up initially, but scooted to the stairs and was able to use the railing to stand. She did not seek immediate medical care. On day of admit, her children noted her to be weak and complaining of back pain so encouraged her to be evaluated.   * Work up in the ED fairly unremarkable. Labs are wnl. Troponin is wnl. EKG showed sinus rhythm. CT head negative for acute pathology. CT cervical spine is normal. XR of the lumbar spine shows chronic T12 compression fracture and some other chronic degenerative change but no acute issues. Neurologic exam is non-focal she is just generally weak.   - observation status  - Cardiac monitoring   - ECHO as below  - PT/OT - plan to discharge to [facility]  - CC/SW - discharge to [facility] planned for 10/26  - Tylenol was PRN and now scheduled - seems to be better controlled; pain is mainly with movement/therapies - discussed would take some time for this to improve  - also heat PRN  - UA does not appear to be infected - follow culture    Hypertension  Hyperlipidemia   Hx of coronary  "artery disease s/p prior stenting  - Continue PTA amlodipine, losartan and metoprolol   - PTA ASA and atorvastatin - continue at discharge   - had been consistently on htn side so planned to increase metoprolol to 125mg PO BID (from home 100mg PO BID) starting with PM does 10/25; however, Bps remainder of day well-controlled and a few on lower side - order changed back to PTA dosing - never received increased dose.     Type II DM   PTA insulin regimen includes NPH 10 units before breakfast and 18 units before dinner   - Continue NPH at 10 units BID for now  - MDSSI     Obesity: Estimated body mass index is 39.48 kg/m  as calculated from the following:    Height as of this encounter: 1.626 m (5' 4\").    Weight as of this encounter: 104.3 kg (230 lb)     Diet: Regular Diet Adult    DVT Prophylaxis: Pneumatic Compression Devices  Tony Catheter: Not present  Central Lines: None  Cardiac Monitoring: ACTIVE order. Indication: near-syncope  Code Status: Full Code      Disposition Plan      Expected Discharge Date: 10/26/2022, 12:00 PM      Discharge Comments: SOB/RAMIREZ  chest xray today.        The patient's care was discussed with the Attending Physician, Dr. Vargas, Bedside Nurse and Patient.    Mahsa Moss PA-C  Hospitalist Service  [facility]  Securely message with the Vocera Web Console (learn more here)  Text page via [employer] Paging/Directory     Clinically Significant Risk Factors Present on Admission         # Hyponatremia: Lowest Na = 135 mmol/L (Ref range: 136-145) in last 2 days, will monitor as appropriate      # Hypoalbuminemia: Lowest albumin = 3 g/dL (Ref range: 3.5-5.2) at 10/24/2022 10:44 AM, will monitor as appropriate     # Hypertension: home medication list includes antihypertensive(s)     # Obesity: Estimated body mass index is 39.48 kg/m  as calculated from the following:    Height as of this encounter: 1.626 m (5' 4\").    Weight as of this encounter: 104.3 kg (230 lb).  "          ______________________________________________________________________    Interval History    This morning reports feeling somewhat better but still having pain and biggest concern this morning is that the bed is too soft. She was nauseous this morning and did vomit - unsure if was due to medications or something else; given compazine and did not vomit again and tolerated food later in day. HTN overnight requiring hydralazine.     Data reviewed today: I reviewed all medications, new labs and imaging results over the last 24 hours. I personally reviewed no images or EKG's today.    Physical Exam   Vital Signs: Temp: 97.8  F (36.6  C) Temp src: Oral BP: 128/61 Pulse: 69   Resp: 21 SpO2: 90 % O2 Device: None (Room air)    Weight: 230 lbs 0 oz  Constitutional: WD/WN, obese, elderly  female. Appearing in No apparent distress. VERY hard of hearing.   Eyes: Conjunctiva and pupils examined and normal.  HEENT: dry mucous membranes, normal dentition.  Respiratory: Slightly decreased bases otherwise CTA.  Cardiovascular: Regular rate and rhythm, normal S1 and S2, and II/VI systolic murmur noted.  GI: Soft, non-distended, non-tender, normal bowel sounds.  Skin: No rashes, no cyanosis, no edema.  Musculoskeletal: No joint swelling, erythema or tenderness.  Neurologic: Cranial nerves 2-12 intact, normal strength and sensation.  Psychiatric: Alert, oriented to person, place and time, no obvious anxiety or depression    Data   Recent Labs   Lab 10/25/22  1220 10/25/22  0836 10/25/22  0152 10/24/22  1222 10/24/22  1044 10/24/22  0038 10/23/22  1625   WBC  --   --   --   --  11.0  --  10.4   HGB  --   --   --   --  13.5  --  14.6   MCV  --   --   --   --  90  --  89   PLT  --   --   --   --  206  --  262   NA  --   --   --   --  135  --  135   POTASSIUM  --   --   --   --  4.2  --  4.6   CHLORIDE  --   --   --   --  107  --  107   CO2  --   --   --   --  23  --  22   BUN  --   --   --   --  20  --  23   CR  --   --    --   --  0.78  --  0.99   ANIONGAP  --   --   --   --  5  --  6   MANJEET  --   --   --   --  8.6  --  8.9   * 123* 111*   < > 204*   < > 206*   ALBUMIN  --   --   --   --  3.0*  --   --    PROTTOTAL  --   --   --   --  6.6*  --   --    BILITOTAL  --   --   --   --  0.8  --   --    ALKPHOS  --   --   --   --  96  --   --    ALT  --   --   --   --  21  --   --    AST  --   --   --   --  20  --   --     < > = values in this interval not displayed.     No results found for this or any previous visit (from the past 24 hour(s)).

## 2022-10-25 NOTE — UTILIZATION REVIEW
Concurrent stay review; Secondary Review Determination    Under the authority of the Utilization Management Committee, the utilization review process indicated a secondary review on the above patient. The review outcome is based on review of the medical records, discussions with staff, and applying clinical experience noted on the date of the review.    (x) Observation Status Appropriate - Concurrent stay review         RATIONALE FOR DETERMINATION: 82-year-old female with type 2 diabetes, chronic kidney disease, osteoarthritis who had a fall 2 days prior to admission and since then has been complaining of low back pain mild headache and some leg weakness.  Evaluation in the emergency room did not find any new fractures and exam is nonfocal just generally weak with no signs of metabolic or infectious abnormalities.  Patient has had some fluctuating O2 saturations but remained at 90 or greater with normal chest x-ray.  Patient ultimately with significant obesity and with exacerbation of back pain will require TCU at discharge.  Observation care appropriate awaiting placement.    Patient is clinically improving and there is no clear indication to change patient's status to inpatient. The severity of illness, intensity of service provided, expected LOS and risk for adverse outcome make the care appropriate for observation.    This document was produced using voice recognition software    The information on this document is developed by the utilization review team in order for the business office to ensure compliance. This only denotes the appropriateness of proper admission status and does not reflect the quality of care rendered.    The definitions of Inpatient Status and Observation Status used in making the determination above are those provided in the CMS Coverage Manual, Chapter 1 and Chapter 6, section 70.4.    Sincerely,    Chilo Burnett MD  Utilization Review  Physician Advisor  [facility] [place].

## 2022-10-25 NOTE — PLAN OF CARE
Orientation/Cognitive: Alert, answers orientation questions appropriately but has intermittent confusion/forgetfulness primarily occurring immediately after waking  Observation Goals (Met/ Not Met): not met  Mobility Level/Assist Equipment: A1, GB/W  Fall Risk (Y/N): Y, patient impulsive, gets OOB often, doesn't use call light  Behavior Concerns: None  Pain Management: Lidocaine patch to low back; patient reports pain worsens with activity  Tele/VS/O2: stable, RA, tele NSR/BBB  ABNL Lab/BG:   Diet: Regular  Bowel/Bladder: continent, some dribbling  Skin Concerns: redness to pannus  Drains/Devices: PIV SL  Tests/Procedures for next shift: none  Anticipated DC date & active delays: pending, PT recommending TCU or home with 24hr care; SW involved for placement    Shift summary: Patient began to exhibit exertional dyspnea and wheezing at 0200 when ambulating to bathroom, sats maintained on RA, no peripheral edema. MD paged and IVF stopped.

## 2022-10-25 NOTE — PROGRESS NOTES
Obs goals     diagnostic tests and consults completed and resulted   -vital signs normal or at patient baseline- Met     -tolerating oral intake to maintain hydration- Met     -adequate pain control on oral analgesics- Met     -returns to baseline functional status- Not met, up x1 GB walker     -safe disposition plan has been identified- Met, SW following, discharge to [facility] tomorrow 10/26    Nurse to notify provider when observation goals have been met and patient is ready for discharge.

## 2022-10-25 NOTE — PROGRESS NOTES
Obs goals     diagnostic tests and consults completed and resulted   -vital signs normal or at patient baseline- Met     -tolerating oral intake to maintain hydration- Met     -adequate pain control on oral analgesics- Met     -returns to baseline functional status- Not met, up x1 GB walker     -safe disposition plan has been identified- Not met, SW following    Nurse to notify provider when observation goals have been met and patient is ready for discharge.

## 2022-10-25 NOTE — PROGRESS NOTES
Care Management Follow Up    Length of Stay (days): 0    Expected Discharge Date: 10/26/2022     Concerns to be Addressed:       Patient plan of care discussed at interdisciplinary rounds: Yes    Anticipated Discharge Disposition: Home     Anticipated Discharge Services: Other (see comment) (Home care)  Anticipated Discharge DME:      Patient/family educated on Medicare website which has current facility and service quality ratings:    Education Provided on the Discharge Plan:    Patient/Family in Agreement with the Plan: yes    Referrals Placed by CM/SW:    Private pay costs discussed: Not applicable    Additional Information:  LORRIE started [facility] medicare advantage insurance auth Request ID [id number]. Case is currenly being reviewed by [facility] Medical Director. LORRIE called patients daughter who is coming to the hospital later today and is requesting a visit from LORRIE.    LORRIE to visit with patient and daughter.     Addendum:  SW received [employer] authorization [id number] (10/25/2022 through 11/1/2022). LORRIE met with patient and daughter who agreed to TCU placement. Patient is open to a private or semi-private bed and would like a referral sent to [facility]. Daughter is also requesting a list of DME providers and a list of private pay home care services. SW to provide information.     Addendum:  Patient accepted to [facility] TCU for 10/26 at noon, family will transport.   ----------------------------------------------------------------------  D: Per DHS regulation, OLRRIE completed and submitted PAS-RR to [place] [employer] on [employer]ging Direct Connect via the [email or web address] Line.  PAS-RR confirmation # is : [id number]    I: LORRIE spoke with pt and they are aware a PAS-RR has been submitted.  LORRIE reviewed with pt that they may be contacted for a follow up appointment within 10 days of hospital discharge if their SNF stay is < 30 days.  Contact information for [facility] Line was also provided.    A: pt verbalized understanding.    P: Further questions  may be directed to Senior LinkAge Line at #[phone], option #4 for [facility]- staff.    DANISH Corrales MSW, LGSW

## 2022-10-25 NOTE — PLAN OF CARE
Goal Outcome Evaluation:       Orientation/Cognitive:alert and oriented, at times confused  Observation Goals (Met/ Not Met): n  Mobility Level/Assist Equipment:assistance of one, gb/w  Fall Risk (Y/N): y  Behavior Concerns:restlesstylenol, lidocaine Tele/VS/O2: vss  ABNL Lab/B  Diet: re  Bowel/Bladder: incontinent  Skin Concerns: abd fold/ breast/ rash  Drains/Devices:n  Tests/Procedures for next shift:n  Anticipated DC date & active delays: tbd  Patient Stated Goal for Today: n

## 2022-10-26 VITALS
DIASTOLIC BLOOD PRESSURE: 74 MMHG | HEIGHT: 64 IN | WEIGHT: 230 LBS | BODY MASS INDEX: 39.27 KG/M2 | OXYGEN SATURATION: 91 % | HEART RATE: 64 BPM | TEMPERATURE: 97.7 F | SYSTOLIC BLOOD PRESSURE: 158 MMHG | RESPIRATION RATE: 20 BRPM

## 2022-10-26 LAB
BACTERIA UR CULT: ABNORMAL
BACTERIA UR CULT: ABNORMAL
GLUCOSE BLDC GLUCOMTR-MCNC: 107 MG/DL (ref 70–99)
GLUCOSE BLDC GLUCOMTR-MCNC: 15 MG/DL (ref 70–99)
GLUCOSE BLDC GLUCOMTR-MCNC: 88 MG/DL (ref 70–99)

## 2022-10-26 PROCEDURE — G0378 HOSPITAL OBSERVATION PER HR: HCPCS

## 2022-10-26 PROCEDURE — 250N000013 HC RX MED GY IP 250 OP 250 PS 637: Performed by: STUDENT IN AN ORGANIZED HEALTH CARE EDUCATION/TRAINING PROGRAM

## 2022-10-26 PROCEDURE — 99217 PR OBSERVATION CARE DISCHARGE: CPT | Performed by: PHYSICIAN ASSISTANT

## 2022-10-26 PROCEDURE — 250N000013 HC RX MED GY IP 250 OP 250 PS 637: Performed by: PHYSICIAN ASSISTANT

## 2022-10-26 PROCEDURE — 96372 THER/PROPH/DIAG INJ SC/IM: CPT

## 2022-10-26 PROCEDURE — 82962 GLUCOSE BLOOD TEST: CPT

## 2022-10-26 RX ORDER — LIDOCAINE 4 G/G
1 PATCH TOPICAL EVERY 24 HOURS
DISCHARGE
Start: 2022-10-26 | End: 2023-05-03

## 2022-10-26 RX ADMIN — MICONAZOLE NITRATE: 2 POWDER TOPICAL at 09:55

## 2022-10-26 RX ADMIN — ACETAMINOPHEN 650 MG: 325 TABLET, FILM COATED ORAL at 02:00

## 2022-10-26 RX ADMIN — AMLODIPINE BESYLATE 10 MG: 10 TABLET ORAL at 09:02

## 2022-10-26 RX ADMIN — METOPROLOL TARTRATE 100 MG: 50 TABLET, FILM COATED ORAL at 09:02

## 2022-10-26 RX ADMIN — INSULIN HUMAN 10 UNITS: 100 INJECTION, SUSPENSION SUBCUTANEOUS at 09:04

## 2022-10-26 RX ADMIN — ACETAMINOPHEN 650 MG: 325 TABLET, FILM COATED ORAL at 09:02

## 2022-10-26 RX ADMIN — LOSARTAN POTASSIUM 100 MG: 100 TABLET, FILM COATED ORAL at 09:02

## 2022-10-26 ASSESSMENT — ACTIVITIES OF DAILY LIVING (ADL)
ADLS_ACUITY_SCORE: 46
ADLS_ACUITY_SCORE: 42
ADLS_ACUITY_SCORE: 46
ADLS_ACUITY_SCORE: 46

## 2022-10-26 NOTE — PROGRESS NOTES
Observation goals  PRIOR TO DISCHARGE        Comments:   -diagnostic tests and consults completed and resulted: met   -vital signs normal or at patient baseline: met   -tolerating oral intake to maintain hydration: met   -adequate pain control on oral analgesics: met   -returns to baseline functional status: not met   -safe disposition plan has been identified: met   Nurse to notify provider when observation goals have been met and patient is ready for discharge.

## 2022-10-26 NOTE — PROGRESS NOTES
Physical Therapy Discharge Summary    Reason for therapy discharge:    Discharged to transitional care facility.    Progress towards therapy goal(s). See goals on Care Plan in [facility] electronic health record for goal details.  Goals partially met.  Barriers to achieving goals:   discharge from facility.    Therapy recommendation(s):    Continued therapy is recommended.  Rationale/Recommendations:  TCU. Bed mob MaxA. 40' + 150' CGA RW LBP..

## 2022-10-26 NOTE — PLAN OF CARE
Orientation/Cognitive: A&Ox4, forgetful at times  Observation Goals (Met/ Not Met): met  Mobility Level/Assist Equipment: Ax1 GB and walker   Fall Risk (Y/N): yes  Behavior Concerns: none  Pain Management: PRN Tylenol and t-pump utilized   Tele/VS/O2: positive orthostatic BP-MD aware. All other VSS on RA  ABNL Lab/BG:   Diet: regular   Bowel/Bladder: continent   Skin Concerns: abdominal and left breast fold rash, scattered bruising   Drains/Devices: IV-SL  Tests/Procedures for next shift: none  Anticipated DC date & active delays: Today at noon to [facility]   Patient Stated Goal for Today: rest/pain control  AVS sent to TCU. All belongings returned to patient. IV removed. All questions answered. Discharging to TCU via ride with daughter Kalina.

## 2022-10-26 NOTE — PROGRESS NOTES
Observation goals  PRIOR TO DISCHARGE        Comments:   -diagnostic tests and consults completed and resulted:Met   -vital signs normal or at patient baseline:Met   -tolerating oral intake to maintain hydration:Met  -adequate pain control on oral analgesics:Met   -returns to baseline functional status:Met   -safe disposition plan has been identified:Met discharge 10/26 @1200 to [facility]   Nurse to notify provider when observation goals have been met and patient is ready for discharge.

## 2022-10-26 NOTE — PROGRESS NOTES
Care Management Discharge Note    Discharge Date: 10/26/2022    Discharge Disposition:[facility] TCU  Discharge Services: PCA services- private pay  Discharge DME:      Discharge Transportation: family or friend will provide    Private pay costs discussed: Not applicable    PAS Confirmation Code: [id number]  Patient/family educated on Medicare website which has current facility and service quality ratings:      Education Provided on the Discharge Plan:    Persons Notified of Discharge Plans: Hospitalist, Charge, HUC  Patient/Family in Agreement with the Plan: yes    Handoff Referral Completed: Yes    Additional Information:  [facility] Family to transport patient. Hospitalist paged for discharge orders.     Codie Aquino MSW, LGSW

## 2022-10-26 NOTE — PLAN OF CARE
Goal Outcome Evaluation:      Plan of Care Reviewed With: patient    Overall Patient Progress: no changeOverall Patient Progress: no change     Orientation/Cognitive: A&Ox4 [place]  Observation Goals (Met/ Not Met):Met  Mobility Level/Assist Equipment: Ax1,GB,walker  Fall Risk (Y/N): Y  Behavior Concerns: NA  Pain Management: Back pain managed w/ PRN tylenol and lidocaine patch   Tele/VS/O2: VSS   ABNL Lab/BG: B, 131  Diet: Regular  Bowel/Bladder:Continent  Skin Concerns: Scattered bruising, rash under breast and abdominal folds  Drains/Devices: PIV:S/L  Tests/Procedures for next shift: NA  Anticipated DC date & active delays:10/26 @1200 PM daughter will be waiting in door 6  Patient Stated Goal for Today: To go home  Observation goals  PRIOR TO DISCHARGE        Comments:   -diagnostic tests and consults completed and resulted:Met   -vital signs normal or at patient baseline:Met   -tolerating oral intake to maintain hydration:Met  -adequate pain control on oral analgesics:Met   -returns to baseline functional status:Met   -safe disposition plan has been identified:Met discharge 10/26 @1200 to [facility]   Nurse to notify provider when observation goals have been met and patient is ready for discharge.

## 2022-10-26 NOTE — PROGRESS NOTES
Observation goals  PRIOR TO DISCHARGE        Comments:   -diagnostic tests and consults completed and resulted:Met   -vital signs normal or at patient baseline:Met   -tolerating oral intake to maintain hydration:Met  -adequate pain control on oral analgesics:Met   -returns to baseline functional status:Met   -safe disposition plan has been identified:Met discharge 10/26 @1200 to [facility]   Nurse to notify provider when observation goals have been met and patient is ready for discharge.

## 2022-10-26 NOTE — PROVIDER NOTIFICATION
"MD Notification    Notified Person: PA    Notified Person Name: Juana Hogue    Notification Date/Time: 10/26/22 @0948    Notification Interaction: [employer] messaging     Purpose of Notification: sitting 124/64 HR 86. Standing 98/61 HR 94     Orders Received: \"knee high compression stockings, hold blood pressure medications\"    Comments:    "

## 2022-10-26 NOTE — DISCHARGE SUMMARY
[facility]  Hospitalist Discharge Summary      Date of Admission:  10/23/2022  Date of Discharge:  10/26/2022  Discharging Provider: Juana Hogue PA-C  Discharge Service: Hospitalist Service    Discharge Diagnoses   Fall, possible near syncope, suspect orthostatic hypotension   Generalized weakness  Acute on chronic low back pain, right lumbar paraspinous region with right sided radiculopathy   Chronic left-sided sciatica  Chronic T12 compression fracture  Chronic headache   Shortness of breath, resolved  Moderate aortic stenosis   Moderate pulmonary hypertension  Hypertension  Hyperlipidemia   Hx of coronary artery disease s/p prior stenting  Type II DM, insulin dependent  Obesity  Asymptomatic bacteruria    Follow-ups Needed After Discharge   Follow-up Appointments     Follow Up and recommended labs and tests      Follow up with TCU provider  No follow up labs or test are needed.           Unresulted Labs Ordered in the Past 30 Days of this Admission     No orders found from 9/23/2022 to 10/24/2022.      These results will be followed up by PCP    Discharge Disposition   Discharged to short-term care facility  Condition at discharge: Stable    Hospital Course   Cara Camarillo is a 82 year old female with past medical history significant for type II DM, CKD, and osteoarthritis admitted on 10/23/2022 with leg weakness, low back pain and headache after a fall two days prior. Please refer to H&P by Dr. Tang from 10/23/22 for details on presentation.      Fall, possible near syncope, suspect orthostatic hypotension   Generalized weakness  Acute on chronic low back pain, right lumbar paraspinous region with right sided radiculopathy   Chronic left-sided sciatica  Chronic T12 compression fracture  Chronic headache   Pt presents to the ED with weakness and pain after a fall 2 days prior to admission. Reported getting up from chair after eating supper and feeling acute  dizziness with positional change while walking to her bedroom with subsequent fall, hitting a piece of furniture. No loss of consciousness, but reports feeling light-headed, diaphoretic and nauseated prior to the fall. She was unable to get up initially, but scooted to the stairs and was able to use the railing to stand. She did not seek immediate medical care. On day of admit, her children noted her to be weak and complaining of back pain so encouraged her to be evaluated.   * Work up in the ED fairly unremarkable. Labs are wnl. Troponin is wnl. EKG showed sinus rhythm. CT head negative for acute pathology. CT cervical spine is normal. XR of the lumbar spine shows chronic T12 compression fracture and some other chronic degenerative change but no acute issues. Neurologic exam is non-focal she is just generally weak.   * Telemetry NSR, no overt arrhythmia during hospital stay   * Echo with preserved EF, note moderate aortic stenosis (progression from prior), RVSP elevated consistent with moderate pulmonary hypertension  * Orthostatics checked 10/26 AM for dizziness after getting up to go to the bathroom. Positive. Did receive 1 L bolus on admission and maintenance fluids for the first 24 hours of her stay.   * Pt appeared euvolemic on day of discharge, was demonstrating adequate oral fluid intake   - Compression stockings ordered for orthostatic hypotension   - Educated on slow positional change to avoid dizziness   - Adequate oral fluid intake recommended  - No medication adjustments to antihypertensives given elevations in blood pressure. Can consider adjustment if orthostatic sx persist despite the above   - Analgesic regimen with scheduled tylenol, lidoderm patch, ice/heat. Continue PTA Cymbalta for sciatica (LLE)  - Recommend follow-up with outpatient Ortho Spine team at [facility] if acute back pain persists >2 weeks or worsens  - PT recommending TCU   - SW helped facilitate discharge planning to TCU   - Outpatient  "hospital follow-up in the next 7-10 days recommended     Shortness of breath, resolved: Reported 10/25 with SpO2 of 90%. Range of 91-94% over the last 24 hours prior to discharge. Repeat CXR without acute pathology. No complaints of SOB on day of discharge. Appeared euvolemic. Note pulmonary hypertension on echo. Possible component of obesity hypoventilation syndrome.   - Continue to monitor outpatient     Moderate aortic stenosis   Moderate pulmonary hypertension: Noted on echo 10/25. Clinically, dizziness more consistent with orthostatic hypotension.   - Continue to monitor outpatient annually. Discussed findings with daughter.      Hypertension  Hyperlipidemia   Hx of coronary artery disease s/p prior stenting  - Continue PTA amlodipine, losartan, metoprolol ASA and atorvastatin. No changes made.      Type II DM, insulin dependent: A1C 7.2  PTA insulin regimen includes NPH 10 units before breakfast and 18 units before dinner   * Note documented episode of hypoglycemia to 15 overnight 10/25, but this was machine error, repeat glucose 88 immediately following.   - Continue NPH at 10 units BID at time of discharge due to described reduced oral intake over the past few months and well controlled sugars on this regimen in the hospital.     Recent Labs   Lab 10/26/22  0821 10/26/22  0156 10/26/22  0151 10/25/22  2113 10/25/22  1658 10/25/22  1220   * 88 15* 131* 173* 156*      Obesity: Estimated body mass index is 39.48 kg/m  as calculated from the following:    Height as of this encounter: 1.626 m (5' 4\").    Weight as of this encounter: 104.3 kg (230 lb)    Asymptomatic bacteruria: <10,000 strep agalactaie on urine culture. Pt is asymptomatic. No indication for treatment. Discussed with patient and daughter.     Consultations This Hospital Stay   CARE MANAGEMENT / SOCIAL WORK IP CONSULT  PHYSICAL THERAPY ADULT IP CONSULT  OCCUPATIONAL THERAPY ADULT IP CONSULT  PHYSICAL THERAPY ADULT IP CONSULT  OCCUPATIONAL " THERAPY ADULT IP CONSULT    Code Status   Prior    Time Spent on this Encounter   I, Juana Hogue PA-C, personally saw the patient today and spent greater than 30 minutes discharging this patient.       Juana Hogue PA-C  [facility] EXTENDED RECOVERY AND SHORT STAY  [address] [facility] [address]  Phone: [phone]  ______________________________________________________________________    Physical Exam   Vital Signs: Temp: 97.7  F (36.5  C) Temp src: Oral BP: (!) 158/74 Pulse: 64   Resp: 20 SpO2: 91 % O2 Device: None (Room air)    Weight: 230 lbs 0 oz    CONSTITUTIONAL: Pt laying in bed, dressed in hospital garb. Appears comfortable. Cooperative with interview.   HEENT: Normocephalic, atraumatic. [place].  CARDIOVASCULAR: RRR, 2+ murmur in aortic region. No rubs or extra heart sounds appreciated. Pulses +2/4 and regular in upper and lower extremities, bilaterally.   RESPIRATORY: No increased work of breathing. CTA, bilat; no wheezes, rales, or rhonchi appreciated.  GASTROINTESTINAL:  Abdomen soft, non-distended. BS auscultated in all four quadrants. Negative for tenderness to palpation.  No masses or organomegaly noted.  MUSCULOSKELETAL: No gross deformities noted. Normal muscle tone.   HEMATOLOGIC/LYMPHATIC/IMMUNOLOGIC: Negative for lower extremity edema, bilaterally.  NEUROLOGIC: Alert and oriented to person, place, and time.  strength intact. No focal neuro deficits.   SKIN: Warm, dry, intact.     Primary Care Physician   Kole Gonsalez    Discharge Orders      Primary Care - Care Coordination Referral      General info for SNF    Length of Stay Estimate: Short Term Care: Estimated # of Days <30  Condition at Discharge: Stable  Level of care:skilled   Rehabilitation Potential: Good  Admission H&P remains valid and up-to-date: Yes  Recent Chemotherapy: N/A  Use Nursing Home Standing Orders: Yes     Mantoux instructions    Give two-step  Brayan (PPD) Per Facility Policy Yes     Follow Up and recommended labs and tests    Follow up with TCU provider  No follow up labs or test are needed.     Reason for your hospital stay    You were hospitalized for further evaluation and treatment of an episode of dizziness with positional change that led to a fall at home with acute right-sided lumbar back pain. You underwent thorough evaluation. You were noted to have orthostatic hypotension (blood pressure drops with positional changes) which likely contributed to your dizziness. This can be caused by dehydration. You were encouraged to drink more water and wear compression stockings throughout the day.     Glucose monitor nursing POCT    Before meals and at bedtime     Intake and output    Every shift     Daily weights    Call Provider for weight gain of more than 2 pounds per day or 5 pounds per week.     Activity - Up with nursing assistance     Encourage PO fluids     Additional Discharge Instructions    1. Adequate oral fluid intake encouraged   2. Wear compression stockings during the day  3. Slowly make positional changes, sit at edge of the bed for 1-2 minutes before getting up   4. Tylenol and lidoderm patches for back pain. If pain continues or worsens after 2 weeks, follow-up with the Orthopedic Spine doctor. You can obtain a referral from TCU provider   5. Return to the ER with recurrent or worsening symptoms   6. Insulin NPH adjusted to 10 U twice daily instead of 10 U in the AM and 18 U in the evening     Physical Therapy Adult Consult    Evaluate and treat as clinically indicated.    Reason:  acute lumbar back pain, physical deconditioning     Occupational Therapy Adult Consult    Evaluate and treat as clinically indicated.    Reason:  acute lumbar back pain, physical deconditioning     Fall precautions     Compression Sleeve/Stocking Order    Compression Sleeve/Stocking Documentation:   The patient needs compression stockings for orthostatic  hypotension     I, the undersigned, certify that the above prescribed supplies are medically necessary for this patient and is both reasonable and necessary in reference to accepted standards of medical and necessary in reference to accepted standards of medical practice in the treatment of this patient's condition and is not prescribed as a convenience.     Diet    Follow this diet upon discharge: Orders Placed This Encounter      Regular Diet Adult       Significant Results and Procedures   Most Recent 3 CBC's:  Recent Labs   Lab Test 10/24/22  1044 10/23/22  1625 08/10/20  1659   WBC 11.0 10.4 7.6   HGB 13.5 14.6 14.3   MCV 90 89 88    262 246     Most Recent 3 BMP's:  Recent Labs   Lab Test 10/26/22  0821 10/26/22  0156 10/26/22  0151 10/24/22  1222 10/24/22  1044 10/24/22  0038 10/23/22  1625 10/23/22  1618 07/13/22  1517   NA  --   --   --   --  135  --  135  --  140   POTASSIUM  --   --   --   --  4.2  --  4.6  --  4.5   CHLORIDE  --   --   --   --  107  --  107  --  112*   CO2  --   --   --   --  23  --  22  --  22   BUN  --   --   --   --  20  --  23  --  18   CR  --   --   --   --  0.78  --  0.99  --  1.00   ANIONGAP  --   --   --   --  5  --  6  --  6   MANJEET  --   --   --   --  8.6  --  8.9  --  8.9   * 88 15*   < > 204*   < > 206*   < > 109*    < > = values in this interval not displayed.     Most Recent 2 LFT's:  Recent Labs   Lab Test 10/24/22  1044 04/29/19  1447   AST 20 20   ALT 21 26   ALKPHOS 96 116   BILITOTAL 0.8 0.5     Most Recent 3 Troponin's:  Recent Labs   Lab Test 05/26/16  1501 03/16/16  1606 12/18/15  1115   TROPI <0.015  The 99th percentile for upper reference range is 0.045 ug/L.  Troponin values in   the range of 0.045 - 0.120 ug/L may be associated with risks of adverse   clinical events.   <0.015  The 99th percentile for upper reference range is 0.045 ug/L.  Troponin values in   the range of 0.045 - 0.120 ug/L may be associated with risks of adverse   clinical events.    <0.015  The 99th percentile for upper reference range is 0.045 ug/L.  Troponin values in   the range of 0.045 - 0.120 ug/L may be associated with risks of adverse   clinical events.       7-Day Micro Results     Collected Updated Procedure Result Status      10/23/2022 2014 10/26/2022 1010 Urine Culture [[id number]]   (Abnormal)   Urine, Straight Catheter    Final result Component Value   Culture <10,000 CFU/mL Streptococcus agalactiae (Group B Streptococcus)      This organism is susceptible to ampicillin, penicillin, vancomycin and the cephalosporins. If treatment is required AND your patient is allergic to penicillin, contact the Microbiology Lab within 5 days to request susceptibility testing.    <1,000 CFU/mL Urogenital zac               10/23/2022 2014 10/23/2022 2055 Asymptomatic COVID-19 Virus (Coronavirus) by PCR Nasopharyngeal [[id number]]    Swab from Nasopharyngeal    Final result Component Value   SARS CoV2 PCR Negative   NEGATIVE: SARS-CoV-2 (COVID-19) RNA not detected, presumed negative.                Most Recent Hemoglobin A1c:  Recent Labs   Lab Test 07/13/22  1517   A1C 7.2*     Most Recent Urinalysis:  Recent Labs   Lab Test 10/23/22  2014 10/02/19  1847   COLOR Yellow Yellow   APPEARANCE Clear Slightly Cloudy   URINEGLC 70* Negative   URINEBILI Negative Negative   URINEKETONE 10* Negative   SG 1.031 1.025   UBLD Negative Moderate*   URINEPH 5.5 5.5   PROTEIN 50* 30*   UROBILINOGEN  --  0.2   NITRITE Negative Negative   LEUKEST Trace* Moderate*   RBCU 0 O - 2   WBCU 5 >100*   ,   Results for orders placed or performed during the hospital encounter of 10/23/22   CT Head w/o Contrast    Narrative    EXAM: CT HEAD W/O CONTRAST, CT CERVICAL SPINE W/O CONTRAST  LOCATION: [facility]  DATE/TIME: 10/23/2022 7:07 PM    INDICATION: Head and neck injury, fall.  COMPARISON: None.  TECHNIQUE:   1) Routine CT Head without IV contrast. Multiplanar reformats. Dose reduction  techniques were used.  2) Routine CT Cervical Spine without IV contrast. Multiplanar reformats. Dose reduction techniques were used.    FINDINGS:   HEAD CT:   INTRACRANIAL CONTENTS: No intracranial hemorrhage, extraaxial collection, or mass effect.  No CT evidence of acute infarct. Moderate presumed chronic small vessel ischemic changes. Moderate generalized volume loss. No hydrocephalus.     VISUALIZED ORBITS/SINUSES/MASTOIDS: Prior bilateral cataract surgery. Visualized portions of the orbits are otherwise unremarkable. No paranasal sinus mucosal disease. No middle ear or mastoid effusion.    BONES/SOFT TISSUES: No acute abnormality.    CERVICAL SPINE CT:   VERTEBRA: Normal vertebral body heights. No fracture or posttraumatic subluxation. Slight anterolisthesis at C4-C5 and C5-C6. Osteopenic bones.    CANAL/FORAMINA: Mild/moderate multilevel degenerative changes. No high-grade canal or neural foraminal stenosis.    PARASPINAL: No extraspinal abnormality. Visualized lung fields are clear.      Impression    IMPRESSION:  HEAD CT:  1.  No CT evidence for acute intracranial process.  2.  Brain atrophy and presumed chronic microvascular ischemic changes as above.    CERVICAL SPINE CT:  1.  No CT evidence for acute fracture or post traumatic subluxation.   CT Cervical Spine w/o Contrast    Narrative    EXAM: CT HEAD W/O CONTRAST, CT CERVICAL SPINE W/O CONTRAST  LOCATION: [facility]  DATE/TIME: 10/23/2022 7:07 PM    INDICATION: Head and neck injury, fall.  COMPARISON: None.  TECHNIQUE:   1) Routine CT Head without IV contrast. Multiplanar reformats. Dose reduction techniques were used.  2) Routine CT Cervical Spine without IV contrast. Multiplanar reformats. Dose reduction techniques were used.    FINDINGS:   HEAD CT:   INTRACRANIAL CONTENTS: No intracranial hemorrhage, extraaxial collection, or mass effect.  No CT evidence of acute infarct. Moderate presumed chronic small vessel ischemic  changes. Moderate generalized volume loss. No hydrocephalus.     VISUALIZED ORBITS/SINUSES/MASTOIDS: Prior bilateral cataract surgery. Visualized portions of the orbits are otherwise unremarkable. No paranasal sinus mucosal disease. No middle ear or mastoid effusion.    BONES/SOFT TISSUES: No acute abnormality.    CERVICAL SPINE CT:   VERTEBRA: Normal vertebral body heights. No fracture or posttraumatic subluxation. Slight anterolisthesis at C4-C5 and C5-C6. Osteopenic bones.    CANAL/FORAMINA: Mild/moderate multilevel degenerative changes. No high-grade canal or neural foraminal stenosis.    PARASPINAL: No extraspinal abnormality. Visualized lung fields are clear.      Impression    IMPRESSION:  HEAD CT:  1.  No CT evidence for acute intracranial process.  2.  Brain atrophy and presumed chronic microvascular ischemic changes as above.    CERVICAL SPINE CT:  1.  No CT evidence for acute fracture or post traumatic subluxation.   Lumbar spine XR, 2-3 views    Narrative    EXAM: XR LUMBAR SPINE 2/3 VIEWS  LOCATION: [facility]  DATE/TIME: 10/23/2022 7:18 PM    INDICATION: Fall, back injury, back pain.   COMPARISON: X-ray 06/20/2016  TECHNIQUE: Radiographs of lumbar spine in routine projections.    FINDINGS: Nomenclature based on 5 lumbar type vertebral bodies. Mild levocurvature centered at L3. Grade 1 anterolisthesis at L4-5. Minimal degenerative 2 mm retrolisthesis at L1-L2. Chronic 50% loss of height of T12, unchanged from prior. The vertebral   bodies are normal in height. Advanced L4-L5 and L5-S1 interbody degeneration. Advanced lower lumbar facet arthropathy. Unremarkable visualized bony pelvis. Cholecystectomy clips. Surgical clips are present bilaterally.   XR Pelvis 1/2 Views    Narrative    EXAM: XR PELVIS 1/2 VIEWS  LOCATION: [facility]  DATE/TIME: 10/23/2022 7:18 PM    INDICATION: Pain following fall.  COMPARISON: None.      Impression    IMPRESSION:  Osteopenia. No fractures are evident. Mild degenerative changes in the hips. Degenerative changes in the SI joints and lower lumbar spine. Surgical clips in the pelvis.   XR Chest 1 View    Narrative    EXAM: XR CHEST 1 VIEW  LOCATION: [facility]  DATE/TIME: 10/23/2022 7:20 PM    INDICATION: fall. Weakness and pain.  COMPARISON: Chest 2013      Impression    IMPRESSION: No hydropneumothorax or fracture. Lungs are clear. Heart appears enlarged, likely magnified due to supine view. No signs of pneumonia or failure.   XR Chest 2 Views    Narrative    XR CHEST 2 VIEWS  10/25/2022 2:36 PM       INDICATION: O2 sats dropping; possible fluid overload; please evaluate  COMPARISON: 10/23/2022       Impression    IMPRESSION: Borderline heart size with normal pulmonary vascularity.  No pulmonary edema. The lungs are clear.    YVETTE GREENE MD         SYSTEM ID:  [id number]   Echocardiogram Complete     Value    LVEF  65-70%    Narrative    [id number]  [id number]  [id number]  [id number]^REBECCA^CONNIE^CRISPIN                                                                       Version 2     [facility]  Echocardiography Laboratory  [address] [place] [address]     Name: LUZMA LAGOS  MRN: [id number]  : 1940  Study Date: 10/24/2022 10:42 AM  Age: 82 yrs  Gender: Female  Patient Location: [facility]  Reason For Study: Syncope  Ordering Physician: CONNIE FERNANDEZ  Referring Physician: Kole Gonsalez  Performed By: Izaiah Rubalcava RDCS     BSA: 2.1 m2  Height: 64 in  Weight: 230 lb  HR: 63  BP: 180/86 mmHg  ______________________________________________________________________________  Procedure  Complete Portable Echo Adult. Optison (NDC #[id number]) given intravenously.  ______________________________________________________________________________  Interpretation Summary     The visual ejection fraction is 65-70%.  Left ventricular systolic function is normal.  Moderate  valvular aortic stenosis. Theree has beeen progression of the aortic  stenosis from previously.  Right ventricular systolic pressure is elevated, consistent with moderate  pulmonary hypertension.  The study was technically difficult.  ______________________________________________________________________________  Left Ventricle  The left ventricle is normal in size. There is mild concentric left  ventricular hypertrophy. Diastolic Doppler findings (E/E' ratio and/or other  parameters) suggest left ventricular filling pressures are increased. The  visual ejection fraction is 65-70%. Left ventricular systolic function is  normal.     Right Ventricle  The right ventricle is normal size. Right ventricular function cannot be  assessed due to poor image quality.     Atria  The left atrium is mildly dilated. Right atrial size is normal. There is no  color Doppler evidence of an atrial shunt.     Mitral Valve  There is mild to moderate mitral annular calcification. There is mild (1+)  mitral regurgitation.     Tricuspid Valve  There is mild (1+) tricuspid regurgitation. The right ventricular systolic  pressure is approximated at 52mmHg plus the right atrial pressure. IVC  diameter <2.1 cm collapsing >50% with sniff suggests a normal RA pressure of 3  mmHg. Right ventricular systolic pressure is elevated, consistent with  moderate pulmonary hypertension.     Aortic Valve  The aortic valve is trileaflet. Significantly thickened and calcified aortic  valve leaftets. The number of leaflets can not be ascertained. No aortic  regurgitation is present. The calculated aortic valve are is 1.2 cm^2. The  peak AoV pressure gradient is 28.0 mmHg. The mean AoV pressure gradient is  15.7 mmHg. Moderate valvular aortic stenosis.     Pulmonic Valve  There is no pulmonic valvular regurgitation. Normal pulmonic valve velocity.     Vessels  The aortic root is normal size. Normal size ascending aorta. The inferior vena  cava was normal in size  with preserved respiratory variability.     Pericardium  There is no pericardial effusion.     Rhythm  Sinus rhythm was noted.  ______________________________________________________________________________  MMode/2D Measurements & Calculations  IVSd: 1.3 cm     LVIDd: 3.5 cm  LVIDs: 1.7 cm  LVPWd: 1.2 cm  FS: 49.5 %  LV mass(C)d: 141.3 grams  LV mass(C)dI: 68.1 grams/m2  Ao root diam: 3.2 cm  LA dimension: 4.1 cm  asc Aorta Diam: 3.1 cm  LA/Ao: 1.3  LVOT diam: 2.0 cm  LVOT area: 3.2 cm2  LA Volume (BP): 78.9 ml  LA Volume Index (BP): 37.9 ml/m2  RWT: 0.71     Doppler Measurements & Calculations  MV E max lyndon: 85.4 cm/sec  MV A max lyndon: 100.9 cm/sec  MV E/A: 0.85  MV max P.3 mmHg  MV mean P.4 mmHg  MV V2 VTI: 35.4 cm  MVA(VTI): 2.4 cm2  MV dec time: 0.24 sec  Ao V2 max: 264.2 cm/sec  Ao max P.0 mmHg  Ao V2 mean: 187.9 cm/sec  Ao mean PG: 15.7 mmHg  Ao V2 VTI: 70.8 cm  MAY(I,D): 1.2 cm2  MAY(V,D): 1.3 cm2  LV V1 max P.7 mmHg  LV V1 max: 108.7 cm/sec  LV V1 VTI: 26.4 cm  SV(LVOT): 85.0 ml  SI(LVOT): 40.9 ml/m2  PA acc time: 0.08 sec  TR max lyndon: 335.3 cm/sec  TR max P.2 mmHg  AV Lyndon Ratio (DI): 0.41  MAY Index (cm2/m2): 0.58  E/E' av.7  Lateral E/e': 17.4  Medial E/e': 16.0     ______________________________________________________________________________  Report approved by: Bhavin Alba 10/24/2022 12:37 PM               Discharge Medications   Discharge Medication List as of 10/26/2022 11:10 AM      START taking these medications    Details   insulin  UNIT/ML injection Inject 10 Units Subcutaneous 2 times daily (before meals), Disp-15 mL, Transitional      Lidocaine (LIDOCARE) 4 % Patch Place 1 patch onto the skin every 24 hours To prevent lidocaine toxicity, patient should be patch free for 12 hrs daily.Transitional         CONTINUE these medications which have NOT CHANGED    Details   CONTOUR NEXT TEST test strip USE TO TEST BLOOD SUGAR DAILY OR AS DIRECTED., Disp-100  "strip, R-1, E-Prescribe      acetaminophen (TYLENOL) 500 MG tablet Take 1,000 mg by mouth 3 times daily, Historical      amLODIPine (NORVASC) 10 MG tablet TAKE 1 TABLET BY MOUTH EVERY DAY, Disp-90 tablet, R-3, E-Prescribe      aspirin 81 MG tablet Take 1 tablet (81 mg) by mouth daily, Disp-100 tablet, R-3, No Print Out      atorvastatin (LIPITOR) 20 MG tablet Take 1 tablet (20 mg) by mouth daily, Disp-90 tablet, R-3, E-Prescribe      BD INSULIN SYRINGE U/F 30G X 1/2\" 0.5 ML miscellaneous USE ONE SYRINGE TWICE DAILY OR AS DIRECTED., Disp-100 each, R-1, [id number]      blood glucose monitoring (CONTOUR NEXT MONITOR W/DEVICE KIT) meter device kit Use to test blood sugar 3 times daily or as directed.Disp-1 kit, [id number] Code: E11.59 Patient is insulin dependent.      DULoxetine (CYMBALTA) 30 MG capsule One capsule once daily for one week, then increase to two capsules once daily, Disp-90 capsule, R-3, E-Prescribe      losartan (COZAAR) 50 MG tablet TAKE 2 TABLETS BY MOUTH EVERY DAY, Disp-180 tablet, R-3, E-Prescribe      metoprolol tartrate (LOPRESSOR) 100 MG tablet Take 1 tablet (100 mg) by mouth 2 times daily, Disp-180 tablet, R-3, E-Prescribe      nitroGLYcerin (NITROSTAT) 0.4 MG sublingual tablet Place 1 tablet (0.4 mg) under the tongue every 5 minutes as needed for chest pain if you are still having symptoms after 3 doses (15 minutes) call 911., Disp-25 tablet, R-1, E-Prescribe      vitamin D3 (CHOLECALCIFEROL) 50 mcg (2000 units) tablet Take 1 tablet by mouth every other day, Historical         STOP taking these medications       insulin NPH (NOVOLIN N VIAL) 100 UNIT/ML vial Comments:   Reason for Stopping:             Allergies   Allergies   Allergen Reactions     Actos [Pioglitazone]      Lower extremity edema       Enalapril      Renal failure     Hydrochlorothiazide      Dry mouth     Lisinopril      Hyperkalemia       Metformin Diarrhea     Diarrhea      "

## 2022-10-27 ENCOUNTER — PATIENT OUTREACH (OUTPATIENT)
Dept: CARE COORDINATION | Facility: CLINIC | Age: 82
End: 2022-10-27

## 2022-10-27 ENCOUNTER — TRANSITIONAL CARE UNIT VISIT (OUTPATIENT)
Dept: GERIATRICS | Facility: CLINIC | Age: 82
End: 2022-10-27
Payer: COMMERCIAL

## 2022-10-27 VITALS
SYSTOLIC BLOOD PRESSURE: 169 MMHG | TEMPERATURE: 97.6 F | RESPIRATION RATE: 17 BRPM | HEIGHT: 64 IN | WEIGHT: 219.5 LBS | DIASTOLIC BLOOD PRESSURE: 64 MMHG | OXYGEN SATURATION: 91 % | HEART RATE: 70 BPM | BODY MASS INDEX: 37.47 KG/M2

## 2022-10-27 DIAGNOSIS — R53.81 PHYSICAL DECONDITIONING: ICD-10-CM

## 2022-10-27 DIAGNOSIS — I25.10 CORONARY ARTERY DISEASE INVOLVING NATIVE CORONARY ARTERY OF NATIVE HEART WITHOUT ANGINA PECTORIS: ICD-10-CM

## 2022-10-27 DIAGNOSIS — N18.30 STAGE 3 CHRONIC KIDNEY DISEASE, UNSPECIFIED WHETHER STAGE 3A OR 3B CKD (H): ICD-10-CM

## 2022-10-27 DIAGNOSIS — E11.51 TYPE 2 DIABETES MELLITUS WITH DIABETIC PERIPHERAL ANGIOPATHY WITHOUT GANGRENE, WITHOUT LONG-TERM CURRENT USE OF INSULIN (H): ICD-10-CM

## 2022-10-27 DIAGNOSIS — I10 ESSENTIAL HYPERTENSION, BENIGN: ICD-10-CM

## 2022-10-27 DIAGNOSIS — M54.41 ACUTE BILATERAL LOW BACK PAIN WITH RIGHT-SIDED SCIATICA: ICD-10-CM

## 2022-10-27 DIAGNOSIS — S22.080S COMPRESSION FRACTURE OF T12 VERTEBRA, SEQUELA: Primary | ICD-10-CM

## 2022-10-27 DIAGNOSIS — E66.01 MORBID OBESITY (H): ICD-10-CM

## 2022-10-27 PROCEDURE — 99310 SBSQ NF CARE HIGH MDM 45: CPT | Performed by: NURSE PRACTITIONER

## 2022-10-27 NOTE — PROGRESS NOTES
[facility] GERIATRICS    PRIMARY CARE PROVIDER AND CLINIC:  Kole Gonsalez MD, [address] / [facility] [address]  Chief Complaint   Patient presents with     Hospital F/U      [place] Medical Record Number:  [id number]  Place of Service where encounter took place:  [facility] [25[facility]    Cara Camarillo  is a 82 year old  (1940), admitted to the above facility from  [facility]. Hospital stay 10/23/22 through 10/26/22..   HPI:    PMH of chronic back pain, HTN, CKD, DMII who presents with acute low back pain and leg weakness after a fall.   Acute/chronic low back pain with right sided radiculopathy  Chronic left sided sciatica  Chronic T12 compression Fx  Patient had a fall at home with worsening back pain  Workup in ED showed no acute findings  Orthostatic BP drop, given 1 liter bolus of IVF, compression stockings education on orthostasis  No medication changes  Moderate Aortic stenosis  Moderate pulmonary HTN  Echo preserved EF, moderate AS, RVSP elevated consistent with moderat pHTN  DMII: stable, no changes  On exam today: patient sitting up in w/c, alert, states she is having 9/10 pain in low back, denies pain radiating down legs, states she slept well last night, denies fever, chills, cough, congestion, CP, palpitations, SOB, N/V/D or constipation    CODE STATUS/ADVANCE DIRECTIVES DISCUSSION:  Prior  CPR/Full code   ALLERGIES:   Allergies   Allergen Reactions     Actos [Pioglitazone]      Lower extremity edema       Enalapril      Renal failure     Hydrochlorothiazide      Dry mouth     Lisinopril      Hyperkalemia       Metformin Diarrhea     Diarrhea       PAST MEDICAL HISTORY:   Past Medical History:   Diagnosis Date     Adenoma     tubal     Anxiety      Anxiety      Aortic stenosis      Arthritis     ankles     Chronic kidney disease (CKD), stage III (moderate) (H)      Coronary artery disease     cardiac cath Feb 2016: ISIDRO to LAD, cath Jan 2016:  ISIDRO to PDA     Decubitus ulcer of coccyx      Dysthymic disorder      Essential hypertension, benign 2205     Herpes zoster without mention of complication Aug 2006    left leg (thigh)     Hydronephrosis      Hyperlipidaemia LDL goal < 100      Left ventricular diastolic dysfunction      Malignant neoplasm of corpus uteri, except isthmus (H) 2005    endometrial CA, s/p external beam radiation & radiation implant & FIDEL/BSO       Obesity      Pulmonary hyperinflation      Sciatica      Type II or unspecified type diabetes mellitus without mention of complication, not stated as uncontrolled 2005     Unspecified congenital anomaly of urinary system 2005      PAST SURGICAL HISTORY:   has a past surgical history that includes ANESTH, SECTION; nephrectomy rt/lt (OCT 2005); hysterectomy, vaginal (05); NONSPECIFIC PROCEDURE (3/20/06); UGI ENDOSCOPY W EUS (2013); Laparoscopic cholecystectomy (1/10/2013); Nephrectomy bilateral; Hysterectomy; Arthroplasty knee (2013); Arthroplasty knee (2/3/2014); Excise mass lower extremity (Left, 2015); other surgical history; and other surgical history.  FAMILY HISTORY: family history includes Diabetes in her father; Other - See Comments (age of onset: 95) in her mother.  SOCIAL HISTORY:   reports that she has never smoked. She has never used smokeless tobacco. She reports that she does not drink alcohol and does not use drugs.  Patient's living condition: lives alone    Post Discharge Medication Reconciliation Status:   MED REC REQUIRED  Post Medication Reconciliation Status: discharge medications reconciled and changed, per note/orders       Current Outpatient Medications   Medication Sig     CONTOUR NEXT TEST test strip USE TO TEST BLOOD SUGAR DAILY OR AS DIRECTED.     acetaminophen (TYLENOL) 500 MG tablet Take 1,000 mg by mouth 3 times daily     amLODIPine (NORVASC) 10 MG tablet TAKE 1 TABLET BY MOUTH EVERY DAY     aspirin 81 MG tablet  "Take 1 tablet (81 mg) by mouth daily     atorvastatin (LIPITOR) 20 MG tablet Take 1 tablet (20 mg) by mouth daily     BD INSULIN SYRINGE U/F 30G X 1/2\" 0.5 ML miscellaneous USE ONE SYRINGE TWICE DAILY OR AS DIRECTED.     blood glucose monitoring (CONTOUR NEXT MONITOR W/DEVICE KIT) meter device kit Use to test blood sugar 3 times daily or as directed.     DULoxetine (CYMBALTA) 30 MG capsule One capsule once daily for one week, then increase to two capsules once daily (Patient taking differently: Take 60 mg by mouth daily One capsule once daily for one week, then increase to two capsules once daily)     insulin  UNIT/ML injection Inject 10 Units Subcutaneous 2 times daily (before meals)     Lidocaine (LIDOCARE) 4 % Patch Place 1 patch onto the skin every 24 hours To prevent lidocaine toxicity, patient should be patch free for 12 hrs daily.     losartan (COZAAR) 50 MG tablet TAKE 2 TABLETS BY MOUTH EVERY DAY (Patient taking differently: Take 50 mg by mouth 2 times daily)     metoprolol tartrate (LOPRESSOR) 100 MG tablet Take 1 tablet (100 mg) by mouth 2 times daily     nitroGLYcerin (NITROSTAT) 0.4 MG sublingual tablet Place 1 tablet (0.4 mg) under the tongue every 5 minutes as needed for chest pain if you are still having symptoms after 3 doses (15 minutes) call 911.     vitamin D3 (CHOLECALCIFEROL) 50 mcg (2000 units) tablet Take 1 tablet by mouth every other day     No current facility-administered medications for this visit.       ROS:  10 point ROS of systems including Constitutional, Eyes, Respiratory, Cardiovascular, Gastroenterology, Genitourinary, Integumentary, Musculoskeletal, Psychiatric were all negative except for pertinent positives noted in my HPI.    Vitals:  BP (!) 169/64   Pulse 70   Temp 97.6  F (36.4  C)   Resp 17   Ht 1.626 m (5' 4\")   Wt 99.6 kg (219 lb 8 oz)   SpO2 91%   BMI 37.68 kg/m    Exam:  GENERAL APPEARANCE:  Alert, in no distress, morbidly obese  ENT:  Mouth and posterior " oropharynx normal, moist mucous membranes, [place]  EYES:  EOM, conjunctivae, lids, pupils and irises normal, PERRL  RESP:  respiratory effort and palpation of chest normal, lungs clear to auscultation , no respiratory distress  CV:  Palpation and auscultation of heart done , regular rate and rhythm, no murmur, rub, or gallop, peripheral edema 1+ in LE bilaterally  ABDOMEN:  normal bowel sounds, soft, nontender, no hepatosplenomegaly or other masses  M/S:   patient sitting up in w/c  SKIN:  Inspection of skin and subcutaneous tissue baseline  NEURO:   speech wnl  PSYCH:  affect and mood normal    Lab/Diagnostic data:  Recent labs in [facility] reviewed by me today.  and   Most Recent 3 CBC's:Recent Labs   Lab Test 10/24/22  1044 10/23/22  1625 08/10/20  1659   WBC 11.0 10.4 7.6   HGB 13.5 14.6 14.3   MCV 90 89 88    262 246     Most Recent 3 BMP's:Recent Labs   Lab Test 10/26/22  0821 10/26/22  0156 10/26/22  0151 10/24/22  1222 10/24/22  1044 10/24/22  0038 10/23/22  1625 10/23/22  1618 07/13/22  1517   NA  --   --   --   --  135  --  135  --  140   POTASSIUM  --   --   --   --  4.2  --  4.6  --  4.5   CHLORIDE  --   --   --   --  107  --  107  --  112*   CO2  --   --   --   --  23  --  22  --  22   BUN  --   --   --   --  20  --  23  --  18   CR  --   --   --   --  0.78  --  0.99  --  1.00   ANIONGAP  --   --   --   --  5  --  6  --  6   MANJEET  --   --   --   --  8.6  --  8.9  --  8.9   * 88 15*   < > 204*   < > 206*   < > 109*    < > = values in this interval not displayed.       ASSESSMENT/PLAN:    (S22.080S) Compression fracture of T12 vertebra, sequela  (primary encounter diagnosis)  (M54.41) Acute bilateral low back pain with right-sided sciatica  (R53.81) Physical deconditioning  Comment: acute/ongoing  Plan: PT and OT, continue tylenol  1000mg TID scheduled,  lidocaine patch 4% to low back on AM and off Hs, cymbalta 60mg QD    (E66.01) Morbid obesity (H)  Comment: ongoing  Plan: dietician to  follow    (I25.10) Coronary artery disease involving native coronary artery of native heart without angina pectoris  (I10) Essential hypertension, benign  (N18.30) Stage 3 chronic kidney disease, unspecified whether stage 3a or 3b CKD (H)  Comment: ongoing  Plan: BMP follow, continue norvasc 10mg QD, losartan 100mg QD, metoprolol 100mg BID    (E11.51) Type 2 diabetes mellitus with diabetic peripheral angiopathy without gangrene, without long-term current use of insulin (H)  Comment: ongoing  Plan: blood sugar monitoring QID, continue NPH insulin 10 units BID    Orders:  BMP and Hgb on Monday    Total time spent with patient visit at the skilled nursing facility was 35 min including patient visit and review of past records. Greater than 50% of total time spent with counseling and coordinating care due to discussed pain management, will have therapy see if they can do any modalities.     Electronically signed by:  Tonya Lynn Haase, APRN CNP

## 2022-10-27 NOTE — PROGRESS NOTES
Clinic Care Coordination Contact  Care Coordination Transition Communication         Clinical Data: [facility]  Hospitalist Discharge Summary       Date of Admission:  10/23/2022  Date of Discharge:  10/26/2022  Discharging Provider: Juana Hogue PA-C  Discharge Service: Hospitalist Service        Discharge Diagnoses     Fall, possible near syncope, suspect orthostatic hypotension   Generalized weakness  Acute on chronic low back pain, right lumbar paraspinous region with right sided radiculopathy   Chronic left-sided sciatica  Chronic T12 compression fracture  Chronic headache   Shortness of breath, resolved  Moderate aortic stenosis   Moderate pulmonary hypertension  Hypertension  Hyperlipidemia   Hx of coronary artery disease s/p prior stenting  Type II DM, insulin dependent  Obesity  Asymptomatic bacteruria    Transition to Facility:              Facility Name: [facility]              Contact name and phone number/fax: [phone]/ [phone]    Plan: RN/SW Care Coordinator will await notification from facility staff informing RN/SW Care Coordinator of patient's discharge plans/needs. RN/SW Care Coordinator will review chart and outreach to facility staff every 4 weeks and as needed. Fax sent to TCU with my contact information requesting notification upon discharge.    Juany Gonzalez [facility]  Clinic Care Coordinator  [facility]  [phone]  [email or web address]

## 2022-10-27 NOTE — PROGRESS NOTES
[facility] Care Resource Widen    Background: Transitional Care Management program identified per system criteria and reviewed by [facility] team for possible outreach.    Assessment: Upon chart review, CCR Team member will not proceed with patient outreach related to this episode of Transitional Care Management program due to reason below:    Patient discharged to TCU/ARU/LTACH and is established within [facility] Primary Care. Referral created for Primary Care-Care Coordination program.    Plan: Transitional Care Management episode addressed appropriately per reason noted above.      Jorge L Soria  [facility] Resource Widen, [facility]    *Connected Care Resource Team does NOT follow patient ongoing. Referrals are identified based on internal discharge reports and the outreach is to ensure patient has an understanding of their discharge instructions.

## 2022-10-27 NOTE — LETTER
[facility]   To:   [facility]          Please give to facility    From:   LORRIE Chu Care Coordinator [facility]     Patient Name:  Cara Camarillo  YOB: 1940    Admit date: 10/26/2022      *Information Needed:  Please contact me when the patient will discharge (or if they will move to long term care)- include the discharge date, disposition, and main diagnosis   - If the patient is discharged with home care services, please provide the name of the agency    Also- Please inform me if a care conference is being held.   Phone, Fax or Email with information       Thank You,   DANISH Chu, MSW  Clinic Care Coordinator  [facility] [place] [place] [place]  Ph: [phone]  [email or web address]

## 2022-10-27 NOTE — LETTER
10/27/2022        RE: Cara Camarillo  [address]  [place] [address]        [facility] GERIATRICS    PRIMARY CARE PROVIDER AND CLINIC:  Kole Gonsalez MD, [address] / [place] [address]  Chief Complaint   Patient presents with     Hospital F/U      [place] Medical Record Number:  [id number]  Place of Service where encounter took place:  [facility] ([facility]) [25]    Cara Camarillo  is a 82 year old  (1940), admitted to the above facility from  [facility]. Hospital stay 10/23/22 through 10/26/22..   HPI:    PMH of chronic back pain, HTN, CKD, DMII who presents with acute low back pain and leg weakness after a fall.   Acute/chronic low back pain with right sided radiculopathy  Chronic left sided sciatica  Chronic T12 compression Fx  Patient had a fall at home with worsening back pain  Workup in ED showed no acute findings  Orthostatic BP drop, given 1 liter bolus of IVF, compression stockings education on orthostasis  No medication changes  Moderate Aortic stenosis  Moderate pulmonary HTN  Echo preserved EF, moderate AS, RVSP elevated consistent with moderat pHTN  DMII: stable, no changes  On exam today: patient sitting up in w/c, alert, states she is having 9/10 pain in low back, denies pain radiating down legs, states she slept well last night, denies fever, chills, cough, congestion, CP, palpitations, SOB, N/V/D or constipation    CODE STATUS/ADVANCE DIRECTIVES DISCUSSION:  Prior  CPR/Full code   ALLERGIES:   Allergies   Allergen Reactions     Actos [Pioglitazone]      Lower extremity edema       Enalapril      Renal failure     Hydrochlorothiazide      Dry mouth     Lisinopril      Hyperkalemia       Metformin Diarrhea     Diarrhea       PAST MEDICAL HISTORY:   Past Medical History:   Diagnosis Date     Adenoma     tubal     Anxiety      Anxiety      Aortic stenosis      Arthritis     ankles     Chronic kidney disease (CKD), stage III  (moderate) (H)      Coronary artery disease     cardiac cath 2016: ISIDRO to LAD, cath 2016: ISIDRO to PDA     Decubitus ulcer of coccyx      Dysthymic disorder      Essential hypertension, benign 2205     Herpes zoster without mention of complication Aug 2006    left leg (thigh)     Hydronephrosis      Hyperlipidaemia LDL goal < 100      Left ventricular diastolic dysfunction      Malignant neoplasm of corpus uteri, except isthmus (H) 2005    endometrial CA, s/p external beam radiation & radiation implant & FIDEL/BSO       Obesity      Pulmonary hyperinflation      Sciatica      Type II or unspecified type diabetes mellitus without mention of complication, not stated as uncontrolled 2005     Unspecified congenital anomaly of urinary system 2005      PAST SURGICAL HISTORY:   has a past surgical history that includes ANESTH, SECTION; nephrectomy rt/lt (OCT 2005); hysterectomy, vaginal (05); NONSPECIFIC PROCEDURE (3/20/06); UGI ENDOSCOPY W EUS (2013); Laparoscopic cholecystectomy (1/10/2013); Nephrectomy bilateral; Hysterectomy; Arthroplasty knee (2013); Arthroplasty knee (2/3/2014); Excise mass lower extremity (Left, 2015); other surgical history; and other surgical history.  FAMILY HISTORY: family history includes Diabetes in her father; Other - See Comments (age of onset: 95) in her mother.  SOCIAL HISTORY:   reports that she has never smoked. She has never used smokeless tobacco. She reports that she does not drink alcohol and does not use drugs.  Patient's living condition: lives alone    Post Discharge Medication Reconciliation Status:   MED REC REQUIRED  Post Medication Reconciliation Status: discharge medications reconciled and changed, per note/orders       Current Outpatient Medications   Medication Sig     CONTOUR NEXT TEST test strip USE TO TEST BLOOD SUGAR DAILY OR AS DIRECTED.     acetaminophen (TYLENOL) 500 MG tablet Take 1,000 mg by mouth 3 times daily  "    amLODIPine (NORVASC) 10 MG tablet TAKE 1 TABLET BY MOUTH EVERY DAY     aspirin 81 MG tablet Take 1 tablet (81 mg) by mouth daily     atorvastatin (LIPITOR) 20 MG tablet Take 1 tablet (20 mg) by mouth daily     BD INSULIN SYRINGE U/F 30G X 1/2\" 0.5 ML miscellaneous USE ONE SYRINGE TWICE DAILY OR AS DIRECTED.     blood glucose monitoring (CONTOUR NEXT MONITOR W/DEVICE KIT) meter device kit Use to test blood sugar 3 times daily or as directed.     DULoxetine (CYMBALTA) 30 MG capsule One capsule once daily for one week, then increase to two capsules once daily (Patient taking differently: Take 60 mg by mouth daily One capsule once daily for one week, then increase to two capsules once daily)     insulin  UNIT/ML injection Inject 10 Units Subcutaneous 2 times daily (before meals)     Lidocaine (LIDOCARE) 4 % Patch Place 1 patch onto the skin every 24 hours To prevent lidocaine toxicity, patient should be patch free for 12 hrs daily.     losartan (COZAAR) 50 MG tablet TAKE 2 TABLETS BY MOUTH EVERY DAY (Patient taking differently: Take 50 mg by mouth 2 times daily)     metoprolol tartrate (LOPRESSOR) 100 MG tablet Take 1 tablet (100 mg) by mouth 2 times daily     nitroGLYcerin (NITROSTAT) 0.4 MG sublingual tablet Place 1 tablet (0.4 mg) under the tongue every 5 minutes as needed for chest pain if you are still having symptoms after 3 doses (15 minutes) call 911.     vitamin D3 (CHOLECALCIFEROL) 50 mcg (2000 units) tablet Take 1 tablet by mouth every other day     No current facility-administered medications for this visit.       ROS:  10 point ROS of systems including Constitutional, Eyes, Respiratory, Cardiovascular, Gastroenterology, Genitourinary, Integumentary, Musculoskeletal, Psychiatric were all negative except for pertinent positives noted in my HPI.    Vitals:  BP (!) 169/64   Pulse 70   Temp 97.6  F (36.4  C)   Resp 17   Ht 1.626 m (5' 4\")   Wt 99.6 kg (219 lb 8 oz)   SpO2 91%   BMI 37.68 kg/m  "   Exam:  GENERAL APPEARANCE:  Alert, in no distress, morbidly obese  ENT:  Mouth and posterior oropharynx normal, moist mucous membranes, [place]  EYES:  EOM, conjunctivae, lids, pupils and irises normal, PERRL  RESP:  respiratory effort and palpation of chest normal, lungs clear to auscultation , no respiratory distress  CV:  Palpation and auscultation of heart done , regular rate and rhythm, no murmur, rub, or gallop, peripheral edema 1+ in LE bilaterally  ABDOMEN:  normal bowel sounds, soft, nontender, no hepatosplenomegaly or other masses  M/S:   patient sitting up in w/c  SKIN:  Inspection of skin and subcutaneous tissue baseline  NEURO:   speech wnl  PSYCH:  affect and mood normal    Lab/Diagnostic data:  Recent labs in [facility] reviewed by me today.  and   Most Recent 3 CBC's:Recent Labs   Lab Test 10/24/22  1044 10/23/22  1625 08/10/20  1659   WBC 11.0 10.4 7.6   HGB 13.5 14.6 14.3   MCV 90 89 88    262 246     Most Recent 3 BMP's:Recent Labs   Lab Test 10/26/22  0821 10/26/22  0156 10/26/22  0151 10/24/22  1222 10/24/22  1044 10/24/22  0038 10/23/22  1625 10/23/22  1618 07/13/22  1517   NA  --   --   --   --  135  --  135  --  140   POTASSIUM  --   --   --   --  4.2  --  4.6  --  4.5   CHLORIDE  --   --   --   --  107  --  107  --  112*   CO2  --   --   --   --  23  --  22  --  22   BUN  --   --   --   --  20  --  23  --  18   CR  --   --   --   --  0.78  --  0.99  --  1.00   ANIONGAP  --   --   --   --  5  --  6  --  6   MANJEET  --   --   --   --  8.6  --  8.9  --  8.9   * 88 15*   < > 204*   < > 206*   < > 109*    < > = values in this interval not displayed.       ASSESSMENT/PLAN:    (S22.080S) Compression fracture of T12 vertebra, sequela  (primary encounter diagnosis)  (M54.41) Acute bilateral low back pain with right-sided sciatica  (R53.81) Physical deconditioning  Comment: acute/ongoing  Plan: PT and OT, continue tylenol  1000mg TID scheduled,  lidocaine patch 4% to low back on AM and off Hs,  cymbalta 60mg QD    (E66.01) Morbid obesity (H)  Comment: ongoing  Plan: dietician to follow    (I25.10) Coronary artery disease involving native coronary artery of native heart without angina pectoris  (I10) Essential hypertension, benign  (N18.30) Stage 3 chronic kidney disease, unspecified whether stage 3a or 3b CKD (H)  Comment: ongoing  Plan: BMP follow, continue norvasc 10mg QD, losartan 100mg QD, metoprolol 100mg BID    (E11.51) Type 2 diabetes mellitus with diabetic peripheral angiopathy without gangrene, without long-term current use of insulin (H)  Comment: ongoing  Plan: blood sugar monitoring QID, continue NPH insulin 10 units BID    Orders:  BMP and Hgb on Monday    Total time spent with patient visit at the [facility] nursing facility was 35 min including patient visit and review of past records. Greater than 50% of total time spent with counseling and coordinating care due to discussed pain management, will have therapy see if they can do any modalities.     Electronically signed by:  Tonya Lynn Haase, APRN CNP                       Sincerely,        Tonya Lynn Haase, APRN CNP

## 2022-10-31 ENCOUNTER — TRANSITIONAL CARE UNIT VISIT (OUTPATIENT)
Dept: GERIATRICS | Facility: CLINIC | Age: 82
End: 2022-10-31
Payer: COMMERCIAL

## 2022-10-31 VITALS
DIASTOLIC BLOOD PRESSURE: 74 MMHG | HEART RATE: 83 BPM | WEIGHT: 219 LBS | OXYGEN SATURATION: 91 % | BODY MASS INDEX: 37.39 KG/M2 | TEMPERATURE: 97.4 F | RESPIRATION RATE: 17 BRPM | HEIGHT: 64 IN | SYSTOLIC BLOOD PRESSURE: 124 MMHG

## 2022-10-31 DIAGNOSIS — N18.30 STAGE 3 CHRONIC KIDNEY DISEASE, UNSPECIFIED WHETHER STAGE 3A OR 3B CKD (H): ICD-10-CM

## 2022-10-31 DIAGNOSIS — M54.41 ACUTE BILATERAL LOW BACK PAIN WITH RIGHT-SIDED SCIATICA: ICD-10-CM

## 2022-10-31 DIAGNOSIS — R53.81 PHYSICAL DECONDITIONING: ICD-10-CM

## 2022-10-31 DIAGNOSIS — E11.51 TYPE 2 DIABETES MELLITUS WITH DIABETIC PERIPHERAL ANGIOPATHY WITHOUT GANGRENE, WITHOUT LONG-TERM CURRENT USE OF INSULIN (H): ICD-10-CM

## 2022-10-31 DIAGNOSIS — I10 ESSENTIAL HYPERTENSION, BENIGN: ICD-10-CM

## 2022-10-31 DIAGNOSIS — I25.10 CORONARY ARTERY DISEASE INVOLVING NATIVE CORONARY ARTERY OF NATIVE HEART WITHOUT ANGINA PECTORIS: ICD-10-CM

## 2022-10-31 DIAGNOSIS — S22.080S COMPRESSION FRACTURE OF T12 VERTEBRA, SEQUELA: Primary | ICD-10-CM

## 2022-10-31 DIAGNOSIS — E66.01 MORBID OBESITY (H): ICD-10-CM

## 2022-10-31 PROCEDURE — 99310 SBSQ NF CARE HIGH MDM 45: CPT | Performed by: NURSE PRACTITIONER

## 2022-10-31 RX ORDER — TRAMADOL HYDROCHLORIDE 50 MG/1
50 TABLET ORAL EVERY 6 HOURS PRN
Qty: 10 TABLET | Refills: 0
Start: 2022-10-31 | End: 2022-11-03

## 2022-10-31 NOTE — LETTER
"    10/31/2022        RE: Cara Camarillo  [address] [facility] [address]        [employer]    Chief Complaint   Patient presents with     RECHECK     HPI:  Cara Camarillo is a 82 year old  (1940), who is being seen today for an episodic care visit at: [facility] ([facility]) [25]. Today's concern is:   Acute/chronic low back pain: patient is sitting up in w/c, states pain in low back is rated 6/10 at time of exam, states pain much worse this AM, patient is having pain in bilateral thighs and groin as well, in therapy patient is walking up to 160 feet using a RW with CGA  CAD/HTN/CKD: /74, 104/58, 112/68 with HR 80 range, denies CP, palpitations, SOB  DMII: blood sugar range 139-282  CKD: see labs    Allergies, and PMH/PSH reviewed in [facility] today.  REVIEW OF SYSTEMS:  10 point ROS of systems including Constitutional, Eyes, Respiratory, Cardiovascular, Gastroenterology, Genitourinary, Integumentary, Musculoskeletal, Psychiatric were all negative except for pertinent positives noted in my HPI.    Objective:   /74   Pulse 83   Temp 97.4  F (36.3  C)   Resp 17   Ht 1.626 m (5' 4\")   Wt 99.3 kg (219 lb)   SpO2 91%   BMI 37.59 kg/m    GENERAL APPEARANCE:  Alert, in no distress, morbidly obese  ENT:  Mouth and posterior oropharynx normal, moist mucous membranes, [place]  EYES:  EOM, conjunctivae, lids, pupils and irises normal, PERRL  RESP:  respiratory effort and palpation of chest normal, lungs clear to auscultation , no respiratory distress  CV:  Palpation and auscultation of heart done , regular rate and rhythm, no murmur, rub, or gallop, peripheral edema 1+ in LE bilaterally  ABDOMEN:  normal bowel sounds, soft, nontender, no hepatosplenomegaly or other masses  M/S:   patient sitting up in w/c  SKIN:  Inspection of skin and subcutaneous tissue baseline  NEURO:   speech wnl  PSYCH:  affect and mood normal    Recent labs in [facility] reviewed by me today.  and   Most " Recent 3 CBC's:Recent Labs   Lab Test 10/24/22  1044 10/23/22  1625 08/10/20  1659   WBC 11.0 10.4 7.6   HGB 13.5 14.6 14.3   MCV 90 89 88    262 246     Most Recent 3 BMP's:Recent Labs   Lab Test 10/26/22  0821 10/26/22  0156 10/26/22  0151 10/24/22  1222 10/24/22  1044 10/24/22  0038 10/23/22  1625 10/23/22  1618 07/13/22  1517   NA  --   --   --   --  135  --  135  --  140   POTASSIUM  --   --   --   --  4.2  --  4.6  --  4.5   CHLORIDE  --   --   --   --  107  --  107  --  112*   CO2  --   --   --   --  23  --  22  --  22   BUN  --   --   --   --  20  --  23  --  18   CR  --   --   --   --  0.78  --  0.99  --  1.00   ANIONGAP  --   --   --   --  5  --  6  --  6   MANJEET  --   --   --   --  8.6  --  8.9  --  8.9   * 88 15*   < > 204*   < > 206*   < > 109*    < > = values in this interval not displayed.       Assessment/Plan:  (S22.080S) Compression fracture of T12 vertebra, sequela  (primary encounter diagnosis)  (M54.41) Acute bilateral low back pain with right-sided sciatica  (R53.81) Physical deconditioning  Comment: acute/ongoing  Plan: PT and OT, continue tylenol  1000mg TID scheduled,  lidocaine patch 4% to low back on AM and off Hs, cymbalta 60mg QD  Add tramadol 50mg q 6 hours prn     (E66.01) Morbid obesity (H)  Comment: ongoing  Plan: dietician to follow     (I25.10) Coronary artery disease involving native coronary artery of native heart without angina pectoris  (I10) Essential hypertension, benign  (N18.30) Stage 3 chronic kidney disease, unspecified whether stage 3a or 3b CKD (H)  Comment: ongoing  Plan: BMP follow, continue norvasc 10mg QD, losartan 100mg QD, metoprolol 100mg BID     (E11.51) Type 2 diabetes mellitus with diabetic peripheral angiopathy without gangrene, without long-term current use of insulin (H)  Comment: ongoing  Plan: blood sugar monitoring QID, continue NPH insulin 10 units BID       MED REC REQUIRED  Post Medication Reconciliation Status: medication reconcilation  previously completed during another office visit      Orders:  Tramadol 50mg q 6 hours prn      Electronically signed by: Tonya Lynn Haase, APRN CNP             Sincerely,        Tonya Lynn Haase, APRN CNP

## 2022-10-31 NOTE — PROGRESS NOTES
"[facility] GERIATRICS    Chief Complaint   Patient presents with     RECHECK     HPI:  Cara Camarillo is a 82 year old  (1940), who is being seen today for an episodic care visit at: [facility]) [25]. Today's concern is:   Acute/chronic low back pain: patient is sitting up in w/c, states pain in low back is rated 6/10 at time of exam, states pain much worse this AM, patient is having pain in bilateral thighs and groin as well, in therapy patient is walking up to 160 feet using a RW with CGA  CAD/HTN/CKD: /74, 104/58, 112/68 with HR 80 range, denies CP, palpitations, SOB  DMII: blood sugar range 139-282  CKD: see labs    Allergies, and PMH/PSH reviewed in [facility] today.  REVIEW OF SYSTEMS:  10 point ROS of systems including Constitutional, Eyes, Respiratory, Cardiovascular, Gastroenterology, Genitourinary, Integumentary, Musculoskeletal, Psychiatric were all negative except for pertinent positives noted in my HPI.    Objective:   /74   Pulse 83   Temp 97.4  F (36.3  C)   Resp 17   Ht 1.626 m (5' 4\")   Wt 99.3 kg (219 lb)   SpO2 91%   BMI 37.59 kg/m    GENERAL APPEARANCE:  Alert, in no distress, morbidly obese  ENT:  Mouth and posterior oropharynx normal, moist mucous membranes, [place]  EYES:  EOM, conjunctivae, lids, pupils and irises normal, PERRL  RESP:  respiratory effort and palpation of chest normal, lungs clear to auscultation , no respiratory distress  CV:  Palpation and auscultation of heart done , regular rate and rhythm, no murmur, rub, or gallop, peripheral edema 1+ in LE bilaterally  ABDOMEN:  normal bowel sounds, soft, nontender, no hepatosplenomegaly or other masses  M/S:   patient sitting up in w/c  SKIN:  Inspection of skin and subcutaneous tissue baseline  NEURO:   speech wnl  PSYCH:  affect and mood normal    Recent labs in [facility] reviewed by me today.  and   Most Recent 3 CBC's:Recent Labs   Lab Test 10/24/22  1044 10/23/22  1625 08/10/20  1659   WBC 11.0 10.4 " 7.6   HGB 13.5 14.6 14.3   MCV 90 89 88    262 246     Most Recent 3 BMP's:Recent Labs   Lab Test 10/26/22  0821 10/26/22  0156 10/26/22  0151 10/24/22  1222 10/24/22  1044 10/24/22  0038 10/23/22  1625 10/23/22  1618 07/13/22  1517   NA  --   --   --   --  135  --  135  --  140   POTASSIUM  --   --   --   --  4.2  --  4.6  --  4.5   CHLORIDE  --   --   --   --  107  --  107  --  112*   CO2  --   --   --   --  23  --  22  --  22   BUN  --   --   --   --  20  --  23  --  18   CR  --   --   --   --  0.78  --  0.99  --  1.00   ANIONGAP  --   --   --   --  5  --  6  --  6   MANJEET  --   --   --   --  8.6  --  8.9  --  8.9   * 88 15*   < > 204*   < > 206*   < > 109*    < > = values in this interval not displayed.       Assessment/Plan:  (S22.080S) Compression fracture of T12 vertebra, sequela  (primary encounter diagnosis)  (M54.41) Acute bilateral low back pain with right-sided sciatica  (R53.81) Physical deconditioning  Comment: acute/ongoing  Plan: PT and OT, continue tylenol  1000mg TID scheduled,  lidocaine patch 4% to low back on AM and off Hs, cymbalta 60mg QD  Add tramadol 50mg q 6 hours prn     (E66.01) Morbid obesity (H)  Comment: ongoing  Plan: dietician to follow     (I25.10) Coronary artery disease involving native coronary artery of native heart without angina pectoris  (I10) Essential hypertension, benign  (N18.30) Stage 3 chronic kidney disease, unspecified whether stage 3a or 3b CKD (H)  Comment: ongoing  Plan: BMP follow, continue norvasc 10mg QD, losartan 100mg QD, metoprolol 100mg BID     (E11.51) Type 2 diabetes mellitus with diabetic peripheral angiopathy without gangrene, without long-term current use of insulin (H)  Comment: ongoing  Plan: blood sugar monitoring QID, continue NPH insulin 10 units BID       MED REC REQUIRED  Post Medication Reconciliation Status: medication reconcilation previously completed during another office visit      Orders:  Tramadol 50mg q 6 hours  prn      Electronically signed by: Tonya Lynn Haase, APRN CNP

## 2022-11-03 ENCOUNTER — TRANSITIONAL CARE UNIT VISIT (OUTPATIENT)
Dept: GERIATRICS | Facility: CLINIC | Age: 82
End: 2022-11-03
Payer: COMMERCIAL

## 2022-11-03 VITALS
SYSTOLIC BLOOD PRESSURE: 137 MMHG | OXYGEN SATURATION: 93 % | BODY MASS INDEX: 37.37 KG/M2 | DIASTOLIC BLOOD PRESSURE: 76 MMHG | HEIGHT: 64 IN | RESPIRATION RATE: 18 BRPM | WEIGHT: 218.9 LBS | HEART RATE: 62 BPM | TEMPERATURE: 97.4 F

## 2022-11-03 VITALS
HEIGHT: 64 IN | WEIGHT: 217.3 LBS | OXYGEN SATURATION: 91 % | HEART RATE: 68 BPM | RESPIRATION RATE: 18 BRPM | TEMPERATURE: 97.6 F | BODY MASS INDEX: 37.1 KG/M2 | SYSTOLIC BLOOD PRESSURE: 151 MMHG | DIASTOLIC BLOOD PRESSURE: 65 MMHG

## 2022-11-03 DIAGNOSIS — R53.81 PHYSICAL DECONDITIONING: ICD-10-CM

## 2022-11-03 DIAGNOSIS — S22.080S COMPRESSION FRACTURE OF T12 VERTEBRA, SEQUELA: Primary | ICD-10-CM

## 2022-11-03 DIAGNOSIS — I10 ESSENTIAL HYPERTENSION, BENIGN: ICD-10-CM

## 2022-11-03 DIAGNOSIS — E11.51 TYPE 2 DIABETES MELLITUS WITH DIABETIC PERIPHERAL ANGIOPATHY WITHOUT GANGRENE, WITHOUT LONG-TERM CURRENT USE OF INSULIN (H): ICD-10-CM

## 2022-11-03 PROCEDURE — 99304 1ST NF CARE SF/LOW MDM 25: CPT | Performed by: INTERNAL MEDICINE

## 2022-11-03 NOTE — PROGRESS NOTES
"[facility] GERIATRICS    Chief Complaint   Patient presents with     RECHECK     HPI:  Cara Camarillo is a 82 year old  (1940), who is being seen today for an episodic care visit at: [facility]) [25]. Today's concern is:   Acute/chronic low back pain: patient is sitting up in w/c, states pain in low back is rated 10/10, ongoing pain, states she has pain at night that keeps her awake , in therapy patient is walking up to 160 feet using a RW with CGA  CAD/HTN/CKD: /65, 137/76, 110/74 with HR 60-70 range, denies CP, palpitations, SOB  DMII: blood sugar range 137-263  CKD: creat 1.10 on 10/31/22    Allergies, and PMH/PSH reviewed in [facility] today.  REVIEW OF SYSTEMS:  10 point ROS of systems including Constitutional, Eyes, Respiratory, Cardiovascular, Gastroenterology, Genitourinary, Integumentary, Musculoskeletal, Psychiatric were all negative except for pertinent positives noted in my HPI.    Objective:   BP (!) 151/65   Pulse 68   Temp 97.6  F (36.4  C)   Resp 18   Ht 1.626 m (5' 4.02\")   Wt 98.6 kg (217 lb 4.8 oz)   SpO2 91%   BMI 37.28 kg/m    GENERAL APPEARANCE:  Alert, in no distress, morbidly obese  ENT:  Mouth and posterior oropharynx normal, moist mucous membranes, [place]  EYES:  EOM, conjunctivae, lids, pupils and irises normal, PERRL  RESP:  respiratory effort and palpation of chest normal, lungs clear to auscultation , no respiratory distress  CV:  Palpation and auscultation of heart done , regular rate and rhythm, no murmur, rub, or gallop, peripheral edema trace+ in LE bilaterally  ABDOMEN:  normal bowel sounds, soft, nontender, no hepatosplenomegaly or other masses  M/S:   patient sitting up in w/c  SKIN:  Inspection of skin and subcutaneous tissue baseline  NEURO:   speech wnl  PSYCH:  affect and mood normal    Recent labs in [facility] reviewed by me today.  and   Most Recent 3 CBC's:Recent Labs   Lab Test 10/24/22  1044 10/23/22  1625 08/10/20  1659   WBC 11.0 10.4 7.6 "   HGB 13.5 14.6 14.3   MCV 90 89 88    262 246     Most Recent 3 BMP's:Recent Labs   Lab Test 10/26/22  0821 10/26/22  0156 10/26/22  0151 10/24/22  1222 10/24/22  1044 10/24/22  0038 10/23/22  1625 10/23/22  1618 07/13/22  1517   NA  --   --   --   --  135  --  135  --  140   POTASSIUM  --   --   --   --  4.2  --  4.6  --  4.5   CHLORIDE  --   --   --   --  107  --  107  --  112*   CO2  --   --   --   --  23  --  22  --  22   BUN  --   --   --   --  20  --  23  --  18   CR  --   --   --   --  0.78  --  0.99  --  1.00   ANIONGAP  --   --   --   --  5  --  6  --  6   MANJEET  --   --   --   --  8.6  --  8.9  --  8.9   * 88 15*   < > 204*   < > 206*   < > 109*    < > = values in this interval not displayed.       Assessment/Plan:  (S22.080S) Compression fracture of T12 vertebra, sequela  (primary encounter diagnosis)  (M54.41) Acute bilateral low back pain with right-sided sciatica  (R53.81) Physical deconditioning  Comment: acute/ongoing  Plan: PT and OT, continue tylenol  1000mg TID scheduled,  lidocaine patch 4% to low back on AM and off Hs, cymbalta 60mg QD, tramadol 50mg q 6 hours prn  Add neurontin 200mg TID scheduled and qhs prn     (E66.01) Morbid obesity (H)  Comment: ongoing, no change  Plan: dietician to follow     (I25.10) Coronary artery disease involving native coronary artery of native heart without angina pectoris  (I10) Essential hypertension, benign  (N18.30) Stage 3 chronic kidney disease, unspecified whether stage 3a or 3b CKD (H)  Comment: ongoing, no change  Plan: BMP follow, continue norvasc 10mg QD, losartan 100mg QD, metoprolol 100mg BID     (E11.51) Type 2 diabetes mellitus with diabetic peripheral angiopathy without gangrene, without long-term current use of insulin (H)  Comment: ongoing, no change  Plan: blood sugar monitoring QID, continue NPH insulin 10 units BID          MED REC REQUIRED  Post Medication Reconciliation Status: medication reconcilation previously completed  during another office visit      Orders:  neurontin 200mg TID and qhs prn      Electronically signed by: Tonya Lynn Haase, APRN CNP

## 2022-11-03 NOTE — PROGRESS NOTES
[facility] GERIATRICS    PRIMARY CARE PROVIDER AND CLINIC:  Kole Gonsalez MD, [address] / [facility] [address]  Chief Complaint   Patient presents with     Hospital F/U      [place] Medical Record Number:  [id number]  Place of Service where encounter took place:  [facility] ([facility])     Cara Camarillo  is a 82 year old  (1940), admitted to the above facility from  [facility]. Hospital stay 10/23/22 through 10/26/22..     Pt was hospitalized for the evaluation of back pain, LE weakness 2 days  following a fall.  History of chronic back pain, chronic L sided sciatica. CT head neg CT C spine neg. xray lumbar spine revealed chronic T12 compression fracture.  Neuro exam non-focal.  Echo noted mod aortic stenosis, mod pulmonary HTN.  Noted to have symptomatic orthostatic hypotension, improved with IV fluids.    Other medical issues addressed included HTN (multiple medications continued), DM under fair control    Patient notes continued low back pain, is frustrated that it has not improved since presentation to the hospital. Tramadol has been moderately helpful. She is walking with a walker.  She notes chronic thigh and left lower extremity pain which she states are not new.  She denies dizziness, chest pain, shortness of breath, palpitations.  Blood glucoses have generally been in the 100s to 200s.  She is having regular bowel movements and is voiding without difficulty    CODE STATUS/ADVANCE DIRECTIVES DISCUSSION:  Prior  CPR/Full code   ALLERGIES:   Allergies   Allergen Reactions     Actos [Pioglitazone]      Lower extremity edema       Enalapril      Renal failure     Hydrochlorothiazide      Dry mouth     Lisinopril      Hyperkalemia       Metformin Diarrhea     Diarrhea       PAST MEDICAL HISTORY:   Past Medical History:   Diagnosis Date     Adenoma     tubal     Anxiety      Anxiety      Aortic stenosis      Arthritis     ankles     Chronic kidney disease  (CKD), stage III (moderate) (H)      Coronary artery disease     cardiac cath 2016: ISIDRO to LAD, cath 2016: ISIDRO to PDA     Decubitus ulcer of coccyx      Dysthymic disorder      Essential hypertension, benign 2205     Herpes zoster without mention of complication Aug 2006    left leg (thigh)     Hydronephrosis      Hyperlipidaemia LDL goal < 100      Left ventricular diastolic dysfunction      Malignant neoplasm of corpus uteri, except isthmus (H) 2005    endometrial CA, s/p external beam radiation & radiation implant & FIDEL/BSO       Obesity      Pulmonary hyperinflation      Sciatica      Type II or unspecified type diabetes mellitus without mention of complication, not stated as uncontrolled 2005     Unspecified congenital anomaly of urinary system 2005      PAST SURGICAL HISTORY:   has a past surgical history that includes ANESTH, SECTION; nephrectomy rt/lt (OCT 2005); hysterectomy, vaginal (05); NONSPECIFIC PROCEDURE (3/20/06); UGI ENDOSCOPY W EUS (2013); Laparoscopic cholecystectomy (1/10/2013); Nephrectomy bilateral; Hysterectomy; Arthroplasty knee (2013); Arthroplasty knee (2/3/2014); Excise mass lower extremity (Left, 2015); other surgical history; and other surgical history.  FAMILY HISTORY: family history includes Diabetes in her father; Other - See Comments (age of onset: 95) in her mother.  SOCIAL HISTORY:   reports that she has never smoked. She has never used smokeless tobacco. She reports that she does not drink alcohol and does not use drugs.  Patient's living condition: lives alone    Medications were reviewed by me today:    Current Outpatient Medications   Medication Sig     CONTOUR NEXT TEST test strip USE TO TEST BLOOD SUGAR DAILY OR AS DIRECTED.     acetaminophen (TYLENOL) 500 MG tablet Take 1,000 mg by mouth 3 times daily     amLODIPine (NORVASC) 10 MG tablet TAKE 1 TABLET BY MOUTH EVERY DAY     aspirin 81 MG tablet Take 1 tablet (81 mg)  "by mouth daily     atorvastatin (LIPITOR) 20 MG tablet Take 1 tablet (20 mg) by mouth daily     BD INSULIN SYRINGE U/F 30G X 1/2\" 0.5 ML miscellaneous USE ONE SYRINGE TWICE DAILY OR AS DIRECTED.     blood glucose monitoring (CONTOUR NEXT MONITOR W/DEVICE KIT) meter device kit Use to test blood sugar 3 times daily or as directed.     DULoxetine (CYMBALTA) 30 MG capsule One capsule once daily for one week, then increase to two capsules once daily (Patient taking differently: Take 60 mg by mouth daily One capsule once daily for one week, then increase to two capsules once daily)     insulin  UNIT/ML injection Inject 10 Units Subcutaneous 2 times daily (before meals)     Lidocaine (LIDOCARE) 4 % Patch Place 1 patch onto the skin every 24 hours To prevent lidocaine toxicity, patient should be patch free for 12 hrs daily.     losartan (COZAAR) 50 MG tablet TAKE 2 TABLETS BY MOUTH EVERY DAY (Patient taking differently: Take 50 mg by mouth 2 times daily)     metoprolol tartrate (LOPRESSOR) 100 MG tablet Take 1 tablet (100 mg) by mouth 2 times daily     nitroGLYcerin (NITROSTAT) 0.4 MG sublingual tablet Place 1 tablet (0.4 mg) under the tongue every 5 minutes as needed for chest pain if you are still having symptoms after 3 doses (15 minutes) call 911.     traMADol (ULTRAM) 50 MG tablet Take 1 tablet (50 mg) by mouth every 6 hours as needed for severe pain     vitamin D3 (CHOLECALCIFEROL) 50 mcg (2000 units) tablet Take 1 tablet by mouth every other day     No current facility-administered medications for this visit.       ROS:  10 point ROS of systems including Constitutional, Eyes, Respiratory, Cardiovascular, Gastroenterology, Genitourinary, Integumentary, Musculoskeletal, Psychiatric were all negative except for pertinent positives noted in my HPI.    Vitals:  /76   Pulse 62   Temp 97.4  F (36.3  C)   Resp 18   Ht 1.626 m (5' 4\")   Wt 99.3 kg (218 lb 14.4 oz)   SpO2 93%   BMI 37.57 kg/m  "   Exam:  Obese female, lying in bed, in NAD  [place]  HEENT oral mucosa moist  Neck supple  Lungs clear  CV rrr  Abd soft, non-distended, non-tender  Extremities no edema, no sign of acute arthritis  NEURO alert, fully oriented  CN intact  No focal LE weakness, as assessed with Pt lying in bed  Gait was not assessed by me    Lab/Diagnostic data:    Most Recent 3 CBC's:Recent Labs   Lab Test 10/24/22  1044 10/23/22  1625 08/10/20  1659   WBC 11.0 10.4 7.6   HGB 13.5 14.6 14.3   MCV 90 89 88    262 246     Most Recent 3 BMP's:Recent Labs   Lab Test 10/26/22  0821 10/26/22  0156 10/26/22  0151 10/24/22  1222 10/24/22  1044 10/24/22  0038 10/23/22  1625 10/23/22  1618 07/13/22  1517   NA  --   --   --   --  135  --  135  --  140   POTASSIUM  --   --   --   --  4.2  --  4.6  --  4.5   CHLORIDE  --   --   --   --  107  --  107  --  112*   CO2  --   --   --   --  23  --  22  --  22   BUN  --   --   --   --  20  --  23  --  18   CR  --   --   --   --  0.78  --  0.99  --  1.00   ANIONGAP  --   --   --   --  5  --  6  --  6   MANJEET  --   --   --   --  8.6  --  8.9  --  8.9   * 88 15*   < > 204*   < > 206*   < > 109*    < > = values in this interval not displayed.     Most Recent 2 LFT's:Recent Labs   Lab Test 10/24/22  1044 04/29/19  1447   AST 20 20   ALT 21 26   ALKPHOS 96 116   BILITOTAL 0.8 0.5     Most Recent Hemoglobin A1c:Recent Labs   Lab Test 07/13/22  1517   A1C 7.2*       ASSESSMENT/PLAN      (S22.080S) Compression fracture of T12 vertebra, sequela  (primary encounter diagnosis)  Acute on chronic low back pain, intermittent bilateral LE pain  (R53.81) Physical deconditioning  Comment: acute/ongoing  No focal LE weakness  Plan: PT and OT, continue tylenol  1000mg TID scheduled,  lidocaine patch 4% to low back on AM and off Hs, cymbalta 60mg every day,  tramadol 50mg q 6 hours prn  Monitor LE exam    (I25.10) Coronary artery disease involving native coronary artery of native heart without angina  pectoris  (I10) Essential hypertension, benign  (N18.30) Stage 3 chronic kidney disease, unspecified whether stage 3a or 3b CKD (H)  Comment: CV status stable  Plan:  continue norvasc 10mg QD, losartan 100mg QD, metoprolol 100mg BID. Monitor BP, HR, BMP  Avoid hypotension     (E11.51) Type 2 diabetes mellitus with diabetic peripheral angiopathy without gangrene, without long-term current use of insulin (H)  Comment: fair control  Plan: blood sugar monitoring QID, continue NPH insulin 10 units BID      Elier Vogt MD

## 2022-11-03 NOTE — LETTER
11/3/2022        RE: Cara Camarillo  [address]  [facility] [address]        [facility] GERIATRICS    PRIMARY CARE PROVIDER AND CLINIC:  Kole Gonsalez MD, [address] / [facility] [address]  Chief Complaint   Patient presents with     Hospital F/U      [facility] Record Number:  [id number]  Place of Service where encounter took place:  [facility] (TCU)     Cara Camarillo  is a 82 year old  (1940), admitted to the above facility from  [facility]. Hospital stay 10/23/22 through 10/26/22..     Pt was hospitalized for the evaluation of back pain, LE weakness 2 days  following a fall.  History of chronic back pain, chronic L sided sciatica. CT head neg CT C spine neg. xray lumbar spine revealed chronic T12 compression fracture.  Neuro exam non-focal.  Echo noted mod aortic stenosis, mod pulmonary HTN.  Noted to have symptomatic orthostatic hypotension, improved with IV fluids.    Other medical issues addressed included HTN (multiple medications continued), DM under fair control    Patient notes continued low back pain, is frustrated that it has not improved since presentation to the hospital. Tramadol has been moderately helpful. She is walking with a walker.  She notes chronic thigh and left lower extremity pain which she states are not new.  She denies dizziness, chest pain, shortness of breath, palpitations.  Blood glucoses have generally been in the 100s to 200s.  She is having regular bowel movements and is voiding without difficulty    CODE STATUS/ADVANCE DIRECTIVES DISCUSSION:  Prior  CPR/Full code   ALLERGIES:   Allergies   Allergen Reactions     Actos [Pioglitazone]      Lower extremity edema       Enalapril      Renal failure     Hydrochlorothiazide      Dry mouth     Lisinopril      Hyperkalemia       Metformin Diarrhea     Diarrhea       PAST MEDICAL HISTORY:   Past Medical History:   Diagnosis Date     Adenoma     tubal      Anxiety      Anxiety      Aortic stenosis      Arthritis     ankles     Chronic kidney disease (CKD), stage III (moderate) (H)      Coronary artery disease     cardiac cath 2016: ISIDRO to LAD, cath 2016: ISIDRO to PDA     Decubitus ulcer of coccyx      Dysthymic disorder      Essential hypertension, benign 2205     Herpes zoster without mention of complication Aug 2006    left leg (thigh)     Hydronephrosis      Hyperlipidaemia LDL goal < 100      Left ventricular diastolic dysfunction      Malignant neoplasm of corpus uteri, except isthmus (H) 2005    endometrial CA, s/p external beam radiation & radiation implant & FIDEL/BSO       Obesity      Pulmonary hyperinflation      Sciatica      Type II or unspecified type diabetes mellitus without mention of complication, not stated as uncontrolled 2005     Unspecified congenital anomaly of urinary system 2005      PAST SURGICAL HISTORY:   has a past surgical history that includes ANESTH, SECTION; nephrectomy rt/lt (OCT 2005); hysterectomy, vaginal (05); NONSPECIFIC PROCEDURE (3/20/06); UGI ENDOSCOPY W EUS (2013); Laparoscopic cholecystectomy (1/10/2013); Nephrectomy bilateral; Hysterectomy; Arthroplasty knee (2013); Arthroplasty knee (2/3/2014); Excise mass lower extremity (Left, 2015); other surgical history; and other surgical history.  FAMILY HISTORY: family history includes Diabetes in her father; Other - See Comments (age of onset: 95) in her mother.  SOCIAL HISTORY:   reports that she has never smoked. She has never used smokeless tobacco. She reports that she does not drink alcohol and does not use drugs.  Patient's living condition: lives alone    Medications were reviewed by me today:    Current Outpatient Medications   Medication Sig     CONTOUR NEXT TEST test strip USE TO TEST BLOOD SUGAR DAILY OR AS DIRECTED.     acetaminophen (TYLENOL) 500 MG tablet Take 1,000 mg by mouth 3 times daily     amLODIPine  "(NORVASC) 10 MG tablet TAKE 1 TABLET BY MOUTH EVERY DAY     aspirin 81 MG tablet Take 1 tablet (81 mg) by mouth daily     atorvastatin (LIPITOR) 20 MG tablet Take 1 tablet (20 mg) by mouth daily     BD INSULIN SYRINGE U/F 30G X 1/2\" 0.5 ML miscellaneous USE ONE SYRINGE TWICE DAILY OR AS DIRECTED.     blood glucose monitoring (CONTOUR NEXT MONITOR W/DEVICE KIT) meter device kit Use to test blood sugar 3 times daily or as directed.     DULoxetine (CYMBALTA) 30 MG capsule One capsule once daily for one week, then increase to two capsules once daily (Patient taking differently: Take 60 mg by mouth daily One capsule once daily for one week, then increase to two capsules once daily)     insulin  UNIT/ML injection Inject 10 Units Subcutaneous 2 times daily (before meals)     Lidocaine (LIDOCARE) 4 % Patch Place 1 patch onto the skin every 24 hours To prevent lidocaine toxicity, patient should be patch free for 12 hrs daily.     losartan (COZAAR) 50 MG tablet TAKE 2 TABLETS BY MOUTH EVERY DAY (Patient taking differently: Take 50 mg by mouth 2 times daily)     metoprolol tartrate (LOPRESSOR) 100 MG tablet Take 1 tablet (100 mg) by mouth 2 times daily     nitroGLYcerin (NITROSTAT) 0.4 MG sublingual tablet Place 1 tablet (0.4 mg) under the tongue every 5 minutes as needed for chest pain if you are still having symptoms after 3 doses (15 minutes) call 911.     traMADol (ULTRAM) 50 MG tablet Take 1 tablet (50 mg) by mouth every 6 hours as needed for severe pain     vitamin D3 (CHOLECALCIFEROL) 50 mcg (2000 units) tablet Take 1 tablet by mouth every other day     No current facility-administered medications for this visit.       ROS:  10 point ROS of systems including Constitutional, Eyes, Respiratory, Cardiovascular, Gastroenterology, Genitourinary, Integumentary, Musculoskeletal, Psychiatric were all negative except for pertinent positives noted in my HPI.    Vitals:  /76   Pulse 62   Temp 97.4  F (36.3  C)   " "Resp 18   Ht 1.626 m (5' 4\")   Wt 99.3 kg (218 lb 14.4 oz)   SpO2 93%   BMI 37.57 kg/m    Exam:  Obese female, lying in bed, in NAD  [place]  HEENT oral mucosa moist  Neck supple  Lungs clear  CV rrr  Abd soft, non-distended, non-tender  Extremities no edema, no sign of acute arthritis  NEURO alert, fully oriented  CN intact  No focal LE weakness, as assessed with Pt lying in bed  Gait was not assessed by me    Lab/Diagnostic data:    Most Recent 3 CBC's:Recent Labs   Lab Test 10/24/22  1044 10/23/22  1625 08/10/20  1659   WBC 11.0 10.4 7.6   HGB 13.5 14.6 14.3   MCV 90 89 88    262 246     Most Recent 3 BMP's:Recent Labs   Lab Test 10/26/22  0821 10/26/22  0156 10/26/22  0151 10/24/22  1222 10/24/22  1044 10/24/22  0038 10/23/22  1625 10/23/22  1618 07/13/22  1517   NA  --   --   --   --  135  --  135  --  140   POTASSIUM  --   --   --   --  4.2  --  4.6  --  4.5   CHLORIDE  --   --   --   --  107  --  107  --  112*   CO2  --   --   --   --  23  --  22  --  22   BUN  --   --   --   --  20  --  23  --  18   CR  --   --   --   --  0.78  --  0.99  --  1.00   ANIONGAP  --   --   --   --  5  --  6  --  6   MANJEET  --   --   --   --  8.6  --  8.9  --  8.9   * 88 15*   < > 204*   < > 206*   < > 109*    < > = values in this interval not displayed.     Most Recent 2 LFT's:Recent Labs   Lab Test 10/24/22  1044 04/29/19  1447   AST 20 20   ALT 21 26   ALKPHOS 96 116   BILITOTAL 0.8 0.5     Most Recent Hemoglobin A1c:Recent Labs   Lab Test 07/13/22  1517   A1C 7.2*       ASSESSMENT/PLAN      (S22.080S) Compression fracture of T12 vertebra, sequela  (primary encounter diagnosis)  Acute on chronic low back pain, intermittent bilateral LE pain  (R53.81) Physical deconditioning  Comment: acute/ongoing  No focal LE weakness  Plan: PT and OT, continue tylenol  1000mg TID scheduled,  lidocaine patch 4% to low back on AM and off Hs, cymbalta 60mg every day,  tramadol 50mg q 6 hours prn  Monitor LE exam    (I25.10) Coronary " artery disease involving native coronary artery of native heart without angina pectoris  (I10) Essential hypertension, benign  (N18.30) Stage 3 chronic kidney disease, unspecified whether stage 3a or 3b CKD (H)  Comment: CV status stable  Plan:  continue norvasc 10mg QD, losartan 100mg QD, metoprolol 100mg BID. Monitor BP, HR, BMP  Avoid hypotension     (E11.51) Type 2 diabetes mellitus with diabetic peripheral angiopathy without gangrene, without long-term current use of insulin (H)  Comment: fair control  Plan: blood sugar monitoring QID, continue NPH insulin 10 units BID      Elier Vogt MD          Sincerely,        Elier Vogt MD

## 2022-11-04 ENCOUNTER — TRANSITIONAL CARE UNIT VISIT (OUTPATIENT)
Dept: GERIATRICS | Facility: CLINIC | Age: 82
End: 2022-11-04
Payer: COMMERCIAL

## 2022-11-04 DIAGNOSIS — I10 ESSENTIAL HYPERTENSION, BENIGN: ICD-10-CM

## 2022-11-04 DIAGNOSIS — R53.81 PHYSICAL DECONDITIONING: ICD-10-CM

## 2022-11-04 DIAGNOSIS — I25.10 CORONARY ARTERY DISEASE INVOLVING NATIVE CORONARY ARTERY OF NATIVE HEART WITHOUT ANGINA PECTORIS: ICD-10-CM

## 2022-11-04 DIAGNOSIS — S22.080S COMPRESSION FRACTURE OF T12 VERTEBRA, SEQUELA: Primary | ICD-10-CM

## 2022-11-04 DIAGNOSIS — N18.30 STAGE 3 CHRONIC KIDNEY DISEASE, UNSPECIFIED WHETHER STAGE 3A OR 3B CKD (H): ICD-10-CM

## 2022-11-04 DIAGNOSIS — E11.51 TYPE 2 DIABETES MELLITUS WITH DIABETIC PERIPHERAL ANGIOPATHY WITHOUT GANGRENE, WITHOUT LONG-TERM CURRENT USE OF INSULIN (H): ICD-10-CM

## 2022-11-04 DIAGNOSIS — M54.41 ACUTE BILATERAL LOW BACK PAIN WITH RIGHT-SIDED SCIATICA: ICD-10-CM

## 2022-11-04 DIAGNOSIS — E66.01 MORBID OBESITY (H): ICD-10-CM

## 2022-11-04 PROCEDURE — 99310 SBSQ NF CARE HIGH MDM 45: CPT | Performed by: NURSE PRACTITIONER

## 2022-11-04 RX ORDER — GABAPENTIN 100 MG/1
200 CAPSULE ORAL 3 TIMES DAILY
Start: 2022-11-04 | End: 2022-11-17

## 2022-11-04 NOTE — LETTER
"    11/4/2022        RE: Cara Camarillo  [address] [address]        [facility]    Chief Complaint   Patient presents with     RECHECK     HPI:  Cara Camarillo is a 82 year old  (1940), who is being seen today for an episodic care visit at: [facility] ([facility]) [25]. Today's concern is:   Acute/chronic low back pain: patient is sitting up in w/c, states pain in low back is rated 10/10, ongoing pain, states she has pain at night that keeps her awake , in therapy patient is walking up to 160 feet using a RW with CGA  CAD/HTN/CKD: /65, 137/76, 110/74 with HR 60-70 range, denies CP, palpitations, SOB  DMII: blood sugar range 137-263  CKD: creat 1.10 on 10/31/22    Allergies, and PMH/PSH reviewed in [facility] today.  REVIEW OF SYSTEMS:  10 point ROS of systems including Constitutional, Eyes, Respiratory, Cardiovascular, Gastroenterology, Genitourinary, Integumentary, Musculoskeletal, Psychiatric were all negative except for pertinent positives noted in my HPI.    Objective:   BP (!) 151/65   Pulse 68   Temp 97.6  F (36.4  C)   Resp 18   Ht 1.626 m (5' 4.02\")   Wt 98.6 kg (217 lb 4.8 oz)   SpO2 91%   BMI 37.28 kg/m    GENERAL APPEARANCE:  Alert, in no distress, morbidly obese  ENT:  Mouth and posterior oropharynx normal, moist mucous membranes, [place]  EYES:  EOM, conjunctivae, lids, pupils and irises normal, PERRL  RESP:  respiratory effort and palpation of chest normal, lungs clear to auscultation , no respiratory distress  CV:  Palpation and auscultation of heart done , regular rate and rhythm, no murmur, rub, or gallop, peripheral edema trace+ in LE bilaterally  ABDOMEN:  normal bowel sounds, soft, nontender, no hepatosplenomegaly or other masses  M/S:   patient sitting up in w/c  SKIN:  Inspection of skin and subcutaneous tissue baseline  NEURO:   speech wnl  PSYCH:  affect and mood normal    Recent labs in [facility] reviewed by me today.  and   Most Recent 3 " CBC's:Recent Labs   Lab Test 10/24/22  1044 10/23/22  1625 08/10/20  1659   WBC 11.0 10.4 7.6   HGB 13.5 14.6 14.3   MCV 90 89 88    262 246     Most Recent 3 BMP's:Recent Labs   Lab Test 10/26/22  0821 10/26/22  0156 10/26/22  0151 10/24/22  1222 10/24/22  1044 10/24/22  0038 10/23/22  1625 10/23/22  1618 07/13/22  1517   NA  --   --   --   --  135  --  135  --  140   POTASSIUM  --   --   --   --  4.2  --  4.6  --  4.5   CHLORIDE  --   --   --   --  107  --  107  --  112*   CO2  --   --   --   --  23  --  22  --  22   BUN  --   --   --   --  20  --  23  --  18   CR  --   --   --   --  0.78  --  0.99  --  1.00   ANIONGAP  --   --   --   --  5  --  6  --  6   MANJEET  --   --   --   --  8.6  --  8.9  --  8.9   * 88 15*   < > 204*   < > 206*   < > 109*    < > = values in this interval not displayed.       Assessment/Plan:  (S22.080S) Compression fracture of T12 vertebra, sequela  (primary encounter diagnosis)  (M54.41) Acute bilateral low back pain with right-sided sciatica  (R53.81) Physical deconditioning  Comment: acute/ongoing  Plan: PT and OT, continue tylenol  1000mg TID scheduled,  lidocaine patch 4% to low back on AM and off Hs, cymbalta 60mg QD, tramadol 50mg q 6 hours prn  Add neurontin 200mg TID scheduled and qhs prn     (E66.01) Morbid obesity (H)  Comment: ongoing, no change  Plan: dietician to follow     (I25.10) Coronary artery disease involving native coronary artery of native heart without angina pectoris  (I10) Essential hypertension, benign  (N18.30) Stage 3 chronic kidney disease, unspecified whether stage 3a or 3b CKD (H)  Comment: ongoing, no change  Plan: BMP follow, continue norvasc 10mg QD, losartan 100mg QD, metoprolol 100mg BID     (E11.51) Type 2 diabetes mellitus with diabetic peripheral angiopathy without gangrene, without long-term current use of insulin (H)  Comment: ongoing, no change  Plan: blood sugar monitoring QID, continue NPH insulin 10 units BID          MED REC  REQUIRED  Post Medication Reconciliation Status: medication reconcilation previously completed during another office visit      Orders:  neurontin 200mg TID and qhs prn      Electronically signed by: Tonya Lynn Haase, APRN CNP             Sincerely,        Tonya Lynn Haase, APRN CNP

## 2022-11-07 ENCOUNTER — TELEPHONE (OUTPATIENT)
Dept: GERIATRICS | Facility: CLINIC | Age: 82
End: 2022-11-07

## 2022-11-07 NOTE — TELEPHONE ENCOUNTER
[facility] Nurse Triage Telephone Note    Provider: Tonya Haase, APRN CNP  Facility: [facility]   Facility Type:  C    Caller: Zuri  Call Back Number: [phone]    Allergies   Allergen Reactions     Actos [Pioglitazone]      Lower extremity edema       Enalapril      Renal failure     Hydrochlorothiazide      Dry mouth     Lisinopril      Hyperkalemia       Metformin Diarrhea     Diarrhea        Reason for call: Weight gain of 5lbs in 3 days. 11/4: 215.6#, today (11/7): 220#. No noticeable increase in edema, SOB, lungs clear bilat. Not on any diuretics.    Verbal Order/Direction given by Provider: PCP notified    Provider giving Order:  Tonya Haase, APRN CNP Chloe Hanson, RN

## 2022-11-08 ENCOUNTER — TELEPHONE (OUTPATIENT)
Dept: GERIATRICS | Facility: CLINIC | Age: 82
End: 2022-11-08

## 2022-11-08 ENCOUNTER — TRANSITIONAL CARE UNIT VISIT (OUTPATIENT)
Dept: GERIATRICS | Facility: CLINIC | Age: 82
End: 2022-11-08
Payer: COMMERCIAL

## 2022-11-08 VITALS
DIASTOLIC BLOOD PRESSURE: 78 MMHG | WEIGHT: 220 LBS | RESPIRATION RATE: 18 BRPM | BODY MASS INDEX: 37.74 KG/M2 | OXYGEN SATURATION: 92 % | SYSTOLIC BLOOD PRESSURE: 125 MMHG | HEART RATE: 68 BPM | TEMPERATURE: 97.4 F

## 2022-11-08 DIAGNOSIS — R30.0 DYSURIA: ICD-10-CM

## 2022-11-08 DIAGNOSIS — I25.10 CORONARY ARTERY DISEASE INVOLVING NATIVE CORONARY ARTERY OF NATIVE HEART WITHOUT ANGINA PECTORIS: ICD-10-CM

## 2022-11-08 DIAGNOSIS — S22.080S COMPRESSION FRACTURE OF T12 VERTEBRA, SEQUELA: Primary | ICD-10-CM

## 2022-11-08 DIAGNOSIS — E66.01 MORBID OBESITY (H): ICD-10-CM

## 2022-11-08 DIAGNOSIS — R53.81 PHYSICAL DECONDITIONING: ICD-10-CM

## 2022-11-08 DIAGNOSIS — M54.41 ACUTE BILATERAL LOW BACK PAIN WITH RIGHT-SIDED SCIATICA: ICD-10-CM

## 2022-11-08 DIAGNOSIS — I10 ESSENTIAL HYPERTENSION, BENIGN: ICD-10-CM

## 2022-11-08 DIAGNOSIS — E11.51 TYPE 2 DIABETES MELLITUS WITH DIABETIC PERIPHERAL ANGIOPATHY WITHOUT GANGRENE, WITHOUT LONG-TERM CURRENT USE OF INSULIN (H): ICD-10-CM

## 2022-11-08 DIAGNOSIS — N18.30 STAGE 3 CHRONIC KIDNEY DISEASE, UNSPECIFIED WHETHER STAGE 3A OR 3B CKD (H): ICD-10-CM

## 2022-11-08 DIAGNOSIS — K59.03 DRUG-INDUCED CONSTIPATION: ICD-10-CM

## 2022-11-08 PROCEDURE — 99310 SBSQ NF CARE HIGH MDM 45: CPT | Performed by: NURSE PRACTITIONER

## 2022-11-08 RX ORDER — SENNA AND DOCUSATE SODIUM 50; 8.6 MG/1; MG/1
1 TABLET, FILM COATED ORAL 2 TIMES DAILY
Start: 2022-11-08 | End: 2023-04-25

## 2022-11-08 RX ORDER — POLYETHYLENE GLYCOL 3350 17 G/17G
17 POWDER, FOR SOLUTION ORAL DAILY
Qty: 510 G
Start: 2022-11-08 | End: 2023-04-25

## 2022-11-08 NOTE — LETTER
11/8/2022        RE: Cara Camarillo  [address] [address]        [facility]    Chief Complaint   Patient presents with     RECHECK     HPI:  Cara Camarillo is a 82 year old  (1940), who is being seen today for an episodic care visit at: [facility] ([facility]) [25]. Today's concern is:   Acute/chronic low back pain: resting in bed, states pain in low back is rated 10/10, patient states pain was better for a few days and now today is worse , in therapy patient is walking up to 160 feet using a RW with CGA  Constipation: patient states she has not had a BM in over 4 days, increase back pain today, no N/V or abdominal pain  Dysuria: c/o pain when urinating and incontinent urine, patient states she has not had incontinent urine before  CAD/HTN/CKD: /78, 141/72, 152/67  with HR 60range, denies CP, palpitations, SOB  DMII: blood sugar range 116-178  CKD: creat 1.10 on 10/31/22    Allergies, and PMH/PSH reviewed in [facility] today.  REVIEW OF SYSTEMS:  10 point ROS of systems including Constitutional, Eyes, Respiratory, Cardiovascular, Gastroenterology, Genitourinary, Integumentary, Musculoskeletal, Psychiatric were all negative except for pertinent positives noted in my HPI.    Objective:   /78   Pulse 68   Temp 97.4  F (36.3  C)   Resp 18   Wt 99.8 kg (220 lb)   SpO2 92%   BMI 37.74 kg/m    GENERAL APPEARANCE:  Alert, in no distress, morbidly obese  ENT:  Mouth and posterior oropharynx normal, moist mucous membranes, [place]  EYES:  EOM, conjunctivae, lids, pupils and irises normal, PERRL  RESP:  respiratory effort and palpation of chest normal, lungs clear to auscultation , no respiratory distress  CV:  Palpation and auscultation of heart done , regular rate and rhythm, no murmur, rub, or gallop, peripheral edema 1+ in LE bilaterally  ABDOMEN:  normal bowel sounds, soft, nontender, no hepatosplenomegaly or other masses  M/S:   patient resting in  bed  SKIN:  Inspection of skin and subcutaneous tissue baseline  NEURO:   speech wnl  PSYCH:  affect and mood normal    Recent labs in [facility] reviewed by me today.  and   Most Recent 3 CBC's:Recent Labs   Lab Test 10/24/22  1044 10/23/22  1625 08/10/20  1659   WBC 11.0 10.4 7.6   HGB 13.5 14.6 14.3   MCV 90 89 88    262 246     Most Recent 3 BMP's:Recent Labs   Lab Test 10/26/22  0821 10/26/22  0156 10/26/22  0151 10/24/22  1222 10/24/22  1044 10/24/22  0038 10/23/22  1625 10/23/22  1618 07/13/22  1517   NA  --   --   --   --  135  --  135  --  140   POTASSIUM  --   --   --   --  4.2  --  4.6  --  4.5   CHLORIDE  --   --   --   --  107  --  107  --  112*   CO2  --   --   --   --  23  --  22  --  22   BUN  --   --   --   --  20  --  23  --  18   CR  --   --   --   --  0.78  --  0.99  --  1.00   ANIONGAP  --   --   --   --  5  --  6  --  6   MANJEET  --   --   --   --  8.6  --  8.9  --  8.9   * 88 15*   < > 204*   < > 206*   < > 109*    < > = values in this interval not displayed.       Assessment/Plan:  (S22.080S) Compression fracture of T12 vertebra, sequela  (primary encounter diagnosis)  (M54.41) Acute bilateral low back pain with right-sided sciatica  (R53.81) Physical deconditioning  Comment: acute/ongoing, no change  Plan: PT and OT, continue tylenol  1000mg TID scheduled,  lidocaine patch 4% to low back on AM and off Hs, cymbalta 60mg QD, tramadol 50mg q 6 hours prn  Continue neurontin 200mg TID scheduled and qhs prn     (E66.01) Morbid obesity (H)  Comment: ongoing, no change  Plan: dietician to follow     (I25.10) Coronary artery disease involving native coronary artery of native heart without angina pectoris  (I10) Essential hypertension, benign  (N18.30) Stage 3 chronic kidney disease, unspecified whether stage 3a or 3b CKD (H)  Comment: ongoing, no change  Plan: BMP follow, continue norvasc 10mg QD, losartan 100mg QD, metoprolol 100mg BID     (E11.51) Type 2 diabetes mellitus with diabetic  peripheral angiopathy without gangrene, without long-term current use of insulin (H)  Comment: ongoing, no change  Plan: blood sugar monitoring QID, continue NPH insulin 10 units BID    (K59.03) drug induced constipation  Acute/ongoing  Plan: senna s 1 PO BID and miralax 17gm QD scheduled, hold for loose stool    (R30.0) dysruria  Acute/ongoing  Plan: UA/UC, push fluids       MED REC REQUIRED  Post Medication Reconciliation Status: medication reconcilation previously completed during another office visit      Orders:  Senna s 1 PO BID scheduled  miralax 17gm QD scheduled  UA/UC      Electronically signed by: Tonya Lynn Haase, APRN CNP             Sincerely,        Tonya Lynn Haase, APRN CNP

## 2022-11-08 NOTE — TELEPHONE ENCOUNTER
[facility]    Provider: Tonya Haase, APRN CNP  Facility: [facility] Facility Type:  TCU    Caller: Avril  Call Back Number: [phone]    Allergies:    Allergies   Allergen Reactions     Actos [Pioglitazone]      Lower extremity edema       Enalapril      Renal failure     Hydrochlorothiazide      Dry mouth     Lisinopril      Hyperkalemia       Metformin Diarrhea     Diarrhea         Reason for call: c/o of burning on urination. Afebrile       Verbal Order/Direction given by Provider: Wait for UC results    Provider giving Order:  AKANKSHA Estevez CNP    Verbal Order given to: Avril Altamirano RN

## 2022-11-08 NOTE — PROGRESS NOTES
[facility] GERIATRICS    Chief Complaint   Patient presents with     RECHECK     HPI:  Cara Camarillo is a 82 year old  (1940), who is being seen today for an episodic care visit at: [facility]) [25]. Today's concern is:   Acute/chronic low back pain: resting in bed, states pain in low back is rated 10/10, patient states pain was better for a few days and now today is worse , in therapy patient is walking up to 160 feet using a RW with CGA  Constipation: patient states she has not had a BM in over 4 days, increase back pain today, no N/V or abdominal pain  Dysuria: c/o pain when urinating and incontinent urine, patient states she has not had incontinent urine before  CAD/HTN/CKD: /78, 141/72, 152/67  with HR 60range, denies CP, palpitations, SOB  DMII: blood sugar range 116-178  CKD: creat 1.10 on 10/31/22    Allergies, and PMH/PSH reviewed in [facility] today.  REVIEW OF SYSTEMS:  10 point ROS of systems including Constitutional, Eyes, Respiratory, Cardiovascular, Gastroenterology, Genitourinary, Integumentary, Musculoskeletal, Psychiatric were all negative except for pertinent positives noted in my HPI.    Objective:   /78   Pulse 68   Temp 97.4  F (36.3  C)   Resp 18   Wt 99.8 kg (220 lb)   SpO2 92%   BMI 37.74 kg/m    GENERAL APPEARANCE:  Alert, in no distress, morbidly obese  ENT:  Mouth and posterior oropharynx normal, moist mucous membranes, [place]  EYES:  EOM, conjunctivae, lids, pupils and irises normal, PERRL  RESP:  respiratory effort and palpation of chest normal, lungs clear to auscultation , no respiratory distress  CV:  Palpation and auscultation of heart done , regular rate and rhythm, no murmur, rub, or gallop, peripheral edema 1+ in LE bilaterally  ABDOMEN:  normal bowel sounds, soft, nontender, no hepatosplenomegaly or other masses  M/S:   patient resting in bed  SKIN:  Inspection of skin and subcutaneous tissue baseline  NEURO:   speech wnl  PSYCH:  affect and  mood normal    Recent labs in [facility] reviewed by me today.  and   Most Recent 3 CBC's:Recent Labs   Lab Test 10/24/22  1044 10/23/22  1625 08/10/20  1659   WBC 11.0 10.4 7.6   HGB 13.5 14.6 14.3   MCV 90 89 88    262 246     Most Recent 3 BMP's:Recent Labs   Lab Test 10/26/22  0821 10/26/22  0156 10/26/22  0151 10/24/22  1222 10/24/22  1044 10/24/22  0038 10/23/22  1625 10/23/22  1618 07/13/22  1517   NA  --   --   --   --  135  --  135  --  140   POTASSIUM  --   --   --   --  4.2  --  4.6  --  4.5   CHLORIDE  --   --   --   --  107  --  107  --  112*   CO2  --   --   --   --  23  --  22  --  22   BUN  --   --   --   --  20  --  23  --  18   CR  --   --   --   --  0.78  --  0.99  --  1.00   ANIONGAP  --   --   --   --  5  --  6  --  6   MANJEET  --   --   --   --  8.6  --  8.9  --  8.9   * 88 15*   < > 204*   < > 206*   < > 109*    < > = values in this interval not displayed.       Assessment/Plan:  (S22.080S) Compression fracture of T12 vertebra, sequela  (primary encounter diagnosis)  (M54.41) Acute bilateral low back pain with right-sided sciatica  (R53.81) Physical deconditioning  Comment: acute/ongoing, no change  Plan: PT and OT, continue tylenol  1000mg TID scheduled,  lidocaine patch 4% to low back on AM and off Hs, cymbalta 60mg QD, tramadol 50mg q 6 hours prn  Continue neurontin 200mg TID scheduled and qhs prn     (E66.01) Morbid obesity (H)  Comment: ongoing, no change  Plan: dietician to follow     (I25.10) Coronary artery disease involving native coronary artery of native heart without angina pectoris  (I10) Essential hypertension, benign  (N18.30) Stage 3 chronic kidney disease, unspecified whether stage 3a or 3b CKD (H)  Comment: ongoing, no change  Plan: BMP follow, continue norvasc 10mg QD, losartan 100mg QD, metoprolol 100mg BID     (E11.51) Type 2 diabetes mellitus with diabetic peripheral angiopathy without gangrene, without long-term current use of insulin (H)  Comment: ongoing, no  change  Plan: blood sugar monitoring QID, continue NPH insulin 10 units BID    (K59.03) drug induced constipation  Acute/ongoing  Plan: senna s 1 PO BID and miralax 17gm QD scheduled, hold for loose stool    (R30.0) dysruria  Acute/ongoing  Plan: UA/UC, push fluids       MED REC REQUIRED  Post Medication Reconciliation Status: medication reconcilation previously completed during another office visit      Orders:  Senna s 1 PO BID scheduled  miralax 17gm QD scheduled  UA/UC      Electronically signed by: Tonya Lynn Haase, APRN CNP

## 2022-11-09 ENCOUNTER — TELEPHONE (OUTPATIENT)
Dept: GERIATRICS | Facility: CLINIC | Age: 82
End: 2022-11-09

## 2022-11-09 ENCOUNTER — DOCUMENTATION ONLY (OUTPATIENT)
Dept: GERIATRICS | Facility: CLINIC | Age: 82
End: 2022-11-09

## 2022-11-09 NOTE — TELEPHONE ENCOUNTER
[facility] Geriatrics Lab Note     Provider: Tonya Haase, APRN CNP  Facility: [facility] Facility Type:  TCU    Allergies   Allergen Reactions     Actos [Pioglitazone]      Lower extremity edema       Enalapril      Renal failure     Hydrochlorothiazide      Dry mouth     Lisinopril      Hyperkalemia       Metformin Diarrhea     Diarrhea        Labs Reviewed by provider: BMP - Potassium 5.5     Verbal Order/Direction given by Provider: Repeat BMP on 11/10/22.    Provider giving Order:  Tonya Haase, APRN CNP    Verbal Order given to: Herbert Sanders RN

## 2022-11-10 ENCOUNTER — TRANSITIONAL CARE UNIT VISIT (OUTPATIENT)
Dept: GERIATRICS | Facility: CLINIC | Age: 82
End: 2022-11-10
Payer: COMMERCIAL

## 2022-11-10 VITALS
OXYGEN SATURATION: 92 % | HEART RATE: 62 BPM | RESPIRATION RATE: 17 BRPM | WEIGHT: 221 LBS | BODY MASS INDEX: 37.73 KG/M2 | DIASTOLIC BLOOD PRESSURE: 77 MMHG | HEIGHT: 64 IN | TEMPERATURE: 97.2 F | SYSTOLIC BLOOD PRESSURE: 133 MMHG

## 2022-11-10 DIAGNOSIS — E11.51 TYPE 2 DIABETES MELLITUS WITH DIABETIC PERIPHERAL ANGIOPATHY WITHOUT GANGRENE, WITHOUT LONG-TERM CURRENT USE OF INSULIN (H): ICD-10-CM

## 2022-11-10 DIAGNOSIS — I25.10 CORONARY ARTERY DISEASE INVOLVING NATIVE CORONARY ARTERY OF NATIVE HEART WITHOUT ANGINA PECTORIS: ICD-10-CM

## 2022-11-10 DIAGNOSIS — E66.01 MORBID OBESITY (H): ICD-10-CM

## 2022-11-10 DIAGNOSIS — S22.080S COMPRESSION FRACTURE OF T12 VERTEBRA, SEQUELA: Primary | ICD-10-CM

## 2022-11-10 DIAGNOSIS — K59.03 DRUG-INDUCED CONSTIPATION: ICD-10-CM

## 2022-11-10 DIAGNOSIS — N39.0 URINARY TRACT INFECTION WITHOUT HEMATURIA, SITE UNSPECIFIED: ICD-10-CM

## 2022-11-10 DIAGNOSIS — I10 ESSENTIAL HYPERTENSION, BENIGN: ICD-10-CM

## 2022-11-10 DIAGNOSIS — R53.81 PHYSICAL DECONDITIONING: ICD-10-CM

## 2022-11-10 DIAGNOSIS — N18.30 STAGE 3 CHRONIC KIDNEY DISEASE, UNSPECIFIED WHETHER STAGE 3A OR 3B CKD (H): ICD-10-CM

## 2022-11-10 DIAGNOSIS — M54.41 ACUTE BILATERAL LOW BACK PAIN WITH RIGHT-SIDED SCIATICA: ICD-10-CM

## 2022-11-10 PROCEDURE — 99310 SBSQ NF CARE HIGH MDM 45: CPT | Performed by: NURSE PRACTITIONER

## 2022-11-10 RX ORDER — CIPROFLOXACIN 250 MG/1
250 TABLET, FILM COATED ORAL 2 TIMES DAILY
Qty: 14 TABLET | Refills: 0
Start: 2022-11-09 | End: 2022-11-16

## 2022-11-10 NOTE — PROGRESS NOTES
"[facility] GERIATRICS    Chief Complaint   Patient presents with     RECHECK     HPI:  Cara Camarillo is a 82 year old  (1940), who is being seen today for an episodic care visit at: [facility]) [25]. Today's concern is:   Acute/chronic low back pain: patient continuously rates pain 9-10 on 1/10 scale, therapy note that when given a scale of faces, patient rates pain in mid range instead of high range of pain, in therapy patient is walking up to 160 feet using a RW with CGA making progress in therapy  Constipation: nurses notes show LBM 11/8/22,no N/V or abdominal pain  UTI: UC grew > 100,000 E coli,  Still has some dysuria, afebrile  CAD/HTN/CKD: /77, 120/70, 125/78  with HR 60range, denies CP, palpitations, SOB  DMII: blood sugar range 135-250  CKD: creat 1.10 on 10/31/22    Allergies, and PMH/PSH reviewed in [facility] today.  REVIEW OF SYSTEMS:  10 point ROS of systems including Constitutional, Eyes, Respiratory, Cardiovascular, Gastroenterology, Genitourinary, Integumentary, Musculoskeletal, Psychiatric were all negative except for pertinent positives noted in my HPI.    Objective:   /77   Pulse 62   Temp 97.2  F (36.2  C)   Resp 17   Ht 1.626 m (5' 4.02\")   Wt 100.2 kg (221 lb)   SpO2 92%   BMI 37.92 kg/m    GENERAL APPEARANCE:  Alert, in no distress, morbidly obese  ENT:  Mouth and posterior oropharynx normal, moist mucous membranes, [place]  EYES:  EOM, conjunctivae, lids, pupils and irises normal, PERRL  RESP:  respiratory effort and palpation of chest normal, lungs clear to auscultation , no respiratory distress  CV:  Palpation and auscultation of heart done , regular rate and rhythm, no murmur, rub, or gallop, peripheral edema 1+ in LE bilaterally  ABDOMEN:  normal bowel sounds, soft, nontender, no hepatosplenomegaly or other masses  M/S:   patient resting in bed at time of exam  SKIN:  Inspection of skin and subcutaneous tissue baseline  NEURO:   speech wnl  PSYCH:  " affect and mood normal    Recent labs in [facility] reviewed by me today.  and   Most Recent 3 CBC's:Recent Labs   Lab Test 10/24/22  1044 10/23/22  1625 08/10/20  1659   WBC 11.0 10.4 7.6   HGB 13.5 14.6 14.3   MCV 90 89 88    262 246     Most Recent 3 BMP's:Recent Labs   Lab Test 10/26/22  0821 10/26/22  0156 10/26/22  0151 10/24/22  1222 10/24/22  1044 10/24/22  0038 10/23/22  1625 10/23/22  1618 07/13/22  1517   NA  --   --   --   --  135  --  135  --  140   POTASSIUM  --   --   --   --  4.2  --  4.6  --  4.5   CHLORIDE  --   --   --   --  107  --  107  --  112*   CO2  --   --   --   --  23  --  22  --  22   BUN  --   --   --   --  20  --  23  --  18   CR  --   --   --   --  0.78  --  0.99  --  1.00   ANIONGAP  --   --   --   --  5  --  6  --  6   MANJEET  --   --   --   --  8.6  --  8.9  --  8.9   * 88 15*   < > 204*   < > 206*   < > 109*    < > = values in this interval not displayed.       Assessment/Plan:  S22.080S) Compression fracture of T12 vertebra, sequela  (primary encounter diagnosis)  (M54.41) Acute bilateral low back pain with right-sided sciatica  (R53.81) Physical deconditioning  Comment: acute/ongoing, no change  Plan: PT and OT, continue tylenol  1000mg TID scheduled,  lidocaine patch 4% to low back on AM and off Hs, cymbalta 60mg QD, tramadol 50mg q 6 hours prn  Continue neurontin 200mg TID scheduled and qhs prn     (E66.01) Morbid obesity (H)  Comment: ongoing, no change  Plan: dietician to follow     (I25.10) Coronary artery disease involving native coronary artery of native heart without angina pectoris  (I10) Essential hypertension, benign  (N18.30) Stage 3 chronic kidney disease, unspecified whether stage 3a or 3b CKD (H)  Comment: ongoing, no change  Plan: BMP follow, continue norvasc 10mg QD, losartan 100mg QD, metoprolol 100mg BID     (E11.51) Type 2 diabetes mellitus with diabetic peripheral angiopathy without gangrene, without long-term current use of insulin  (H)  Comment: ongoing, no change  Plan: blood sugar monitoring QID, continue NPH insulin 10 units BID     (K59.03) drug induced constipation  Acute/ongoing, no change  Plan: senna s 1 PO BID and miralax 17gm QD scheduled, hold for loose stool     (R39.0) UTI  Acute/ongoing  Plan: cipro 250mg BID for 7 days    MED REC REQUIRED  Post Medication Reconciliation Status: medication reconcilation previously completed during another office visit      Orders:  cipro 250mg BID for 7 days      Electronically signed by: Tonya Lynn Haase, APRN CNP

## 2022-11-10 NOTE — LETTER
"    11/10/2022        RE: Cara Camarillo  [address] [address]        [facility]    Chief Complaint   Patient presents with     RECHECK     HPI:  Cara Camarillo is a 82 year old  (1940), who is being seen today for an episodic care visit at: [facility] ([facility]) [25]. Today's concern is:   Acute/chronic low back pain: patient continuously rates pain 9-10 on 1/10 scale, therapy note that when given a scale of faces, patient rates pain in mid range instead of high range of pain, in therapy patient is walking up to 160 feet using a RW with CGA making progress in therapy  Constipation: nurses notes show LBM 11/8/22,no N/V or abdominal pain  UTI: UC grew > 100,000 E coli,  Still has some dysuria, afebrile  CAD/HTN/CKD: /77, 120/70, 125/78  with HR 60range, denies CP, palpitations, SOB  DMII: blood sugar range 135-250  CKD: creat 1.10 on 10/31/22    Allergies, and PMH/PSH reviewed in [facility] today.  REVIEW OF SYSTEMS:  10 point ROS of systems including Constitutional, Eyes, Respiratory, Cardiovascular, Gastroenterology, Genitourinary, Integumentary, Musculoskeletal, Psychiatric were all negative except for pertinent positives noted in my HPI.    Objective:   /77   Pulse 62   Temp 97.2  F (36.2  C)   Resp 17   Ht 1.626 m (5' 4.02\")   Wt 100.2 kg (221 lb)   SpO2 92%   BMI 37.92 kg/m    GENERAL APPEARANCE:  Alert, in no distress, morbidly obese  ENT:  Mouth and posterior oropharynx normal, moist mucous membranes, [place]  EYES:  EOM, conjunctivae, lids, pupils and irises normal, PERRL  RESP:  respiratory effort and palpation of chest normal, lungs clear to auscultation , no respiratory distress  CV:  Palpation and auscultation of heart done , regular rate and rhythm, no murmur, rub, or gallop, peripheral edema 1+ in LE bilaterally  ABDOMEN:  normal bowel sounds, soft, nontender, no hepatosplenomegaly or other masses  M/S:   patient resting in bed at time " of exam  SKIN:  Inspection of skin and subcutaneous tissue baseline  NEURO:   speech wnl  PSYCH:  affect and mood normal    Recent labs in [facility] reviewed by me today.  and   Most Recent 3 CBC's:Recent Labs   Lab Test 10/24/22  1044 10/23/22  1625 08/10/20  1659   WBC 11.0 10.4 7.6   HGB 13.5 14.6 14.3   MCV 90 89 88    262 246     Most Recent 3 BMP's:Recent Labs   Lab Test 10/26/22  0821 10/26/22  0156 10/26/22  0151 10/24/22  1222 10/24/22  1044 10/24/22  0038 10/23/22  1625 10/23/22  1618 07/13/22  1517   NA  --   --   --   --  135  --  135  --  140   POTASSIUM  --   --   --   --  4.2  --  4.6  --  4.5   CHLORIDE  --   --   --   --  107  --  107  --  112*   CO2  --   --   --   --  23  --  22  --  22   BUN  --   --   --   --  20  --  23  --  18   CR  --   --   --   --  0.78  --  0.99  --  1.00   ANIONGAP  --   --   --   --  5  --  6  --  6   MANJEET  --   --   --   --  8.6  --  8.9  --  8.9   * 88 15*   < > 204*   < > 206*   < > 109*    < > = values in this interval not displayed.       Assessment/Plan:  S22.080S) Compression fracture of T12 vertebra, sequela  (primary encounter diagnosis)  (M54.41) Acute bilateral low back pain with right-sided sciatica  (R53.81) Physical deconditioning  Comment: acute/ongoing, no change  Plan: PT and OT, continue tylenol  1000mg TID scheduled,  lidocaine patch 4% to low back on AM and off Hs, cymbalta 60mg QD, tramadol 50mg q 6 hours prn  Continue neurontin 200mg TID scheduled and qhs prn     (E66.01) Morbid obesity (H)  Comment: ongoing, no change  Plan: dietician to follow     (I25.10) Coronary artery disease involving native coronary artery of native heart without angina pectoris  (I10) Essential hypertension, benign  (N18.30) Stage 3 chronic kidney disease, unspecified whether stage 3a or 3b CKD (H)  Comment: ongoing, no change  Plan: BMP follow, continue norvasc 10mg QD, losartan 100mg QD, metoprolol 100mg BID     (E11.51) Type 2 diabetes mellitus with diabetic  peripheral angiopathy without gangrene, without long-term current use of insulin (H)  Comment: ongoing, no change  Plan: blood sugar monitoring QID, continue NPH insulin 10 units BID     (K59.03) drug induced constipation  Acute/ongoing, no change  Plan: senna s 1 PO BID and miralax 17gm QD scheduled, hold for loose stool     (R39.0) UTI  Acute/ongoing  Plan: cipro 250mg BID for 7 days    MED REC REQUIRED  Post Medication Reconciliation Status: medication reconcilation previously completed during another office visit      Orders:  cipro 250mg BID for 7 days      Electronically signed by: Tonya Lynn Haase, APRN CNP             Sincerely,        Tonya Lynn Haase, APRN CNP

## 2022-11-15 ENCOUNTER — TRANSITIONAL CARE UNIT VISIT (OUTPATIENT)
Dept: GERIATRICS | Facility: CLINIC | Age: 82
End: 2022-11-15
Payer: COMMERCIAL

## 2022-11-15 VITALS
DIASTOLIC BLOOD PRESSURE: 76 MMHG | WEIGHT: 218.6 LBS | TEMPERATURE: 97.2 F | SYSTOLIC BLOOD PRESSURE: 132 MMHG | BODY MASS INDEX: 37.32 KG/M2 | HEIGHT: 64 IN | OXYGEN SATURATION: 92 % | HEART RATE: 77 BPM | RESPIRATION RATE: 17 BRPM

## 2022-11-15 DIAGNOSIS — I10 ESSENTIAL HYPERTENSION, BENIGN: ICD-10-CM

## 2022-11-15 DIAGNOSIS — E11.51 TYPE 2 DIABETES MELLITUS WITH DIABETIC PERIPHERAL ANGIOPATHY WITHOUT GANGRENE, WITHOUT LONG-TERM CURRENT USE OF INSULIN (H): ICD-10-CM

## 2022-11-15 DIAGNOSIS — K59.03 DRUG-INDUCED CONSTIPATION: ICD-10-CM

## 2022-11-15 DIAGNOSIS — N39.0 URINARY TRACT INFECTION WITHOUT HEMATURIA, SITE UNSPECIFIED: ICD-10-CM

## 2022-11-15 DIAGNOSIS — R53.81 PHYSICAL DECONDITIONING: Primary | ICD-10-CM

## 2022-11-15 DIAGNOSIS — M54.41 ACUTE BILATERAL LOW BACK PAIN WITH RIGHT-SIDED SCIATICA: ICD-10-CM

## 2022-11-15 DIAGNOSIS — I25.10 CORONARY ARTERY DISEASE INVOLVING NATIVE CORONARY ARTERY OF NATIVE HEART WITHOUT ANGINA PECTORIS: ICD-10-CM

## 2022-11-15 DIAGNOSIS — E66.01 MORBID OBESITY (H): ICD-10-CM

## 2022-11-15 DIAGNOSIS — N18.30 STAGE 3 CHRONIC KIDNEY DISEASE, UNSPECIFIED WHETHER STAGE 3A OR 3B CKD (H): ICD-10-CM

## 2022-11-15 DIAGNOSIS — S22.080S COMPRESSION FRACTURE OF T12 VERTEBRA, SEQUELA: ICD-10-CM

## 2022-11-15 PROCEDURE — 99310 SBSQ NF CARE HIGH MDM 45: CPT | Performed by: NURSE PRACTITIONER

## 2022-11-15 NOTE — LETTER
"    11/15/2022        RE: Cara Camarillo  [address] [address]        [facility]    Chief Complaint   Patient presents with     RECHECK     HPI:  Cara Camarillo is a 82 year old  (1940), who is being seen today for an episodic care visit at: [facility] ([facility]) [25]. Today's concern is:   Acute/chronic low back pain: patient continuously rates pain 8 on 1/10 scale,  in therapy patient is walking up to 350 feet using a RW with CGA making progress in therapy  Constipation: nurses notes show LBM 11/8/22,no N/V or abdominal pain  UTI: UC grew > 100,000 E coli,  Still has some dysuria, afebrile  CAD/HTN/CKD: /81, 136/76, 116/74  with HR 60 -70 range, denies CP, palpitations, SOB  DMII: blood sugar range   CKD: creat 1.11 on 11/11/22    Allergies, and PMH/PSH reviewed in [facility] today.  REVIEW OF SYSTEMS:  10 point ROS of systems including Constitutional, Eyes, Respiratory, Cardiovascular, Gastroenterology, Genitourinary, Integumentary, Musculoskeletal, Psychiatric were all negative except for pertinent positives noted in my HPI.    Objective:   /76   Pulse 77   Temp 97.2  F (36.2  C)   Resp 17   Ht 1.626 m (5' 4\")   Wt 99.2 kg (218 lb 9.6 oz)   SpO2 92%   BMI 37.52 kg/m    GENERAL APPEARANCE:  Alert, in no distress, morbidly obese  ENT:  Mouth and posterior oropharynx normal, moist mucous membranes, [place]  EYES:  EOM, conjunctivae, lids, pupils and irises normal, PERRL  RESP:  respiratory effort and palpation of chest normal, lungs clear to auscultation , no respiratory distress  CV:  Palpation and auscultation of heart done , regular rate and rhythm, no murmur, rub, or gallop, peripheral edema 1+ in LE bilaterally  ABDOMEN:  normal bowel sounds, soft, nontender, no hepatosplenomegaly or other masses  M/S:   patient resting in bed  SKIN:  Inspection of skin and subcutaneous tissue baseline  NEURO:   speech wnl  PSYCH:  affect and mood " normal    Recent labs in [facility] reviewed by me today.  and   Most Recent 3 CBC's:Recent Labs   Lab Test 10/24/22  1044 10/23/22  1625 08/10/20  1659   WBC 11.0 10.4 7.6   HGB 13.5 14.6 14.3   MCV 90 89 88    262 246     Most Recent 3 BMP's:Recent Labs   Lab Test 10/26/22  0821 10/26/22  0156 10/26/22  0151 10/24/22  1222 10/24/22  1044 10/24/22  0038 10/23/22  1625 10/23/22  1618 07/13/22  1517   NA  --   --   --   --  135  --  135  --  140   POTASSIUM  --   --   --   --  4.2  --  4.6  --  4.5   CHLORIDE  --   --   --   --  107  --  107  --  112*   CO2  --   --   --   --  23  --  22  --  22   BUN  --   --   --   --  20  --  23  --  18   CR  --   --   --   --  0.78  --  0.99  --  1.00   ANIONGAP  --   --   --   --  5  --  6  --  6   MANJEET  --   --   --   --  8.6  --  8.9  --  8.9   * 88 15*   < > 204*   < > 206*   < > 109*    < > = values in this interval not displayed.       Assessment/Plan:  S22.080S) Compression fracture of T12 vertebra, sequela  (primary encounter diagnosis)  (M54.41) Acute bilateral low back pain with right-sided sciatica  (R53.81) Physical deconditioning  Comment: acute/ongoing, no change  Plan: PT and OT, continue tylenol  1000mg TID scheduled,  lidocaine patch 4% to low back on AM and off Hs, cymbalta 60mg QD, tramadol 50mg q 6 hours prn  Continue neurontin 200mg TID scheduled and qhs prn     (E66.01) Morbid obesity (H)  Comment: ongoing, no change  Plan: dietician to follow     (I25.10) Coronary artery disease involving native coronary artery of native heart without angina pectoris  (I10) Essential hypertension, benign  (N18.30) Stage 3 chronic kidney disease, unspecified whether stage 3a or 3b CKD (H)  Comment: ongoing, no change  Plan: BMP follow, continue norvasc 10mg QD, losartan 100mg QD, metoprolol 100mg BID     (E11.51) Type 2 diabetes mellitus with diabetic peripheral angiopathy without gangrene, without long-term current use of insulin (H)  Comment: ongoing, no  change  Plan: blood sugar monitoring QID, continue NPH insulin 10 units BID     (K59.03) drug induced constipation  Acute/ongoing, no change  Plan: senna s 1 PO BID and miralax 17gm QD scheduled, hold for loose stool     (R39.0) UTI  Acute/ongoing improved  Showing > 100,000 e coli, susceptible to cipro  Plan: cipro 250mg BID for 7 days    MED REC REQUIRED  Post Medication Reconciliation Status: medication reconcilation previously completed during another office visit      Orders:  No new orders      Electronically signed by: Tonya Lynn Haase, APRN CNP           Sincerely,        Tonya Lynn Haase, APRN CNP

## 2022-11-15 NOTE — PROGRESS NOTES
"[facility] GERIATRICS    Chief Complaint   Patient presents with     RECHECK     HPI:  Cara Camarillo is a 82 year old  (1940), who is being seen today for an episodic care visit at: [facility]) [25]. Today's concern is:   Acute/chronic low back pain: patient continuously rates pain 8 on 1/10 scale,  in therapy patient is walking up to 350 feet using a RW with CGA making progress in therapy  Constipation: nurses notes show LBM 11/8/22,no N/V or abdominal pain  UTI: UC grew > 100,000 E coli,  Still has some dysuria, afebrile  CAD/HTN/CKD: /81, 136/76, 116/74  with HR 60 -70 range, denies CP, palpitations, SOB  DMII: blood sugar range   CKD: creat 1.11 on 11/11/22    Allergies, and PMH/PSH reviewed in [facility] today.  REVIEW OF SYSTEMS:  10 point ROS of systems including Constitutional, Eyes, Respiratory, Cardiovascular, Gastroenterology, Genitourinary, Integumentary, Musculoskeletal, Psychiatric were all negative except for pertinent positives noted in my HPI.    Objective:   /76   Pulse 77   Temp 97.2  F (36.2  C)   Resp 17   Ht 1.626 m (5' 4\")   Wt 99.2 kg (218 lb 9.6 oz)   SpO2 92%   BMI 37.52 kg/m    GENERAL APPEARANCE:  Alert, in no distress, morbidly obese  ENT:  Mouth and posterior oropharynx normal, moist mucous membranes, [place]  EYES:  EOM, conjunctivae, lids, pupils and irises normal, PERRL  RESP:  respiratory effort and palpation of chest normal, lungs clear to auscultation , no respiratory distress  CV:  Palpation and auscultation of heart done , regular rate and rhythm, no murmur, rub, or gallop, peripheral edema 1+ in LE bilaterally  ABDOMEN:  normal bowel sounds, soft, nontender, no hepatosplenomegaly or other masses  M/S:   patient resting in bed  SKIN:  Inspection of skin and subcutaneous tissue baseline  NEURO:   speech wnl  PSYCH:  affect and mood normal    Recent labs in [facility] reviewed by me today.  and   Most Recent 3 CBC's:Recent Labs   Lab Test " 10/24/22  1044 10/23/22  1625 08/10/20  1659   WBC 11.0 10.4 7.6   HGB 13.5 14.6 14.3   MCV 90 89 88    262 246     Most Recent 3 BMP's:Recent Labs   Lab Test 10/26/22  0821 10/26/22  0156 10/26/22  0151 10/24/22  1222 10/24/22  1044 10/24/22  0038 10/23/22  1625 10/23/22  1618 07/13/22  1517   NA  --   --   --   --  135  --  135  --  140   POTASSIUM  --   --   --   --  4.2  --  4.6  --  4.5   CHLORIDE  --   --   --   --  107  --  107  --  112*   CO2  --   --   --   --  23  --  22  --  22   BUN  --   --   --   --  20  --  23  --  18   CR  --   --   --   --  0.78  --  0.99  --  1.00   ANIONGAP  --   --   --   --  5  --  6  --  6   MANJEET  --   --   --   --  8.6  --  8.9  --  8.9   * 88 15*   < > 204*   < > 206*   < > 109*    < > = values in this interval not displayed.       Assessment/Plan:  S22.080S) Compression fracture of T12 vertebra, sequela  (primary encounter diagnosis)  (M54.41) Acute bilateral low back pain with right-sided sciatica  (R53.81) Physical deconditioning  Comment: acute/ongoing, no change  Plan: PT and OT, continue tylenol  1000mg TID scheduled,  lidocaine patch 4% to low back on AM and off Hs, cymbalta 60mg QD, tramadol 50mg q 6 hours prn  Continue neurontin 200mg TID scheduled and qhs prn     (E66.01) Morbid obesity (H)  Comment: ongoing, no change  Plan: dietician to follow     (I25.10) Coronary artery disease involving native coronary artery of native heart without angina pectoris  (I10) Essential hypertension, benign  (N18.30) Stage 3 chronic kidney disease, unspecified whether stage 3a or 3b CKD (H)  Comment: ongoing, no change  Plan: BMP follow, continue norvasc 10mg QD, losartan 100mg QD, metoprolol 100mg BID     (E11.51) Type 2 diabetes mellitus with diabetic peripheral angiopathy without gangrene, without long-term current use of insulin (H)  Comment: ongoing, no change  Plan: blood sugar monitoring QID, continue NPH insulin 10 units BID     (K59.03) drug induced  constipation  Acute/ongoing, no change  Plan: senna s 1 PO BID and miralax 17gm QD scheduled, hold for loose stool     (R39.0) UTI  Acute/ongoing improved  Showing > 100,000 e coli, susceptible to cipro  Plan: cipro 250mg BID for 7 days    MED REC REQUIRED  Post Medication Reconciliation Status: medication reconcilation previously completed during another office visit      Orders:  No new orders      Electronically signed by: Tonya Lynn Haase, APRN CNP

## 2022-11-17 ENCOUNTER — TRANSITIONAL CARE UNIT VISIT (OUTPATIENT)
Dept: GERIATRICS | Facility: CLINIC | Age: 82
End: 2022-11-17
Payer: COMMERCIAL

## 2022-11-17 VITALS
BODY MASS INDEX: 37.73 KG/M2 | HEART RATE: 67 BPM | WEIGHT: 221 LBS | SYSTOLIC BLOOD PRESSURE: 142 MMHG | DIASTOLIC BLOOD PRESSURE: 66 MMHG | RESPIRATION RATE: 18 BRPM | TEMPERATURE: 97.5 F | OXYGEN SATURATION: 94 % | HEIGHT: 64 IN

## 2022-11-17 DIAGNOSIS — E66.01 MORBID OBESITY (H): ICD-10-CM

## 2022-11-17 DIAGNOSIS — N18.30 STAGE 3 CHRONIC KIDNEY DISEASE, UNSPECIFIED WHETHER STAGE 3A OR 3B CKD (H): ICD-10-CM

## 2022-11-17 DIAGNOSIS — S22.080S COMPRESSION FRACTURE OF T12 VERTEBRA, SEQUELA: Primary | ICD-10-CM

## 2022-11-17 DIAGNOSIS — R53.81 PHYSICAL DECONDITIONING: ICD-10-CM

## 2022-11-17 DIAGNOSIS — E11.51 TYPE 2 DIABETES MELLITUS WITH DIABETIC PERIPHERAL ANGIOPATHY WITHOUT GANGRENE, WITHOUT LONG-TERM CURRENT USE OF INSULIN (H): ICD-10-CM

## 2022-11-17 DIAGNOSIS — K59.03 DRUG-INDUCED CONSTIPATION: ICD-10-CM

## 2022-11-17 DIAGNOSIS — I10 ESSENTIAL HYPERTENSION, BENIGN: ICD-10-CM

## 2022-11-17 DIAGNOSIS — I25.10 CORONARY ARTERY DISEASE INVOLVING NATIVE CORONARY ARTERY OF NATIVE HEART WITHOUT ANGINA PECTORIS: ICD-10-CM

## 2022-11-17 DIAGNOSIS — N39.0 URINARY TRACT INFECTION WITHOUT HEMATURIA, SITE UNSPECIFIED: ICD-10-CM

## 2022-11-17 PROCEDURE — 99310 SBSQ NF CARE HIGH MDM 45: CPT | Performed by: NURSE PRACTITIONER

## 2022-11-17 RX ORDER — GABAPENTIN 100 MG/1
100 CAPSULE ORAL 3 TIMES DAILY
Start: 2022-11-17 | End: 2022-11-22

## 2022-11-17 NOTE — PROGRESS NOTES
"[facility] GERIATRICS    Chief Complaint   Patient presents with     RECHECK     HPI:  Cara Camarillo is a 82 year old  (1940), who is being seen today for an episodic care visit at: [facility]) [25]. Today's concern is:   Acute/chronic low back pain: patient is resting in bed at time of exam, c/o low back pain does not rate at time of exam,  in therapy patient is walking 150 feet using a RW with CGA most consistently, prefers to rest in bed, therapy state they have a hard time motivating her to get out of bed,  states prior to hospitalization Son states she was always in bed when he checked on her which was daily.   Constipation: nurses notes show LBM 11/16/22,no N/V or abdominal pain  UTI: denies dysuria, fever,chills  CAD/HTN/CKD: /82, 142/66, 138/81  with HR 60 -70 range, denies CP, palpitations, SOB  DMII: blood sugar range 116-181  CKD: creat 1.05 on 11/15/22    Allergies, and PMH/PSH reviewed in [facility] today.  REVIEW OF SYSTEMS:  10 point ROS of systems including Constitutional, Eyes, Respiratory, Cardiovascular, Gastroenterology, Genitourinary, Integumentary, Musculoskeletal, Psychiatric were all negative except for pertinent positives noted in my HPI.    Objective:   BP (!) 142/66   Pulse 67   Temp 97.5  F (36.4  C)   Resp 18   Ht 1.626 m (5' 4.02\")   Wt 100.2 kg (221 lb)   SpO2 94%   BMI 37.92 kg/m    GENERAL APPEARANCE:  Alert, in no distress, morbidly obese  ENT:  Mouth and posterior oropharynx normal, moist mucous membranes, [place]  EYES:  EOM, conjunctivae, lids, pupils and irises normal, PERRL  RESP:  respiratory effort and palpation of chest normal, lungs clear to auscultation , no respiratory distress  CV:  Palpation and auscultation of heart done , regular rate and rhythm, no murmur, rub, or gallop, peripheral edema 1+ in LE bilaterally  ABDOMEN:  normal bowel sounds, soft, nontender, no hepatosplenomegaly or other masses  M/S:   patient resting in " bed  SKIN:  Inspection of skin and subcutaneous tissue baseline  NEURO:   speech wnl  PSYCH:  affect and mood normal    Recent labs in [facility] reviewed by me today.  and   Most Recent 3 CBC's:Recent Labs   Lab Test 10/24/22  1044 10/23/22  1625 08/10/20  1659   WBC 11.0 10.4 7.6   HGB 13.5 14.6 14.3   MCV 90 89 88    262 246     Most Recent 3 BMP's:Recent Labs   Lab Test 10/26/22  0821 10/26/22  0156 10/26/22  0151 10/24/22  1222 10/24/22  1044 10/24/22  0038 10/23/22  1625 10/23/22  1618 07/13/22  1517   NA  --   --   --   --  135  --  135  --  140   POTASSIUM  --   --   --   --  4.2  --  4.6  --  4.5   CHLORIDE  --   --   --   --  107  --  107  --  112*   CO2  --   --   --   --  23  --  22  --  22   BUN  --   --   --   --  20  --  23  --  18   CR  --   --   --   --  0.78  --  0.99  --  1.00   ANIONGAP  --   --   --   --  5  --  6  --  6   MANJEET  --   --   --   --  8.6  --  8.9  --  8.9   * 88 15*   < > 204*   < > 206*   < > 109*    < > = values in this interval not displayed.       Assessment/Plan:  S22.080S) Compression fracture of T12 vertebra, sequela  (primary encounter diagnosis)  (M54.41) Acute bilateral low back pain with right-sided sciatica  (R53.81) Physical deconditioning  Comment: acute/ongoing, no change  Plan: PT and OT, continue tylenol  1000mg TID scheduled,  lidocaine patch 4% to low back on AM and off Hs, cymbalta 60mg QD, tramadol 50mg q 6 hours prn  decrease neurontin to 100mg TID  Looking at discharge to home next week     (E66.01) Morbid obesity (H)  Comment: ongoing, no change  Plan: dietician to follow     (I25.10) Coronary artery disease involving native coronary artery of native heart without angina pectoris  (I10) Essential hypertension, benign  (N18.30) Stage 3 chronic kidney disease, unspecified whether stage 3a or 3b CKD (H)  Comment: ongoing, no change  Plan: BMP follow, continue norvasc 10mg QD, losartan 100mg QD, metoprolol 100mg BID     (E11.51) Type 2 diabetes mellitus  with diabetic peripheral angiopathy without gangrene, without long-term current use of insulin (H)  Comment: ongoing, no change  Plan: blood sugar monitoring QID, continue NPH insulin 10 units BID     (K59.03) drug induced constipation  Acute/ongoing, no change  Plan: senna s 1 PO BID and miralax 17gm QD scheduled, hold for loose stool     (R39.0) UTI  resolved  Showing > 100,000 e coli, susceptible to cipro  Plan: cipro finished    MED REC REQUIRED  Post Medication Reconciliation Status: medication reconcilation previously completed during another office visit      Orders:  Decrease neurontin to 100mg TID      Electronically signed by: Tonya Lynn Haase, APRN CNP

## 2022-11-17 NOTE — LETTER
"    11/17/2022        RE: Cara Camarillo  [address] [address]        [facility] GERIATRICS    Chief Complaint   Patient presents with     RECHECK     HPI:  Cara Camarillo is a 82 year old  (1940), who is being seen today for an episodic care visit at: [facility] ([facility] [25]. Today's concern is:   Acute/chronic low back pain: patient is resting in bed at time of exam, c/o low back pain does not rate at time of exam,  in therapy patient is walking 150 feet using a RW with CGA most consistently, prefers to rest in bed, therapy state they have a hard time motivating her to get out of bed,  states prior to hospitalization Son states she was always in bed when he checked on her which was daily.   Constipation: nurses notes show LBM 11/16/22,no N/V or abdominal pain  UTI: denies dysuria, fever,chills  CAD/HTN/CKD: /82, 142/66, 138/81  with HR 60 -70 range, denies CP, palpitations, SOB  DMII: blood sugar range 116-181  CKD: creat 1.05 on 11/15/22    Allergies, and PMH/PSH reviewed in EPIC today.  REVIEW OF SYSTEMS:  10 point ROS of systems including Constitutional, Eyes, Respiratory, Cardiovascular, Gastroenterology, Genitourinary, Integumentary, Musculoskeletal, Psychiatric were all negative except for pertinent positives noted in my HPI.    Objective:   BP (!) 142/66   Pulse 67   Temp 97.5  F (36.4  C)   Resp 18   Ht 1.626 m (5' 4.02\")   Wt 100.2 kg (221 lb)   SpO2 94%   BMI 37.92 kg/m    GENERAL APPEARANCE:  Alert, in no distress, morbidly obese  ENT:  Mouth and posterior oropharynx normal, moist mucous membranes, [place]  EYES:  EOM, conjunctivae, lids, pupils and irises normal, PERRL  RESP:  respiratory effort and palpation of chest normal, lungs clear to auscultation , no respiratory distress  CV:  Palpation and auscultation of heart done , regular rate and rhythm, no murmur, rub, or gallop, peripheral edema 1+ in LE bilaterally  ABDOMEN:  " normal bowel sounds, soft, nontender, no hepatosplenomegaly or other masses  M/S:   patient resting in bed  SKIN:  Inspection of skin and subcutaneous tissue baseline  NEURO:   speech wnl  PSYCH:  affect and mood normal    Recent labs in [facility] reviewed by me today.  and   Most Recent 3 CBC's:Recent Labs   Lab Test 10/24/22  1044 10/23/22  1625 08/10/20  1659   WBC 11.0 10.4 7.6   HGB 13.5 14.6 14.3   MCV 90 89 88    262 246     Most Recent 3 BMP's:Recent Labs   Lab Test 10/26/22  0821 10/26/22  0156 10/26/22  0151 10/24/22  1222 10/24/22  1044 10/24/22  0038 10/23/22  1625 10/23/22  1618 07/13/22  1517   NA  --   --   --   --  135  --  135  --  140   POTASSIUM  --   --   --   --  4.2  --  4.6  --  4.5   CHLORIDE  --   --   --   --  107  --  107  --  112*   CO2  --   --   --   --  23  --  22  --  22   BUN  --   --   --   --  20  --  23  --  18   CR  --   --   --   --  0.78  --  0.99  --  1.00   ANIONGAP  --   --   --   --  5  --  6  --  6   MANJEET  --   --   --   --  8.6  --  8.9  --  8.9   * 88 15*   < > 204*   < > 206*   < > 109*    < > = values in this interval not displayed.       Assessment/Plan:  S22.080S) Compression fracture of T12 vertebra, sequela  (primary encounter diagnosis)  (M54.41) Acute bilateral low back pain with right-sided sciatica  (R53.81) Physical deconditioning  Comment: acute/ongoing, no change  Plan: PT and OT, continue tylenol  1000mg TID scheduled,  lidocaine patch 4% to low back on AM and off Hs, cymbalta 60mg QD, tramadol 50mg q 6 hours prn  decrease neurontin to 100mg TID  Looking at discharge to home next week     (E66.01) Morbid obesity (H)  Comment: ongoing, no change  Plan: dietician to follow     (I25.10) Coronary artery disease involving native coronary artery of native heart without angina pectoris  (I10) Essential hypertension, benign  (N18.30) Stage 3 chronic kidney disease, unspecified whether stage 3a or 3b CKD (H)  Comment: ongoing, no change  Plan: BMP follow,  continue norvasc 10mg QD, losartan 100mg QD, metoprolol 100mg BID     (E11.51) Type 2 diabetes mellitus with diabetic peripheral angiopathy without gangrene, without long-term current use of insulin (H)  Comment: ongoing, no change  Plan: blood sugar monitoring QID, continue NPH insulin 10 units BID     (K59.03) drug induced constipation  Acute/ongoing, no change  Plan: senna s 1 PO BID and miralax 17gm QD scheduled, hold for loose stool     (R39.0) UTI  resolved  Showing > 100,000 e coli, susceptible to cipro  Plan: cipro finished    MED REC REQUIRED  Post Medication Reconciliation Status: medication reconcilation previously completed during another office visit      Orders:  Decrease neurontin to 100mg TID      Electronically signed by: Tonya Lynn Haase, APRN CNP             Sincerely,        Tonya Lynn Haase, APRN CNP

## 2022-11-18 ENCOUNTER — TELEPHONE (OUTPATIENT)
Dept: GERIATRICS | Facility: CLINIC | Age: 82
End: 2022-11-18

## 2022-11-18 NOTE — TELEPHONE ENCOUNTER
[facility] Triage Nurse Telephone Encounter    Provider: Tonya Haase, APRN CNP  Facility: [facility] Facility Type:  TCU    Caller: Tonya  Call Back Number: [phone]    Allergies:    Allergies   Allergen Reactions     Actos [Pioglitazone]      Lower extremity edema       Enalapril      Renal failure     Hydrochlorothiazide      Dry mouth     Lisinopril      Hyperkalemia       Metformin Diarrhea     Diarrhea         Reason for call: Nurse is updating about a weight gain.  Today-222.7#, yesterday-220.7#, 11/#, 11/15-not done, 11/.6#.  Patient has trace to 1+ bilateral LE edema, for which she wears compression stockings.  No cough or SOB noted.      Verbal Order/Direction given by Provider: No new orders.      Provider giving Order:  Tonya Haase, APRN CNP    Verbal Order given to: Sharad Riley RN

## 2022-11-22 ENCOUNTER — TRANSITIONAL CARE UNIT VISIT (OUTPATIENT)
Dept: GERIATRICS | Facility: CLINIC | Age: 82
End: 2022-11-22
Payer: COMMERCIAL

## 2022-11-22 VITALS
WEIGHT: 220.7 LBS | DIASTOLIC BLOOD PRESSURE: 68 MMHG | TEMPERATURE: 94.6 F | SYSTOLIC BLOOD PRESSURE: 131 MMHG | BODY MASS INDEX: 37.68 KG/M2 | HEIGHT: 64 IN | OXYGEN SATURATION: 92 % | RESPIRATION RATE: 18 BRPM | HEART RATE: 69 BPM

## 2022-11-22 DIAGNOSIS — N18.30 STAGE 3 CHRONIC KIDNEY DISEASE, UNSPECIFIED WHETHER STAGE 3A OR 3B CKD (H): ICD-10-CM

## 2022-11-22 DIAGNOSIS — E11.51 TYPE 2 DIABETES MELLITUS WITH DIABETIC PERIPHERAL ANGIOPATHY WITHOUT GANGRENE, WITHOUT LONG-TERM CURRENT USE OF INSULIN (H): ICD-10-CM

## 2022-11-22 DIAGNOSIS — K59.03 DRUG-INDUCED CONSTIPATION: ICD-10-CM

## 2022-11-22 DIAGNOSIS — E66.01 MORBID OBESITY (H): ICD-10-CM

## 2022-11-22 DIAGNOSIS — I10 ESSENTIAL HYPERTENSION, BENIGN: ICD-10-CM

## 2022-11-22 DIAGNOSIS — R53.81 PHYSICAL DECONDITIONING: ICD-10-CM

## 2022-11-22 DIAGNOSIS — S22.080S COMPRESSION FRACTURE OF T12 VERTEBRA, SEQUELA: Primary | ICD-10-CM

## 2022-11-22 DIAGNOSIS — I25.10 CORONARY ARTERY DISEASE INVOLVING NATIVE CORONARY ARTERY OF NATIVE HEART WITHOUT ANGINA PECTORIS: ICD-10-CM

## 2022-11-22 PROCEDURE — 99316 NF DSCHRG MGMT 30 MIN+: CPT | Performed by: NURSE PRACTITIONER

## 2022-11-22 NOTE — LETTER
11/22/2022        RE: Cara Camarillo  [address]  [place] [address]        [facility] GERIATRICS DISCHARGE SUMMARY  PATIENT'S NAME: Cara Camarillo  YOB: 1940  MEDICAL RECORD NUMBER:  [id number]  [facility]    PRIMARY CARE PROVIDER AND CLINIC RESPONSIBLE AFTER TRANSFER:   Kole Gonsalez MD, [address] / [place] [address]    [facility] Provider     Transferring providers: Tonya Lynn Haase, AKANKSHA NEGRETE, Elier Vogt MD  Recent Hospitalization/ED:  [facility] stay 10/23/22 to 10/26/22.  Date of SNF Admission: October 26, 2022  Date of SNF (anticipated) Discharge: November 23, 2022  Discharged to: previous independent home  Cognitive Scores: BIMS: 14/15 and Short blessed: 0/28  Physical Function: Ambulating 150 ft with RW with SBA  DME: Walker    CODE STATUS/ADVANCE DIRECTIVES DISCUSSION:  Prior   ALLERGIES: Actos [pioglitazone], Enalapril, Hydrochlorothiazide, Lisinopril, and Metformin    NURSING FACILITY COURSE   Medication Changes/Rationale:     See assessment and plan    Summary of nursing facility stay:   Patient progressed in therapy to walking up to 150 feet using a RW with SBA, needing cues for safety, needing assistance with ADL's, able to toilet self independent from a w/c level. Interdisciplinary team recommend patient move to [facility] for needed assistance as she is not safe to live alone. Family are in agreement that patient requires [facility] and did have placement at [facility] but patient refuses to move from house. Patient Son states prior to hospitalization he did check on her daily and his Mom was always in bed when he checked on her. Patient will discharge to home but a vulnerable adult case will be filed with the State, will DC with home PT, OT, RN, HHA and  through [facility].     Discharge Medications:  MED REC REQUIRED  Post Medication  "Reconciliation Status: discharge medications reconciled and changed, per note/orders       Current Outpatient Medications   Medication Sig Dispense Refill     CONTOUR NEXT TEST test strip USE TO TEST BLOOD SUGAR DAILY OR AS DIRECTED. 100 strip 1     acetaminophen (TYLENOL) 500 MG tablet Take 1,000 mg by mouth 3 times daily       amLODIPine (NORVASC) 10 MG tablet TAKE 1 TABLET BY MOUTH EVERY DAY 90 tablet 3     aspirin 81 MG tablet Take 1 tablet (81 mg) by mouth daily 100 tablet 3     atorvastatin (LIPITOR) 20 MG tablet Take 1 tablet (20 mg) by mouth daily 90 tablet 3     BD INSULIN SYRINGE U/F 30G X 1/2\" 0.5 ML miscellaneous USE ONE SYRINGE TWICE DAILY OR AS DIRECTED. 100 each 1     blood glucose monitoring (CONTOUR NEXT MONITOR W/DEVICE KIT) meter device kit Use to test blood sugar 3 times daily or as directed. 1 kit 0     DULoxetine (CYMBALTA) 30 MG capsule One capsule once daily for one week, then increase to two capsules once daily (Patient taking differently: Take 60 mg by mouth daily One capsule once daily for one week, then increase to two capsules once daily) 90 capsule 3     insulin  UNIT/ML injection Inject 10 Units Subcutaneous 2 times daily (before meals) 15 mL      Lidocaine (LIDOCARE) 4 % Patch Place 1 patch onto the skin every 24 hours To prevent lidocaine toxicity, patient should be patch free for 12 hrs daily.       losartan (COZAAR) 50 MG tablet TAKE 2 TABLETS BY MOUTH EVERY DAY (Patient taking differently: Take 50 mg by mouth 2 times daily) 180 tablet 3     metoprolol tartrate (LOPRESSOR) 100 MG tablet Take 1 tablet (100 mg) by mouth 2 times daily 180 tablet 3     nitroGLYcerin (NITROSTAT) 0.4 MG sublingual tablet Place 1 tablet (0.4 mg) under the tongue every 5 minutes as needed for chest pain if you are still having symptoms after 3 doses (15 minutes) call 911. 25 tablet 1     polyethylene glycol (MIRALAX) 17 GM/Dose powder Take 17 g by mouth daily 510 g      SENNA-docusate sodium (SENNA " "S) 8.6-50 MG tablet Take 1 tablet by mouth 2 times daily       vitamin D3 (CHOLECALCIFEROL) 50 mcg (2000 units) tablet Take 1 tablet by mouth every other day            Controlled medications:   not applicable/none     Past Medical History:   Past Medical History:   Diagnosis Date     Adenoma     tubal     Anxiety      Anxiety      Aortic stenosis      Arthritis     ankles     Chronic kidney disease (CKD), stage III (moderate) (H)      Coronary artery disease     cardiac cath Feb 2016: ISIDRO to LAD, cath Jan 2016: ISIDRO to PDA     Decubitus ulcer of coccyx      Dysthymic disorder      Essential hypertension, benign 03/01/2205     Herpes zoster without mention of complication Aug 2006    left leg (thigh)     Hydronephrosis      Hyperlipidaemia LDL goal < 100      Left ventricular diastolic dysfunction      Malignant neoplasm of corpus uteri, 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    endometrial CA, s/p external beam radiation & radiation implant & FIDEL/BSO       Obesity      Pulmonary hyperinflation      Sciatica      Type II or unspecified type diabetes mellitus without mention of complication, not stated as uncontrolled 03/01/2005     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  GENERAL APPEARANCE:  Alert, in no distress, morbidly obese  ENT:  Mouth and posterior oropharynx normal, moist mucous membranes, [place]  EYES:  EOM, conjunctivae, lids, pupils and irises normal, PERRL  RESP:  respiratory effort and palpation of chest normal, lungs clear to auscultation , no respiratory distress  CV:  Palpation and auscultation of heart done , regular rate and rhythm, no murmur, rub, or gallop, peripheral edema 1+ in LE bilaterally  ABDOMEN:  normal bowel sounds, soft, nontender, no hepatosplenomegaly or other " masses  M/S:   patient sitting up in w/c  SKIN:  Inspection of skin and subcutaneous tissue baseline  NEURO:   speech wnl  PSYCH:  affect and mood normal     SNF labs: Recent labs in [facility] reviewed by me today.  and   Most Recent 3 CBC's:  Recent Labs   Lab Test 10/24/22  1044 10/23/22  1625 08/10/20  1659   WBC 11.0 10.4 7.6   HGB 13.5 14.6 14.3   MCV 90 89 88    262 246     Most Recent 3 BMP's:  Recent Labs   Lab Test 10/26/22  0821 10/26/22  0156 10/26/22  0151 10/24/22  1222 10/24/22  1044 10/24/22  0038 10/23/22  1625 10/23/22  1618 07/13/22  1517   NA  --   --   --   --  135  --  135  --  140   POTASSIUM  --   --   --   --  4.2  --  4.6  --  4.5   CHLORIDE  --   --   --   --  107  --  107  --  112*   CO2  --   --   --   --  23  --  22  --  22   BUN  --   --   --   --  20  --  23  --  18   CR  --   --   --   --  0.78  --  0.99  --  1.00   ANIONGAP  --   --   --   --  5  --  6  --  6   MANJEET  --   --   --   --  8.6  --  8.9  --  8.9   * 88 15*   < > 204*   < > 206*   < > 109*    < > = values in this interval not displayed.     S22.080S) Compression fracture of T12 vertebra, sequela  (primary encounter diagnosis)  (M54.41) Acute bilateral low back pain with right-sided sciatica  (R53.81) Physical deconditioning  Comment: acute/ongoing  Plan: DC to home with home PT, OT, RN, HHA and SW through [facility], vulnerable adult case filed with state at discharge, continue tylenol  1000mg TID scheduled,  lidocaine patch 4% to low back on AM and off Hs, cymbalta 60mg QD  DC neurontin and tramadol at discharge     (E66.01) Morbid obesity (H)  Comment: ongoing  Plan: f/u with PCP     (I25.10) Coronary artery disease involving native coronary artery of native heart without angina pectoris  (I10) Essential hypertension, benign  (N18.30) Stage 3 chronic kidney disease, unspecified whether stage 3a or 3b CKD (H)  Comment: ongoing  Plan: continue norvasc 10mg QD, losartan 100mg QD, metoprolol 100mg BID     (E11.51) Type 2  diabetes mellitus with diabetic peripheral angiopathy without gangrene, without long-term current use of insulin (H)  Comment: ongoing  Plan: blood sugar monitoring QID per home,  continue NPH insulin 10 units BID     (K59.03) drug induced constipation  Acute/ongoing  Plan: senna s 1 PO BID and miralax 17gm QD scheduled, hold for loose stool     (R39.0) UTI  resolved  Showing > 100,000 e coli, susceptible to cipro  Plan: cipro finished    DISCHARGE PLAN:    Follow up labs: No labs orders/due    Medical Follow Up:      Follow up with primary care provider in 1-2 weeks    [facility] scheduled appointments:  Next 5 appointments (look out 90 days)    Nov 28, 2022  9:30 AM  (Arrive by 9:10 AM)  Provider Visit with Kole Gonsalez MD  [facility] [address]  [place] [address]  [phone]   Feb 16, 2023  1:00 PM  (Arrive by 12:40 PM)  Provider Visit with Kole Gonsalez MD  [facility] ) [address]  [place] [address]  [phone]           Discharge Services: Home Care:  Occupational Therapy, Physical Therapy, Registered Nurse, Home Health Aide and     Discharge Instructions Verbalized to Patient at Discharge:     None    TOTAL DISCHARGE TIME:   Greater than 30 minutes  Electronically signed by:  Tonya Lynn Haase, APRN CNP                     Sincerely,        Tonya Lynn Haase, APRN CNP

## 2022-11-23 ENCOUNTER — DOCUMENTATION ONLY (OUTPATIENT)
Dept: OTHER | Facility: CLINIC | Age: 82
End: 2022-11-23

## 2022-11-25 ENCOUNTER — PATIENT OUTREACH (OUTPATIENT)
Dept: CARE COORDINATION | Facility: CLINIC | Age: 82
End: 2022-11-25

## 2022-11-25 ENCOUNTER — TELEPHONE (OUTPATIENT)
Dept: FAMILY MEDICINE | Facility: CLINIC | Age: 82
End: 2022-11-25

## 2022-11-25 NOTE — LETTER
[facility] CARE COORDINATION  [address]  [place] [address]    November 25, 2022    Cara Camarillo  [address]  [place] [address]      Dear Cara,        I am a clinic care coordinator who works with Kole Gonsalez MD with the [facility]. I wanted to introduce myself and provide you with my contact information for you to be able to call me with any questions or concerns. Below is a description of clinic care coordination and how I can further assist you.       The clinic care coordination team is made up of a registered nurse, , financial resource worker and community health worker who understand the health care system. The goal of clinic care coordination is to help you manage your health and improve access to the health care system. Our team works alongside your provider to assist you in determining your health and social needs. We can help you obtain health care and community resources, providing you with necessary information and education. We can work with you through any barriers and develop a care plan that helps coordinate and strengthen the communication between you and your care team.    Please feel free to contact me with any questions or concerns regarding care coordination and what we can offer.      We are focused on providing you with the highest-quality healthcare experience possible.    Sincerely,     Avril Burkett, [facility]  Clinic Care Coordinator  [facility]  [phone]

## 2022-11-25 NOTE — PROGRESS NOTES
Clinic Care Coordination Contact    Clinic Care Coordination Contact  OUTREACH    Referral Information: PCP            Chief Complaint   Patient presents with     Clinic Care Coordination - Initial        Universal Utilization:      Utilization    Hospital Admissions  1             ED Visits  1             No Show Count (past year)  0                Current as of: 11/25/2022 12:56 PM              Clinical Concerns:  Current Medical Concerns:  Recent discharge from TCU, TCU expresses concern that pt is not able to live independently     Current Behavioral Concerns: Not discussed    Education Provided to patient: PCP follow up visit      Health Maintenance Reviewed:    Clinical Pathway: None    Medication Management:  Medication review status: Medications reviewed and no changes reported per patient.             Functional Status:       Living Situation: Lives alone       Lifestyle & Psychosocial Needs:         Resources and Interventions:  Current Resources:   SW was only able to speak with pt briefly.  Pt states she is doing fine at home, but wants to find out if she is seeing her PCP virtually or in-person.  Pt states she is unsure as she received documentation that it looked like it was virtual, but she thought it was supposed to be in-person.   SW inquired with clinic whether visit was scheduled as in-person or virtual, and received confirmation that visit is in-person.  SW left voicemail for pt confirming that her appointment is in-person.  SW left phone number for pt to reach SW if needed.  SW indicates that SW will reach out to pt again next week.  SW was unable to speak with pt in more depth about her short and long term goals and needs.         Care Plan:      Patient/Caregiver understanding: Unable to discuss, as pt had to go to the bathroom and did not answer phone when SW called back.       Future Appointments              In 5 days Kole Gonsalez MD [facility]    In 2 months  Kole Gonsalez MD [facility]           Plan: SW to reach out next week.   Avril Burkett  [facility]  Clinic Care Coordinator  [facility]  [phone]  [email or web address]

## 2022-11-25 NOTE — TELEPHONE ENCOUNTER
Elaine from [facility] called requesting providers approval for delay start of care for PT until 11/30/2022 due to staffing.    Ok to leave a detailed voice message at [phone] Ext [phone] or cell phone number [phone].

## 2022-11-29 ENCOUNTER — MEDICAL CORRESPONDENCE (OUTPATIENT)
Dept: HEALTH INFORMATION MANAGEMENT | Facility: CLINIC | Age: 82
End: 2022-11-29

## 2022-11-30 ENCOUNTER — OFFICE VISIT (OUTPATIENT)
Dept: FAMILY MEDICINE | Facility: CLINIC | Age: 82
End: 2022-11-30
Payer: COMMERCIAL

## 2022-11-30 ENCOUNTER — TELEPHONE (OUTPATIENT)
Dept: FAMILY MEDICINE | Facility: CLINIC | Age: 82
End: 2022-11-30

## 2022-11-30 ENCOUNTER — MEDICAL CORRESPONDENCE (OUTPATIENT)
Dept: HEALTH INFORMATION MANAGEMENT | Facility: CLINIC | Age: 82
End: 2022-11-30

## 2022-11-30 VITALS
DIASTOLIC BLOOD PRESSURE: 75 MMHG | TEMPERATURE: 97.4 F | RESPIRATION RATE: 18 BRPM | HEART RATE: 89 BPM | SYSTOLIC BLOOD PRESSURE: 124 MMHG | OXYGEN SATURATION: 97 % | WEIGHT: 213.7 LBS | BODY MASS INDEX: 36.66 KG/M2

## 2022-11-30 DIAGNOSIS — Z23 NEED FOR PROPHYLACTIC VACCINATION AND INOCULATION AGAINST INFLUENZA: ICD-10-CM

## 2022-11-30 DIAGNOSIS — G89.29 CHRONIC LEFT-SIDED LOW BACK PAIN WITH LEFT-SIDED SCIATICA: ICD-10-CM

## 2022-11-30 DIAGNOSIS — M54.42 CHRONIC LEFT-SIDED LOW BACK PAIN WITH LEFT-SIDED SCIATICA: ICD-10-CM

## 2022-11-30 DIAGNOSIS — I10 ESSENTIAL HYPERTENSION, BENIGN: ICD-10-CM

## 2022-11-30 DIAGNOSIS — R29.6 FALLS FREQUENTLY: Primary | ICD-10-CM

## 2022-11-30 DIAGNOSIS — E11.51 TYPE 2 DIABETES MELLITUS WITH DIABETIC PERIPHERAL ANGIOPATHY WITHOUT GANGRENE, WITHOUT LONG-TERM CURRENT USE OF INSULIN (H): ICD-10-CM

## 2022-11-30 DIAGNOSIS — N18.30 STAGE 3 CHRONIC KIDNEY DISEASE, UNSPECIFIED WHETHER STAGE 3A OR 3B CKD (H): ICD-10-CM

## 2022-11-30 PROCEDURE — 99215 OFFICE O/P EST HI 40 MIN: CPT | Mod: 25 | Performed by: INTERNAL MEDICINE

## 2022-11-30 PROCEDURE — G0008 ADMIN INFLUENZA VIRUS VAC: HCPCS | Performed by: INTERNAL MEDICINE

## 2022-11-30 PROCEDURE — 90662 IIV NO PRSV INCREASED AG IM: CPT | Performed by: INTERNAL MEDICINE

## 2022-11-30 RX ORDER — AMLODIPINE BESYLATE 5 MG/1
5 TABLET ORAL DAILY
Qty: 90 TABLET | Refills: 3 | Status: SHIPPED | OUTPATIENT
Start: 2022-11-30 | End: 2023-02-16

## 2022-11-30 ASSESSMENT — PATIENT HEALTH QUESTIONNAIRE - PHQ9
SUM OF ALL RESPONSES TO PHQ QUESTIONS 1-9: 2
10. IF YOU CHECKED OFF ANY PROBLEMS, HOW DIFFICULT HAVE THESE PROBLEMS MADE IT FOR YOU TO DO YOUR WORK, TAKE CARE OF THINGS AT HOME, OR GET ALONG WITH OTHER PEOPLE: NOT DIFFICULT AT ALL
SUM OF ALL RESPONSES TO PHQ QUESTIONS 1-9: 2

## 2022-11-30 ASSESSMENT — PAIN SCALES - GENERAL: PAINLEVEL: EXTREME PAIN (8)

## 2022-11-30 NOTE — TELEPHONE ENCOUNTER
Spoke with Linda PT with [facility]. Verbal orders requested for continuation of PT once a week for 4 weeks and once every other week for 4 weeks.     Linda PT also requested initiation of OT evaluation, home Skilled Nurse evaluation, and Social Work initial visit.     Writer gave verbals for all requests.

## 2022-11-30 NOTE — PROGRESS NOTES
Assessment & Plan     Falls frequently  I think she is most appropriate for skilled nursing  Discussed this at length  She is hesitant and states that her goal is to have the good lord take her in her home   Care coordination asked to help family get her a fall pendant at the very least     Type 2 diabetes mellitus with diabetic peripheral angiopathy without gangrene, without long-term current use of insulin (H)  Recheck A1c in Feb, continue lower insulin dose     CKD (chronic kidney disease) stage 3, GFR 30-59 ml/min (H)    - INFLUENZA, QUAD, HIGH DOSE, PF, 65YR + (FLUZONE HD)    BENIGN HYPERTENSION  Reduce amlodipine from 10 to 5 to lower fall risk   - amLODIPine (NORVASC) 5 MG tablet; Take 1 tablet (5 mg) by mouth daily    Chronic left-sided low back pain with left-sided sciatica  See palliative to help with goals of care and to align her with resources   - Palliative Care Referral; Future  - Primary Care - Care Coordination Referral; Future    Need for prophylactic vaccination and inoculation against influenza    - INFLUENZA, QUAD, HIGH DOSE, PF, 65YR + (FLUZONE HD)      44 minutes spent on the date of the encounter doing chart review, history and exam, documentation and further activities per the note           Return today (on 11/30/2022) for recheck w A1c prior to visit , Pre-visit Non-fasting Lab.  Patient instructed to return to clinic or contact us sooner if symptoms worsen or new symptoms develop. Kole Gonsalez MD  [facility] [place] is a 82 year old accompanied by her daughter, presenting for the following health issues:  Follow Up (Follow up from TCU.) and Imm/Inj (Flu Shot)      History of Present Illness       Reason for visit:  Cara went home after being hospitalized and being discharged from transitional care after a fall in her home.  Kalina Gordon, her daughter would like to speak to the doctor prior to the visit.    She eats 0-1 servings of fruits and vegetables  daily.She consumes 0 sweetened beverage(s) daily.She exercises with enough effort to increase her heart rate 9 or less minutes per day.  She exercises with enough effort to increase her heart rate 3 or less days per week.   She is taking medications regularly.         She is back home  She is taking less insulin  Her daughter wants her to move to [facility]  We spent most of the time discussing this   No new complaints     Review of Systems         Objective    /75 (BP Location: Left arm, Patient Position: Sitting, Cuff Size: Adult Large)   Pulse 89   Temp 97.4  F (36.3  C) (Temporal)   Resp 18   Wt 96.9 kg (213 lb 11.2 oz)   SpO2 97%   BMI 36.66 kg/m    Body mass index is 36.66 kg/m .  Physical Exam   Unchanged appearing  Standing blood pressure is OK

## 2022-12-02 ENCOUNTER — PATIENT OUTREACH (OUTPATIENT)
Dept: CARE COORDINATION | Facility: CLINIC | Age: 82
End: 2022-12-02

## 2022-12-02 NOTE — PROGRESS NOTES
Clinic Care Coordination Contact  [facility]/Voicemail       Clinical Data: Care Coordinator Outreach  Outreach attempted x 3.  Left message on patient's voicemail with call back information and requested return call.  SW also left voicemail for pt daughter Kalina who had reached out to PCP with concerns that pt was not safe at home, and PCP recommended a Life Alert at minimum.  SW left voiecmail for daughter that she can reach out to [facility] for Life Alert plans [phone].  SW also suggests that daughter help pt check with her Medicare Advantage plan, as some of them cover costs related to Life Alert.    Plan: Care Coordinator  via . Care Coordinator will try to reach patient again in 1 month.    Avril Burkett  [facility]  Clinic Care Coordinator  [facility]  [phone]  [email or web address]

## 2022-12-06 ENCOUNTER — TELEPHONE (OUTPATIENT)
Dept: FAMILY MEDICINE | Facility: CLINIC | Age: 82
End: 2022-12-06

## 2022-12-06 NOTE — TELEPHONE ENCOUNTER
"Spoke with WILMER Graham from [facility] regarding patient. Patient was recently discharged from TCU and WILMER Graham is requesting Home Care orders once a week for three weeks and two PRN visits. RN gave verbal approval for these orders.     WILMER Graham also needed to reconcile medications. WILMER Graham requesting clarification for insulin, tramadol, metoprolol, amlodipine, losartan, cymbalta, atorvastatin, aspirin and how many times to take BG in a day.     RN relayed information to WILMER Graham per active medication list. Per WILMER Graham, patient reported that TCU told her to take BG once per week. Per WILMER Graham, patient also has, \"An entire bottle of tramadol\" at her home, per chart review, tramadol is not on the patient's current med list.     Routing to PCP for review. Should patient still be taking Tramadol? How many times in a day should patient be testing sugars?      Can we leave a detailed message on this number? YES  Phone number patient can be reached at: Other phone number:  WILMER Graham [phone]     Justice L. Phoenix, RN  [facility] Triage      "

## 2022-12-07 NOTE — TELEPHONE ENCOUNTER
I approve of requested home care orders.    Kole Gonsalez MD      I discussed not using tramadol with patient and her daughter in clinic

## 2022-12-07 NOTE — TELEPHONE ENCOUNTER
Patient Contact    Attempt # 1    Was call answered?  No.  Left message on voicemail with information to call me back.    On call back, please relay provider message to WILMER Graham.

## 2022-12-14 ENCOUNTER — PATIENT OUTREACH (OUTPATIENT)
Dept: CARE COORDINATION | Facility: CLINIC | Age: 82
End: 2022-12-14

## 2022-12-14 DIAGNOSIS — Z53.9 DIAGNOSIS NOT YET DEFINED: Primary | ICD-10-CM

## 2022-12-14 PROCEDURE — G0180 MD CERTIFICATION HHA PATIENT: HCPCS | Performed by: INTERNAL MEDICINE

## 2022-12-14 NOTE — LETTER
[facility]  [address]  [place] [address]    December 14, 2022    Cara Camarillo  [address]  [place] [address]      Dear Cara,    I have been attempting to reach you since our last contact. I would like to continue to work with you and provide any additional support you may need on achieving your health care related goals. I would appreciate if you would give me a call at [phone] to let me know if you would like to continue working together. I know that there are many things that can affect our ability to communicate and I hope we can continue to work together.    All of us at the [facility] are invested in your health and are here to assist you in meeting your goals.     Sincerely,    ROSE RiosSW

## 2022-12-14 NOTE — PROGRESS NOTES
Clinic Care Coordination Contact  [facility]/Voicemail       Clinical Data: Care Coordinator Outreach  Outreach attempted x 3.  Left message on patient's voicemail with call back information and requested return call.  Plan: Care Coordinator will send disenrollment letter with care coordinator contact information via [employer]. Care Coordinator.    Avril Burkett  [facility]  Clinic Care Coordinator  [facility]  [phone]  [email or web address]

## 2022-12-15 ENCOUNTER — PATIENT OUTREACH (OUTPATIENT)
Dept: CARE COORDINATION | Facility: CLINIC | Age: 82
End: 2022-12-15

## 2022-12-15 NOTE — PROGRESS NOTES
Clinic Care Coordination Contact      Phone call to Cara's daughter, Elidia Gordon to provide information on [employer] programs and caregiver support options.  Message was left, will await return call.    Leonarda Garduno [facility]  [phone]  On behalf of DANISH Huston

## 2022-12-16 ENCOUNTER — PATIENT OUTREACH (OUTPATIENT)
Dept: CARE COORDINATION | Facility: CLINIC | Age: 82
End: 2022-12-16

## 2022-12-16 NOTE — PROGRESS NOTES
Clinic Care Coordination Contact  [facility]/Voicemail       Clinical Data: Care Coordinator Outreach  Outreach attempted x 2.  Left message on Daughter, Russ, voicemail with call back information and requested return call.  Plan: No additional outreach planned.    Leonarda Garudno, [facility]  [phone]

## 2022-12-20 ENCOUNTER — TELEPHONE (OUTPATIENT)
Dept: FAMILY MEDICINE | Facility: CLINIC | Age: 82
End: 2022-12-20

## 2022-12-20 NOTE — TELEPHONE ENCOUNTER
The Home Care/Assisted Living/Nursing Facility is calling regarding an established patient.  Has the patient seen Home Care in the past or is currently residing in Assisted Living or Nursing Facility? No.     Steph calling from [facility] requesting the following orders that are NOT within the Home Care, Assisted Living or Nursing Home Eval and Treatment standing order and must be ordered by a Licensed Practitioner.    Preferred Call Back Number: [phone], confidential    Social work evaluation and treatment Need new order for social work for 1 visit within 21 days to assist pt in accessing community resources. Got a previous order but this was only for 1 week, need new order for 21 days.    Routing to Licensed Practitioner (Provider) to review request and provide approval or recommendation.    Writer has verified Requestor will send fax to have orders signed.    Mariana RAPP RN  [facility]    ADMIT

## 2022-12-21 ENCOUNTER — TELEPHONE (OUTPATIENT)
Dept: FAMILY MEDICINE | Facility: CLINIC | Age: 82
End: 2022-12-21

## 2022-12-21 NOTE — TELEPHONE ENCOUNTER
Okay to give verbal on  home care  orders as requested?     Ana CHAVES, Triage RN  [facility]

## 2022-12-21 NOTE — TELEPHONE ENCOUNTER
Pt reports lower abdominal pain, nausea, and vomiting that started two weeks ago. Pt has not taken anything for it and states the pain radiates to her back. Pt denies Chest pain or SOB. Pt denies blood in vomit or stool.     The Home Care/Assisted Living/Nursing Facility is calling regarding an established patient.  Has the patient seen Home Care in the past or is currently residing in Assisted Living or Nursing Facility? Yes.     Lily calling from [facility]  requesting the following orders that are within the Home Care, Assisted Living or Nursing Home Eval and Treatment standing order and can be signed as standing order signature required by RN.    Preferred Call Back Number: [phone]    PT/OT/Speech Therapy    Any additional Orders:  Are there any orders requested, not stated above, that are outside of the standing order and must be routed to a licensed practitioner for approval?    No    Writer has verified Requestor will send fax to have orders signed.    Xiao Almonte RN  [facility]

## 2022-12-22 ENCOUNTER — MEDICAL CORRESPONDENCE (OUTPATIENT)
Dept: HEALTH INFORMATION MANAGEMENT | Facility: CLINIC | Age: 82
End: 2022-12-22

## 2022-12-22 NOTE — TELEPHONE ENCOUNTER
Writer called and left detailed VM on the confidential voicemail of LORRIE Choi with [facility] with PCP's approval of orders.    Advised to call back to triage if anything further needed.    Signing encounter.    Elmer Hernandez RN  [facility]

## 2022-12-26 ENCOUNTER — MEDICAL CORRESPONDENCE (OUTPATIENT)
Dept: HEALTH INFORMATION MANAGEMENT | Facility: CLINIC | Age: 82
End: 2022-12-26

## 2022-12-27 ENCOUNTER — NURSE TRIAGE (OUTPATIENT)
Dept: FAMILY MEDICINE | Facility: CLINIC | Age: 82
End: 2022-12-27

## 2022-12-27 ENCOUNTER — MEDICAL CORRESPONDENCE (OUTPATIENT)
Dept: HEALTH INFORMATION MANAGEMENT | Facility: CLINIC | Age: 82
End: 2022-12-27

## 2022-12-27 NOTE — TELEPHONE ENCOUNTER
Called PTLinda and left detailed message regarding MD message below.    Routing to TCs to call to schedule appointment.     Aneta Vaca RN on 12/27/2022 at 5:44 PM

## 2022-12-27 NOTE — TELEPHONE ENCOUNTER
"Linda, physical therapy with [facility] calling ([phone], okay to leave detailed message).     Linda seeing patient today.     Linda reporting a couple of concerns:     1. Linda reports patient has episodes of hallucination. This started in August, when patient started on Duloxetine. She is concerned the hallucinations are caused by the Duloxetine.   Hallucinations occur about once a month.   Last happened yesterday morning. Room flipped. Patient felt like she was on the ceiling. Lasted a couple of minutes.     [facility] with [facility] fracture in November.   Always the same thing, feels like she's suspended on the ceiling.   Patient reported this to the TCU but reports no recommendations were made about this.     Linda reports the patient is alert and oriented x4 now.     Duloxetine 30 mg capsule was started on 8/16/2022. Micromedex does not list hallucinations as an adverse effect.       2. Since fall in October, patient has had headaches.   October 23-26 hospitalized for fall, near syncope, then [facility]  Linda reports the patient has a history of headaches and states the head CT was \"fine\"    CT head completed on 10/23/2022        Reports pain is mostly in the temporal region, constant 5/10. Reports pain is relieved with OTC medications.   Denies new weakness, dizziness, numbness, vision changes, fever.   Linda reports patient has been home since Thanksgiving and is safe in her home. Patient does live alone.   Vitals today are:   126/84 with pulse of 75, 97% on room air      Linda also updated the patient's med list and reports the patient is not taking Neurontin.     Linda request call back with provider's response be to Kalina, patient's daughter, on consent to communicate, as patient is hard of hearing.   Linda does not need a call back.      Provider, please advise how soon patient should be seen? Per physical therapy Linda, " patient is currently alert and oriented x4 with a 5/10 headache.   Protocol advises patient be seen today in office or in ED.       Codie Ventura RN BSN MSN  [facility]          Reason for Disposition    Headache is a chronic symptom (recurrent or ongoing AND lasting > 4 weeks)    Confusion, disorientation, or hallucinations is main symptom    Headache or vomiting    Unexplained headache that is present > 24 hours    New headache and age > 50    Additional Information    Negative: Difficult to awaken or acting confused (e.g., disoriented, slurred speech)    Negative: Weakness of the face, arm or leg on one side of the body and new-onset    Negative: Loss of speech or garbled speech and new-onset    Negative: Numbness of the face, arm or leg on one side of the body and new-onset    Negative: Passed out (i.e., fainted, collapsed and was not responding)    Negative: Sounds like a life-threatening emergency to the triager    Negative: Followed a head injury within last 3 days    Negative: Traumatic Brain Injury (TBI) is suspected    Negative: Sinus pain of forehead and yellow or green nasal discharge    Negative: Pregnant    Negative: Unable to walk without falling    Negative: Stiff neck (can't touch chin to chest)    Negative: Possibility of carbon monoxide exposure    Negative: SEVERE headache, states 'worst headache' of life    Negative: SEVERE headache, sudden-onset (i.e., reaching maximum intensity within seconds to 1 hour)    Negative: Severe pain in one eye    Negative: Loss of vision or double vision  (Exception: Same as prior migraines.)    Negative: Patient sounds very sick or weak to the triager    Negative: Fever > 103 F (39.4 C)    Negative: Fever > 100.0 F (37.8 C) and has diabetes mellitus or a weak immune system (e.g., HIV positive, cancer chemotherapy, organ transplant, splenectomy, chronic steroids)    Negative: SEVERE headache and not relieved by pain meds    Negative: SEVERE  headache and vomiting    Negative: SEVERE headache and fever    Negative: SEVERE headache (e.g., excruciating) and has had severe headaches before    Negative: New headache and weak immune system (e.g., HIV positive, cancer chemo, splenectomy, organ transplant, chronic steroids)    Negative: Fever present > 3 days (72 hours)    Negative: Patient wants to be seen    Negative: Headache started during exertion (e.g., sex, strenuous exercise, heavy lifting)    Negative: Difficult to awaken or acting confused (e.g., disoriented, slurred speech)    Negative: New neurologic deficit that is present NOW, sudden onset of ANY of the following: * Weakness of the face, arm, or leg on one side of the body* Numbness of the face, arm, or leg on one side of the body* Loss of speech or garbled speech    Negative: Sounds like a life-threatening emergency to the triager    Negative: Difficult to awaken or acting confused (disoriented, slurred speech) and has diabetes mellitus    Negative: Difficult to awaken or acting confused (disoriented, slurred speech) and new-onset    Negative: Weakness of the face, arm, or leg on one side of the body and new-onset    Negative: Numbness of the face, arm, or leg on one side of the body and new-onset    Negative: Loss of speech or garbled speech and new-onset    Negative: Difficulty breathing and bluish (or gray) lips or face    Negative: Shock suspected (e.g., cold/pale/clammy skin, too weak to stand, low BP, rapid pulse)    Negative: Seeing or hearing or feeling things that are not there (i.e., auditory, visual, or tactile hallucinations)    Negative: Followed a head injury    Negative: Drug overdose suspected    Negative: Sounds like a life-threatening emergency to the triager    Negative: Questions or concerns about alcohol use, unhealthy alcohol use, binge drinking, intoxication, or withdrawal    Negative: Questions or concerns about substance use (drug use), unhealthy drug use, intoxication,  "or withdrawal    Negative: Diabetes mellitus and confusion from low blood sugar (i.e., < 60 mg/dl or 3.5 mmol/l)    Answer Assessment - Initial Assessment Questions  1. SYMPTOM: \"What is the main symptom you are concerned about?\" (e.g., weakness, numbness)      Hallucinations  2. ONSET: \"When did this start?\" (minutes, hours, days; while sleeping)      *No Answer*  3. LAST NORMAL: \"When was the last time you (the patient) were normal (no symptoms)?\"      *No Answer*  4. PATTERN \"Does this come and go, or has it been constant since it started?\"  \"Is it present now?\"      *No Answer*  5. CARDIAC SYMPTOMS: \"Have you had any of the following symptoms: chest pain, difficulty breathing, palpitations?\"      *No Answer*  6. NEUROLOGIC SYMPTOMS: \"Have you had any of the following symptoms: headache, dizziness, vision loss, double vision, changes in speech, unsteady on your feet?\"      *No Answer*  7. OTHER SYMPTOMS: \"Do you have any other symptoms?\"      *No Answer*  8. PREGNANCY: \"Is there any chance you are pregnant?\" \"When was your last menstrual period?\"      *No Answer*    Answer Assessment - Initial Assessment Questions  1. LOCATION: \"Where does it hurt?\"       *No Answer*  2. ONSET: \"When did the headache start?\" (Minutes, hours or days)       *No Answer*  3. PATTERN: \"Does the pain come and go, or has it been constant since it started?\"      *No Answer*  4. SEVERITY: \"How bad is the pain?\" and \"What does it keep you from doing?\"  (e.g., Scale 1-10; mild, moderate, or severe)    - MILD (1-3): doesn't interfere with normal activities     - MODERATE (4-7): interferes with normal activities or awakens from sleep     - SEVERE (8-10): excruciating pain, unable to do any normal activities         *No Answer*  5. RECURRENT SYMPTOM: \"Have you ever had headaches before?\" If Yes, ask: \"When was the last time?\" and \"What happened that time?\"       *No Answer*  6. CAUSE: \"What do you think is causing the headache?\"      *No " "Answer*  7. MIGRAINE: \"Have you been diagnosed with migraine headaches?\" If Yes, ask: \"Is this headache similar?\"       *No Answer*  8. HEAD INJURY: \"Has there been any recent injury to the head?\"       *No Answer*  9. OTHER SYMPTOMS: \"Do you have any other symptoms?\" (fever, stiff neck, eye pain, sore throat, cold symptoms)      Intermittent hallucinations   10. PREGNANCY: \"Is there any chance you are pregnant?\" \"When was your last menstrual period?\"        N/A    Answer Assessment - Initial Assessment Questions  1. LEVEL OF CONSCIOUSNESS: \"How is he (she, the patient) acting right now?\" (e.g., alert-oriented, confused, lethargic, stuporous, comatose)      Alert and oriented x4 per Linda, PT  2. ONSET: \"When did the confusion start?\"  (minutes, hours, days)      Since August 2022, when started on Cymbalta   3. PATTERN \"Does this come and go, or has it been constant since it started?\"  \"Is it present now?\"      Comes and goes, happens about once a month   4. ALCOHOL or DRUGS: \"Has he been drinking alcohol or taking any drugs?\"       No   5. NARCOTIC MEDICATIONS: \"Has he been receiving any narcotic medications?\" (e.g., morphine, Vicodin)      No   6. CAUSE: \"What do you think is causing the confusion?\"       Medication - Cymbalta  7. OTHER SYMPTOMS: \"Are there any other symptoms?\" (e.g., difficulty breathing, headache, fever, weakness)      Headache    Protocols used: HEADACHE-A-OH, NEUROLOGIC DEFICIT-A-OH, CONFUSION - DELIRIUM-A-OH      "

## 2022-12-27 NOTE — TELEPHONE ENCOUNTER
I would not recommend stopping the duloxetine, but I would recommend scheduling an appointment to evaluate the new symptom

## 2023-01-11 ENCOUNTER — MEDICAL CORRESPONDENCE (OUTPATIENT)
Dept: HEALTH INFORMATION MANAGEMENT | Facility: CLINIC | Age: 83
End: 2023-01-11

## 2023-01-18 ENCOUNTER — TELEPHONE (OUTPATIENT)
Dept: FAMILY MEDICINE | Facility: CLINIC | Age: 83
End: 2023-01-18
Payer: COMMERCIAL

## 2023-01-18 NOTE — TELEPHONE ENCOUNTER
The Home Care/Assisted Living/Nursing Facility is calling regarding an established patient.  Has the patient seen Home Care in the past or is currently residing in Assisted Living or Nursing Facility? Yes.     NORBERT Nj calling from [facility] requesting the following orders that are within the Home Care, Assisted Living or Nursing Home Eval and Treatment standing order and can be signed as standing order signature required by RN.    Preferred Call Back Number: [phone]    PT/OT/Speech Therapy - OT every other week for 6 weeks to work on ADL safety, body mechanics training, and energy conservation.     Any additional Orders:  Are there any orders requested, not stated above, that are outside of the standing order and must be routed to a licensed practitioner for approval?    No    Writer has verified Requestor will send fax to have orders signed. RN gave verbal approval for requested orders per protocol. Routing update to PCP.     Osbaldo COSTELLO RN 1/18/2023 at 9:33 AM

## 2023-01-23 ENCOUNTER — MEDICAL CORRESPONDENCE (OUTPATIENT)
Dept: HEALTH INFORMATION MANAGEMENT | Facility: CLINIC | Age: 83
End: 2023-01-23

## 2023-01-24 ENCOUNTER — TELEPHONE (OUTPATIENT)
Dept: FAMILY MEDICINE | Facility: CLINIC | Age: 83
End: 2023-01-24
Payer: COMMERCIAL

## 2023-01-24 NOTE — TELEPHONE ENCOUNTER
Received call from ARVIN Mckeon from [facility] requesting verbal orders for continuation of PT.     Request: 1 x per week for 1 week, every other week for 6 weeks, 1 x per week for 2 weeks. Reason: balance, strength and endurance training.     Verbal approval given per RN Protocol.     Home care to fax requested orders to provider for review and signature.     Eduardo Mills RN

## 2023-01-27 ENCOUNTER — TELEPHONE (OUTPATIENT)
Dept: FAMILY MEDICINE | Facility: CLINIC | Age: 83
End: 2023-01-27
Payer: COMMERCIAL

## 2023-01-27 NOTE — TELEPHONE ENCOUNTER
The Home Care/Assisted Living/Nursing Facility is calling regarding an established patient.  Has the patient seen Home Care in the past or is currently residing in Assisted Living or Nursing Facility? Yes.     Lily calling from  [facility] requesting the following orders that are within the Home Care, Assisted Living or Nursing Home Eval and Treatment standing order and can be signed as standing order signature required by RN.    Preferred Call Back Number: [phone]    PT/OT/Speech Therapy    Any additional Orders:  Are there any orders requested, not stated above, that are outside of the standing order and must be routed to a licensed practitioner for approval?    No    Writer has verified Requestor will send fax to have orders signed.    Xiao Almonte RN  [facility]

## 2023-01-29 ENCOUNTER — MEDICAL CORRESPONDENCE (OUTPATIENT)
Dept: HEALTH INFORMATION MANAGEMENT | Facility: CLINIC | Age: 83
End: 2023-01-29

## 2023-02-09 DIAGNOSIS — Z53.9 DIAGNOSIS NOT YET DEFINED: Primary | ICD-10-CM

## 2023-02-09 PROCEDURE — G0179 MD RECERTIFICATION HHA PT: HCPCS | Performed by: INTERNAL MEDICINE

## 2023-02-16 ENCOUNTER — PATIENT OUTREACH (OUTPATIENT)
Dept: CARE COORDINATION | Facility: CLINIC | Age: 83
End: 2023-02-16

## 2023-02-16 ENCOUNTER — OFFICE VISIT (OUTPATIENT)
Dept: FAMILY MEDICINE | Facility: CLINIC | Age: 83
End: 2023-02-16
Payer: COMMERCIAL

## 2023-02-16 VITALS
TEMPERATURE: 97.6 F | OXYGEN SATURATION: 97 % | HEART RATE: 66 BPM | DIASTOLIC BLOOD PRESSURE: 76 MMHG | RESPIRATION RATE: 20 BRPM | BODY MASS INDEX: 37.38 KG/M2 | WEIGHT: 217.9 LBS | SYSTOLIC BLOOD PRESSURE: 114 MMHG

## 2023-02-16 DIAGNOSIS — E66.01 MORBID OBESITY (H): ICD-10-CM

## 2023-02-16 DIAGNOSIS — I10 ESSENTIAL HYPERTENSION, BENIGN: ICD-10-CM

## 2023-02-16 DIAGNOSIS — E78.5 HYPERLIPIDEMIA LDL GOAL <70: ICD-10-CM

## 2023-02-16 DIAGNOSIS — G89.29 CHRONIC LEFT-SIDED LOW BACK PAIN WITH LEFT-SIDED SCIATICA: ICD-10-CM

## 2023-02-16 DIAGNOSIS — N18.30 STAGE 3 CHRONIC KIDNEY DISEASE, UNSPECIFIED WHETHER STAGE 3A OR 3B CKD (H): ICD-10-CM

## 2023-02-16 DIAGNOSIS — M54.42 CHRONIC LEFT-SIDED LOW BACK PAIN WITH LEFT-SIDED SCIATICA: ICD-10-CM

## 2023-02-16 DIAGNOSIS — E11.51 TYPE 2 DIABETES MELLITUS WITH DIABETIC PERIPHERAL ANGIOPATHY WITHOUT GANGRENE, WITHOUT LONG-TERM CURRENT USE OF INSULIN (H): Primary | ICD-10-CM

## 2023-02-16 DIAGNOSIS — I25.10 CORONARY ARTERY DISEASE INVOLVING NATIVE CORONARY ARTERY OF NATIVE HEART WITHOUT ANGINA PECTORIS: ICD-10-CM

## 2023-02-16 PROCEDURE — 99207 PR FOOT EXAM NO CHARGE: CPT | Performed by: INTERNAL MEDICINE

## 2023-02-16 PROCEDURE — 99214 OFFICE O/P EST MOD 30 MIN: CPT | Performed by: INTERNAL MEDICINE

## 2023-02-16 RX ORDER — AMLODIPINE BESYLATE 5 MG/1
5 TABLET ORAL DAILY
Qty: 90 TABLET | Refills: 3 | Status: SHIPPED | OUTPATIENT
Start: 2023-02-16 | End: 2024-04-30

## 2023-02-16 RX ORDER — LOSARTAN POTASSIUM 50 MG/1
50 TABLET ORAL 2 TIMES DAILY
Qty: 180 TABLET | Refills: 3 | Status: SHIPPED | OUTPATIENT
Start: 2023-02-16 | End: 2024-04-26

## 2023-02-16 RX ORDER — DULOXETIN HYDROCHLORIDE 60 MG/1
60 CAPSULE, DELAYED RELEASE ORAL DAILY
Qty: 90 CAPSULE | Refills: 3 | Status: SHIPPED | OUTPATIENT
Start: 2023-02-16 | End: 2024-02-28

## 2023-02-16 ASSESSMENT — PAIN SCALES - GENERAL: PAINLEVEL: MODERATE PAIN (4)

## 2023-02-16 NOTE — PROGRESS NOTES
Assessment & Plan     Type 2 diabetes mellitus with diabetic peripheral angiopathy without gangrene, without long-term current use of insulin (H)  A1c today   - Primary Care - Care Coordination Referral; Future  - FOOT EXAM    Stage 3 chronic kidney disease, unspecified whether stage 3a or 3b CKD (H)    - Albumin Random Urine Quantitative with Creat Ratio; Future    Coronary artery disease involving native coronary artery of native heart without angina pectoris  Recheck   - Lipid panel reflex to direct LDL Non-fasting; Future    Morbid obesity (H)      Hyperlipidemia LDL goal <70      BENIGN HYPERTENSION  Ok control  - amLODIPine (NORVASC) 5 MG tablet; Take 1 tablet (5 mg) by mouth daily  - losartan (COZAAR) 50 MG tablet; Take 1 tablet (50 mg) by mouth 2 times daily    Chronic left-sided low back pain with left-sided sciatica  Continue duloxetine   - DULoxetine (CYMBALTA) 60 MG capsule; Take 1 capsule (60 mg) by mouth daily        Get care coordination to help with transport options and to assist with family stress re level of care    30 minutes spent on the date of the encounter doing chart review, history and exam, documentation and further activities per the note           Return in about 6 months (around 8/16/2023) for Preventive Visit.   Patient instructed to return to clinic or contact us sooner if symptoms worsen or new symptoms develop.   Kole Gonsalez MD  [facility] ISRAEL Marroquin is a 82 year old accompanied by her daughter, presenting for the following health issues:  Follow Up (Patient here for a follow up visit per Dr. Gonsalez.)      History of Present Illness       Diabetes:   She presents for follow up of diabetes.  She is checking home blood glucose a few times a month. She checks blood glucose before and after meals.  Blood glucose is sometimes over 200 and never under 70. When her blood glucose is low, the patient is asymptomatic for confusion, blurred vision,  lethargy and reports not feeling dizzy, shaky, or weak.  She has no concerns regarding her diabetes at this time.  She is not experiencing numbness or burning in feet, excessive thirst, blurry vision, weight changes or redness, sores or blisters on feet. The patient has not had a diabetic eye exam in the last 12 months.         Reason for visit:  Regular checkup    She eats 0-1 servings of fruits and vegetables daily.She consumes 1 sweetened beverage(s) daily.She exercises with enough effort to increase her heart rate 9 or less minutes per day.  She exercises with enough effort to increase her heart rate 7 days per week.   She is taking medications regularly.         Follow up diabetes, chronic pain, hyperlipidemia, hypertension, CKD   She is frustrated by lack of transport, but she is not safe to drive, family has been helping  She refuses to move to [facility] despite my recommendation  This is causing a lot stress for patient and family       Review of Systems         Objective    /76 (BP Location: Right arm, Patient Position: Sitting, Cuff Size: Adult Large)   Pulse 66   Temp 97.6  F (36.4  C) (Temporal)   Resp 20   Wt 98.8 kg (217 lb 14.4 oz)   SpO2 97%   BMI 37.38 kg/m    Body mass index is 37.38 kg/m .  Physical Exam   GENERAL: healthy, alert and no distress  RESP: lungs clear to auscultation - no rales, rhonchi or wheezes  CV: Heart with regular rate and rhythm.   MS: no gross musculoskeletal defects noted, no edema  NEURO: ataxic gait, frailty  PSYCH: appearance well groomed and impaired insight and judgement   Diabetic foot exam: normal DP and PT pulses, no trophic changes or ulcerative lesions and normal sensory exam

## 2023-02-16 NOTE — PROGRESS NOTES
"Clinic Care Coordination Contact  Community Health Worker Initial Outreach    CHW introduced self and offered the CC program.  Spoke with patient's daughter, Kalina.    Patient was previously enrolled in CC.      Patient's daughter stated her mom refuses to move to an [facility].  Patient's daughter had a one month stay offer at [facility] in [place] to have her mom try an [facility].  Patient refused.    Patient's daughter stated her mom is \"adamant\" to stay in her house.    Patient's daughter stated her mom is not driving.  Her brother has the car.  Patient's daughter stated her mom wants to drive and will not \"accept\" having to pay for any transportation. Patient continues to ask to drive and for her car.  Patient's daughter and son provide transportation.    Patient's daughter stated her mother is  \"delusional\" about her age.    Patient's daughter stated her mother is \"extremely stubborn\".  She wants everything for free.      CHW Initial Information Gathering:  Referral Source: PCP    Reason for Referral:  Resources for Transportation     Patient accepts CC: No, not at this time. Patient will be sent Care Coordination introduction letter for future reference.     Plan: Care Coordinator will send care coordination introduction letter with care coordinator contact information and explanation of care coordination services via [employer]. Care Coordinator will do no further outreaches at this time.      JUSTINE Lynch  Clinic Care Coordination  [facility]: [facility] [place], [facility], [facility], [facility]  Phone: [phone]      "

## 2023-02-16 NOTE — LETTER
[facility] COORDINATION  [address]  [facility] [address]      February 16, 2023      Cara Camarillo  [address]  [facility] [address]      Dear Cara,        I am a clinic community health worker who works with Kole Gonsalez MD with the [facility]. I wanted to thank you for spending the time to talk with me.  Below is a description of clinic care coordination and how I can further assist you.       The clinic care coordination team is made up of a registered nurse, , financial resource worker and community health worker who understand the health care system. The goal of clinic care coordination is to help you manage your health and improve access to the health care system. Our team works alongside your provider to assist you in determining your health and social needs. We can help you obtain health care and community resources, providing you with necessary information and education. We can work with you through any barriers and develop a care plan that helps coordinate and strengthen the communication between you and your care team.    Please feel free to contact me with any questions or concerns regarding care coordination and what we can offer.      We are focused on providing you with the highest-quality healthcare experience possible.    Sincerely,       JUSTINE Lynch  Clinic Care Coordination  [facility]: [facility] Ponder[facility], [facility], [facility], [facility]  Phone: [phone]

## 2023-02-20 ENCOUNTER — DOCUMENTATION ONLY (OUTPATIENT)
Dept: OTHER | Facility: CLINIC | Age: 83
End: 2023-02-20
Payer: COMMERCIAL

## 2023-02-28 ENCOUNTER — MYC MEDICAL ADVICE (OUTPATIENT)
Dept: FAMILY MEDICINE | Facility: CLINIC | Age: 83
End: 2023-02-28
Payer: COMMERCIAL

## 2023-02-28 NOTE — TELEPHONE ENCOUNTER
MY CHART message requesting renewal Handicap parking.     Seems early to submit for July 2023 expiration.  Conditions may change.   Fill out or tell contact us abt May?     [email or web address].gov/divisions/dvs/forms-[email or web address]    Tete HESS MA
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
## 2023-03-02 ENCOUNTER — ONCOLOGY VISIT (OUTPATIENT)
Dept: ONCOLOGY | Facility: CLINIC | Age: 83
End: 2023-03-02
Attending: STUDENT IN AN ORGANIZED HEALTH CARE EDUCATION/TRAINING PROGRAM
Payer: COMMERCIAL

## 2023-03-02 VITALS
WEIGHT: 218 LBS | HEART RATE: 70 BPM | SYSTOLIC BLOOD PRESSURE: 124 MMHG | BODY MASS INDEX: 37.4 KG/M2 | RESPIRATION RATE: 17 BRPM | DIASTOLIC BLOOD PRESSURE: 79 MMHG | OXYGEN SATURATION: 98 %

## 2023-03-02 DIAGNOSIS — M54.2 NECK PAIN: Primary | ICD-10-CM

## 2023-03-02 DIAGNOSIS — R51.9 CHRONIC HEAD PAIN: ICD-10-CM

## 2023-03-02 DIAGNOSIS — G89.29 CHRONIC LEFT-SIDED LOW BACK PAIN WITH LEFT-SIDED SCIATICA: ICD-10-CM

## 2023-03-02 DIAGNOSIS — R29.6 FALLS FREQUENTLY: ICD-10-CM

## 2023-03-02 DIAGNOSIS — Z71.89 GOALS OF CARE, COUNSELING/DISCUSSION: ICD-10-CM

## 2023-03-02 DIAGNOSIS — M54.42 CHRONIC LEFT-SIDED LOW BACK PAIN WITH LEFT-SIDED SCIATICA: ICD-10-CM

## 2023-03-02 DIAGNOSIS — Z51.5 ENCOUNTER FOR PALLIATIVE CARE: ICD-10-CM

## 2023-03-02 DIAGNOSIS — G89.29 CHRONIC HEAD PAIN: ICD-10-CM

## 2023-03-02 PROCEDURE — 99205 OFFICE O/P NEW HI 60 MIN: CPT | Performed by: STUDENT IN AN ORGANIZED HEALTH CARE EDUCATION/TRAINING PROGRAM

## 2023-03-02 PROCEDURE — G0463 HOSPITAL OUTPT CLINIC VISIT: HCPCS | Performed by: STUDENT IN AN ORGANIZED HEALTH CARE EDUCATION/TRAINING PROGRAM

## 2023-03-02 ASSESSMENT — PAIN SCALES - GENERAL: PAINLEVEL: MODERATE PAIN (4)
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
## 2023-03-02 NOTE — PROGRESS NOTES
"Oncology Rooming Note    March 2, 2023 12:33 PM   Cara Camarillo is a 82 year old female who presents for:    Chief Complaint   Patient presents with     Oncology Clinic Visit     Initial Vitals: /79   Pulse 70   Resp 17   Wt 98.9 kg (218 lb)   SpO2 98%   BMI 37.40 kg/m   Estimated body mass index is 37.4 kg/m  as calculated from the following:    Height as of 11/22/22: 1.626 m (5' 4.02\").    Weight as of this encounter: 98.9 kg (218 lb). Body surface area is 2.11 meters squared.  Moderate Pain (4) Comment: Data Unavailable   No LMP recorded. Patient is postmenopausal.  Allergies reviewed: Yes  Medications reviewed: Yes    Medications: Medication refills not needed today.  Pharmacy name entered into [facility]: [facility] [id number] [place] [address]\[facility]\""    Clinical concerns:  doctor was notified.      Jocy Garcia CMA            "

## 2023-03-02 NOTE — PATIENT INSTRUCTIONS
Recommendations:  -recommend adding in at least one more dose of Tylenol during the day. You can take 1000mg (two 500mg tablets) 3 times a day if needed for the headache/neck pain.  -Suggest trial of topical aspercream (regular, not with lidocaine), bengay, or voltaren gel rubbed into the neck muscles 3-4 times daily.  -Provided a blank healthcare directive form today. Recommend discussing this and completing it with your family. You can then bring a copy into Dr. Gonsalez's office once it's complete, so it can be scanned into your chart.    Follow up: As needed      Reasons to Call    If you are having worsening/uncontrolled symptoms we want you to call!    You or your other physicians make any changes to medications we have prescribed.  -Please call for refills 4-5 days before you will run out of medication.    Important Phone Numbers, including: Refills, scheduling, and general questions     [facility] - [place]. Palliative Care RN - [phone]    [facility] - [place]da Shirley. Palliative Care RN - [phone]  *After hours or on weekends. Will connect you with on call MD. [phone]

## 2023-03-07 NOTE — TELEPHONE ENCOUNTER
Connected with daughter Kalina that wanted to  form. Form placed at . Copy made and placed in [employer].

## 2023-03-29 ENCOUNTER — TELEPHONE (OUTPATIENT)
Dept: FAMILY MEDICINE | Facility: CLINIC | Age: 83
End: 2023-03-29
Payer: COMMERCIAL

## 2023-03-29 NOTE — TELEPHONE ENCOUNTER
Linda with [facility] care called requesting approval for PT orders 1 x a wk for 4 wks and one every other week for 4 wks.     Verbal approval given.

## 2023-03-30 ENCOUNTER — MEDICAL CORRESPONDENCE (OUTPATIENT)
Dept: HEALTH INFORMATION MANAGEMENT | Facility: CLINIC | Age: 83
End: 2023-03-30

## 2023-04-01 ENCOUNTER — HEALTH MAINTENANCE LETTER (OUTPATIENT)
Age: 83
End: 2023-04-01

## 2023-04-04 DIAGNOSIS — Z53.9 DIAGNOSIS NOT YET DEFINED: Primary | ICD-10-CM

## 2023-04-04 PROCEDURE — G0179 MD RECERTIFICATION HHA PT: HCPCS | Performed by: INTERNAL MEDICINE

## 2023-04-25 ENCOUNTER — LAB (OUTPATIENT)
Dept: LAB | Facility: CLINIC | Age: 83
End: 2023-04-25
Payer: COMMERCIAL

## 2023-04-25 ENCOUNTER — OFFICE VISIT (OUTPATIENT)
Dept: PHARMACY | Facility: CLINIC | Age: 83
End: 2023-04-25
Payer: COMMERCIAL

## 2023-04-25 ENCOUNTER — NURSE TRIAGE (OUTPATIENT)
Dept: FAMILY MEDICINE | Facility: CLINIC | Age: 83
End: 2023-04-25

## 2023-04-25 VITALS — WEIGHT: 222 LBS | DIASTOLIC BLOOD PRESSURE: 72 MMHG | SYSTOLIC BLOOD PRESSURE: 126 MMHG | BODY MASS INDEX: 38.09 KG/M2

## 2023-04-25 DIAGNOSIS — I10 ESSENTIAL HYPERTENSION, BENIGN: ICD-10-CM

## 2023-04-25 DIAGNOSIS — I25.10 CORONARY ARTERY DISEASE INVOLVING NATIVE CORONARY ARTERY OF NATIVE HEART WITHOUT ANGINA PECTORIS: ICD-10-CM

## 2023-04-25 DIAGNOSIS — N18.30 STAGE 3 CHRONIC KIDNEY DISEASE, UNSPECIFIED WHETHER STAGE 3A OR 3B CKD (H): ICD-10-CM

## 2023-04-25 DIAGNOSIS — M79.662 PAIN OF LEFT LOWER LEG: ICD-10-CM

## 2023-04-25 DIAGNOSIS — E78.5 HYPERLIPIDEMIA LDL GOAL <70: ICD-10-CM

## 2023-04-25 DIAGNOSIS — E11.51 TYPE 2 DIABETES MELLITUS WITH DIABETIC PERIPHERAL ANGIOPATHY WITHOUT GANGRENE, WITHOUT LONG-TERM CURRENT USE OF INSULIN (H): ICD-10-CM

## 2023-04-25 DIAGNOSIS — E55.9 VITAMIN D DEFICIENCY: ICD-10-CM

## 2023-04-25 DIAGNOSIS — E11.59 TYPE 2 DIABETES MELLITUS WITH VASCULAR DISEASE (H): ICD-10-CM

## 2023-04-25 DIAGNOSIS — E11.59 TYPE 2 DIABETES MELLITUS WITH VASCULAR DISEASE (H): Primary | ICD-10-CM

## 2023-04-25 LAB
ANION GAP SERPL CALCULATED.3IONS-SCNC: 12 MMOL/L (ref 7–15)
BUN SERPL-MCNC: 32.8 MG/DL (ref 8–23)
CALCIUM SERPL-MCNC: 9.2 MG/DL (ref 8.8–10.2)
CHLORIDE SERPL-SCNC: 106 MMOL/L (ref 98–107)
CHOLEST SERPL-MCNC: 160 MG/DL
CREAT SERPL-MCNC: 1.09 MG/DL (ref 0.51–0.95)
DEPRECATED HCO3 PLAS-SCNC: 22 MMOL/L (ref 22–29)
GFR SERPL CREATININE-BSD FRML MDRD: 50 ML/MIN/1.73M2
GLUCOSE SERPL-MCNC: 227 MG/DL (ref 70–99)
HBA1C MFR BLD: 9.4 % (ref 0–5.6)
HDLC SERPL-MCNC: 61 MG/DL
LDLC SERPL CALC-MCNC: 80 MG/DL
NONHDLC SERPL-MCNC: 99 MG/DL
POTASSIUM SERPL-SCNC: 4.7 MMOL/L (ref 3.4–5.3)
SODIUM SERPL-SCNC: 140 MMOL/L (ref 136–145)
TRIGL SERPL-MCNC: 94 MG/DL

## 2023-04-25 PROCEDURE — 82306 VITAMIN D 25 HYDROXY: CPT

## 2023-04-25 PROCEDURE — 80048 BASIC METABOLIC PNL TOTAL CA: CPT

## 2023-04-25 PROCEDURE — 99605 MTMS BY PHARM NP 15 MIN: CPT | Performed by: PHARMACIST

## 2023-04-25 PROCEDURE — 99607 MTMS BY PHARM ADDL 15 MIN: CPT | Performed by: PHARMACIST

## 2023-04-25 PROCEDURE — 36415 COLL VENOUS BLD VENIPUNCTURE: CPT

## 2023-04-25 PROCEDURE — 82570 ASSAY OF URINE CREATININE: CPT

## 2023-04-25 PROCEDURE — 83036 HEMOGLOBIN GLYCOSYLATED A1C: CPT

## 2023-04-25 PROCEDURE — 82043 UR ALBUMIN QUANTITATIVE: CPT

## 2023-04-25 PROCEDURE — 80061 LIPID PANEL: CPT

## 2023-04-25 NOTE — LETTER
"Recommended To-Do List      Prepared on: Apr 25, 2023       You can get the best results from your medications by completing the items on this \"To-Do List.\"      Bring your To-Do List when you go to your doctor. And, share it with your family or caregivers.    My To-Do List:  What we talked about: What I should do:   What my medicines are for, how to know if my medicines are working, made sure my medicines are safe for me and reviewed how to take my medicines.    Take my medicines every day               "

## 2023-04-25 NOTE — LETTER
April 26, 2023  Cara Camarillo  [address] [address]    Dear Ms. Camarillo, [facility]     Thank you for talking with me on Apr 25, 2023 about your health and medications. As a follow-up to our conversation, I have included two documents:      1. Your Recommended To-Do List has steps you should take to get the best results from your medications.  2. Your Medication List will help you keep track of your medications and how to take them.    If you want to talk about these documents, please call Lissy Diana PharmD at phone: [phone], Monday-Friday 8-4:30pm.    I look forward to working with you and your doctors to make sure your medications work well for you.    Sincerely,  Lissy Diana PharmD  [facility] Pharmacist, [facility]

## 2023-04-25 NOTE — TELEPHONE ENCOUNTER
No phone number listed for Linda PT     Called [facility] number [phone]    Spoke with Lyric there     States pt is currently enrolled in home care, so they can take a verbal for adding skilled nursing, she will have Linda call back to take a verbal for this     She will also ask Linda to notify patient to schedule appointment with our office for BP follow up    Ana CHAVES, Christiano RN  [facility]

## 2023-04-25 NOTE — TELEPHONE ENCOUNTER
Found [facility] PT Linda's number in prior encounter:    [phone]    Left her a detailed message giving verbal ok to add skilled nursing and also notifying her of MTM message below     Ana CHAVES, Triage RN  [facility]

## 2023-04-25 NOTE — LETTER
"_  Medication List        Prepared on: Apr 25, 2023     Bring your Medication List when you go to the doctor, hospital, or   emergency room. And, share it with your family or caregivers.     Note any changes to how you take your medications.  Cross out medications when you no longer use them.    Medication How I take it Why I use it Prescriber   acetaminophen (TYLENOL) 500 MG tablet Take 1,000 mg by mouth every 8 hours as needed for pain Pain Patient Reported   amLODIPine (NORVASC) 5 MG tablet Take 1 tablet (5 mg) by mouth daily Essential Hypertension, Benign Kole Gonsalez MD   aspirin 81 MG tablet Take 1 tablet (81 mg) by mouth daily DM w/o Complication Type II Ventura Alvarez MD   atorvastatin (LIPITOR) 20 MG tablet Take 1 tablet (20 mg) by mouth daily Hyperlipidemia LDL Goal <70 Kole Gonsalez MD   BD INSULIN SYRINGE U/F 30G X 1/2\" 0.5 ML miscellaneous USE ONE SYRINGE TWICE DAILY OR AS DIRECTED. 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) Kole Gonsalez MD   DULoxetine (CYMBALTA) 60 MG capsule Take 1 capsule (60 mg) by mouth daily Chronic left-sided low back pain with left-sided sciatica Kole Gonsalez MD   insulin  UNIT/ML injection Inject 10 Units Subcutaneous 2 times daily (before meals) Type 2 diabetes mellitus with diabetic peripheral angiopathy without gangrene, without long-term current use of insulin (H) Juana Hogue PA-C   Lidocaine (LIDOCARE) 4 % Patch Place 1 patch onto the skin every 24 hours To prevent lidocaine toxicity, patient should be patch free for 12 hrs daily. Back contusion, unspecified laterality, " initial encounter Juana Hogue PA-C   losartan (COZAAR) 50 MG tablet Take 1 tablet (50 mg) by mouth 2 times daily Essential Hypertension, Benign Kole Gonsalez MD   metoprolol tartrate (LOPRESSOR) 100 MG tablet Take 1 tablet (100 mg) by mouth 2 times daily Essential Hypertension, Benign Kole Gonsalez MD   nitroGLYcerin (NITROSTAT) 0.4 MG sublingual tablet Place 1 tablet (0.4 mg) under the tongue every 5 minutes as needed for chest pain if you are still having symptoms after 3 doses (15 minutes) call 911. Acute Angina Pectoris Kole Gonsalez MD   Vitamin D3 (CHOLECALCIFEROL) 25 mcg (1000 units) tablet Take 1 tablet by mouth daily Osteoporosis Patient Reported         Add new medications, over-the-counter drugs, herbals, vitamins, or  minerals in the blank rows below.    Medication How I take it Why I use it Prescriber                                      Allergies:      actos [pioglitazone]; enalapril; hydrochlorothiazide; lisinopril; metformin        Side effects I have had:               Other Information:              My notes and questions:

## 2023-04-25 NOTE — PROGRESS NOTES
Medication Therapy Management (MTM) Encounter    ASSESSMENT:                            Medication Adherence/Access: No issues identified    Type 2 Diabetes: Patient is meeting A1c goal of < 8%, due for re-check.  Also due for microalbumin.  She declines further eye exams at this point.    Hypertension/CAD: Patient is meeting blood pressure goal of < 130/80mmHg.  Due for BMP re-check.    Hyperlipidemia: Stable.  No change in current medication necessary at this time. Further increase in statin dose not warranted as LDL is well controlled (close to or less than 40mg/dL).     Pain:  Stable, agree she may benefit from continuing duloxetine for mood.    Supplements:  Due to re-check Vitamin D levels.    PLAN:                            Labs today - A1c, microalbumin, BMP and Vitamin D    Follow-up: Return pending lab results.    SUBJECTIVE/OBJECTIVE:                          Cara Camarillo is a 83 year old female coming in for a follow-up visit.  Today's visit is a follow-up MTM visit from 7/13/2022.  She is joined by her daughter, Kalina, for our visit today.   Reason for visit: Routine follow-up.    Tobacco: She reports that she has never smoked. She has never used smokeless tobacco.  Alcohol: not currently using    Social history:  Patient had a fall in October which led to hospitalization and TCU stay.  Family has taken away her car since that time and has been encouraging move to assisted living, but report that Cara has refused.  She does currently have home physical therapy.  Kalina reports that family visits Cara daily.    Medication Adherence/Access: no issues reported    Diabetes:  Pt currently taking Novolin N 10 units twice daily with breakfast and dinner (she reports dose was reduced in TCU).  She was taken off metformin due to diarrhea.  She denies side effects.  SMBG: once weekly, doesn't have exact readings here today  Patient is not experiencing hypoglycemia.   Recent symptoms of high blood sugar?  None  Eye exam: due - she reports being told she didn't need further eye exams following cataract surgery  Foot exam: up to date  Microalbumin is < 30 mg/g. Pt is taking an ACEi/ARB.  Lab Results   Component Value Date    UMALCR 28.66 (H) 11/15/2021   Aspirin: Taking 81mg daily for secondary prevention and she denies side effects today  Lab Results   Component Value Date    A1C 7.2 07/13/2022    A1C 7.4 02/15/2022    A1C 7.4 11/15/2021    A1C 8.4 03/09/2021    A1C 8.4 12/11/2020    A1C 7.5 08/10/2020    A1C 7.3 11/18/2019    A1C 7.6 08/19/2019     GFR Estimate   Date Value Ref Range Status   04/25/2023 50 (L) >60 mL/min/1.73m2 Final     Comment:     eGFR calculated using 2021 CKD-EPI equation.   08/10/2020 45 (L) >60 mL/min/[1.73_m2] Final     Comment:     Non  GFR Calc  Starting 12/18/2018, serum creatinine based estimated GFR (eGFR) will be   calculated using the Chronic Kidney Disease Epidemiology Collaboration   (CKD-EPI) equation.       Hypertension/CAD: Current medications include aspirin 81mg daily, amlodipine 5mg daily, losartan 50mg twice daily, metoprolol tartrate 100mg twice daily and sublingual nitroglycerin as needed (no recent use). Patient does not self-monitor blood pressure. She denies side effects of therapy.  BP Readings from Last 3 Encounters:   04/25/23 126/72   03/02/23 124/79   02/16/23 114/76          Hyperlipidemia: Current therapy includes atorvastatin 20mg once daily.  Pt reports no significant myalgias or other side effects.  She has had myalgias with higher statin doses.  Recent Labs   Lab Test 07/13/22  1517 11/15/21  1554   CHOL 121 146   HDL 51 45*   LDL 51 79   TRIG 96 109         Pain:  Cara was started on duloxetine 60mg daily to help with pain, she found this helpful right away but now is finding less helpful and she'd like to stop this.  Kalina notes that this has been very helpful for Cara's mood.  Cara also takes acetaminophen as needed - generally takes  1000mg at bedtime.  She finds this effective and denies side effects.  She has Lidocaine patches available for use, but is not currently using.    Supplements:  She takes Vitamin D 1000 international unit(s) daily.  She denies side effects.  Vitamin D Deficiency Screening Results:  Lab Results   Component Value Date    VITDT 39 04/25/2023    VITDT 37 07/21/2014      Today's Vitals: /72   Wt 222 lb (100.7 kg)   BMI 38.09 kg/m    ----------------    I spent 30 minutes with this patient today. All changes were made via collaborative practice agreement with Dr. Gonsalez. A copy of the visit note was provided to the patient's provider(s).    A summary of these recommendations was given to the patient.    Lissy Diana, PharmD, [facility]  Medication Therapy Management Provider  Pager: [phone]      Medication Therapy Recommendations  No medication therapy recommendations to display

## 2023-04-25 NOTE — PATIENT INSTRUCTIONS
"Recommendations from today's MTM visit:                                                    MTM (medication therapy management) is a service provided by a clinical pharmacist designed to help you get the most of out of your medicines.   Today we reviewed what your medicines are for, how to know if they are working, that your medicines are safe and how to make your medicine regimen as easy as possible.      Labs today - A1c, protein in the urine, kidney function/electrolytes and Vitamin D.    Follow-up: Return pending lab results.    It was great speaking with you today.  I value your experience and would be very thankful for your time in providing feedback in our clinic survey. In the next few days, you may receive an email or text message from [employer] with a link to a survey related to your  clinical pharmacist.\"     To schedule another MTM appointment, please call the clinic directly or you may call the MTM scheduling line at [phone] or toll-free at [phone].     My Clinical Pharmacist's contact information:                                                      Please feel free to contact me with any questions or concerns you have.      Lissy Diana, Sonia, BCACP  Medication Therapy Management Provider  Pager: [phone]    "

## 2023-04-25 NOTE — TELEPHONE ENCOUNTER
S: High blood pressure and headache.    B: Linda, Physical Therapist, with [facility]. She is calling in to update dr. Gonsaelz that patient is still having higher blood pressures when they come and visit.  Blood pressure this morning was 160/90, this is before her morning blood pressure medications. She takes them at 11am. Patient continues to have a chronic headache. Patient had a fall in October that she did hit her head. She has had numerous imaging done, all has come back ok. Patient continued to have a sharp pin point pain to the top of her head.  Left front orbital discomfort as well.  All chronic and unchanged. Tylenol will help the pain. Taking blood pressure medications do not make the pain go away. Denies chest pain, shortness of breath, changes to gait, or vision changes. Patient is at her baseline for headache pain, and baseline for unsteady gait. Denies any recent falls. Has chronic dizziness, unchanged also.     A: Advised her protocol that recommendation would be a visit with a provider. RN reviewed red flag symptoms with patient and when to seek emergency care.     R: Patient and Linda verbalized understanding. Patient declines an appointment unless provider states it is needed as all symptom's are chronic. Physical Therapist would like a verbal order for a skilled nurse visit to get nursing involved in care due to b/p and headache.     Provider: Physical Therapist requesting home care order for skilled nurse visit? Also, would you like patient seen in clinic for continue symptom's or any other recommendations? Thank you!     Reason for Disposition    Systolic BP >= 160 OR Diastolic >= 100    Additional Information    Negative: Sounds like a life-threatening emergency to the triager    Negative: Symptom is main concern (e.g., headache, chest pain)    Negative: Low blood pressure is main concern    Negative: Systolic BP >= 160 OR Diastolic >= 100, and any cardiac or neurologic symptoms  (e.g., chest pain, difficulty breathing, unsteady gait, blurred vision)    Negative: Pregnant 20 or more weeks (or postpartum < 6 weeks) with new hand or face swelling    Negative: Pregnant 20 or more weeks (or postpartum < 6 weeks) and Systolic BP >= 160 OR Diastolic >= 100    Negative: Patient sounds very sick or weak to the triager    Negative: Systolic BP >= 200 OR Diastolic >= 120 and having NO cardiac or neurologic symptoms    Negative: Pregnant 20 or more weeks (or postpartum < 6 weeks) with Systolic BP >= 140 OR Diastolic >= 90    Negative: Systolic BP >= 180 OR Diastolic >= 110, and missed most recent dose of blood pressure medication    Negative: Systolic BP >= 180 OR Diastolic >= 110    Negative: Patient wants to be seen    Negative: Ran out of BP medications    Negative: Taking BP medications and feels is having side effects (e.g., impotence, cough, dizziness)    Protocols used: BLOOD PRESSURE - HIGH-A-OH

## 2023-04-25 NOTE — TELEPHONE ENCOUNTER
Just FYI - patient was here for MTM visit today, blood pressure was fine.  Physical therapy appointment today was before she had taken AM medications so recommended taking AM medications prior to physical therapy.    BP Readings from Last 3 Encounters:   04/25/23 126/72   03/02/23 124/79   02/16/23 114/76      Lissy Diana PharmD, [facility]  Medication Therapy Management Provider  Pager: [phone]

## 2023-04-25 NOTE — TELEPHONE ENCOUNTER
OK, I can sign home care order, please pend, also help her make an appointment re her blood pressure

## 2023-04-26 LAB — DEPRECATED CALCIDIOL+CALCIFEROL SERPL-MC: 39 UG/L (ref 20–75)

## 2023-04-26 NOTE — RESULT ENCOUNTER NOTE
The following letter pertains to your most recent diagnostic tests:    The diabetes is not well controlled.  Please schedule an appointment with me to help you adjust your medications to improve your blood sugar control.         Sincerely,    Dr. Gonsalez

## 2023-04-27 ENCOUNTER — TELEPHONE (OUTPATIENT)
Dept: FAMILY MEDICINE | Facility: CLINIC | Age: 83
End: 2023-04-27
Payer: COMMERCIAL

## 2023-04-27 ENCOUNTER — MYC MEDICAL ADVICE (OUTPATIENT)
Dept: FAMILY MEDICINE | Facility: CLINIC | Age: 83
End: 2023-04-27
Payer: COMMERCIAL

## 2023-04-27 NOTE — TELEPHONE ENCOUNTER
Spoke with pt's daughter Kalina (consent to communicate in file) and scheduled an appointment on 05/03/2023.    Pat Dominguez CMA

## 2023-04-27 NOTE — TELEPHONE ENCOUNTER
Reason for Call:  Appointment Request    Patient requesting this type of appt: Chronic Diease Management/Medication/Follow-Up    Requested provider: Kole Gonsalez    Reason patient unable to be scheduled: Not within requested timeframe    When does patient want to be seen/preferred time: 1-2 weeks    Comments: did not schedule an appt, first avail appt was in Aug    Could we send this information to you in [facility] or would you prefer to receive a phone call?:   Patient would prefer a phone call   Okay to leave a detailed message?: Yes at Cell number on file:     Telephone Information:   Mobile [phone]       Call taken on 4/27/2023 at 8:12 AM by Gabriella Esparza

## 2023-05-03 ENCOUNTER — APPOINTMENT (OUTPATIENT)
Dept: LAB | Facility: CLINIC | Age: 83
End: 2023-05-03
Payer: COMMERCIAL

## 2023-05-03 ENCOUNTER — MEDICAL CORRESPONDENCE (OUTPATIENT)
Dept: HEALTH INFORMATION MANAGEMENT | Facility: CLINIC | Age: 83
End: 2023-05-03

## 2023-05-03 ENCOUNTER — OFFICE VISIT (OUTPATIENT)
Dept: FAMILY MEDICINE | Facility: CLINIC | Age: 83
End: 2023-05-03
Payer: COMMERCIAL

## 2023-05-03 VITALS
HEART RATE: 68 BPM | DIASTOLIC BLOOD PRESSURE: 59 MMHG | HEIGHT: 64 IN | WEIGHT: 221.3 LBS | TEMPERATURE: 97.8 F | BODY MASS INDEX: 37.78 KG/M2 | SYSTOLIC BLOOD PRESSURE: 120 MMHG | RESPIRATION RATE: 16 BRPM | OXYGEN SATURATION: 96 %

## 2023-05-03 DIAGNOSIS — Z23 HIGH PRIORITY FOR 2019-NCOV VACCINE: ICD-10-CM

## 2023-05-03 DIAGNOSIS — E78.5 HYPERLIPIDEMIA LDL GOAL <70: ICD-10-CM

## 2023-05-03 DIAGNOSIS — E11.51 TYPE 2 DIABETES MELLITUS WITH DIABETIC PERIPHERAL ANGIOPATHY WITHOUT GANGRENE, WITHOUT LONG-TERM CURRENT USE OF INSULIN (H): Primary | ICD-10-CM

## 2023-05-03 DIAGNOSIS — F41.9 ANXIETY: ICD-10-CM

## 2023-05-03 DIAGNOSIS — I10 ESSENTIAL HYPERTENSION, BENIGN: ICD-10-CM

## 2023-05-03 DIAGNOSIS — E66.01 MORBID OBESITY (H): ICD-10-CM

## 2023-05-03 LAB
CREAT UR-MCNC: 125 MG/DL
MICROALBUMIN UR-MCNC: 90.7 MG/L
MICROALBUMIN/CREAT UR: 72.56 MG/G CR (ref 0–25)

## 2023-05-03 PROCEDURE — 91312 COVID-19 BIVALENT 12+ (PFIZER): CPT | Performed by: INTERNAL MEDICINE

## 2023-05-03 PROCEDURE — 0124A COVID-19 BIVALENT 12+ (PFIZER): CPT | Performed by: INTERNAL MEDICINE

## 2023-05-03 PROCEDURE — 99214 OFFICE O/P EST MOD 30 MIN: CPT | Mod: 25 | Performed by: INTERNAL MEDICINE

## 2023-05-03 ASSESSMENT — PATIENT HEALTH QUESTIONNAIRE - PHQ9: SUM OF ALL RESPONSES TO PHQ QUESTIONS 1-9: 3

## 2023-05-03 ASSESSMENT — PAIN SCALES - GENERAL: PAINLEVEL: NO PAIN (0)

## 2023-05-03 NOTE — PROGRESS NOTES
"  Assessment & Plan     Type 2 diabetes mellitus with diabetic peripheral angiopathy without gangrene, without long-term current use of insulin (H)  Not well controlled   I would favor starting ozempic to increasing NPH, but there are cost concerns; for weight loss benefit   Her daughter will check out out of pocket expenses and if affordable she will add to existing insulin  If ozempic is not affordable, would recommend increasing NPH to 14 units twice daily   Daughter understood directions   - semaglutide (OZEMPIC) 2 MG/3ML pen; Inject 0.25 mg Subcutaneous every 7 days After 4 weeks increase to 0.5 mg every 7 days    Morbid obesity (H)  See discussion above     Essential hypertension, benign  Well controlled     Hyperlipidemia LDL goal <70  On statin therapy; with lipids recently well controlled     Anxiety  I would strongly suggested staying on duloxetine for mood benefit         30 minutes spent by me on the date of the encounter doing chart review, history and exam, documentation and further activities per the note       BMI:   Estimated body mass index is 37.99 kg/m  as calculated from the following:    Height as of this encounter: 1.626 m (5' 4\").    Weight as of this encounter: 100.4 kg (221 lb 4.8 oz).   Weight management plan: Discussed healthy diet and exercise guidelines        Kole Gonsalez MD  [facility] ISRAEL Marroquin is a 83 year old, presenting for the following health issues:    Follow Up and Diabetes         View : No data to display.              HPI       Grieving the loss of her driving privileges  She denies missing any insulin shots  Her blood sugars are usually below 200  She is hear with her daughter          Review of Systems         Objective    /59 (BP Location: Left arm, Patient Position: Sitting, Cuff Size: Adult Large)   Pulse 68   Temp 97.8  F (36.6  C) (Oral)   Resp 16   Ht 1.626 m (5' 4\")   Wt 100.4 kg (221 lb 4.8 oz)   SpO2 96%   BMI " 37.99 kg/m    Body mass index is 37.99 kg/m .  Physical Exam   Well appearing

## 2023-05-17 ENCOUNTER — TELEPHONE (OUTPATIENT)
Dept: FAMILY MEDICINE | Facility: CLINIC | Age: 83
End: 2023-05-17
Payer: COMMERCIAL

## 2023-05-17 NOTE — TELEPHONE ENCOUNTER
Prior Authorization Retail Medication Request    Medication/Dose: Ozempic 0.25mg  ICD code (if different than what is on RX):  On Rx  Previously Tried and Failed:  Avandia, glimepiride, glipizide, glyburide, Novolog, Levemir, Lantus, Humulin N, Novolin R, metformin, pioglitazone  Rationale:  Uncontrolled diabetes, obesity    Insurance Name:  [employer] Medicare Advantage  Insurance ID:  [id number]     Lissy Diana, PharmD, BCACP  Medication Therapy Management Provider  Pager: [phone]

## 2023-05-22 ENCOUNTER — TELEPHONE (OUTPATIENT)
Dept: FAMILY MEDICINE | Facility: CLINIC | Age: 83
End: 2023-05-22
Payer: COMMERCIAL

## 2023-05-22 NOTE — TELEPHONE ENCOUNTER
Prior Authorization Retail Medication Request    Medication/Dose: Semaglutide 2mg/3mL  ICD code (if different than what is on RX):  E11.51  Previously Tried and Failed:  Metformin, Pioglitazone, Glipizide, Glimepiride, Avandia  Rationale:  Patient on NPH insulin and would benefit from GLP-1 vs increasing NPH insulin dosing, would also benefit from added benefit of weight loss with medication    Insurance Name:  [facility]  Insurance ID:  [id number]      Pharmacy Information (if different than what is on RX)  Name:  [facility] in [employer] [id number]  Phone:  [phone]

## 2023-05-23 NOTE — TELEPHONE ENCOUNTER
Central Prior Authorization Team   Phone: [phone]      PA Initiation    Medication: OZEMPIC (0.25 OR 0.5 MG/DOSE) 2 MG/3ML SC SOPN  Insurance Company: [employer] - Phone [phone] Fax [phone]  Pharmacy Filling the Rx:    Filling Pharmacy Phone:    Filling Pharmacy Fax:    Start Date: 5/23/2023

## 2023-05-24 NOTE — TELEPHONE ENCOUNTER
Great, thank you.  Notified daughter via [employer] also.  Lissy Diana, PharmD, [facility]  Medication Therapy Management Provider  Pager: [phone]

## 2023-05-24 NOTE — TELEPHONE ENCOUNTER
Prior Authorization Approval    Medication: OZEMPIC (0.25 OR 0.5 MG/DOSE) 2 MG/3ML SC SOPN  Authorization Effective Date: 2/23/2023  Authorization Expiration Date:  5/24/2024   Insurance Company: [employer] [place] - Phone [phone] Fax [phone]  Which Pharmacy is filling the prescription:    Pharmacy Notified:    Patient Notified: Yes - called patient, no answer so left v/m notifying of a PA approval and to contact pharmacy to have filled.

## 2023-05-25 ENCOUNTER — TELEPHONE (OUTPATIENT)
Dept: FAMILY MEDICINE | Facility: CLINIC | Age: 83
End: 2023-05-25
Payer: COMMERCIAL

## 2023-05-25 NOTE — TELEPHONE ENCOUNTER
Writer called and spoke with WILMER Nicole with [facility] to clarify what verbal orders are needed.    Bonnie is requesting skilled nursing 1x per week for 3 weeks, 1x every other week for 6 weeks     Routing to PCP to please review and advise on requested home care orders - thank you!     Callback to Bonnie [phone] - ok to leave detailed VM     Elmer Hernandez RN  [facility]

## 2023-05-25 NOTE — TELEPHONE ENCOUNTER
Patient Returning Call    Reason for call:  TRES REQUESTING A CALL BACK REGARDING VERBAL ORDERS.     Information relayed to patient:  N/A    Patient has additional questions:  Yes    What are your questions/concerns:  VERBAL ORDERS     Could we send this information to you in [facility] or would you prefer to receive a phone call?:   Patient would prefer a phone call   Okay to leave a detailed message?: Yes at Cell number on file:    Telephone Information:   Mobile [phone]

## 2023-05-25 NOTE — TELEPHONE ENCOUNTER
Writer called and left detailed message for WILMER Nicole with [facility] and notified of Dr. Gonsalez's approval for requested home care orders.    Advised Bonnie to call back if further questions or concerns.    Signing encounter.    Elmer Hernandez RN  [facility]

## 2023-06-08 ENCOUNTER — NURSE TRIAGE (OUTPATIENT)
Dept: FAMILY MEDICINE | Facility: CLINIC | Age: 83
End: 2023-06-08
Payer: COMMERCIAL

## 2023-06-08 ENCOUNTER — MYC MEDICAL ADVICE (OUTPATIENT)
Dept: FAMILY MEDICINE | Facility: CLINIC | Age: 83
End: 2023-06-08
Payer: COMMERCIAL

## 2023-06-08 ENCOUNTER — TELEPHONE (OUTPATIENT)
Dept: FAMILY MEDICINE | Facility: CLINIC | Age: 83
End: 2023-06-08

## 2023-06-08 DIAGNOSIS — E11.51 TYPE 2 DIABETES MELLITUS WITH DIABETIC PERIPHERAL ANGIOPATHY WITHOUT GANGRENE, WITHOUT LONG-TERM CURRENT USE OF INSULIN (H): Primary | ICD-10-CM

## 2023-06-08 NOTE — TELEPHONE ENCOUNTER
Reason for Call:  Appointment Request    Patient requesting this type of appt: Chronic Diease Management/Medication/Follow-Up    Requested provider: Kole Gonsalez    Reason patient unable to be scheduled: Not within requested timeframe    When does patient want to be seen/preferred time: 1-2 weeks    Comments: Dr. Gonsalez is booked untile Nov and a nurse had just called requesting that they schedule for a follow up win 2 weeks.    Could we send this information to you in [employer] or would you prefer to receive a phone call?:   Patient would like to be contacted via [employer]    Call taken on 6/8/2023 at 1:13 PM by Linda Wade

## 2023-06-08 NOTE — TELEPHONE ENCOUNTER
TO PCP    [facility] Home health calling stating that patient will not have enough pen needles for Ozempic doses. Only has enough for six doses out of the eight doses sent in the box    Rx and pharmacy pended.       Aneta Vaca RN on 6/8/2023 at 12:35 PM

## 2023-06-08 NOTE — TELEPHONE ENCOUNTER
To PCP  FYI    Patients daughter will call back to schedule appointment for the follow up within two weeks.    Aneta Vaca RN on 6/8/2023 at 12:44 PM      Reason for Disposition    Headache is a chronic symptom (recurrent or ongoing AND lasting > 4 weeks)    Additional Information    Negative: Difficult to awaken or acting confused (e.g., disoriented, slurred speech)    Negative: Weakness of the face, arm or leg on one side of the body and new-onset    Negative: Numbness of the face, arm or leg on one side of the body and new-onset    Negative: Loss of speech or garbled speech and new-onset    Negative: Passed out (i.e., fainted, collapsed and was not responding)    Negative: Sounds like a life-threatening emergency to the triager    Negative: Followed a head injury within last 3 days    Negative: Traumatic Brain Injury (TBI) is suspected    Negative: Sinus pain of forehead and yellow or green nasal discharge    Negative: Pregnant    Negative: Unable to walk without falling    Negative: Stiff neck (can't touch chin to chest)    Negative: Possibility of carbon monoxide exposure    Negative: SEVERE headache, states 'worst headache' of life    Negative: SEVERE headache, sudden-onset (i.e., reaching maximum intensity within seconds to 1 hour)    Negative: Severe pain in one eye    Negative: Loss of vision or double vision  (Exception: Same as prior migraines.)    Negative: Patient sounds very sick or weak to the triager    Negative: Fever > 103 F (39.4 C)    Negative: Fever > 100.0 F (37.8 C) and has diabetes mellitus or a weak immune system (e.g., HIV positive, cancer chemotherapy, organ transplant, splenectomy, chronic steroids)    Negative: SEVERE headache (e.g., excruciating) and has had severe headaches before    Negative: SEVERE headache and not relieved by pain meds    Negative: SEVERE headache and vomiting    Negative: SEVERE headache and fever    Negative: Fever present > 3 days (72 hours)    Negative: New  "headache and weak immune system (e.g., HIV positive, cancer chemo, splenectomy, organ transplant, chronic steroids)    Negative: Patient wants to be seen    Negative: Unexplained headache that is present > 24 hours    Negative: New headache and age > 50    Negative: Headache started during exertion (e.g., sex, strenuous exercise, heavy lifting)    Answer Assessment - Initial Assessment Questions  1. LOCATION: \"Where does it hurt?\"       Fell at the end of October 2022. The back of my head and across the front of my eyes on left side  2. ONSET: \"When did the headache start?\" (Minutes, hours or days)       End of October  3. PATTERN: \"Does the pain come and go, or has it been constant since it started?\"      Constant   4. SEVERITY: \"How bad is the pain?\" and \"What does it keep you from doing?\"  (e.g., Scale 1-10; mild, moderate, or severe)    - MILD (1-3): doesn't interfere with normal activities     - MODERATE (4-7): interferes with normal activities or awakens from sleep     - SEVERE (8-10): excruciating pain, unable to do any normal activities         4/10  5. RECURRENT SYMPTOM: \"Have you ever had headaches before?\" If Yes, ask: \"When was the last time?\" and \"What happened that time?\"       No  6. CAUSE: \"What do you think is causing the headache?\"      Fall from Oct 2022  7. MIGRAINE: \"Have you been diagnosed with migraine headaches?\" If Yes, ask: \"Is this headache similar?\"       No  8. HEAD INJURY: \"Has there been any recent injury to the head?\"       Yes, Oct 2022  9. OTHER SYMPTOMS: \"Do you have any other symptoms?\" (fever, stiff neck, eye pain, sore throat, cold symptoms)      When I turn my head it hurts too. It hurts on both sides of the neck at the base of my skull  10. PREGNANCY: \"Is there any chance you are pregnant?\" \"When was your last menstrual period?\"        No    Protocols used: HEADACHE-A-OH      "

## 2023-06-13 ENCOUNTER — OFFICE VISIT (OUTPATIENT)
Dept: FAMILY MEDICINE | Facility: CLINIC | Age: 83
End: 2023-06-13
Payer: COMMERCIAL

## 2023-06-13 VITALS
BODY MASS INDEX: 37.23 KG/M2 | HEART RATE: 82 BPM | TEMPERATURE: 97.2 F | WEIGHT: 216.9 LBS | DIASTOLIC BLOOD PRESSURE: 83 MMHG | OXYGEN SATURATION: 98 % | RESPIRATION RATE: 20 BRPM | SYSTOLIC BLOOD PRESSURE: 131 MMHG

## 2023-06-13 DIAGNOSIS — I10 ESSENTIAL HYPERTENSION, BENIGN: ICD-10-CM

## 2023-06-13 DIAGNOSIS — E11.51 TYPE 2 DIABETES MELLITUS WITH DIABETIC PERIPHERAL ANGIOPATHY WITHOUT GANGRENE, WITHOUT LONG-TERM CURRENT USE OF INSULIN (H): Primary | ICD-10-CM

## 2023-06-13 PROCEDURE — 99214 OFFICE O/P EST MOD 30 MIN: CPT | Performed by: INTERNAL MEDICINE

## 2023-06-13 ASSESSMENT — PAIN SCALES - GENERAL: PAINLEVEL: MODERATE PAIN (4)

## 2023-06-13 NOTE — PROGRESS NOTES
Assessment & Plan     Type 2 diabetes mellitus with diabetic peripheral angiopathy without gangrene, without long-term current use of insulin (H)  Continue current drugs and check A1c in August; further follow up pending result; goal A1c < 8  - Hemoglobin A1c; Future    Essential hypertension, benign  Well controlled; continue current drugs         36 minutes spent by me on the date of the encounter doing chart review, history and exam, documentation and further activities per the note           Kole Gonsalez MD  [facility] ISRAEL Marroquin is a 83 year old, presenting for the following health issues:  Musculoskeletal Problem (Patient having neck pain per home health care nurse.)         View : No data to display.                Here to follow up diabetes and starting ozempic  According to daughter, things are going well  She denies nausea although she has a lot of angst about using the pen rather than a vial   The home care RNs have been helping her with her confidence about the injections  She has lost some weight on it already  Her musculoskeletal complaints persist and are unchanged for years according to her daughter  She is not missing duloxetine doses      Review of Systems         Objective    /83 (BP Location: Right arm, Patient Position: Sitting, Cuff Size: Adult Large)   Pulse 82   Temp 97.2  F (36.2  C) (Temporal)   Resp 20   Wt 98.4 kg (216 lb 14.4 oz)   SpO2 98%   BMI 37.23 kg/m    Body mass index is 37.23 kg/m .  Physical Exam   Well appearing, anxious affect is not changed

## 2023-06-14 ENCOUNTER — TELEPHONE (OUTPATIENT)
Dept: FAMILY MEDICINE | Facility: CLINIC | Age: 83
End: 2023-06-14

## 2023-06-14 NOTE — TELEPHONE ENCOUNTER
Tara GARDNER from [facility] called requesting Dr. Gonsalez log into physician portal to sign plan of care from May 25th, order number [id number]. Tara would like to know if Dr. Gonsalez is having difficulties with this.     Call back number [phone] ext. [phone].

## 2023-07-10 DIAGNOSIS — E11.51 TYPE 2 DIABETES MELLITUS WITH DIABETIC PERIPHERAL ANGIOPATHY WITHOUT GANGRENE, WITHOUT LONG-TERM CURRENT USE OF INSULIN (H): ICD-10-CM

## 2023-07-10 RX ORDER — PEN NEEDLE, DIABETIC 32GX 5/32"
NEEDLE, DISPOSABLE MISCELLANEOUS
Qty: 100 EACH | Refills: 1 | Status: SHIPPED | OUTPATIENT
Start: 2023-07-10 | End: 2024-09-11

## 2023-07-27 ENCOUNTER — TELEPHONE (OUTPATIENT)
Dept: FAMILY MEDICINE | Facility: CLINIC | Age: 83
End: 2023-07-27
Payer: COMMERCIAL

## 2023-07-27 NOTE — TELEPHONE ENCOUNTER
Called Karrie RN regarding PCP message below. She verbalized understanding.     Aneta Vaca RN on 7/27/2023 at 2:38 PM

## 2023-07-27 NOTE — TELEPHONE ENCOUNTER
Home Care is calling regarding an established patient with [facility].       Requesting orders from: Kole Gonsalez  Provider is following patient: Yes  Is this a 60-day recertification request?  Yes    Orders Requested    Skilled Nursing  Request for recertification   Frequency:  1x/wk for 4 wks      Confirmed ok to leave a detailed message with call back.  Contact information confirmed and updated as needed.    Steve Preston RN

## 2023-08-11 ENCOUNTER — LAB (OUTPATIENT)
Dept: LAB | Facility: CLINIC | Age: 83
End: 2023-08-11
Payer: COMMERCIAL

## 2023-08-11 DIAGNOSIS — E11.51 TYPE 2 DIABETES MELLITUS WITH DIABETIC PERIPHERAL ANGIOPATHY WITHOUT GANGRENE, WITHOUT LONG-TERM CURRENT USE OF INSULIN (H): ICD-10-CM

## 2023-08-11 LAB — HBA1C MFR BLD: 8.7 % (ref 0–5.6)

## 2023-08-11 PROCEDURE — 36415 COLL VENOUS BLD VENIPUNCTURE: CPT

## 2023-08-11 PROCEDURE — 83036 HEMOGLOBIN GLYCOSYLATED A1C: CPT

## 2023-08-11 NOTE — RESULT ENCOUNTER NOTE
The following letter pertains to your most recent diagnostic tests:    Good news! The hemoglobin A1c is improving.    Sincerely,    Dr. Gonsalez

## 2023-08-21 ENCOUNTER — OFFICE VISIT (OUTPATIENT)
Dept: PHARMACY | Facility: CLINIC | Age: 83
End: 2023-08-21
Payer: COMMERCIAL

## 2023-08-21 VITALS — BODY MASS INDEX: 36.39 KG/M2 | WEIGHT: 212 LBS

## 2023-08-21 DIAGNOSIS — E11.59 TYPE 2 DIABETES MELLITUS WITH VASCULAR DISEASE (H): Primary | ICD-10-CM

## 2023-08-21 DIAGNOSIS — R44.3 HALLUCINATIONS: ICD-10-CM

## 2023-08-21 PROCEDURE — 99606 MTMS BY PHARM EST 15 MIN: CPT | Performed by: PHARMACIST

## 2023-08-21 PROCEDURE — 99607 MTMS BY PHARM ADDL 15 MIN: CPT | Performed by: PHARMACIST

## 2023-08-21 NOTE — PATIENT INSTRUCTIONS
"Recommendations from today's MTM visit:                                                    MTM (medication therapy management) is a service provided by a clinical pharmacist designed to help you get the most of out of your medicines.   Today we reviewed what your medicines are for, how to know if they are working, that your medicines are safe and how to make your medicine regimen as easy as possible.      Continue current medication regimen.  Refill Ozempic - if this isn't financially feasible let me know.    Follow-up: Return in about 6 months (around 2/21/2024) for Follow-up Medication Review.    It was great speaking with you today.  I value your experience and would be very thankful for your time in providing feedback in our clinic survey. In the next few days, you may receive an email or text message from [employer] with a link to a survey related to your  clinical pharmacist.\"     To schedule another MTM appointment, please call the clinic directly or you may call the MTM scheduling line at [phone] or toll-free at [phone].     My Clinical Pharmacist's contact information:                                                      Please feel free to contact me with any questions or concerns you have.      Lissy Diana, PharmD, [facility]  Medication Therapy Management Provider  Pager: [phone]    "

## 2023-08-21 NOTE — PROGRESS NOTES
Medication Therapy Management (MTM) Encounter    ASSESSMENT:                            Medication Adherence/Access: No issues identified    Type 2 Diabetes:   Patient is not meeting A1c goal of < 8%, has improved with initiation of Ozempic. Self monitoring of blood glucose is generally at goal of fasting  mg/dL and post prandial < 180 mg/dL.  May benefit from increasing Ozempic or Novolin N, declines increasing Ozempic due to nausea and I do worry about increasing insulin given the weakness she's been experiencing and I think she may also have some hypoglycemia unawareness.  Will continue to monitor at this time without change given that intermittent blood sugar reading are generally at goal.    Hallucinations:   Unclear cause, could be related to SNRI use although timeline doesn't necessarily correlate.  Will continue to monitor.    PLAN:                            Continue current medication regimen.  Refill Ozempic - if this isn't financially feasible let me know.    Follow-up: Return in about 6 months (around 2024) for Follow-up Medication Review.    SUBJECTIVE/OBJECTIVE:                          Cara Camarillo is a 83 year old female coming in for a follow-up visit from 2023.  She's joined by her daughter, Kalina, for our visit today.    Reason for visit: Diabetes follow-up.    Tobacco: She reports that she has never smoked. She has never used smokeless tobacco.  Alcohol: not currently using    Medication Adherence/Access: no issues reported    Type 2 Diabetes:    Novolin N 10 units twice daily (with breakfast and dinner)  Ozempic 0.5mg weekly  Aspirin 81mg daily for secondary prevention  Patient is not experiencing side effects.  Does note she's been feeling weaker all the time (not at specific moments)  Blood sugar monitoring: Once a week alternating between AM and post-prandial  Fastin, 167, 121mg/dL  Post-prandial: 144, 186, 163, 167mg/dL  Current diabetes symptoms:  none  Diet/Exercise: She has noticed reduced appetite, does occasionally feel nauseated so doesn't want to increase Ozempic dose further.  Eye exam: due - she reports eye doctor told her she didn't need another eye exam after cataract surgery  Foot exam: up to date  Urine Albumin:   Lab Results   Component Value Date    UMALCR 72.56 (H) 04/25/2023      Lab Results   Component Value Date    A1C 8.7 (H) 08/11/2023    A1C 9.4 (H) 04/25/2023    A1C 7.2 (H) 07/13/2022    A1C 7.4 (H) 02/15/2022         Lab Results   Component Value Date    CR 1.09 (H) 04/25/2023    CR 0.78 10/24/2022    CR 0.99 10/23/2022    CR 1.00 07/13/2022    GFRESTIMATED 50 (L) 04/25/2023    GFRESTIMATED 74 10/24/2022    GFRESTIMATED 56 (L) 10/23/2022    GFRESTIMATED 55 (L) 07/13/2022       Wt Readings from Last 4 Encounters:   08/21/23 212 lb (96.2 kg)   06/13/23 216 lb 14.4 oz (98.4 kg)   05/03/23 221 lb 4.8 oz (100.4 kg)   04/25/23 222 lb (100.7 kg)      Hallucinations:  Cara reports she's been having visual and auditory hallucinations (primarily sees moving doors, hears her son's voice), since hospitalization in October, reports these have varied in frequency, experienced quite a bit in June, less often in July/August.  She has discussed this with primary care physician.    She is taking duloxetine 60mg daily - started in August 2022.  Didn't have hallucinations until hospitalization in October.    It has been recommended that she move into a higher level of care, she's refused due to cost.  Kalina reports Cara has the finances to cover the cost, but that she refuses to do so.  Family visits her daily, sometimes twice a day.    Today's Vitals: Wt 212 lb (96.2 kg)   BMI 36.39 kg/m    ----------------    I spent 45 minutes with this patient today. A copy of the visit note was provided to the patient's provider(s).    A summary of these recommendations was given to the patient.    Lissy Diana PharmD, [facility]  Medication Therapy Management  Provider  Pager: [phone]      Medication Therapy Recommendations  No medication therapy recommendations to display

## 2023-11-02 ENCOUNTER — OFFICE VISIT (OUTPATIENT)
Dept: FAMILY MEDICINE | Facility: CLINIC | Age: 83
End: 2023-11-02
Payer: COMMERCIAL

## 2023-11-02 VITALS
WEIGHT: 222.5 LBS | RESPIRATION RATE: 20 BRPM | BODY MASS INDEX: 37.98 KG/M2 | HEIGHT: 64 IN | OXYGEN SATURATION: 95 % | SYSTOLIC BLOOD PRESSURE: 125 MMHG | HEART RATE: 86 BPM | TEMPERATURE: 97.2 F | DIASTOLIC BLOOD PRESSURE: 75 MMHG

## 2023-11-02 DIAGNOSIS — I10 ESSENTIAL HYPERTENSION, BENIGN: ICD-10-CM

## 2023-11-02 DIAGNOSIS — E11.51 TYPE 2 DIABETES MELLITUS WITH DIABETIC PERIPHERAL ANGIOPATHY WITHOUT GANGRENE, WITHOUT LONG-TERM CURRENT USE OF INSULIN (H): ICD-10-CM

## 2023-11-02 DIAGNOSIS — E78.5 HYPERLIPIDEMIA LDL GOAL <70: ICD-10-CM

## 2023-11-02 DIAGNOSIS — I25.10 CORONARY ARTERY DISEASE INVOLVING NATIVE CORONARY ARTERY OF NATIVE HEART WITHOUT ANGINA PECTORIS: ICD-10-CM

## 2023-11-02 DIAGNOSIS — I35.0 AORTIC VALVE STENOSIS, ETIOLOGY OF CARDIAC VALVE DISEASE UNSPECIFIED: ICD-10-CM

## 2023-11-02 DIAGNOSIS — Z00.00 ENCOUNTER FOR MEDICARE ANNUAL WELLNESS EXAM: Primary | ICD-10-CM

## 2023-11-02 DIAGNOSIS — F41.9 ANXIETY: ICD-10-CM

## 2023-11-02 LAB
CHOLEST SERPL-MCNC: 160 MG/DL
HBA1C MFR BLD: 9 % (ref 0–5.6)
HDLC SERPL-MCNC: 59 MG/DL
LDLC SERPL CALC-MCNC: 77 MG/DL
NONHDLC SERPL-MCNC: 101 MG/DL
TRIGL SERPL-MCNC: 118 MG/DL

## 2023-11-02 PROCEDURE — G0008 ADMIN INFLUENZA VIRUS VAC: HCPCS | Performed by: INTERNAL MEDICINE

## 2023-11-02 PROCEDURE — 90480 ADMN SARSCOV2 VAC 1/ONLY CMP: CPT | Performed by: INTERNAL MEDICINE

## 2023-11-02 PROCEDURE — G0439 PPPS, SUBSEQ VISIT: HCPCS | Performed by: INTERNAL MEDICINE

## 2023-11-02 PROCEDURE — 99214 OFFICE O/P EST MOD 30 MIN: CPT | Mod: 25 | Performed by: INTERNAL MEDICINE

## 2023-11-02 PROCEDURE — 83036 HEMOGLOBIN GLYCOSYLATED A1C: CPT | Performed by: INTERNAL MEDICINE

## 2023-11-02 PROCEDURE — 90662 IIV NO PRSV INCREASED AG IM: CPT | Performed by: INTERNAL MEDICINE

## 2023-11-02 PROCEDURE — 36415 COLL VENOUS BLD VENIPUNCTURE: CPT | Performed by: INTERNAL MEDICINE

## 2023-11-02 PROCEDURE — 80061 LIPID PANEL: CPT | Performed by: INTERNAL MEDICINE

## 2023-11-02 PROCEDURE — 91320 SARSCV2 VAC 30MCG TRS-SUC IM: CPT | Performed by: INTERNAL MEDICINE

## 2023-11-02 ASSESSMENT — PAIN SCALES - GENERAL: PAINLEVEL: NO PAIN (0)

## 2023-11-02 ASSESSMENT — PATIENT HEALTH QUESTIONNAIRE - PHQ9
SUM OF ALL RESPONSES TO PHQ QUESTIONS 1-9: 11
10. IF YOU CHECKED OFF ANY PROBLEMS, HOW DIFFICULT HAVE THESE PROBLEMS MADE IT FOR YOU TO DO YOUR WORK, TAKE CARE OF THINGS AT HOME, OR GET ALONG WITH OTHER PEOPLE: SOMEWHAT DIFFICULT
SUM OF ALL RESPONSES TO PHQ QUESTIONS 1-9: 11

## 2023-11-02 NOTE — PROGRESS NOTES
"SUBJECTIVE:   Cara is a 83 year old who presents for Preventive Visit.      Are you in the first 12 months of your Medicare coverage?  No    Healthy Habits:     Taking medications regularly:  0  History of Present Illness       CKD: She uses over the counter pain medication, including tylenol, a few times a month.    Mental Health Follow-up:  Patient presents to follow-up on Depression.Patient's depression since last visit has been:  No change  The patient is not having other symptoms associated with depression.      Any significant life events: No  Patient is not feeling anxious or having panic attacks.  Patient has no concerns about alcohol or drug use.    Diabetes:   She presents for follow up of diabetes.  She is checking home blood glucose two times daily.   She checks blood glucose before meals.  Blood glucose is sometimes over 200 and never under 70. She is aware of hypoglycemia symptoms including dizziness.    She has no concerns regarding her diabetes at this time.  She is having numbness in feet.  The patient has not had a diabetic eye exam in the last 12 months.          Hyperlipidemia:  She presents for follow up of hyperlipidemia.   She is taking medication to lower cholesterol. She is not having myalgia or other side effects to statin medications.    Hypertension: She presents for follow up of hypertension.  She does not check blood pressure  regularly outside of the clinic. Outpatient blood pressures have not been over 140/90. She does not follow a low salt diet.     Headaches:   Since the patient's last clinic visit, headaches are: no change  The patient is getting headaches:  Daily  She is able to do normal daily activities when she has a migraine.  The patient is taking the following rescue/relief medications:  Tylenol   Patient states \"I get only a small amount of relief\" from the rescue/relief medications.   The patient is taking the following medications to prevent migraines:  No medications " to prevent migraines  In the past 4 weeks, the patient has gone to an Urgent Care or Emergency Room 0 times times due to headaches.    She eats 0-1 servings of fruits and vegetables daily.She consumes 2 sweetened beverage(s) daily.She exercises with enough effort to increase her heart rate 9 or less minutes per day.  She exercises with enough effort to increase her heart rate 3 or less days per week.   She is taking medications regularly.      Today's PHQ-9 Score:       11/2/2023     1:12 PM   PHQ-9 SCORE   PHQ-9 Total Score MyChart 11 (Moderate depression)   PHQ-9 Total Score 11           Have you ever done Advance Care Planning? (For example, a Health Directive, POLST, or a discussion with a medical provider or your loved ones about your wishes): Yes, advance care planning is on file.       Fall risk  Fallen 2 or more times in the past year?: No  Any fall with injury in the past year?: No    Cognitive Screening   1) Repeat 3 items (Leader, Season, Table)    2) Clock draw: NORMAL  3) 3 item recall: Recalls 2 objects   Results: NORMAL clock, 1-2 items recalled: COGNITIVE IMPAIRMENT LESS LIKELY    Mini-CogTM Copyright S Gibson. Licensed by the author for use in [facility]; reprinted with permission (nam@[facility]). All rights reserved.      Do you have sleep apnea, excessive snoring or daytime drowsiness? : no    Reviewed and updated as needed this visit by clinical staff   Tobacco  Allergies  Meds              Reviewed and updated as needed this visit by Provider                 Social History     Tobacco Use    Smoking status: Never    Smokeless tobacco: Never   Substance Use Topics    Alcohol use: No     Alcohol/week: 0.0 standard drinks of alcohol              No data to display                   No data to display              Do you have a current opioid prescription? No  Do you use any other controlled substances or medications that are not prescribed by a provider? None              Current  providers sharing in care for this patient include:   Patient Care Team:  Kole Gonsalez MD as PCP - General (Internal Medicine)  Nida Mak APRN CNP as Nurse Practitioner (Nurse Practitioner)  Kole Gonsalez MD as Assigned PCP  Lissy Diana, PharmD as Pharmacist (Pharmacist)  Lissy Diana PharmD as Assigned MTM Pharmacist  [facility]  Desi Urbina DO as Assigned Palliative Care Provider    The following health maintenance items are reviewed in Epic and correct as of today:  Health Maintenance   Topic Date Due    ZOSTER IMMUNIZATION (1 of 2) Never done    RSV VACCINE (Pregnancy & 60+) (1 - 1-dose 60+ series) Never done    EYE EXAM  01/11/2023    MEDICARE ANNUAL WELLNESS VISIT  08/16/2023    INFLUENZA VACCINE (1) 09/01/2023    COVID-19 Vaccine (7 - 2023-24 season) 09/01/2023    LIPID  10/25/2023    A1C  11/11/2023    DIABETIC FOOT EXAM  02/16/2024    ANNUAL REVIEW OF HM ORDERS  02/16/2024    BMP  04/25/2024    MICROALBUMIN  04/25/2024    PHQ-9  05/02/2024    FALL RISK ASSESSMENT  11/02/2024    DTAP/TDAP/TD IMMUNIZATION (2 - Td or Tdap) 01/29/2025    ADVANCE CARE PLANNING  02/20/2028    DEPRESSION ACTION PLAN  Completed    Pneumococcal Vaccine: 65+ Years  Completed    URINALYSIS  Completed    IPV IMMUNIZATION  Aged Out    HPV IMMUNIZATION  Aged Out    MENINGITIS IMMUNIZATION  Aged Out    RSV MONOCLONAL ANTIBODY  Aged Out    HEMOGLOBIN  Discontinued     BP Readings from Last 3 Encounters:   11/02/23 125/75   06/13/23 131/83   05/03/23 120/59    Wt Readings from Last 3 Encounters:   11/02/23 100.9 kg (222 lb 8 oz)   08/21/23 96.2 kg (212 lb)   06/13/23 98.4 kg (216 lb 14.4 oz)                  Patient Active Problem List   Diagnosis    History of cancer of uterus    Dysthymic disorder    Herpes zoster    Essential hypertension, benign    SOLITARY KIDNEY ANOMALY NEC    Hyperlipidemia LDL goal <70    CKD (chronic kidney disease) stage 3, GFR 30-59 ml/min (H)     Advanced directives, counseling/discussion    Anxiety    Tubular adenoma    OA (osteoarthritis)    Aortic stenosis    IVCD (intraventricular conduction defect)    B12 deficiency    Iron deficiency    Esophageal reflux    Overweight BMI 35-40    Osteopenia    RBBB    Left ventricular diastolic dysfunction    Coronary artery disease    Type 2 diabetes mellitus with diabetic peripheral angiopathy without gangrene, without long-term current use of insulin (H)    Chest wall pain    Bilateral edema of lower extremity    Coronary artery disease involving native coronary artery of native heart without angina pectoris     Past Surgical History:   Procedure Laterality Date    ARTHROPLASTY KNEE  2013    Procedure: ARTHROPLASTY KNEE;  RIGHT TOTAL KNEE ARTHROPLASTY;  Surgeon: Romaine Constantino MD;  Location:  OR    ARTHROPLASTY KNEE  2/3/2014    Procedure: ARTHROPLASTY KNEE;  LEFT TOTAL KNEE ARTHROPLASTY (BIOMET)^;  Surgeon: Romaine Constantino MD;  Location:  OR    C ANESTH, SECTION      EXCISE MASS LOWER EXTREMITY Left 2015    Procedure: EXCISE MASS LOWER EXTREMITY;  Surgeon: Sloan Richardson MD;  Location:  SD    HC UGI ENDOSCOPY W EUS  2013    Procedure: COMBINED ENDOSCOPIC ULTRASOUND, ESOPHAGOSCOPY, GASTROSCOPY, DUODENOSCOPY (EGD);  Surgeon: Lyric Simons MD;  Location:  GI    HYSTERECTOMY      Vaginal    HYSTERECTOMY, VAGINAL  05    Uterine CA    LAPAROSCOPIC CHOLECYSTECTOMY  1/10/2013    Procedure: LAPAROSCOPIC CHOLECYSTECTOMY;  LAPAROSCOPIC CHOLECYSTECTOMY ;  Surgeon: Eduardo Taylor MD;  Location:  OR    NEPHRECTOMY BILATERAL      NEPHRECTOMY RT/LT  OCT 2005    OTHER SURGICAL HISTORY      angiogram 2016: ISIDRO to PDA    OTHER SURGICAL HISTORY      angiogram 2016: ISIDRO to LAD    ZZC NONSPECIFIC PROCEDURE  3/20/06    CT scan of chest/abd/pelvis- negative for recurrence of CA       Social History     Tobacco Use    Smoking status: Never    Smokeless tobacco:  "Never   Substance Use Topics    Alcohol use: No     Alcohol/week: 0.0 standard drinks of alcohol     Family History   Problem Relation Age of Onset    Diabetes Father         Adult onset    Other - See Comments Mother 95        Old age         Current Outpatient Medications   Medication Sig Dispense Refill    acetaminophen (TYLENOL) 500 MG tablet Take 1,000 mg by mouth every 8 hours as needed for pain      amLODIPine (NORVASC) 5 MG tablet Take 1 tablet (5 mg) by mouth daily 90 tablet 3    aspirin 81 MG tablet Take 1 tablet (81 mg) by mouth daily 100 tablet 3    atorvastatin (LIPITOR) 20 MG tablet Take 1 tablet (20 mg) by mouth daily 90 tablet 3    BD INSULIN SYRINGE U/F 30G X 1/2\" 0.5 ML miscellaneous USE ONE SYRINGE TWICE DAILY OR AS DIRECTED. 100 each 1    BD PEN NEEDLE RYDER 2ND GEN 32G X 4 MM miscellaneous USE 4 PEN NEEDLES DAILY OR AS DIRECTED. 100 each 1    blood glucose monitoring (CONTOUR NEXT MONITOR W/DEVICE KIT) meter device kit Use to test blood sugar 3 times daily or as directed. 1 kit 0    CONTOUR NEXT TEST test strip USE TO TEST BLOOD SUGAR DAILY OR AS DIRECTED. 100 strip 1    DULoxetine (CYMBALTA) 60 MG capsule Take 1 capsule (60 mg) by mouth daily 90 capsule 3    insulin  UNIT/ML injection Inject 14 Units Subcutaneous 2 times daily (before meals) (Patient taking differently: Inject 10 Units Subcutaneous 2 times daily (before meals) Insulin reduced to 10 units after starting Ozembic) 15 mL 11    losartan (COZAAR) 50 MG tablet Take 1 tablet (50 mg) by mouth 2 times daily 180 tablet 3    metoprolol tartrate (LOPRESSOR) 100 MG tablet Take 1 tablet (100 mg) by mouth 2 times daily 180 tablet 3    nitroGLYcerin (NITROSTAT) 0.4 MG sublingual tablet Place 1 tablet (0.4 mg) under the tongue every 5 minutes as needed for chest pain if you are still having symptoms after 3 doses (15 minutes) call 911. 25 tablet 1    Vitamin D3 (CHOLECALCIFEROL) 25 mcg (1000 units) tablet Take 1 tablet by mouth daily      " "semaglutide (OZEMPIC) 2 MG/3ML pen Inject 0.25 mg Subcutaneous every 7 days After 4 weeks increase to 0.5 mg every 7 days (Patient not taking: Reported on 11/2/2023) 3 mL 11           Pertinent mammograms are reviewed under the imaging tab.    Review of Systems  Constitutional, HEENT, cardiovascular, pulmonary, gi and gu systems are negative, except as otherwise noted.    OBJECTIVE:   /75 (BP Location: Left arm, Patient Position: Sitting, Cuff Size: Adult Large)   Pulse 86   Temp 97.2  F (36.2  C) (Oral)   Resp 20   Ht 1.626 m (5' 4\")   Wt 100.9 kg (222 lb 8 oz)   SpO2 95%   BMI 38.19 kg/m   Estimated body mass index is 38.19 kg/m  as calculated from the following:    Height as of this encounter: 1.626 m (5' 4\").    Weight as of this encounter: 100.9 kg (222 lb 8 oz).  Physical Exam  GENERAL APPEARANCE: healthy, alert and no distress  EYES: Eyes grossly normal to inspection, PERRL and conjunctivae and sclerae normal  HENT: ear canals and TM's normal, nose and mouth without ulcers or lesions, oropharynx clear and oral mucous membranes moist  NECK: no adenopathy, no asymmetry, masses, or scars and thyroid normal to palpation  RESP: lungs clear to auscultation - no rales, rhonchi or wheezes  CV: regular rate and rhythm unchanged murmur   ABDOMEN: soft, nontender, no hepatosplenomegaly, no masses and bowel sounds normal  MS: no musculoskeletal defects are noted and gait is age appropriate without ataxia  SKIN: no suspicious lesions or rashes  NEURO: Normal strength and tone, sensory exam grossly normal, mentation intact and speech normal  PSYCH: mentation appears normal and affect normal/bright        ASSESSMENT / PLAN:       ICD-10-CM    1. Encounter for Medicare annual wellness exam  Z00.00       2. Type 2 diabetes mellitus with diabetic peripheral angiopathy without gangrene, without long-term current use of insulin (H)  E11.51 HEMOGLOBIN A1C     HEMOGLOBIN A1C      3. Essential hypertension, benign  I10 " "      4. Hyperlipidemia LDL goal <70  E78.5       5. Anxiety  F41.9       6. Coronary artery disease involving native coronary artery of native heart without angina pectoris  I25.10 Lipid panel reflex to direct LDL Non-fasting      7. Aortic valve stenosis, etiology of cardiac valve disease unspecified  I35.0 Echocardiogram Complete        -she is not taking ozempic (too expensive) back on insulin; check A1c today  -blood pressure is ok  -on statin therapy; recheck lipids  -mood ok on duloxetine   -CAD stable  -recheck echo        COUNSELING:  Reviewed preventive health counseling, as reflected in patient instructions  Special attention given to:       Regular exercise       Healthy diet/nutrition       Immunizations  Recommended flu and covid shot today; RSV and shingrix at pharmacy         BMI:   Estimated body mass index is 38.19 kg/m  as calculated from the following:    Height as of this encounter: 1.626 m (5' 4\").    Weight as of this encounter: 100.9 kg (222 lb 8 oz).   Weight management plan: Discussed healthy diet and exercise guidelines      She reports that she has never smoked. She has never used smokeless tobacco.      Appropriate preventive services were discussed with this patient, including applicable screening as appropriate for fall prevention, nutrition, physical activity, Tobacco-use cessation, weight loss and cognition.  Checklist reviewing preventive services available has been given to the patient.    Reviewed patients plan of care and provided an AVS. The Basic Care Plan (routine screening as documented in Health Maintenance) for Cara meets the Care Plan requirement. This Care Plan has been established and reviewed with the Patient and daughter.        Kole Gonsalez MD  [facility]    Identified Health Risks:    "

## 2023-11-02 NOTE — PROGRESS NOTES
The patient s PHQ-9 score is consistent with moderate depression. She was provided with information regarding depression and was advised to schedule a follow up appointment in 6 weeks to further address this issue.

## 2023-11-02 NOTE — PATIENT INSTRUCTIONS
"You should get the RSV (Respiratory Syncytial Virus) vaccine at a pharmacy.       You should get the shingles vaccine series \"SHINGRIX\" at a pharmacy.             Patient Education   Personalized Prevention Plan  You are due for the preventive services outlined below.  Your care team is available to assist you in scheduling these services.  If you have already completed any of these items, please share that information with your care team to update in your medical record.  Health Maintenance Due   Topic Date Due    Zoster (Shingles) Vaccine (1 of 2) Never done    RSV VACCINE (Pregnancy & 60+) (1 - 1-dose 60+ series) Never done    Eye Exam  01/11/2023    Annual Wellness Visit  08/16/2023    Flu Vaccine (1) 09/01/2023    COVID-19 Vaccine (7 - 2023-24 season) 09/01/2023    Cholesterol Lab  10/25/2023    A1C Lab  11/11/2023     Learning About Depression Screening  What is depression screening?  Depression screening is a way to see if you have depression symptoms. It may be done by a doctor or counselor. It's often part of a routine checkup. That's because your mental health is just as important as your physical health.  Depression is a mental health condition that affects how you feel, think, and act. You may:  Have less energy.  Lose interest in your daily activities.  Feel sad and grouchy for a long time.  Depression is very common. It affects people of all ages.  Many things can lead to depression. Some people become depressed after they have a stroke or find out they have a major illness like cancer or heart disease. The death of a loved one or a breakup may lead to depression. It can run in families. Most experts believe that a combination of inherited genes and stressful life events can cause it.  What happens during screening?  You may be asked to fill out a form about your depression symptoms. You and the doctor will discuss your answers. The doctor may ask you more questions to learn more about how you think, " "act, and feel.  What happens after screening?  If you have symptoms of depression, your doctor will talk to you about your options.  Doctors usually treat depression with medicines or counseling. Often, combining the two works best. Many people don't get help because they think that they'll get over the depression on their own. But people with depression may not get better unless they get treatment.  The cause of depression is not well understood. There may be many factors involved. But if you have depression, it's not your fault.  A serious symptom of depression is thinking about death or suicide. If you or someone you care about talks about this or about feeling hopeless, get help right away.  It's important to know that depression can be treated. Medicine, counseling, and self-care may help.  Where can you learn more?  Go to https://www.[employer].net/patiented  Enter T185 in the search box to learn more about \"Learning About Depression Screening.\"  Current as of: October 20, 2022               Content Version: 13.7    5547-9879 [email or web address].   Care instructions adapted under license by your healthcare professional. If you have questions about a medical condition or this instruction, always ask your healthcare professional. [employer] disclaims any warranty or liability for your use of this information.         "

## 2023-11-03 DIAGNOSIS — E11.51 TYPE 2 DIABETES MELLITUS WITH DIABETIC PERIPHERAL ANGIOPATHY WITHOUT GANGRENE, WITHOUT LONG-TERM CURRENT USE OF INSULIN (H): ICD-10-CM

## 2023-11-03 DIAGNOSIS — E78.5 HYPERLIPIDEMIA LDL GOAL <70: ICD-10-CM

## 2023-11-03 RX ORDER — ATORVASTATIN CALCIUM 20 MG/1
20 TABLET, FILM COATED ORAL DAILY
Qty: 90 TABLET | Refills: 3 | Status: SHIPPED | OUTPATIENT
Start: 2023-11-03

## 2023-11-03 NOTE — RESULT ENCOUNTER NOTE
The following letter pertains to your most recent diagnostic tests:    The cholesterol looks great, but unfortunately, the blood sugar (hemoglobin A1c) does not look so good.  Our goal is to get the hemoglobin A1c at least less than 8.  For starters, I would recommend increasing your insulin dose from 14 units twice daily to 15 units twice daily.   Next, I would reach out to Lissy our pharmacist for additional assistance on how to incrementally increase the insulin dose to achieve adequate blood sugar control.  I will inform her of this result.  We should recheck the hemoglobin A1c in 3 months.  You can schedule a lab appointment for that purpose.          Sincerely,    Dr. Gonsalez

## 2023-11-03 NOTE — RESULT ENCOUNTER NOTE
Jamie Yuan, can you please work with Cara to incrementally increase her NPH insulin to improve glycemic control.  The A1c has crept up a bit.

## 2023-11-11 DIAGNOSIS — I10 ESSENTIAL HYPERTENSION, BENIGN: ICD-10-CM

## 2023-11-13 RX ORDER — METOPROLOL TARTRATE 100 MG
100 TABLET ORAL 2 TIMES DAILY
Qty: 180 TABLET | Refills: 1 | Status: SHIPPED | OUTPATIENT
Start: 2023-11-13 | End: 2024-05-10

## 2023-11-29 ENCOUNTER — HOSPITAL ENCOUNTER (OUTPATIENT)
Dept: CARDIOLOGY | Facility: CLINIC | Age: 83
Discharge: HOME | End: 2023-11-29
Attending: INTERNAL MEDICINE | Admitting: INTERNAL MEDICINE
Payer: COMMERCIAL

## 2023-11-29 DIAGNOSIS — I35.0 AORTIC VALVE STENOSIS, ETIOLOGY OF CARDIAC VALVE DISEASE UNSPECIFIED: ICD-10-CM

## 2023-11-29 LAB — LVEF ECHO: NORMAL

## 2023-11-29 PROCEDURE — 93306 TTE W/DOPPLER COMPLETE: CPT | Mod: 26 | Performed by: INTERNAL MEDICINE

## 2023-11-29 PROCEDURE — 999N000208 ECHOCARDIOGRAM COMPLETE

## 2023-11-29 PROCEDURE — 255N000002 HC RX 255 OP 636: Performed by: INTERNAL MEDICINE

## 2023-11-29 RX ADMIN — HUMAN ALBUMIN MICROSPHERES AND PERFLUTREN 9 ML: 10; .22 INJECTION, SOLUTION INTRAVENOUS at 11:00

## 2023-11-29 NOTE — RESULT ENCOUNTER NOTE
The following letter pertains to your most recent diagnostic tests:    Good news! The aortic heart valve has remained stable since last check.  We should recheck in one year.        Sincerely,    Dr. Gonsalez

## 2024-02-06 ENCOUNTER — LAB (OUTPATIENT)
Dept: LAB | Facility: CLINIC | Age: 84
End: 2024-02-06
Payer: COMMERCIAL

## 2024-02-06 DIAGNOSIS — E11.51 TYPE 2 DIABETES MELLITUS WITH DIABETIC PERIPHERAL ANGIOPATHY WITHOUT GANGRENE, WITHOUT LONG-TERM CURRENT USE OF INSULIN (H): Primary | ICD-10-CM

## 2024-02-06 LAB — HBA1C MFR BLD: 9.6 % (ref 0–5.6)

## 2024-02-06 PROCEDURE — 83036 HEMOGLOBIN GLYCOSYLATED A1C: CPT

## 2024-02-06 PROCEDURE — 36415 COLL VENOUS BLD VENIPUNCTURE: CPT

## 2024-02-06 NOTE — RESULT ENCOUNTER NOTE
The following letter pertains to your most recent diagnostic tests:    Unfortunately, your hemoglobin A1c test which averages your blood sugars over the last 3 months returned at 9.6 which is above your goal of hemoglobin A1c at least less than 8 and worse than last check 3 months ago when it was 9.0.      Are you taking your insulin shots twice daily?  If not, please try to remember to take the medication as directed.      If you are taking the insulin as directed, then we need to increase the dose.  I recommend with increasing the dose from 17 units twice daily to 18 units twice daily.  If after 3 days at the 18 unit twice daily dose, the morning fasting blood sugars remain greater than 150, then I recommend further increasing to 19 units twice daily.    It is important that we recheck hemoglobin A1c in 3 months.      Starting a medication like Ozempic (semaglutide) is still an option.  It is expensive, but I think it would do a very good job getting your sugars down.  Please inform me if you have changed your mind about possibly starting that drug.        Sincerely,    Dr. Gonsalez

## 2024-02-06 NOTE — LETTER
February 6, 2024      Cara Camarillo  [address] [address]        Dear ,          We are writing to inform you of your test results.        The following letter pertains to your most recent diagnostic tests:     Unfortunately, your hemoglobin A1c test which averages your blood sugars over the last 3 months returned at 9.6 which is above your goal of hemoglobin A1c at least less than 8 and worse than last check 3 months ago when it was 9.0.      Are you taking your insulin shots twice daily?  If not, please try to remember to take the medication as directed.      If you are taking the insulin as directed, then we need to increase the dose.  I recommend with increasing the dose from 17 units twice daily to 18 units twice daily.  If after 3 days at the 18 unit twice daily dose, the morning fasting blood sugars remain greater than 150, then I recommend further increasing to 19 units twice daily.     It is important that we recheck hemoglobin A1c in 3 months.      Starting a medication like Ozempic (semaglutide) is still an option.  It is expensive, but I think it would do a very good job getting your sugars down.  Please inform me if you have changed your mind about possibly starting that drug.        Resulted Orders   HEMOGLOBIN A1C   Result Value Ref Range    Hemoglobin A1C 9.6 (H) 0.0 - 5.6 %       If you have any questions or concerns, please call the clinic at the number listed above.       Sincerely,      Kole Gonsalez MD

## 2024-02-28 DIAGNOSIS — G89.29 CHRONIC LEFT-SIDED LOW BACK PAIN WITH LEFT-SIDED SCIATICA: ICD-10-CM

## 2024-02-28 DIAGNOSIS — M54.42 CHRONIC LEFT-SIDED LOW BACK PAIN WITH LEFT-SIDED SCIATICA: ICD-10-CM

## 2024-02-28 RX ORDER — DULOXETIN HYDROCHLORIDE 60 MG/1
60 CAPSULE, DELAYED RELEASE ORAL DAILY
Qty: 90 CAPSULE | Refills: 1 | Status: SHIPPED | OUTPATIENT
Start: 2024-02-28 | End: 2024-08-22

## 2024-02-28 NOTE — TELEPHONE ENCOUNTER
Prescription approved per [facility] Refill Protocol.  Sheyla Jefferson, RN  [facility] Triage Nurse

## 2024-03-17 DIAGNOSIS — E11.59 TYPE 2 DIABETES MELLITUS WITH VASCULAR DISEASE (H): ICD-10-CM

## 2024-03-27 ENCOUNTER — OFFICE VISIT (OUTPATIENT)
Dept: PHARMACY | Facility: CLINIC | Age: 84
End: 2024-03-27
Payer: COMMERCIAL

## 2024-03-27 VITALS — SYSTOLIC BLOOD PRESSURE: 124 MMHG | DIASTOLIC BLOOD PRESSURE: 64 MMHG | WEIGHT: 231 LBS | BODY MASS INDEX: 39.65 KG/M2

## 2024-03-27 DIAGNOSIS — E78.5 HYPERLIPIDEMIA LDL GOAL <70: ICD-10-CM

## 2024-03-27 DIAGNOSIS — I25.10 CORONARY ARTERY DISEASE INVOLVING NATIVE CORONARY ARTERY OF NATIVE HEART WITHOUT ANGINA PECTORIS: ICD-10-CM

## 2024-03-27 DIAGNOSIS — F41.9 ANXIETY: ICD-10-CM

## 2024-03-27 DIAGNOSIS — M79.662 PAIN OF LEFT LOWER LEG: ICD-10-CM

## 2024-03-27 DIAGNOSIS — I10 ESSENTIAL HYPERTENSION, BENIGN: ICD-10-CM

## 2024-03-27 DIAGNOSIS — E11.59 TYPE 2 DIABETES MELLITUS WITH VASCULAR DISEASE (H): Primary | ICD-10-CM

## 2024-03-27 PROCEDURE — 99605 MTMS BY PHARM NP 15 MIN: CPT | Performed by: PHARMACIST

## 2024-03-27 PROCEDURE — 99607 MTMS BY PHARM ADDL 15 MIN: CPT | Performed by: PHARMACIST

## 2024-03-27 NOTE — PROGRESS NOTES
"Medication Therapy Management (MTM) Encounter    ASSESSMENT:                            Medication Adherence/Access: No issues identified    Diabetes:   Patient is not meeting A1c goal of < 8%.  Self monitoring of blood glucose is sometimes at goal of fasting  mg/dL and post prandial < 180 mg/dL.  May benefit from eliminating high sugar containing drinks.    Hypertension/CAD:   Stable. Patient is meeting blood pressure goal of < 130/80mmHg.    Hyperlipidemia:   Stable.     Anxiety/Chronic Pain:  Stable.    PLAN:                            Consider a different drink other than cranberry juice.  Could try sparkling water, Crystal Light, or a sugar free powder added to water (Gatorade, Prime, etc).  We will re-check labs when you see Dr. Gonsalez.    Follow-up: Return in about 6 weeks (around 2024) for Physical Exam, Lab Work with Dr. Gonsalez.    SUBJECTIVE/OBJECTIVE:                          Cara Camarillo is a 83 year old female coming in for a follow-up visit.  She's accompanied by her daughter, Kalina, for our visit today.    Reason for visit: Routine follow-up.    Tobacco: She reports that she has never smoked. She has never used smokeless tobacco.  Alcohol: not currently using    Medication Adherence/Access: no issues reported    Diabetes   NPH insulin 19 units twice daily   Aspirin 81mg daily for secondary prevention  Patient is not experiencing side effects.  Blood sugar monitorin times per week; Ranges: (patient reported) Fasting - 195, 121, 130, 138, 168, 164, 187  Before dinner - 137, 126, 177, 133, 97, 116, 210   They report blood sugar was running higher prior to last A1c   Current diabetes symptoms: none  Diet/Exercise: No changes.  She reports drinking \"a lot\" of cranberry juice - prefers full sugar vs reduced sugar.     Eye exam in the last 12 months? No - she's declined  Foot exam: due  Urine Albumin:   Lab Results   Component Value Date    UMALCR 72.56 (H) 2023      Lab Results "   Component Value Date    A1C 9.6 (H) 02/06/2024     Hypertension /CAD:  Aspirin 81mg daily (as above)  Amlodipine 5mg daily  Losartan 50mg twice daily   Metoprolol tartrate 100mg twice daily  Sublingual nitroglycerin - no recent use   Patient reports no current medication side effects  Patient does not self-monitor blood pressure.       BP Readings from Last 3 Encounters:   03/27/24 124/64   11/02/23 125/75   06/13/23 131/83     Pulse Readings from Last 3 Encounters:   11/02/23 86   06/13/23 82   05/03/23 68     Hyperlipidemia   Atorvastatin 20mg daily  Patient reports no significant myalgias or other side effects.     Recent Labs   Lab Test 11/02/23  1429 04/25/23  1228   CHOL 160 160   HDL 59 61   LDL 77 80   TRIG 118 94     Anxiety/Chronic Pain:  Acetaminophen 1000mg three times daily as needed - uses for headaches and muscle pain in her neck  Duloxetine 60mg daily  Patient reports dry mouth she associates with duloxetine use, but was complaining of this prior to starting duloxetine.  Has Biotene available but Kalina reports she rarely uses.  Kalina reports duloxetine has been helpful for mental health.  They report auditory hallucinations have resolved.    Today's Vitals: /64   Wt 231 lb (104.8 kg)   BMI 39.65 kg/m    ----------------    I spent 30 minutes with this patient today. A copy of the visit note was provided to the patient's provider(s).    A summary of these recommendations was given to the patient.    Lissy Diana, PharmD, [facility]  Medication Therapy Management Provider  [phone]      Medication Therapy Recommendations  No medication therapy recommendations to display

## 2024-03-27 NOTE — PATIENT INSTRUCTIONS
"Recommendations from today's MTM visit:                                                    MTM (medication therapy management) is a service provided by a clinical pharmacist designed to help you get the most of out of your medicines.   Today we reviewed what your medicines are for, how to know if they are working, that your medicines are safe and how to make your medicine regimen as easy as possible.      Consider a different drink other than cranberry juice.  Could try sparkling water, Crystal Light, or a sugar free powder added to water (Gatorade, Prime, etc).  We will re-check labs when you see Dr. Gonsalez.    Follow-up: Return in about 6 weeks (around 5/8/2024) for Physical Exam, Lab Work with Dr. Gonsalez.    It was great speaking with you today.  I value your experience and would be very thankful for your time in providing feedback in our clinic survey. In the next few days, you may receive an email or text message from [employer] with a link to a survey related to your  clinical pharmacist.\"     To schedule another MTM appointment, please call the clinic directly or you may call the MTM scheduling line at [phone] or toll-free at [phone].     My Clinical Pharmacist's contact information:                                                      Please feel free to contact me with any questions or concerns you have.      Lissy Diana, PharmD, [facility]  Medication Therapy Management Provider  [phone]    "

## 2024-03-27 NOTE — LETTER
"_  Medication List        Prepared on: 03/27/2024     Bring your Medication List when you go to the doctor, hospital, or   emergency room. And, share it with your family or caregivers.     Note any changes to how you take your medications.  Cross out medications when you no longer use them.    Medication How I take it Why I use it Prescriber   acetaminophen (TYLENOL) 500 MG tablet Take 1,000 mg by mouth every 8 hours as needed for pain Pain Patient Reported   amLODIPine (NORVASC) 5 MG tablet Take 1 tablet (5 mg) by mouth daily Essential Hypertension, Benign Kole Gonsalez MD   Artificial Saliva (BIOTENE MOISTURIZING MOUTH MT) Take by mouth daily as needed Dry mouth Patient Reported   aspirin 81 MG tablet Take 1 tablet (81 mg) by mouth daily DM w/o Complication Type II Ventura Alvarez MD   atorvastatin (LIPITOR) 20 MG tablet TAKE 1 TABLET BY MOUTH EVERY DAY Hyperlipidemia LDL Goal <70 Kole Gonsalez MD   BD INSULIN SYRINGE U/F 30G X 1/2\" 0.5 ML miscellaneous USE ONE SYRINGE TWICE DAILY OR AS DIRECTED. 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) Kole Gonsalez MD   BD PEN NEEDLE RYDER 2ND GEN 32G X 4 MM miscellaneous USE 4 PEN NEEDLES DAILY OR AS DIRECTED. Type 2 diabetes mellitus with diabetic peripheral angiopathy without gangrene, without long-term current use of insulin (H) Kole Gonsalez MD   blood glucose (CONTOUR NEXT TEST) test strip USE TO TEST BLOOD SUGAR DAILY OR AS DIRECTED. 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) Kole Gonsalez MD   DULoxetine (CYMBALTA) 60 MG capsule TAKE 1 CAPSULE BY MOUTH EVERY DAY Chronic left-sided low back pain with left-sided sciatica Kole Gonsalez MD   insulin  UNIT/ML injection Inject 19 Units Subcutaneous 2 times daily (before " meals) Type 2 diabetes mellitus with diabetic peripheral angiopathy without gangrene, without long-term current use of insulin (H) Kole Gonsalez MD   losartan (COZAAR) 50 MG tablet Take 1 tablet (50 mg) by mouth 2 times daily Essential Hypertension, Benign Kole Gonsalez MD   metoprolol tartrate (LOPRESSOR) 100 MG tablet TAKE 1 TABLET BY MOUTH TWICE A DAY Essential Hypertension, Benign Kole Gonsalez MD   nitroGLYcerin (NITROSTAT) 0.4 MG sublingual tablet Place 1 tablet (0.4 mg) under the tongue every 5 minutes as needed for chest pain if you are still having symptoms after 3 doses (15 minutes) call 911. Acute Angina Pectoris Kole Gonsalez MD         Add new medications, over-the-counter drugs, herbals, vitamins, or  minerals in the blank rows below.    Medication How I take it Why I use it Prescriber                                      Allergies:      actos [pioglitazone]; enalapril; hydrochlorothiazide; lisinopril; metformin        Side effects I have had:               Other Information:              My notes and questions:

## 2024-03-27 NOTE — LETTER
March 27, 2024  Cara Camarillo  [address] [id number]    Dear Ms. Camarillo, [facility]     Thank you for talking with me on Mar 27, 2024 about your health and medications. As a follow-up to our conversation, I have included two documents:      Your Recommended To-Do List has steps you should take to get the best results from your medications.  Your Medication List will help you keep track of your medications and how to take them.    If you want to talk about these documents, please call Lissy Diana PharmD at phone: [phone], Monday-Friday 8-4:30pm.    I look forward to working with you and your doctors to make sure your medications work well for you.    Sincerely,  Lissy Diana PharmD  [facility] Pharmacist, [facility]

## 2024-03-27 NOTE — LETTER
"Recommended To-Do List      Prepared on: 03/27/2024       You can get the best results from your medications by completing the items on this \"To-Do List.\"      Bring your To-Do List when you go to your doctor. And, share it with your family or caregivers.    My To-Do List:  What we talked about: What I should do:   What my medicines are for, how to know if my medicines are working, made sure my medicines are safe for me and reviewed how to take my medicines.    Take my medicines every day               "

## 2024-04-26 DIAGNOSIS — I10 ESSENTIAL HYPERTENSION, BENIGN: ICD-10-CM

## 2024-04-26 RX ORDER — LOSARTAN POTASSIUM 50 MG/1
50 TABLET ORAL 2 TIMES DAILY
Qty: 180 TABLET | Refills: 3 | Status: SHIPPED | OUTPATIENT
Start: 2024-04-26

## 2024-04-30 DIAGNOSIS — I10 ESSENTIAL HYPERTENSION, BENIGN: ICD-10-CM

## 2024-04-30 RX ORDER — AMLODIPINE BESYLATE 5 MG/1
5 TABLET ORAL DAILY
Qty: 90 TABLET | Refills: 3 | Status: SHIPPED | OUTPATIENT
Start: 2024-04-30

## 2024-05-08 ENCOUNTER — OFFICE VISIT (OUTPATIENT)
Dept: FAMILY MEDICINE | Facility: CLINIC | Age: 84
End: 2024-05-08
Attending: INTERNAL MEDICINE
Payer: COMMERCIAL

## 2024-05-08 VITALS
OXYGEN SATURATION: 96 % | RESPIRATION RATE: 18 BRPM | TEMPERATURE: 96.9 F | DIASTOLIC BLOOD PRESSURE: 82 MMHG | WEIGHT: 231 LBS | HEIGHT: 64 IN | HEART RATE: 82 BPM | SYSTOLIC BLOOD PRESSURE: 135 MMHG | BODY MASS INDEX: 39.44 KG/M2

## 2024-05-08 DIAGNOSIS — I25.10 CORONARY ARTERY DISEASE INVOLVING NATIVE CORONARY ARTERY OF NATIVE HEART WITHOUT ANGINA PECTORIS: ICD-10-CM

## 2024-05-08 DIAGNOSIS — H57.9 ITCHY EYES: ICD-10-CM

## 2024-05-08 DIAGNOSIS — E11.51 TYPE 2 DIABETES MELLITUS WITH DIABETIC PERIPHERAL ANGIOPATHY WITHOUT GANGRENE, WITHOUT LONG-TERM CURRENT USE OF INSULIN (H): Primary | ICD-10-CM

## 2024-05-08 DIAGNOSIS — N18.30 STAGE 3 CHRONIC KIDNEY DISEASE, UNSPECIFIED WHETHER STAGE 3A OR 3B CKD (H): ICD-10-CM

## 2024-05-08 LAB — HBA1C MFR BLD: 8.8 % (ref 0–5.6)

## 2024-05-08 PROCEDURE — 82043 UR ALBUMIN QUANTITATIVE: CPT | Performed by: INTERNAL MEDICINE

## 2024-05-08 PROCEDURE — 99214 OFFICE O/P EST MOD 30 MIN: CPT | Mod: 25 | Performed by: INTERNAL MEDICINE

## 2024-05-08 PROCEDURE — 80048 BASIC METABOLIC PNL TOTAL CA: CPT | Performed by: INTERNAL MEDICINE

## 2024-05-08 PROCEDURE — 36415 COLL VENOUS BLD VENIPUNCTURE: CPT | Performed by: INTERNAL MEDICINE

## 2024-05-08 PROCEDURE — 80061 LIPID PANEL: CPT | Performed by: INTERNAL MEDICINE

## 2024-05-08 PROCEDURE — 90480 ADMN SARSCOV2 VAC 1/ONLY CMP: CPT | Performed by: INTERNAL MEDICINE

## 2024-05-08 PROCEDURE — 83036 HEMOGLOBIN GLYCOSYLATED A1C: CPT | Performed by: INTERNAL MEDICINE

## 2024-05-08 PROCEDURE — 82570 ASSAY OF URINE CREATININE: CPT | Performed by: INTERNAL MEDICINE

## 2024-05-08 PROCEDURE — 91320 SARSCV2 VAC 30MCG TRS-SUC IM: CPT | Performed by: INTERNAL MEDICINE

## 2024-05-08 RX ORDER — KETOTIFEN FUMARATE 0.35 MG/ML
1 SOLUTION/ DROPS OPHTHALMIC EVERY 12 HOURS PRN
Qty: 10 ML | Refills: 11 | Status: SHIPPED | OUTPATIENT
Start: 2024-05-08

## 2024-05-08 ASSESSMENT — PATIENT HEALTH QUESTIONNAIRE - PHQ9
SUM OF ALL RESPONSES TO PHQ QUESTIONS 1-9: 0
10. IF YOU CHECKED OFF ANY PROBLEMS, HOW DIFFICULT HAVE THESE PROBLEMS MADE IT FOR YOU TO DO YOUR WORK, TAKE CARE OF THINGS AT HOME, OR GET ALONG WITH OTHER PEOPLE: NOT DIFFICULT AT ALL
SUM OF ALL RESPONSES TO PHQ QUESTIONS 1-9: 0

## 2024-05-08 ASSESSMENT — PAIN SCALES - GENERAL: PAINLEVEL: NO PAIN (0)

## 2024-05-08 NOTE — PROGRESS NOTES
Assessment & Plan     Type 2 diabetes mellitus with diabetic peripheral angiopathy without gangrene, without long-term current use of insulin (H)  Recheck labs, but this may be as good as we can do as she is not very engaged in improving her diabetes control   - Hemoglobin A1c; Future  - FOOT EXAM  - Hemoglobin A1c    Stage 3 chronic kidney disease, unspecified whether stage 3a or 3b CKD (H)  Recheck renal labs   - BASIC METABOLIC PANEL; Future  - Albumin Random Urine Quantitative with Creat Ratio; Future  - BASIC METABOLIC PANEL  - Albumin Random Urine Quantitative with Creat Ratio    Coronary artery disease involving native coronary artery of native heart without angina pectoris  Stable symptoms, check lipids   - Lipid panel reflex to direct LDL Non-fasting; Future  - Lipid panel reflex to direct LDL Non-fasting    Itchy eyes  Try below for new eye symptoms  - ketotifen fumarate 0.035% 0.035 % SOLN ophthalmic solution; Place 1 drop into both eyes every 12 hours as needed for itching or dry eyes    Overall, the situation is a little sad because if Cara were agreeable to move to a more appropriate level of care she would likely thrive, but she remains unwilling to consider moving due to concerns about finances.  This despite her families urgings and suggestion.  I pleaded the case once again today, but could not change her mind.        No LOS data to display   Time spent by me doing chart review, history and exam, documentation and further activities per the note          FUTURE APPOINTMENTS:       - Follow-up for annual visit or as needed    Marivel Marroquin is a 84 year old, presenting for the following health issues:  Diabetes (Follow up/) and Health Maintenance (Last Eye Exam 2022)    History of Present Illness       Diabetes:   She presents for follow up of diabetes.  She is checking home blood glucose a few times a month.   She checks blood glucose before meals.  Blood glucose is never over 200 and  "never under 70. She is aware of hypoglycemia symptoms including dizziness.    She has no concerns regarding her diabetes at this time.   She is not experiencing numbness or burning in feet, excessive thirst, blurry vision, weight changes or redness, sores or blisters on feet. The patient has not had a diabetic eye exam in the last 12 months.            Cara is here with her daughter  Daughter states that she sleeps a lot   She tried to take ozempic, but it was too overwhelming for her  Family is very attentive and visits her frequently, but they cannot convince her to move to an senior apartment or [facility]   Sugars are running between 110's and 170's  No low sugars  She seems to be having frequent visual hallucinations particularly at night  Also itchy not read eyes for about a month       Objective    /82 (BP Location: Right arm, Patient Position: Sitting, Cuff Size: Adult Large)   Pulse 82   Temp 96.9  F (36.1  C) (Temporal)   Resp 18   Ht 1.626 m (5' 4\")   Wt 104.8 kg (231 lb)   SpO2 96%   BMI 39.65 kg/m    Body mass index is 39.65 kg/m .  Physical Exam   GENERAL: alert and no distress  EYES: mild red eyes bilaterally  NECK: no adenopathy, no asymmetry, masses, or scars  RESP: lungs clear to auscultation - no rales, rhonchi or wheezes  CV: Heart with regular rate and rhythm.   ABDOMEN: soft, nontender, no hepatosplenomegaly, no masses and bowel sounds normal  NEURO: Normal strength and tone, mentation intact and speech normal  PSYCH: Unchanged flat affect and depressed mood, well groomed   Diabetic foot exam: normal DP and PT pulses, no trophic changes or ulcerative lesions, and normal sensory exam            Signed Electronically by: Kole Gonsalez MD    "

## 2024-05-09 LAB
ANION GAP SERPL CALCULATED.3IONS-SCNC: 13 MMOL/L (ref 7–15)
BUN SERPL-MCNC: 34.4 MG/DL (ref 8–23)
CALCIUM SERPL-MCNC: 9 MG/DL (ref 8.8–10.2)
CHLORIDE SERPL-SCNC: 110 MMOL/L (ref 98–107)
CHOLEST SERPL-MCNC: 147 MG/DL
CREAT SERPL-MCNC: 1.17 MG/DL (ref 0.51–0.95)
CREAT UR-MCNC: 139 MG/DL
DEPRECATED HCO3 PLAS-SCNC: 18 MMOL/L (ref 22–29)
EGFRCR SERPLBLD CKD-EPI 2021: 45 ML/MIN/1.73M2
FASTING STATUS PATIENT QL REPORTED: ABNORMAL
FASTING STATUS PATIENT QL REPORTED: NORMAL
GLUCOSE SERPL-MCNC: 194 MG/DL (ref 70–99)
HDLC SERPL-MCNC: 55 MG/DL
LDLC SERPL CALC-MCNC: 76 MG/DL
MICROALBUMIN UR-MCNC: 39.8 MG/L
MICROALBUMIN/CREAT UR: 28.63 MG/G CR (ref 0–25)
NONHDLC SERPL-MCNC: 92 MG/DL
POTASSIUM SERPL-SCNC: 4.8 MMOL/L (ref 3.4–5.3)
SODIUM SERPL-SCNC: 141 MMOL/L (ref 135–145)
TRIGL SERPL-MCNC: 80 MG/DL

## 2024-05-10 DIAGNOSIS — I10 ESSENTIAL HYPERTENSION, BENIGN: ICD-10-CM

## 2024-05-10 RX ORDER — METOPROLOL TARTRATE 100 MG
100 TABLET ORAL 2 TIMES DAILY
Qty: 180 TABLET | Refills: 3 | Status: SHIPPED | OUTPATIENT
Start: 2024-05-10

## 2024-07-01 DIAGNOSIS — E11.59 TYPE 2 DIABETES MELLITUS WITH VASCULAR DISEASE (H): ICD-10-CM

## 2024-08-10 ENCOUNTER — HEALTH MAINTENANCE LETTER (OUTPATIENT)
Age: 84
End: 2024-08-10

## 2024-08-22 DIAGNOSIS — M54.42 CHRONIC LEFT-SIDED LOW BACK PAIN WITH LEFT-SIDED SCIATICA: ICD-10-CM

## 2024-08-22 DIAGNOSIS — G89.29 CHRONIC LEFT-SIDED LOW BACK PAIN WITH LEFT-SIDED SCIATICA: ICD-10-CM

## 2024-08-22 RX ORDER — DULOXETIN HYDROCHLORIDE 60 MG/1
60 CAPSULE, DELAYED RELEASE ORAL DAILY
Qty: 90 CAPSULE | Refills: 1 | Status: SHIPPED | OUTPATIENT
Start: 2024-08-22

## 2024-09-11 ENCOUNTER — OFFICE VISIT (OUTPATIENT)
Dept: PHARMACY | Facility: CLINIC | Age: 84
End: 2024-09-11
Payer: COMMERCIAL

## 2024-09-11 ENCOUNTER — LAB (OUTPATIENT)
Dept: LAB | Facility: CLINIC | Age: 84
End: 2024-09-11
Payer: COMMERCIAL

## 2024-09-11 VITALS — SYSTOLIC BLOOD PRESSURE: 120 MMHG | BODY MASS INDEX: 40.51 KG/M2 | WEIGHT: 236 LBS | DIASTOLIC BLOOD PRESSURE: 70 MMHG

## 2024-09-11 DIAGNOSIS — I25.10 CORONARY ARTERY DISEASE INVOLVING NATIVE CORONARY ARTERY OF NATIVE HEART WITHOUT ANGINA PECTORIS: ICD-10-CM

## 2024-09-11 DIAGNOSIS — E11.59 TYPE 2 DIABETES MELLITUS WITH VASCULAR DISEASE (H): ICD-10-CM
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
## 2024-09-11 DIAGNOSIS — F41.9 ANXIETY: ICD-10-CM

## 2024-09-11 DIAGNOSIS — M79.662 PAIN OF LEFT LOWER LEG: ICD-10-CM

## 2024-09-11 DIAGNOSIS — I10 ESSENTIAL HYPERTENSION, BENIGN: ICD-10-CM

## 2024-09-11 DIAGNOSIS — E78.5 HYPERLIPIDEMIA LDL GOAL <70: ICD-10-CM

## 2024-09-11 DIAGNOSIS — H10.13 ALLERGIC CONJUNCTIVITIS, BILATERAL: ICD-10-CM

## 2024-09-11 LAB
ANION GAP SERPL CALCULATED.3IONS-SCNC: 11 MMOL/L (ref 7–15)
BUN SERPL-MCNC: 26.5 MG/DL (ref 8–23)
CALCIUM SERPL-MCNC: 8.8 MG/DL (ref 8.8–10.4)
CHLORIDE SERPL-SCNC: 109 MMOL/L (ref 98–107)
CREAT SERPL-MCNC: 1.05 MG/DL (ref 0.51–0.95)
EGFRCR SERPLBLD CKD-EPI 2021: 51 ML/MIN/1.73M2
GLUCOSE SERPL-MCNC: 128 MG/DL (ref 70–99)
HBA1C MFR BLD: 9.4 % (ref 0–5.6)
HCO3 SERPL-SCNC: 21 MMOL/L (ref 22–29)
POTASSIUM SERPL-SCNC: 4.9 MMOL/L (ref 3.4–5.3)
SODIUM SERPL-SCNC: 141 MMOL/L (ref 135–145)

## 2024-09-11 PROCEDURE — 83036 HEMOGLOBIN GLYCOSYLATED A1C: CPT

## 2024-09-11 PROCEDURE — 99606 MTMS BY PHARM EST 15 MIN: CPT | Performed by: PHARMACIST

## 2024-09-11 PROCEDURE — 36415 COLL VENOUS BLD VENIPUNCTURE: CPT

## 2024-09-11 PROCEDURE — 99607 MTMS BY PHARM ADDL 15 MIN: CPT | Performed by: PHARMACIST

## 2024-09-11 PROCEDURE — 80048 BASIC METABOLIC PNL TOTAL CA: CPT

## 2024-09-11 NOTE — PROGRESS NOTES
Medication Therapy Management (MTM) Encounter    ASSESSMENT:                            Medication Adherence/Access: No issues identified    Fall  Mechanical in nature, no injuries reported.  Will monitor.    Diabetes   Patient is not meeting A1c goal of < 8%.  Self monitoring of blood glucose is not at goal of fasting  mg/dL and post prandial < 180 mg/dL.   Due for A1c today, medication adjustments pending results if needed.    Hypertension/CAD  Stable. Patient is meeting blood pressure goal of < 130/80mmHg.   Due for BMP.    Hyperlipidemia   Stable. Is not meeting LDL goal <55mg/dL, but given advanced age and desire to minimize medication/cost, likely appropriate.    Anxiety/Chronic Pain  Stable.    Allergy  Stable.     PLAN:                            Labs today - A1c, kidney function/electrolytes  Continue current medication regimen.     Follow-up: Return pending lab results.    SUBJECTIVE/OBJECTIVE:                          Cara Camarillo is a 84 year old female seen for a follow-up visit.  She's joined by her daughter, Kalina, for our visit today.     Reason for visit: Routine follow-up.    Tobacco: She reports that she has never smoked. She has never used smokeless tobacco.  Alcohol: not currently using    Medication Adherence/Access: no issues reported    Fall:  She had a fall at home immediately prior to our appointment, witnessed by daughter.  She reports she slipped on her shoes, did not feel dizzy or lightheaded at all prior to fall.  She did not have any injuries.  They deny any other falls.  She continues to refuse more cares including moving to assisted living, etc.  Her son visits her most days and Kalina calls daily.  Cara also has dinner at [employer] at least once a week.    Diabetes   NPH insulin 19 units twice daily   Aspirin 81mg daily for secondary prevention  Patient is not experiencing side effects.  Blood sugar monitorin times per week; Ranges: (patient reported) Fasting - 188,  135, 155, 176, 128, 155, 162, 181, 180, 216, 182, 161, 145, 201, 151 mg/dL  Before dinner - 82, 220, 207, 148, 155, 167, 119, 268, 173, 159, 196, 133, 159, 216 mg/dL  She refuses more expensive medications for diabetes control.  Current diabetes symptoms: none  Diet/Exercise: No changes..   Eye exam in the last 12 months? No - she has historically declined but has an appointment scheduled in a few weeks  Foot exam is up to date    Hypertension /CAD:  Aspirin 81mg daily (as above)  Amlodipine 5mg daily  Losartan 50mg twice daily   Metoprolol tartrate 100mg twice daily  Sublingual nitroglycerin - no recent use   Patient reports no current medication side effects  Patient does not self-monitor blood pressure.      Hyperlipidemia   Atorvastatin 20mg daily  Patient reports no significant myalgias or other side effects.     Anxiety/Chronic Pain:  Acetaminophen 1000mg three times daily as needed - uses for headaches and muscle pain in her neck  Duloxetine 60mg daily  Kalina reports duloxetine has been helpful for mental health.    Allergy:  Ketotifen eye drops twice daily as needed - no recent use  She reports these are effective when needed.  Denies side effects.    Today's Vitals: /70   Wt 236 lb (107 kg)   BMI 40.51 kg/m    ----------------    I spent 30 minutes with this patient today. All changes were made via collaborative practice agreement with Dr. Gonsalez. A copy of the visit note was provided to the patient's provider(s).    A summary of these recommendations was given to the patient.    Lissy Diana, PharmD, [facility]  Medication Therapy Management Provider  [phone]      Medication Therapy Recommendations  No medication therapy recommendations to display

## 2024-09-11 NOTE — PATIENT INSTRUCTIONS
"Recommendations from today's MTM visit:                                                    MTM (medication therapy management) is a service provided by a clinical pharmacist designed to help you get the most of out of your medicines.   Today we reviewed what your medicines are for, how to know if they are working, that your medicines are safe and how to make your medicine regimen as easy as possible.      Labs today - A1c, kidney function/electrolytes  Continue current medication regimen.     Follow-up: Return pending lab results.    It was great speaking with you today.  I value your experience and would be very thankful for your time in providing feedback in our clinic survey. In the next few days, you may receive an email or text message from [employer] with a link to a survey related to your  clinical pharmacist.\"     To schedule another MTM appointment, please call the clinic directly or you may call the MTM scheduling line at [phone] or toll-free at [phone].     My Clinical Pharmacist's contact information:                                                      Please feel free to contact me with any questions or concerns you have.      Lissy Diana, PharmD, [facility]  Medication Therapy Management Provider  [phone]    "

## 2024-09-12 NOTE — RESULT ENCOUNTER NOTE
The following letter pertains to your most recent diagnostic tests:    The diabetes blood tests is, unfortunately, too high.  The metabolic panel is stable.       I think we have an appointment in November to discuss this further, but you could always schedule a telephone or video appointment sooner to discuss strategies to improve diabetes control.        Sincerely,    Dr. Gonsalez

## 2024-09-24 ENCOUNTER — TRANSFERRED RECORDS (OUTPATIENT)
Dept: MULTI SPECIALTY CLINIC | Facility: CLINIC | Age: 84
End: 2024-09-24

## 2024-09-24 LAB — RETINOPATHY: NORMAL

## 2024-10-25 DIAGNOSIS — E78.5 HYPERLIPIDEMIA LDL GOAL <70: ICD-10-CM

## 2024-10-25 RX ORDER — ATORVASTATIN CALCIUM 20 MG/1
20 TABLET, FILM COATED ORAL DAILY
Qty: 90 TABLET | Refills: 1 | Status: SHIPPED | OUTPATIENT
Start: 2024-10-25

## 2024-10-31 NOTE — H&P
[facility]    History and Physical - Hospitalist Service       Date of Admission:  10/23/2022    Assessment & Plan      Cara Camarillo is a 82 year old female with past medical history significant for type II DM, CKD, and osteoarthritis admitted on 10/23/2022 with leg weakness, low back pain and headache after a fall two days ago.     Fall, possible near syncope   Generalized weakness  Acute on chronic Low back pain, neck pain and headache   Pt presents to the ED with weakness and pain after a fall two days ago. Two days ago the patient had a fall. It sounds like she may have had a near syncope event. She reports feeling light-headed, diaphoretic and nauseated prior to the fall, but did not actually lose consciousness. She was unable to get up initially, but scooted to the stairs and was able to use the railing to stand. She did not seek immediate medical care.   Today her children noted her to be weak and complaining of back pain so encouraged her to be evaluated.   * Work up in the ED is fairly unremarkable. Labs are wnl. Troponin is wnl. EKG shows sinus rhythm. CT head is negative for acute pathology. CT cervical spine is normal. XR of the lumbar spine shows chronic T12 compression fracture and some other chronic degenerative change but no acute issues. Neurologic exam is non-focal she is just generally weak.   - Admit to observation   - Cardiac monitoring   - Will obtain Echo due to reports of light-headedness and diaphoresis during the event  - PT/OT   - CC/SW consult  - Pain control as needed   - Await UA     Hypertension  Hyperlipidemia   Hx of coronary artery disease s/p prior stenting  - Continue PTA amlodipine, losartan and metoprolol   - Continue PTA ASA an atorvastatin     Type II DM   PTA insulin regimen includes NPH 10 units before breakfast and 18 units before dinner   - Continue NPH at 10 units BID for now  - MDSSI       Obesity: Estimated body mass index is 39.48 kg/m   Physical Therapy Initial Evaluation/Plan of Care    Room #:  0415/0415-01  Patient Name: Janet Rivera  YOB: 1949  MRN: [id number]    Date of Service: 10/31/2024     Tentative placement recommendation: {Kplacement:55342}  Equipment recommendation: {KNEquipment:58203}      Evaluating Physical Therapist: Dana Case, PT #[id number]      Specific Provider Orders/Date/Referring Provider :   10/30/24 2145    PT eval and treat  Start:  10/30/24 2145,   End:  10/30/24 2145,   ONE TIME,   Standing Count:  1 Occurrences,   R       Loraine, Kyara L, APRN - CNP    Admitting Diagnosis:   MAYRA (acute kidney injury) (HCC) [N17.9]  Acute cystitis with hematuria [N30.01]  Acute renal failure, unspecified acute renal failure type (HCC) [N17.9]      Surgery: {Knone:82208::\"none\"}  Visit Diagnoses         Codes    Acute cystitis with hematuria    -  Primary N30.01    Acute renal failure, unspecified acute renal failure type (HCC)     N17.9            Patient Active Problem List   Diagnosis    Chest pain    Anemia    Leukocytosis    History of rectal cancer    NSTEMI (non-ST elevated myocardial infarction) (HCC)    Coronary artery disease involving native coronary artery of native heart without angina pectoris    Pure hypercholesterolemia    BPH with urinary obstruction    MAYRA (acute kidney injury) (HCC)    Hematuria    Acute urinary retention        ASSESSMENT of Current Deficits Patient exhibits decreased {KAssessment:43540::\"strength\",\"balance\",\"endurance\"} impairing {KAssessment2:33498::\"functional mobility\",\"transfers\",\"gait \",\"gait distance\",\"tolerance to activity\"} ***       PHYSICAL THERAPY  PLAN OF CARE       Physical therapy plan of care is established based on physician order,  patient diagnosis and clinical assessment    Current Treatment Recommendations:    {KInterventions:04470}    PT long term treatment goals are located in below grid    Patient and or family understand(s) diagnosis, prognosis, and plan of  "as calculated from the following:    Height as of this encounter: 1.626 m (5' 4\").    Weight as of this encounter: 104.3 kg (230 lb)    Diet: Regular diet   DVT Prophylaxis: PCDs   Tony Catheter: Not present  Central Lines: None  Cardiac Monitoring: None  Code Status: Full Code     Disposition Plan   The patient's care was discussed with the Patient.    Carmen Tang  [facility]  [facility]  Securely message with the Vocera Web Console (learn more here)  Text page via [employer] Paging/Directory         ______________________________________________________________________    Chief Complaint   Fall     History is obtained from the patient    History of Present Illness   Cara Camarillo is a 82 year old female who presents to the ED after a fall. She had a fall at home two days ago. She reported she just got up from eating dinner and became dizzy, light-headed, and diaphoretic and nauseated. She became very weak and could not stand. She fell to the ground. She did not actually lose consciousness. She did not hit her head. She could not get up initially but was able to scoot over to the stair railing and eventually got herself up. She did not seek medical care right away.     She has been having worsening back pain and neck pain. Her son went to check in on her today and found her in bed. He thinks she probably didn't get out of bed at all since the fall. He does mention she spends most of her day in the bed normally. He also mentions that she does not drink much water during the day because she doesn't want to get up to use the bathroom frequently.     She does have chronic back and neck pain at baseline. She does get headaches frequently.     Review of Systems    The 10 point Review of Systems is negative other than noted in the HPI or here.     Past Medical History    I have reviewed this patient's medical history and updated it with pertinent information if needed.   Past " Medical History:   Diagnosis Date     Adenoma     tubal     Anxiety      Anxiety      Aortic stenosis      Arthritis     ankles     Chronic kidney disease (CKD), stage III (moderate) (H)      Coronary artery disease     cardiac cath 2016: ISIDRO to LAD, cath 2016: ISIDRO to PDA     Decubitus ulcer of coccyx      Dysthymic disorder      Essential hypertension, benign 2205     Herpes zoster without mention of complication Aug 2006    left leg (thigh)     Hydronephrosis      Hyperlipidaemia LDL goal < 100      Left ventricular diastolic dysfunction      Malignant neoplasm of corpus uteri, except isthmus (H) 2005    endometrial CA, s/p external beam radiation & radiation implant & FIDEL/BSO       Obesity      Pulmonary hyperinflation      Sciatica      Type II or unspecified type diabetes mellitus without mention of complication, not stated as uncontrolled 2005     Unspecified congenital anomaly of urinary system 2005       Past Surgical History   I have reviewed this patient's surgical history and updated it with pertinent information if needed.  Past Surgical History:   Procedure Laterality Date     ARTHROPLASTY KNEE  2013    Procedure: ARTHROPLASTY KNEE;  RIGHT TOTAL KNEE ARTHROPLASTY;  Surgeon: Romaine Constantino MD;  Location:  OR     ARTHROPLASTY KNEE  2/3/2014    Procedure: ARTHROPLASTY KNEE;  LEFT TOTAL KNEE ARTHROPLASTY (BIOMET)^;  Surgeon: Romaine Constantino MD;  Location:  OR     C ANESTH, SECTION       EXCISE MASS LOWER EXTREMITY Left 2015    Procedure: EXCISE MASS LOWER EXTREMITY;  Surgeon: Sloan Richardson MD;  Location: [facility] UGI ENDOSCOPY W EUS  2013    Procedure: COMBINED ENDOSCOPIC ULTRASOUND, ESOPHAGOSCOPY, GASTROSCOPY, DUODENOSCOPY (EGD);  Surgeon: Lyric Simons MD;  Location:  GI     HYSTERECTOMY      Vaginal     HYSTERECTOMY, VAGINAL  05    Uterine CA     LAPAROSCOPIC CHOLECYSTECTOMY  1/10/2013    Procedure: LAPAROSCOPIC  "CHOLECYSTECTOMY;  LAPAROSCOPIC CHOLECYSTECTOMY ;  Surgeon: Eduardo Taylor MD;  Location: [facility] OR     NEPHRECTOMY BILATERAL       NEPHRECTOMY RT/LT  OCT 2005     OTHER SURGICAL HISTORY      angiogram Jan 2016: ISIDRO to PDA     OTHER SURGICAL HISTORY      angiogram Feb 2016: ISIDRO to LAD     ZZC NONSPECIFIC PROCEDURE  3/20/06    CT scan of chest/abd/pelvis- negative for recurrence of CA       Social History   I have reviewed this patient's social history and updated it with pertinent information if needed.  Social History     Tobacco Use     Smoking status: Never     Smokeless tobacco: Never   Substance Use Topics     Alcohol use: No     Alcohol/week: 0.0 standard drinks     Drug use: No       Family History   I have reviewed this patient's family history and updated it with pertinent information if needed.  Family History   Problem Relation Age of Onset     Diabetes Father         Adult onset     Other - See Comments Mother 95        Old age       Prior to Admission Medications   Prior to Admission Medications   Prescriptions Last Dose Informant Patient Reported? Taking?   BD INSULIN SYRINGE U/F 30G X 1/2\" 0.5 ML miscellaneous   No No   Sig: USE ONE SYRINGE TWICE DAILY OR AS DIRECTED.   CONTOUR NEXT TEST test strip   No No   Sig: USE TO TEST BLOOD SUGAR DAILY OR AS DIRECTED.   DULoxetine (CYMBALTA) 30 MG capsule   No No   Sig: One capsule once daily for one week, then increase to two capsules once daily   acetaminophen (TYLENOL) 500 MG tablet   Yes No   Sig: Take 500-1,000 mg by mouth 3 times daily as needed for mild pain   amLODIPine (NORVASC) 10 MG tablet   No No   Sig: TAKE 1 TABLET BY MOUTH EVERY DAY   aspirin 81 MG tablet   No No   Sig: Take 1 tablet (81 mg) by mouth daily   atorvastatin (LIPITOR) 20 MG tablet   No No   Sig: Take 1 tablet (20 mg) by mouth daily   blood glucose monitoring (CONTOUR NEXT MONITOR W/DEVICE KIT) meter device kit   No No   Sig: Use to test blood sugar 3 times daily or as directed. "   insulin NPH (NOVOLIN N VIAL) 100 UNIT/ML vial   No No   Sig: 10 units before breakfast, 18 units before dinner   losartan (COZAAR) 50 MG tablet   No No   Sig: TAKE 2 TABLETS BY MOUTH EVERY DAY   metoprolol tartrate (LOPRESSOR) 100 MG tablet   No No   Sig: Take 1 tablet (100 mg) by mouth 2 times daily   nitroGLYcerin (NITROSTAT) 0.4 MG sublingual tablet   No No   Sig: Place 1 tablet (0.4 mg) under the tongue every 5 minutes as needed for chest pain if you are still having symptoms after 3 doses (15 minutes) call 911.   vitamin D3 (CHOLECALCIFEROL) 50 mcg (2000 units) tablet   Yes No   Sig: Take 1 tablet by mouth every other day      Facility-Administered Medications: None     Allergies   Allergies   Allergen Reactions     Actos [Pioglitazone]      Lower extremity edema       Enalapril      Renal failure     Hydrochlorothiazide      Dry mouth     Lisinopril      Hyperkalemia       Metformin Diarrhea     Diarrhea        Physical Exam   Vital Signs: Temp: 98.3  F (36.8  C) Temp src: Temporal BP: (!) 160/96 Pulse: 73   Resp: 18 SpO2: 96 % O2 Device: None (Room air)    Weight: 230 lbs 0 oz    Constitutional: Awake, alert, cooperative, no apparent distress. VERY hard of hearing.   Eyes: Conjunctiva and pupils examined and normal.  HEENT: dry mucous membranes, normal dentition.  Respiratory: Clear to auscultation bilaterally, no crackles or wheezing.  Cardiovascular: Regular rate and rhythm, normal S1 and S2, and II/VI systolic murmur noted.  GI: Soft, non-distended, non-tender, normal bowel sounds.  Skin: No rashes, no cyanosis, no edema.  Musculoskeletal: No joint swelling, erythema or tenderness.  Neurologic: Cranial nerves 2-12 intact, normal strength and sensation.  Psychiatric: Alert, oriented to person, place and time, no obvious anxiety or depression.    Data   Data reviewed today: I reviewed all medications, new labs and imaging results over the last 24 hours. I personally reviewed the EKG tracing showing normal  sinus rhythm .    Recent Labs   Lab 10/23/22  1625 10/23/22  1618   WBC 10.4  --    HGB 14.6  --    MCV 89  --      --      --    POTASSIUM 4.6  --    CHLORIDE 107  --    CO2 22  --    BUN 23  --    CR 0.99  --    ANIONGAP 6  --    MANJEET 8.9  --    * 217*     Recent Results (from the past 24 hour(s))   CT Cervical Spine w/o Contrast    Narrative    EXAM: CT HEAD W/O CONTRAST, CT CERVICAL SPINE W/O CONTRAST  LOCATION: [facility]  DATE/TIME: 10/23/2022 7:07 PM    INDICATION: Head and neck injury, fall.  COMPARISON: None.  TECHNIQUE:   1) Routine CT Head without IV contrast. Multiplanar reformats. Dose reduction techniques were used.  2) Routine CT Cervical Spine without IV contrast. Multiplanar reformats. Dose reduction techniques were used.    FINDINGS:   HEAD CT:   INTRACRANIAL CONTENTS: No intracranial hemorrhage, extraaxial collection, or mass effect.  No CT evidence of acute infarct. Moderate presumed chronic small vessel ischemic changes. Moderate generalized volume loss. No hydrocephalus.     VISUALIZED ORBITS/SINUSES/MASTOIDS: Prior bilateral cataract surgery. Visualized portions of the orbits are otherwise unremarkable. No paranasal sinus mucosal disease. No middle ear or mastoid effusion.    BONES/SOFT TISSUES: No acute abnormality.    CERVICAL SPINE CT:   VERTEBRA: Normal vertebral body heights. No fracture or posttraumatic subluxation. Slight anterolisthesis at C4-C5 and C5-C6. Osteopenic bones.    CANAL/FORAMINA: Mild/moderate multilevel degenerative changes. No high-grade canal or neural foraminal stenosis.    PARASPINAL: No extraspinal abnormality. Visualized lung fields are clear.      Impression    IMPRESSION:  HEAD CT:  1.  No CT evidence for acute intracranial process.  2.  Brain atrophy and presumed chronic microvascular ischemic changes as above.    CERVICAL SPINE CT:  1.  No CT evidence for acute fracture or post traumatic subluxation.   CT Head w/o  Contrast    Narrative    EXAM: CT HEAD W/O CONTRAST, CT CERVICAL SPINE W/O CONTRAST  LOCATION: [facility]  DATE/TIME: 10/23/2022 7:07 PM    INDICATION: Head and neck injury, fall.  COMPARISON: None.  TECHNIQUE:   1) Routine CT Head without IV contrast. Multiplanar reformats. Dose reduction techniques were used.  2) Routine CT Cervical Spine without IV contrast. Multiplanar reformats. Dose reduction techniques were used.    FINDINGS:   HEAD CT:   INTRACRANIAL CONTENTS: No intracranial hemorrhage, extraaxial collection, or mass effect.  No CT evidence of acute infarct. Moderate presumed chronic small vessel ischemic changes. Moderate generalized volume loss. No hydrocephalus.     VISUALIZED ORBITS/SINUSES/MASTOIDS: Prior bilateral cataract surgery. Visualized portions of the orbits are otherwise unremarkable. No paranasal sinus mucosal disease. No middle ear or mastoid effusion.    BONES/SOFT TISSUES: No acute abnormality.    CERVICAL SPINE CT:   VERTEBRA: Normal vertebral body heights. No fracture or posttraumatic subluxation. Slight anterolisthesis at C4-C5 and C5-C6. Osteopenic bones.    CANAL/FORAMINA: Mild/moderate multilevel degenerative changes. No high-grade canal or neural foraminal stenosis.    PARASPINAL: No extraspinal abnormality. Visualized lung fields are clear.      Impression    IMPRESSION:  HEAD CT:  1.  No CT evidence for acute intracranial process.  2.  Brain atrophy and presumed chronic microvascular ischemic changes as above.    CERVICAL SPINE CT:  1.  No CT evidence for acute fracture or post traumatic subluxation.   Lumbar spine XR, 2-3 views    Narrative    EXAM: XR LUMBAR SPINE 2/3 VIEWS  LOCATION: [facility]  DATE/TIME: 10/23/2022 7:18 PM    INDICATION: Fall, back injury, back pain.   COMPARISON: X-ray 06/20/2016  TECHNIQUE: Radiographs of lumbar spine in routine projections.    FINDINGS: Nomenclature based on 5 lumbar type vertebral  bodies. Mild levocurvature centered at L3. Grade 1 anterolisthesis at L4-5. Minimal degenerative 2 mm retrolisthesis at L1-L2. Chronic 50% loss of height of T12, unchanged from prior. The vertebral   bodies are normal in height. Advanced L4-L5 and L5-S1 interbody degeneration. Advanced lower lumbar facet arthropathy. Unremarkable visualized bony pelvis. Cholecystectomy clips. Surgical clips are present bilaterally.   XR Pelvis 1/2 Views    Narrative    EXAM: XR PELVIS 1/2 VIEWS  LOCATION: [facility]  DATE/TIME: 10/23/2022 7:18 PM    INDICATION: Pain following fall.  COMPARISON: None.      Impression    IMPRESSION: Osteopenia. No fractures are evident. Mild degenerative changes in the hips. Degenerative changes in the SI joints and lower lumbar spine. Surgical clips in the pelvis.   XR Chest 1 View    Narrative    EXAM: XR CHEST 1 VIEW  LOCATION: [facility]  DATE/TIME: 10/23/2022 7:20 PM    INDICATION: fall. Weakness and pain.  COMPARISON: Chest 01/08/2013      Impression    IMPRESSION: No hydropneumothorax or fracture. Lungs are clear. Heart appears enlarged, likely magnified due to supine view. No signs of pneumonia or failure.

## 2024-11-05 ENCOUNTER — OFFICE VISIT (OUTPATIENT)
Dept: FAMILY MEDICINE | Facility: CLINIC | Age: 84
End: 2024-11-05
Payer: COMMERCIAL

## 2024-11-05 VITALS
TEMPERATURE: 97.7 F | BODY MASS INDEX: 39.9 KG/M2 | HEART RATE: 73 BPM | WEIGHT: 233.7 LBS | SYSTOLIC BLOOD PRESSURE: 138 MMHG | RESPIRATION RATE: 20 BRPM | OXYGEN SATURATION: 94 % | DIASTOLIC BLOOD PRESSURE: 76 MMHG | HEIGHT: 64 IN

## 2024-11-05 DIAGNOSIS — Z23 NEED FOR SHINGLES VACCINE: ICD-10-CM

## 2024-11-05 DIAGNOSIS — E78.5 HYPERLIPIDEMIA LDL GOAL <70: ICD-10-CM

## 2024-11-05 DIAGNOSIS — F34.1 DYSTHYMIC DISORDER: ICD-10-CM

## 2024-11-05 DIAGNOSIS — I10 ESSENTIAL HYPERTENSION, BENIGN: ICD-10-CM

## 2024-11-05 DIAGNOSIS — I25.10 CORONARY ARTERY DISEASE INVOLVING NATIVE CORONARY ARTERY OF NATIVE HEART WITHOUT ANGINA PECTORIS: ICD-10-CM

## 2024-11-05 DIAGNOSIS — Z29.11 NEED FOR VACCINATION AGAINST RESPIRATORY SYNCYTIAL VIRUS: ICD-10-CM

## 2024-11-05 DIAGNOSIS — E11.51 TYPE 2 DIABETES MELLITUS WITH DIABETIC PERIPHERAL ANGIOPATHY WITHOUT GANGRENE, WITHOUT LONG-TERM CURRENT USE OF INSULIN (H): ICD-10-CM

## 2024-11-05 DIAGNOSIS — E66.01 SEVERE OBESITY (BMI >= 40) (H): ICD-10-CM

## 2024-11-05 DIAGNOSIS — Z00.00 ENCOUNTER FOR MEDICARE ANNUAL WELLNESS EXAM: Primary | ICD-10-CM

## 2024-11-05 PROCEDURE — G0439 PPPS, SUBSEQ VISIT: HCPCS | Performed by: INTERNAL MEDICINE

## 2024-11-05 PROCEDURE — 99213 OFFICE O/P EST LOW 20 MIN: CPT | Mod: 25 | Performed by: INTERNAL MEDICINE

## 2024-11-05 PROCEDURE — 90662 IIV NO PRSV INCREASED AG IM: CPT | Performed by: INTERNAL MEDICINE

## 2024-11-05 PROCEDURE — 90480 ADMN SARSCOV2 VAC 1/ONLY CMP: CPT | Performed by: INTERNAL MEDICINE

## 2024-11-05 PROCEDURE — 91320 SARSCV2 VAC 30MCG TRS-SUC IM: CPT | Performed by: INTERNAL MEDICINE

## 2024-11-05 PROCEDURE — G0008 ADMIN INFLUENZA VIRUS VAC: HCPCS | Performed by: INTERNAL MEDICINE

## 2024-11-05 RX ORDER — METFORMIN HYDROCHLORIDE 500 MG/1
500 TABLET, EXTENDED RELEASE ORAL
Qty: 90 TABLET | Refills: 3 | Status: SHIPPED | OUTPATIENT
Start: 2024-11-05

## 2024-11-05 SDOH — HEALTH STABILITY: PHYSICAL HEALTH: ON AVERAGE, HOW MANY DAYS PER WEEK DO YOU ENGAGE IN MODERATE TO STRENUOUS EXERCISE (LIKE A BRISK WALK)?: 0 DAYS

## 2024-11-05 ASSESSMENT — PATIENT HEALTH QUESTIONNAIRE - PHQ9
SUM OF ALL RESPONSES TO PHQ QUESTIONS 1-9: 6
SUM OF ALL RESPONSES TO PHQ QUESTIONS 1-9: 6

## 2024-11-05 ASSESSMENT — PAIN SCALES - GENERAL: PAINLEVEL_OUTOF10: MODERATE PAIN (5)

## 2024-11-05 ASSESSMENT — SOCIAL DETERMINANTS OF HEALTH (SDOH): HOW OFTEN DO YOU GET TOGETHER WITH FRIENDS OR RELATIVES?: ONCE A WEEK

## 2024-11-05 NOTE — PROGRESS NOTES
"Preventive Care Visit  [facility] [place]  Kole Gonsalez MD, Internal Medicine  Nov 5, 2024      Assessment & Plan     Encounter for Medicare annual wellness exam      Type 2 diabetes mellitus with diabetic peripheral angiopathy without gangrene, without long-term current use of insulin (H)  Not well controlled  Increase NPH to 20 twice daily (easier for her to see marking to draw up that dose)  Re challenge with XR metformin (daughter will stop if diarrhea comes back)  A1c in mid December   - metFORMIN (GLUCOPHAGE XR) 500 MG 24 hr tablet; Take 1 tablet (500 mg) by mouth daily (with dinner).  - insulin  UNIT/ML injection; Inject 20 Units subcutaneously 2 times daily (before meals).    Essential hypertension, benign  OK on current drugs     Coronary artery disease involving native coronary artery of native heart without angina pectoris  stable    Hyperlipidemia LDL goal <70  Recheck lipids in the Spring    Dysthymic disorder  Continue duloxetine     Need for shingles vaccine  Recommend she get this at pharmacy     Need for vaccination against respiratory syncytial virus  Recommended she get this at pharmacy     Patient has been advised of split billing requirements and indicates understanding: Yes        BMI  Estimated body mass index is 40.11 kg/m  as calculated from the following:    Height as of this encounter: 1.626 m (5' 4\").    Weight as of this encounter: 106 kg (233 lb 11.2 oz).   Weight management plan: Discussed healthy diet and exercise guidelines    Counseling  Appropriate preventive services were addressed with this patient via screening, questionnaire, or discussion as appropriate for fall prevention, nutrition, physical activity, Tobacco-use cessation, social engagement, weight loss and cognition.  Checklist reviewing preventive services available has been given to the patient.  Reviewed patient's diet, addressing concerns and/or questions.   The patient was instructed to see the " dentist every 6 months.   Addressed any concerns about safety while driving.  The patient was provided with written information regarding signs of hearing loss.   The patient's PHQ-9 score is consistent with mild depression. She was provided with information regarding depression.   Flu and COVID shot today     FUTURE APPOINTMENTS:       - lab appointment 2 months for A1c    Marivel Marroquin is a 84 year old, presenting for the following:  Physical (Patient is here for an annual wellness visit.)        11/5/2024     3:10 PM   Additional Questions   Roomed by Mike LAINEZ MA   Accompanied by Kalina Daughter  and Florin son         Via the Health Maintenance questionnaire, the patient has reported the following services have been completed -Eye Exam: [facility] 2024-09-24, this information has been sent to the abstraction team.    HPI          Health Care Directive  Patient has a Health Care Directive on file        11/5/2024   General Health   How would you rate your overall physical health? (!) POOR   Feel stress (tense, anxious, or unable to sleep) Not at all            11/5/2024   Nutrition   Diet: Regular (no restrictions)            11/5/2024   Exercise   Days per week of moderate/strenous exercise 0 days      (!) EXERCISE CONCERN      11/5/2024   Social Factors   Frequency of gathering with friends or relatives Once a week   Worry food won't last until get money to buy more No   Food not last or not have enough money for food? No   Do you have housing? (Housing is defined as stable permanent housing and does not include staying ouside in a car, in a tent, in an abandoned building, in an overnight shelter, or couch-surfing.) Yes   Are you worried about losing your housing? No   Lack of transportation? No   Unable to get utilities (heat,electricity)? No            11/5/2024   Fall Risk   Fallen 2 or more times in the past year? No     Yes    Trouble with walking or balance? Yes     Yes         Patient-reported    Multiple values from one day are sorted in reverse-chronological order           11/5/2024   Activities of Daily Living- Home Safety   Needs help with the following daily activites None of the above   Safety concerns in the home None of the above            11/5/2024   Dental   Dentist two times every year? (!) NO            11/5/2024   Hearing Screening   Hearing concerns? (!) I NEED TO ASK PEOPLE TO SPEAK UP OR REPEAT THEMSELVES.            11/5/2024   Driving Risk Screening   Patient/family members have concerns about driving (!) YES             11/5/2024   General Alertness/Fatigue Screening   Have you been more tired than usual lately? No            11/5/2024   Urinary Incontinence Screening   Bothered by leaking urine in past 6 months No            11/5/2024   TB Screening   Were you born outside of the [place]? No          Today's PHQ-9 Score:       11/5/2024     3:05 PM   PHQ-9 SCORE   PHQ-9 Total Score MyChart 6 (Mild depression)   PHQ-9 Total Score 6        Patient-reported         11/5/2024   Substance Use   Alcohol more than 3/day or more than 7/wk No   Do you have a current opioid prescription? No   How severe/bad is pain from 1 to 10? 5/10   Do you use any other substances recreationally? No        Social History     Tobacco Use    Smoking status: Never    Smokeless tobacco: Never   Vaping Use    Vaping status: Never Used   Substance Use Topics    Alcohol use: No     Alcohol/week: 0.0 standard drinks of alcohol    Drug use: No                    Reviewed and updated as needed this visit by Provider                    Past Medical History:   Diagnosis Date    Adenoma     tubal    Anxiety     Anxiety     Aortic stenosis     Arthritis     ankles    Chronic kidney disease (CKD), stage III (moderate) (H)     Coronary artery disease     cardiac cath Feb 2016: ISIDRO to LAD, cath Jan 2016: ISIDRO to PDA    Decubitus ulcer of coccyx     Dysthymic disorder     Essential hypertension, benign 03/01/2205     Herpes zoster without mention of complication Aug 2006    left leg (thigh)    Hydronephrosis     Hyperlipidaemia LDL goal < 100     Left ventricular diastolic dysfunction     Malignant neoplasm of corpus uteri, except isthmus (H) 2005    endometrial CA, s/p external beam radiation & radiation implant & FIDEL/BSO      Obesity     Pulmonary hyperinflation     Sciatica     Type II or unspecified type diabetes mellitus without mention of complication, not stated as uncontrolled 2005    Unspecified congenital anomaly of urinary system 2005     Past Surgical History:   Procedure Laterality Date    ARTHROPLASTY KNEE  2013    Procedure: ARTHROPLASTY KNEE;  RIGHT TOTAL KNEE ARTHROPLASTY;  Surgeon: Romaine Constantino MD;  Location:  OR    ARTHROPLASTY KNEE  2/3/2014    Procedure: ARTHROPLASTY KNEE;  LEFT TOTAL KNEE ARTHROPLASTY (BIOMET)^;  Surgeon: Romaine Constantino MD;  Location:  OR    C ANESTH, SECTION      EXCISE MASS LOWER EXTREMITY Left 2015    Procedure: EXCISE MASS LOWER EXTREMITY;  Surgeon: Sloan Richardson MD;  Location: [facility] UGI ENDOSCOPY W EUS  2013    Procedure: COMBINED ENDOSCOPIC ULTRASOUND, ESOPHAGOSCOPY, GASTROSCOPY, DUODENOSCOPY (EGD);  Surgeon: Lyric Simons MD;  Location:  GI    HYSTERECTOMY      Vaginal    HYSTERECTOMY, VAGINAL  05    Uterine CA    LAPAROSCOPIC CHOLECYSTECTOMY  1/10/2013    Procedure: LAPAROSCOPIC CHOLECYSTECTOMY;  LAPAROSCOPIC CHOLECYSTECTOMY ;  Surgeon: Eduardo Taylor MD;  Location:  OR    NEPHRECTOMY BILATERAL      NEPHRECTOMY RT/LT  OCT 2005    OTHER SURGICAL HISTORY      angiogram 2016: ISIDRO to PDA    OTHER SURGICAL HISTORY      angiogram 2016: ISIDRO to LAD    ZZC NONSPECIFIC PROCEDURE  3/20/06    CT scan of chest/abd/pelvis- negative for recurrence of CA     BP Readings from Last 3 Encounters:   24 138/76   24 120/70   24 135/82    Wt Readings from Last 3 Encounters:   24  106 kg (233 lb 11.2 oz)   24 107 kg (236 lb)   24 104.8 kg (231 lb)                  Patient Active Problem List   Diagnosis    History of cancer of uterus    Dysthymic disorder    Herpes zoster    Essential hypertension, benign    SOLITARY KIDNEY ANOMALY NEC    Hyperlipidemia LDL goal <70    CKD (chronic kidney disease) stage 3, GFR 30-59 ml/min (H)    Anxiety    Tubular adenoma    OA (osteoarthritis)    Aortic stenosis    IVCD (intraventricular conduction defect)    B12 deficiency    Iron deficiency    Esophageal reflux    Overweight BMI 35-40    Osteopenia    RBBB    Left ventricular diastolic dysfunction    Coronary artery disease    Type 2 diabetes mellitus with diabetic peripheral angiopathy without gangrene, without long-term current use of insulin (H)    Chest wall pain    Bilateral edema of lower extremity    Coronary artery disease involving native coronary artery of native heart without angina pectoris     Past Surgical History:   Procedure Laterality Date    ARTHROPLASTY KNEE  2013    Procedure: ARTHROPLASTY KNEE;  RIGHT TOTAL KNEE ARTHROPLASTY;  Surgeon: Romaine Constantino MD;  Location:  OR    ARTHROPLASTY KNEE  2/3/2014    Procedure: ARTHROPLASTY KNEE;  LEFT TOTAL KNEE ARTHROPLASTY (BIOMET)^;  Surgeon: Romaine Constantino MD;  Location:  OR    C ANESTH, SECTION      EXCISE MASS LOWER EXTREMITY Left 2015    Procedure: EXCISE MASS LOWER EXTREMITY;  Surgeon: Sloan Richardson MD;  Location: [facility] UGI ENDOSCOPY W EUS  2013    Procedure: COMBINED ENDOSCOPIC ULTRASOUND, ESOPHAGOSCOPY, GASTROSCOPY, DUODENOSCOPY (EGD);  Surgeon: Lyric Simons MD;  Location:  GI    HYSTERECTOMY      Vaginal    HYSTERECTOMY, VAGINAL  05    Uterine CA    LAPAROSCOPIC CHOLECYSTECTOMY  1/10/2013    Procedure: LAPAROSCOPIC CHOLECYSTECTOMY;  LAPAROSCOPIC CHOLECYSTECTOMY ;  Surgeon: Eduardo Taylor MD;  Location:  OR    NEPHRECTOMY BILATERAL      NEPHRECTOMY  "RT/LT  OCT 2005    OTHER SURGICAL HISTORY      angiogram Jan 2016: ISIDRO to PDA    OTHER SURGICAL HISTORY      angiogram Feb 2016: ISIDRO to LAD    ZZC NONSPECIFIC PROCEDURE  3/20/06    CT scan of chest/abd/pelvis- negative for recurrence of CA       Social History     Tobacco Use    Smoking status: Never    Smokeless tobacco: Never   Substance Use Topics    Alcohol use: No     Alcohol/week: 0.0 standard drinks of alcohol     Family History   Problem Relation Age of Onset    Diabetes Father         Adult onset    Other - See Comments Mother 95        Old age         Current Outpatient Medications   Medication Sig Dispense Refill    acetaminophen (TYLENOL) 500 MG tablet Take 1,000 mg by mouth every 8 hours as needed for pain      amLODIPine (NORVASC) 5 MG tablet TAKE 1 TABLET BY MOUTH EVERY DAY 90 tablet 3    aspirin 81 MG tablet Take 1 tablet (81 mg) by mouth daily 100 tablet 3    atorvastatin (LIPITOR) 20 MG tablet TAKE 1 TABLET BY MOUTH EVERY DAY 90 tablet 1    BD INSULIN SYRINGE U/F 30G X 1/2\" 0.5 ML miscellaneous USE ONE SYRINGE TWICE DAILY OR AS DIRECTED. 100 each 1    blood glucose (CONTOUR NEXT TEST) test strip USE TO TEST BLOOD SUGAR DAILY OR AS DIRECTED. 100 strip 1    blood glucose monitoring (CONTOUR NEXT MONITOR W/DEVICE KIT) meter device kit Use to test blood sugar 3 times daily or as directed. 1 kit 0    DULoxetine (CYMBALTA) 60 MG capsule TAKE 1 CAPSULE BY MOUTH EVERY DAY 90 capsule 1    insulin  UNIT/ML injection Inject 20 Units subcutaneously 2 times daily (before meals). 15 mL 11    ketotifen fumarate 0.035% 0.035 % SOLN ophthalmic solution Place 1 drop into both eyes every 12 hours as needed for itching or dry eyes 10 mL 11    losartan (COZAAR) 50 MG tablet TAKE 1 TABLET BY MOUTH TWICE A  tablet 3    metFORMIN (GLUCOPHAGE XR) 500 MG 24 hr tablet Take 1 tablet (500 mg) by mouth daily (with dinner). 90 tablet 3    metoprolol tartrate (LOPRESSOR) 100 MG tablet TAKE 1 TABLET BY MOUTH TWICE A " " tablet 3    nitroGLYcerin (NITROSTAT) 0.4 MG sublingual tablet Place 1 tablet (0.4 mg) under the tongue every 5 minutes as needed for chest pain if you are still having symptoms after 3 doses (15 minutes) call 911. 25 tablet 1     Current providers sharing in care for this patient include:  Patient Care Team:  Kole Gonsalez MD as PCP - General (Internal Medicine)  Nida Mak APRN CNP as Nurse Practitioner (Nurse Practitioner)  Kole Gonsalez MD as Assigned PCP  Lissy Diana PharmD as Pharmacist (Pharmacist)  Lissy Diana PharmD as Assigned MTM Pharmacist  [facility]    The following health maintenance items are reviewed in Epic and correct as of today:  Health Maintenance   Topic Date Due    ZOSTER IMMUNIZATION (1 of 2) Never done    RSV VACCINE (1 - 1-dose 75+ series) Never done    EYE EXAM  01/11/2023    INFLUENZA VACCINE (1) 09/01/2024    COVID-19 Vaccine (9 - 2024-25 season) 09/01/2024    MEDICARE ANNUAL WELLNESS VISIT  11/02/2024    LIPID  11/08/2024    A1C  12/11/2024    DTAP/TDAP/TD IMMUNIZATION (2 - Td or Tdap) 01/29/2025    PHQ-9  05/05/2025    MICROALBUMIN  05/08/2025    DIABETIC FOOT EXAM  05/08/2025    ANNUAL REVIEW OF HM ORDERS  05/08/2025    BMP  09/11/2025    FALL RISK ASSESSMENT  11/05/2025    ADVANCE CARE PLANNING  11/02/2028    DEXA  08/27/2029    DEPRESSION ACTION PLAN  Completed    Pneumococcal Vaccine: 65+ Years  Completed    URINALYSIS  Completed    HPV IMMUNIZATION  Aged Out    MENINGITIS IMMUNIZATION  Aged Out    RSV MONOCLONAL ANTIBODY  Aged Out    HEMOGLOBIN  Discontinued       A 10 organ systems ROS is negative other than any pertinent positives or negatives previously stated.      Objective    Exam  /76 (BP Location: Right arm, Patient Position: Sitting, Cuff Size: Adult Large)   Pulse 73   Temp 97.7  F (36.5  C) (Temporal)   Resp 20   Ht 1.626 m (5' 4\")   Wt 106 kg (233 lb 11.2 oz)   SpO2 94%   BMI 40.11 kg/m   " "  Estimated body mass index is 40.11 kg/m  as calculated from the following:    Height as of this encounter: 1.626 m (5' 4\").    Weight as of this encounter: 106 kg (233 lb 11.2 oz).    Physical Exam  GENERAL: alert and no distress; very hard of hearing   HENT: ear canals and TM's normal, nose and mouth without ulcers or lesions  NECK: no adenopathy, no asymmetry, masses, or scars  RESP: lungs clear to auscultation - no rales, rhonchi or wheezes  CV: Heart with regular rate and rhythm.   ABDOMEN: soft, nontender, no hepatosplenomegaly, no masses and bowel sounds normal  MS: trace bilateral edema in both legs unchanged  NEURO: walks with walker, symmetric strength   PSYCH: unchanged flat affect, well groomed, normal speech         11/5/2024   Mini Cog   Clock Draw Score 2 Normal   3 Item Recall 0 objects recalled   Mini Cog Total Score 2                 Signed Electronically by: Kole Gonsalez MD    Answers submitted by the patient for this visit:  Patient Health Questionnaire (Submitted on 11/5/2024)  PHQ9 TOTAL SCORE: 6    "

## 2024-11-05 NOTE — PATIENT INSTRUCTIONS
Patient Education   Preventive Care Advice   This is general advice given by our system to help you stay healthy. However, your care team may have specific advice just for you. Please talk to your care team about your preventive care needs.  Nutrition  Eat 5 or more servings of fruits and vegetables each day.  Try wheat bread, brown rice and whole grain pasta (instead of white bread, rice, and pasta).  Get enough calcium and vitamin D. Check the label on foods and aim for 100% of the RDA (recommended daily allowance).  Lifestyle  Exercise at least 150 minutes each week  (30 minutes a day, 5 days a week).  Do muscle strengthening activities 2 days a week. These help control your weight and prevent disease.  No smoking.  Wear sunscreen to prevent skin cancer.  Have a dental exam and cleaning every 6 months.  Yearly exams  See your health care team every year to talk about:  Any changes in your health.  Any medicines your care team has prescribed.  Preventive care, family planning, and ways to prevent chronic diseases.  Shots (vaccines)   HPV shots (up to age 26), if you've never had them before.  Hepatitis B shots (up to age 59), if you've never had them before.  COVID-19 shot: Get this shot when it's due.  Flu shot: Get a flu shot every year.  Tetanus shot: Get a tetanus shot every 10 years.  Pneumococcal, hepatitis A, and RSV shots: Ask your care team if you need these based on your risk.  Shingles shot (for age 50 and up)  General health tests  Diabetes screening:  Starting at age 35, Get screened for diabetes at least every 3 years.  If you are younger than age 35, ask your care team if you should be screened for diabetes.  Cholesterol test: At age 39, start having a cholesterol test every 5 years, or more often if advised.  Bone density scan (DEXA): At age 50, ask your care team if you should have this scan for osteoporosis (brittle bones).  Hepatitis C: Get tested at least once in your life.  STIs (sexually  transmitted infections)  Before age 24: Ask your care team if you should be screened for STIs.  After age 24: Get screened for STIs if you're at risk. You are at risk for STIs (including HIV) if:  You are sexually active with more than one person.  You don't use condoms every time.  You or a partner was diagnosed with a sexually transmitted infection.  If you are at risk for HIV, ask about PrEP medicine to prevent HIV.  Get tested for HIV at least once in your life, whether you are at risk for HIV or not.  Cancer screening tests  Cervical cancer screening: If you have a cervix, begin getting regular cervical cancer screening tests starting at age 21.  Breast cancer scan (mammogram): If you've ever had breasts, begin having regular mammograms starting at age 40. This is a scan to check for breast cancer.  Colon cancer screening: It is important to start screening for colon cancer at age 45.  Have a colonoscopy test every 10 years (or more often if you're at risk) Or, ask your provider about stool tests like a FIT test every year or Cologuard test every 3 years.  To learn more about your testing options, visit:   .  For help making a decision, visit:   https://bit.ly/dh41387.  Prostate cancer screening test: If you have a prostate, ask your care team if a prostate cancer screening test (PSA) at age 55 is right for you.  Lung cancer screening: If you are a current or former smoker ages 50 to 80, ask your care team if ongoing lung cancer screenings are right for you.  For informational purposes only. Not to replace the advice of your health care provider. Copyright   2023 [facility]. All rights reserved. Clinically reviewed by the [facility] Transitions [facility]. ScaleGrid 904818 - REV 01/24.  Preventing Falls: Care Instructions  Injuries and health problems such as trouble walking or poor eyesight can increase your risk of falling. So can some medicines. But there are things you can do to help  "prevent falls. You can exercise to get stronger. You can also arrange your home to make it safer.    Talk to your doctor about the medicines you take. Ask if any of them increase the risk of falls and whether they can be changed or stopped.   Try to exercise regularly. It can help improve your strength and balance. This can help lower your risk of falling.         Practice fall safety and prevention.   Wear low-heeled shoes that fit well and give your feet good support. Talk to your doctor if you have foot problems that make this hard.  Carry a cellphone or wear a medical alert device that you can use to call for help.  Use stepladders instead of chairs to reach high objects. Don't climb if you're at risk for falls. Ask for help, if needed.  Wear the correct eyeglasses, if you need them.        Make your home safer.   Remove rugs, cords, clutter, and furniture from walkways.  Keep your house well lit. Use night-lights in hallways and bathrooms.  Install and use sturdy handrails on stairways.  Wear nonskid footwear, even inside. Don't walk barefoot or in socks without shoes.        Be safe outside.   Use handrails, curb cuts, and ramps whenever possible.  Keep your hands free by using a shoulder bag or backpack.  Try to walk in well-lit areas. Watch out for uneven ground, changes in pavement, and debris.  Be careful in the winter. Walk on the grass or gravel when sidewalks are slippery. Use de-icer on steps and walkways. Add non-slip devices to shoes.    Put grab bars and nonskid mats in your shower or tub and near the toilet. Try to use a shower chair or bath bench when bathing.   Get into a tub or shower by putting in your weaker leg first. Get out with your strong side first. Have a phone or medical alert device in the bathroom with you.   Where can you learn more?  Go to https://www.Jeeveswise.net/patiented  Enter G117 in the search box to learn more about \"Preventing Falls: Care Instructions.\"  Current as of: " July 17, 2023  Content Version: 14.2 2024 [facility] [employer].   Care instructions adapted under license by your healthcare professional. If you have questions about a medical condition or this instruction, always ask your healthcare professional. [employer] disclaims any warranty or liability for your use of this information.    Hearing Loss: Care Instructions  Overview     Hearing loss is a sudden or slow decrease in how well you hear. It can range from slight to profound. Permanent hearing loss can occur with aging. It also can happen when you are exposed long-term to loud noise. Examples include listening to loud music, riding motorcycles, or being around other loud machines.  Hearing loss can affect your work and home life. It can make you feel lonely or depressed. You may feel that you have lost your independence. But hearing aids and other devices can help you hear better and feel connected to others.  Follow-up care is a key part of your treatment and safety. Be sure to make and go to all appointments, and call your doctor if you are having problems. It's also a good idea to know your test results and keep a list of the medicines you take.  How can you care for yourself at home?  Avoid loud noises whenever possible. This helps keep your hearing from getting worse.  Always wear hearing protection around loud noises.  Wear a hearing aid as directed.  A professional can help you pick a hearing aid that will work best for you.  You can also get hearing aids over the counter for mild to moderate hearing loss.  Have hearing tests as your doctor suggests. They can show whether your hearing has changed. Your hearing aid may need to be adjusted.  Use other devices as needed. These may include:  Telephone amplifiers and hearing aids that can connect to a television, stereo, radio, or microphone.  Devices that use lights or vibrations. These alert you to the doorbell, a ringing telephone, or a baby  "monitor.  Television closed-captioning. This shows the words at the bottom of the screen. Most new TVs can do this.  TTY (text telephone). This lets you type messages back and forth on the telephone instead of talking or listening. These devices are also called TDD. When messages are typed on the keyboard, they are sent over the phone line to a receiving TTY. The message is shown on a monitor.  Use text messaging, social media, and email if it is hard for you to communicate by telephone.  Try to learn a listening technique called speechreading. It is not lipreading. You pay attention to people's gestures, expressions, posture, and tone of voice. These clues can help you understand what a person is saying. Face the person you are talking to, and have them face you. Make sure the lighting is good. You need to see the other person's face clearly.  Think about counseling if you need help to adjust to your hearing loss.  When should you call for help?  Watch closely for changes in your health, and be sure to contact your doctor if:    You think your hearing is getting worse.     You have new symptoms, such as dizziness or nausea.   Where can you learn more?  Go to https://www.coin4ce.net/patiented  Enter R798 in the search box to learn more about \"Hearing Loss: Care Instructions.\"  Current as of: September 27, 2023  Content Version: 14.2 2024 [facility] [employer].   Care instructions adapted under license by your healthcare professional. If you have questions about a medical condition or this instruction, always ask your healthcare professional. Healthwise, Incorporated disclaims any warranty or liability for your use of this information.    Learning About Depression Screening  What is depression screening?  Depression screening is a way to see if you have depression symptoms. It may be done by a doctor or counselor. It's often part of a routine checkup. That's because your mental health is just as important as " "your physical health.  Depression is a mental health condition that affects how you feel, think, and act. You may:  Have less energy.  Lose interest in your daily activities.  Feel sad and grouchy for a long time.  Depression is very common. It affects people of all ages.  Many things can lead to depression. Some people become depressed after they have a stroke or find out they have a major illness like cancer or heart disease. The death of a loved one or a breakup may lead to depression. It can run in families. Most experts believe that a combination of inherited genes and stressful life events can cause it.  What happens during screening?  You may be asked to fill out a form about your depression symptoms. You and the doctor will discuss your answers. The doctor may ask you more questions to learn more about how you think, act, and feel.  What happens after screening?  If you have symptoms of depression, your doctor will talk to you about your options.  Doctors usually treat depression with medicines or counseling. Often, combining the two works best. Many people don't get help because they think that they'll get over the depression on their own. But people with depression may not get better unless they get treatment.  The cause of depression is not well understood. There may be many factors involved. But if you have depression, it's not your fault.  A serious symptom of depression is thinking about death or suicide. If you or someone you care about talks about this or about feeling hopeless, get help right away.  It's important to know that depression can be treated. Medicine, counseling, and self-care may help.  Where can you learn more?  Go to https://www.LiveClips.net/patiented  Enter T185 in the search box to learn more about \"Learning About Depression Screening.\"  Current as of: June 24, 2023  Content Version: 14.2 2024 [facility] [employer].   Care instructions adapted under license by your [facility] " professional. If you have questions about a medical condition or this instruction, always ask your healthcare professional. Healthwise, Incorporated disclaims any warranty or liability for your use of this information.

## 2024-12-17 ENCOUNTER — LAB (OUTPATIENT)
Dept: LAB | Facility: CLINIC | Age: 84
End: 2024-12-17
Payer: COMMERCIAL

## 2024-12-17 DIAGNOSIS — E11.51 TYPE 2 DIABETES MELLITUS WITH DIABETIC PERIPHERAL ANGIOPATHY WITHOUT GANGRENE, WITHOUT LONG-TERM CURRENT USE OF INSULIN (H): ICD-10-CM

## 2024-12-17 LAB
EST. AVERAGE GLUCOSE BLD GHB EST-MCNC: 209 MG/DL
HBA1C MFR BLD: 8.9 % (ref 0–5.6)

## 2024-12-17 PROCEDURE — 36415 COLL VENOUS BLD VENIPUNCTURE: CPT

## 2024-12-17 PROCEDURE — 83036 HEMOGLOBIN GLYCOSYLATED A1C: CPT

## 2025-02-03 ENCOUNTER — OFFICE VISIT (OUTPATIENT)
Dept: PHARMACY | Facility: CLINIC | Age: 85
End: 2025-02-03
Attending: INTERNAL MEDICINE
Payer: COMMERCIAL

## 2025-02-03 VITALS — DIASTOLIC BLOOD PRESSURE: 74 MMHG | BODY MASS INDEX: 39.48 KG/M2 | WEIGHT: 230 LBS | SYSTOLIC BLOOD PRESSURE: 128 MMHG

## 2025-02-03 DIAGNOSIS — E78.5 HYPERLIPIDEMIA LDL GOAL <70: ICD-10-CM

## 2025-02-03 DIAGNOSIS — I10 ESSENTIAL HYPERTENSION, BENIGN: ICD-10-CM

## 2025-02-03 DIAGNOSIS — E11.59 TYPE 2 DIABETES MELLITUS WITH VASCULAR DISEASE (H): Primary | ICD-10-CM

## 2025-02-03 DIAGNOSIS — M79.662 PAIN OF LEFT LOWER LEG: ICD-10-CM

## 2025-02-03 DIAGNOSIS — I25.10 CORONARY ARTERY DISEASE INVOLVING NATIVE CORONARY ARTERY OF NATIVE HEART WITHOUT ANGINA PECTORIS: ICD-10-CM

## 2025-02-03 DIAGNOSIS — F41.9 ANXIETY: ICD-10-CM

## 2025-02-03 DIAGNOSIS — H10.13 ALLERGIC CONJUNCTIVITIS, BILATERAL: ICD-10-CM

## 2025-02-03 PROCEDURE — 99605 MTMS BY PHARM NP 15 MIN: CPT | Performed by: PHARMACIST

## 2025-02-03 PROCEDURE — 99607 MTMS BY PHARM ADDL 15 MIN: CPT | Performed by: PHARMACIST

## 2025-02-03 NOTE — LETTER
February 3, 2025  Cara Camarillo  [address] [id number]    Dear Ms. Camarillo, [facility]     Thank you for talking with me on Feb 3, 2025 about your health and medications. As a follow-up to our conversation, I have included two documents:      Your Recommended To-Do List has steps you should take to get the best results from your medications.  Your Medication List will help you keep track of your medications and how to take them.    If you want to talk about these documents, please call Lissy Diana PharmD at phone: [phone], Monday-Friday 8-4:30pm.    I look forward to working with you and your doctors to make sure your medications work well for you.    Sincerely,  Lissy Diana PharmD  [facility] Pharmacist, [facility]

## 2025-02-03 NOTE — LETTER
"Recommended To-Do List      Prepared on: Feb 3, 2025       You can get the best results from your medications by completing the items on this \"To-Do List.\"      Bring your To-Do List when you go to your doctor. And, share it with your family or caregivers.    My To-Do List:  What we talked about: What I should do:   What my medicines are for, how to know if my medicines are working, made sure my medicines are safe for me and reviewed how to take my medicines.    Take my medicines every day               "

## 2025-02-03 NOTE — LETTER
"_  Medication List        Prepared on: Feb 3, 2025     Bring your Medication List when you go to the doctor, hospital, or   emergency room. And, share it with your family or caregivers.     Note any changes to how you take your medications.  Cross out medications when you no longer use them.    Medication How I take it Why I use it Prescriber   acetaminophen (TYLENOL) 500 MG tablet Take 1,000 mg by mouth every 8 hours as needed for pain Pain Patient Reported   amLODIPine (NORVASC) 5 MG tablet TAKE 1 TABLET BY MOUTH EVERY DAY Essential Hypertension, Benign Kole Gonsalez MD   aspirin 81 MG tablet Take 1 tablet (81 mg) by mouth daily DM w/o Complication Type II Ventura Alvarez MD   atorvastatin (LIPITOR) 20 MG tablet TAKE 1 TABLET BY MOUTH EVERY DAY Hyperlipidemia LDL Goal <70 Kole Gonsalez MD   BD INSULIN SYRINGE U/F 30G X 1/2\" 0.5 ML miscellaneous USE ONE SYRINGE TWICE DAILY OR AS DIRECTED. 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) Kole Gonsalez MD   blood glucose (CONTOUR NEXT TEST) test strip USE TO TEST BLOOD SUGAR DAILY OR AS DIRECTED. 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) Koel Gonsalez MD   blood glucose monitoring (CONTOUR NEXT MONITOR W/DEVICE KIT) meter device kit Use to test blood sugar 3 times daily or as directed. 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) Kole Gonsalez MD   DULoxetine (CYMBALTA) 60 MG capsule TAKE 1 CAPSULE BY MOUTH EVERY DAY Chronic left-sided low back pain with left-sided sciatica Kole Gonsalez MD   insulin  UNIT/ML injection Inject 22 Units subcutaneously 2 times daily (before meals). Type 2 diabetes mellitus with diabetic peripheral angiopathy without gangrene, without long-term current use of insulin (H) Kole Gonsalez MD   ketotifen fumarate 0.035% 0.035 % SOLN ophthalmic solution Place 1 drop into both eyes every 12 hours as needed for itching or dry eyes Itchy Eyes Kole Gonsalez MD   losartan (COZAAR) 50 MG " tablet TAKE 1 TABLET BY MOUTH TWICE A DAY Essential Hypertension, Benign Kole Gonsalez MD   metFORMIN (GLUCOPHAGE XR) 500 MG 24 hr tablet Take 1 tablet (500 mg) by mouth daily (with dinner). Type 2 diabetes mellitus with diabetic peripheral angiopathy without gangrene, without long-term current use of insulin (H) Kole Gonsalez MD   metoprolol tartrate (LOPRESSOR) 100 MG tablet TAKE 1 TABLET BY MOUTH TWICE A DAY Essential Hypertension, Benign Kole Gonsalez MD   nitroGLYcerin (NITROSTAT) 0.4 MG sublingual tablet Place 1 tablet (0.4 mg) under the tongue every 5 minutes as needed for chest pain if you are still having symptoms after 3 doses (15 minutes) call 911. Acute Angina Pectoris Kole Gonsalez MD         Add new medications, over-the-counter drugs, herbals, vitamins, or  minerals in the blank rows below.    Medication How I take it Why I use it Prescriber                                      Allergies:      - Actos [pioglitazone]  - Enalapril  - Hydrochlorothiazide  - Lisinopril  - Metformin - Diarrhea        Side effects I have had:      Not on File        Other Information:              My notes and questions:

## 2025-02-03 NOTE — PATIENT INSTRUCTIONS
"Recommendations from today's MTM visit:                                                    MTM (medication therapy management) is a service provided by a clinical pharmacist designed to help you get the most of out of your medicines.   Today we reviewed what your medicines are for, how to know if they are working, that your medicines are safe and how to make your medicine regimen as easy as possible.      Schedule lab appointment on/after 3/17/25.    Follow-up: Return in about 6 weeks (around 3/17/2025) for Lab Work.    It was great speaking with you today.  I value your experience and would be very thankful for your time in providing feedback in our clinic survey. In the next few days, you may receive an email or text message from [facility] [employer] with a link to a survey related to your  clinical pharmacist.\"     To schedule another MTM appointment, please call the clinic directly or you may call the MTM scheduling line at [phone] or toll-free at [phone].     My Clinical Pharmacist's contact information:                                                      Please feel free to contact me with any questions or concerns you have.      Karrie Bernard, PharmD  Medication Therapy Management Pharmacy Resident  [facility]  [phone]    Lissy Diana PharmD, BCACP  Medication Therapy Management Provider  [facility]  [phone]     "

## 2025-02-03 NOTE — PROGRESS NOTES
Medication Therapy Management (MTM) Encounter    ASSESSMENT:                            Medication Adherence/Access: No issues identified.    Diabetes   Stable.  Patient is not meeting A1c goal of < 8%.  Self monitoring of blood glucose is at goal of fasting  mg/dL and post prandial < 180 mg/dL the majority of the time.  Due for A1c re-check in March.    Hypertension/CAD  Stable. Patient is meeting blood pressure goal of < 130/80mmHg.    Hyperlipidemia   LDL is not quite at goal <70mg/dL (last was 76mg/dL).        Anxiety/Chronic Pain  Stable.    Allergy  Stable.     PLAN:                            Schedule lab appointment on/after 3/17/25.  A1c ordered.    Follow-up: Return in about 6 weeks (around 3/17/2025) for Lab Work.    SUBJECTIVE/OBJECTIVE:                          Cara Camarillo is a 84 year old female seen for a follow-up visit.  She's joined by her daughter, Kalina, for our visit today.     Reason for visit: Routine follow-up.    Tobacco: She reports that she has never smoked. She has never used smokeless tobacco.  Alcohol: not currently using    Medication Adherence/Access: no issues reported.    Diabetes   Metformin XR 500mg daily - added since our last visit  NPH insulin 20 units twice daily   Aspirin 81mg daily for secondary prevention  Patient is not experiencing side effects.  She refuses more expensive medications for diabetes control.  Current diabetes symptoms: none  Diet/Exercise: No changes, doesn't have an appetite most of the time.   Blood sugar monitoring: twice weekly, alternating between fasting and before dinner:  Fasting - 158, 114, 126, 158, 144mg/dL  Before dinner - 177, 140, 158, 234, 142mg/dL  Eye exam is up to date  Foot exam is up to      Hypertension /CAD  Aspirin 81mg daily (as above)  Amlodipine 5mg daily  Losartan 50mg twice daily   Metoprolol tartrate 100mg twice daily  Sublingual nitroglycerin - no recent use   Patient reports no current medication side  effects  Patient does not self-monitor blood pressure.      Hyperlipidemia   Atorvastatin 20mg daily  Patient reports no significant myalgias or other side effects.     Anxiety/Chronic Pain  Acetaminophen 1000mg three times daily as needed - uses for headaches and muscle pain in her neck, use varies from 1-3 times per day  Duloxetine 60mg daily  Kalina reports duloxetine has been helpful for mental health.  Cara feels duloxetine causes dry mouth and a sore throat.     Allergy  Ketotifen eye drops twice daily as needed - no recent use  She reports these are effective when needed.  Denies side effects.    Today's Vitals: /74   Wt 230 lb (104.3 kg)   BMI 39.48 kg/m    ----------------    I spent 20 minutes with this patient today. All changes were made via collaborative practice agreement with Dr. Gonsalez.     A summary of these recommendations was given to the patient.    Lissy Diana, PharmD, [facility]  Medication Therapy Management Provider  [phone]      Medication Therapy Recommendations  No medication therapy recommendations to display

## 2025-02-12 DIAGNOSIS — M54.42 CHRONIC LEFT-SIDED LOW BACK PAIN WITH LEFT-SIDED SCIATICA: ICD-10-CM

## 2025-02-12 DIAGNOSIS — G89.29 CHRONIC LEFT-SIDED LOW BACK PAIN WITH LEFT-SIDED SCIATICA: ICD-10-CM

## 2025-02-12 RX ORDER — DULOXETIN HYDROCHLORIDE 60 MG/1
60 CAPSULE, DELAYED RELEASE ORAL DAILY
Qty: 90 CAPSULE | Refills: 0 | Status: SHIPPED | OUTPATIENT
Start: 2025-02-12

## 2025-03-17 ENCOUNTER — LAB (OUTPATIENT)
Dept: LAB | Facility: CLINIC | Age: 85
End: 2025-03-17
Payer: COMMERCIAL

## 2025-03-17 DIAGNOSIS — E11.59 TYPE 2 DIABETES MELLITUS WITH VASCULAR DISEASE (H): ICD-10-CM

## 2025-03-17 LAB
EST. AVERAGE GLUCOSE BLD GHB EST-MCNC: 200 MG/DL
HBA1C MFR BLD: 8.6 % (ref 0–5.6)

## 2025-03-17 PROCEDURE — 83036 HEMOGLOBIN GLYCOSYLATED A1C: CPT

## 2025-03-17 PROCEDURE — 36415 COLL VENOUS BLD VENIPUNCTURE: CPT

## 2025-03-17 NOTE — RESULT ENCOUNTER NOTE
The following letter pertains to your most recent diagnostic tests:    The hemoglobin A1c has improved since last check.  You are making progress toward your goal of hemoglobin A1c less than 8.  Please continue to work with Lissy to adjust the insulin to achieve your goal.      Sincerely,    Dr. Gonsalez

## 2025-04-19 DIAGNOSIS — E78.5 HYPERLIPIDEMIA LDL GOAL <70: ICD-10-CM

## 2025-04-19 DIAGNOSIS — I10 ESSENTIAL HYPERTENSION, BENIGN: ICD-10-CM

## 2025-04-21 RX ORDER — AMLODIPINE BESYLATE 5 MG/1
5 TABLET ORAL DAILY
Qty: 90 TABLET | Refills: 3 | Status: SHIPPED | OUTPATIENT
Start: 2025-04-21

## 2025-04-21 RX ORDER — ATORVASTATIN CALCIUM 20 MG/1
20 TABLET, FILM COATED ORAL DAILY
Qty: 90 TABLET | Refills: 1 | Status: SHIPPED | OUTPATIENT
Start: 2025-04-21

## 2025-04-21 RX ORDER — LOSARTAN POTASSIUM 50 MG/1
50 TABLET ORAL 2 TIMES DAILY
Qty: 180 TABLET | Refills: 3 | Status: SHIPPED | OUTPATIENT
Start: 2025-04-21

## 2025-04-30 DIAGNOSIS — I10 ESSENTIAL HYPERTENSION, BENIGN: ICD-10-CM

## 2025-04-30 RX ORDER — METOPROLOL TARTRATE 100 MG/1
100 TABLET ORAL 2 TIMES DAILY
Qty: 180 TABLET | Refills: 0 | Status: SHIPPED | OUTPATIENT
Start: 2025-04-30

## 2025-05-03 ENCOUNTER — HEALTH MAINTENANCE LETTER (OUTPATIENT)
Age: 85
End: 2025-05-03

## 2025-05-10 DIAGNOSIS — G89.29 CHRONIC LEFT-SIDED LOW BACK PAIN WITH LEFT-SIDED SCIATICA: ICD-10-CM

## 2025-05-10 DIAGNOSIS — M54.42 CHRONIC LEFT-SIDED LOW BACK PAIN WITH LEFT-SIDED SCIATICA: ICD-10-CM

## 2025-05-12 RX ORDER — DULOXETIN HYDROCHLORIDE 60 MG/1
60 CAPSULE, DELAYED RELEASE ORAL DAILY
Qty: 90 CAPSULE | Refills: 0 | Status: SHIPPED | OUTPATIENT
Start: 2025-05-12

## 2025-07-05 ENCOUNTER — HEALTH MAINTENANCE LETTER (OUTPATIENT)
Age: 85
End: 2025-07-05

## 2025-07-27 DIAGNOSIS — I10 ESSENTIAL HYPERTENSION, BENIGN: ICD-10-CM

## 2025-07-28 RX ORDER — METOPROLOL TARTRATE 100 MG/1
100 TABLET ORAL 2 TIMES DAILY
Qty: 180 TABLET | Refills: 0 | Status: SHIPPED | OUTPATIENT
Start: 2025-07-28

## 2025-08-07 DIAGNOSIS — M54.42 CHRONIC LEFT-SIDED LOW BACK PAIN WITH LEFT-SIDED SCIATICA: ICD-10-CM

## 2025-08-07 DIAGNOSIS — G89.29 CHRONIC LEFT-SIDED LOW BACK PAIN WITH LEFT-SIDED SCIATICA: ICD-10-CM

## 2025-08-07 RX ORDER — DULOXETIN HYDROCHLORIDE 60 MG/1
60 CAPSULE, DELAYED RELEASE ORAL DAILY
Qty: 90 CAPSULE | Refills: 0 | Status: SHIPPED | OUTPATIENT
Start: 2025-08-07

## (undated) RX ORDER — REGADENOSON 0.08 MG/ML
INJECTION, SOLUTION INTRAVENOUS
Status: DISPENSED
Start: 2017-04-18